# Patient Record
Sex: FEMALE | Race: WHITE | Employment: UNEMPLOYED | URBAN - NONMETROPOLITAN AREA
[De-identification: names, ages, dates, MRNs, and addresses within clinical notes are randomized per-mention and may not be internally consistent; named-entity substitution may affect disease eponyms.]

---

## 2017-02-02 ENCOUNTER — TELEPHONE (OUTPATIENT)
Dept: FAMILY MEDICINE CLINIC | Age: 72
End: 2017-02-02

## 2017-03-07 ENCOUNTER — OFFICE VISIT (OUTPATIENT)
Dept: OPHTHALMOLOGY | Age: 72
End: 2017-03-07

## 2017-03-07 DIAGNOSIS — H40.10X3 COAG (CHRONIC OPEN-ANGLE GLAUCOMA), SEVERE STAGE: Primary | ICD-10-CM

## 2017-03-07 PROCEDURE — 92083 EXTENDED VISUAL FIELD XM: CPT | Performed by: OPHTHALMOLOGY

## 2017-03-07 PROCEDURE — 92133 CPTRZD OPH DX IMG PST SGM ON: CPT | Performed by: OPHTHALMOLOGY

## 2017-03-07 PROCEDURE — 1036F TOBACCO NON-USER: CPT | Performed by: OPHTHALMOLOGY

## 2017-03-07 RX ORDER — PHENYLEPHRINE HCL 2.5 %
1 DROPS OPHTHALMIC (EYE) ONCE
Status: COMPLETED | OUTPATIENT
Start: 2017-03-07 | End: 2017-03-07

## 2017-03-07 RX ORDER — TROPICAMIDE 10 MG/ML
1 SOLUTION/ DROPS OPHTHALMIC ONCE
Status: COMPLETED | OUTPATIENT
Start: 2017-03-07 | End: 2017-03-07

## 2017-03-07 RX ADMIN — Medication 1 DROP: at 14:15

## 2017-03-07 RX ADMIN — TROPICAMIDE 1 DROP: 10 SOLUTION/ DROPS OPHTHALMIC at 14:15

## 2017-03-09 ENCOUNTER — OFFICE VISIT (OUTPATIENT)
Dept: OPHTHALMOLOGY | Age: 72
End: 2017-03-09

## 2017-03-09 ENCOUNTER — OFFICE VISIT (OUTPATIENT)
Dept: FAMILY MEDICINE CLINIC | Age: 72
End: 2017-03-09

## 2017-03-09 VITALS
SYSTOLIC BLOOD PRESSURE: 100 MMHG | WEIGHT: 153.8 LBS | DIASTOLIC BLOOD PRESSURE: 68 MMHG | HEART RATE: 60 BPM | HEIGHT: 65 IN | RESPIRATION RATE: 16 BRPM | BODY MASS INDEX: 25.62 KG/M2

## 2017-03-09 DIAGNOSIS — H40.10X3 COAG (CHRONIC OPEN-ANGLE GLAUCOMA), SEVERE STAGE: Primary | ICD-10-CM

## 2017-03-09 DIAGNOSIS — H02.403 PTOSIS OF EYELID, BILATERAL: ICD-10-CM

## 2017-03-09 DIAGNOSIS — Z96.1 PSEUDOPHAKIA OF BOTH EYES: ICD-10-CM

## 2017-03-09 DIAGNOSIS — E11.9 TYPE 2 DIABETES MELLITUS WITHOUT COMPLICATION, WITHOUT LONG-TERM CURRENT USE OF INSULIN (HCC): ICD-10-CM

## 2017-03-09 DIAGNOSIS — J01.41 ACUTE RECURRENT PANSINUSITIS: Primary | ICD-10-CM

## 2017-03-09 PROCEDURE — 4040F PNEUMOC VAC/ADMIN/RCVD: CPT | Performed by: FAMILY MEDICINE

## 2017-03-09 PROCEDURE — 3044F HG A1C LEVEL LT 7.0%: CPT | Performed by: OPHTHALMOLOGY

## 2017-03-09 PROCEDURE — 1036F TOBACCO NON-USER: CPT | Performed by: FAMILY MEDICINE

## 2017-03-09 PROCEDURE — G8483 FLU IMM NO ADMIN DOC REA: HCPCS | Performed by: FAMILY MEDICINE

## 2017-03-09 PROCEDURE — 1123F ACP DISCUSS/DSCN MKR DOCD: CPT | Performed by: FAMILY MEDICINE

## 2017-03-09 PROCEDURE — 1090F PRES/ABSN URINE INCON ASSESS: CPT | Performed by: OPHTHALMOLOGY

## 2017-03-09 PROCEDURE — G8483 FLU IMM NO ADMIN DOC REA: HCPCS | Performed by: OPHTHALMOLOGY

## 2017-03-09 PROCEDURE — 1123F ACP DISCUSS/DSCN MKR DOCD: CPT | Performed by: OPHTHALMOLOGY

## 2017-03-09 PROCEDURE — 1036F TOBACCO NON-USER: CPT | Performed by: OPHTHALMOLOGY

## 2017-03-09 PROCEDURE — G8427 DOCREV CUR MEDS BY ELIG CLIN: HCPCS | Performed by: FAMILY MEDICINE

## 2017-03-09 PROCEDURE — 99214 OFFICE O/P EST MOD 30 MIN: CPT | Performed by: OPHTHALMOLOGY

## 2017-03-09 PROCEDURE — 1090F PRES/ABSN URINE INCON ASSESS: CPT | Performed by: FAMILY MEDICINE

## 2017-03-09 PROCEDURE — 4040F PNEUMOC VAC/ADMIN/RCVD: CPT | Performed by: OPHTHALMOLOGY

## 2017-03-09 PROCEDURE — G8420 CALC BMI NORM PARAMETERS: HCPCS | Performed by: FAMILY MEDICINE

## 2017-03-09 PROCEDURE — 99213 OFFICE O/P EST LOW 20 MIN: CPT | Performed by: FAMILY MEDICINE

## 2017-03-09 PROCEDURE — 3017F COLORECTAL CA SCREEN DOC REV: CPT | Performed by: FAMILY MEDICINE

## 2017-03-09 PROCEDURE — G8427 DOCREV CUR MEDS BY ELIG CLIN: HCPCS | Performed by: OPHTHALMOLOGY

## 2017-03-09 PROCEDURE — 3014F SCREEN MAMMO DOC REV: CPT | Performed by: FAMILY MEDICINE

## 2017-03-09 PROCEDURE — G8399 PT W/DXA RESULTS DOCUMENT: HCPCS | Performed by: FAMILY MEDICINE

## 2017-03-09 PROCEDURE — G8399 PT W/DXA RESULTS DOCUMENT: HCPCS | Performed by: OPHTHALMOLOGY

## 2017-03-09 PROCEDURE — 3017F COLORECTAL CA SCREEN DOC REV: CPT | Performed by: OPHTHALMOLOGY

## 2017-03-09 PROCEDURE — G8420 CALC BMI NORM PARAMETERS: HCPCS | Performed by: OPHTHALMOLOGY

## 2017-03-09 PROCEDURE — 3014F SCREEN MAMMO DOC REV: CPT | Performed by: OPHTHALMOLOGY

## 2017-03-09 RX ORDER — BENOXINATE HCL/FLUORESCEIN SOD 0.4%-0.25%
1 DROPS OPHTHALMIC (EYE) ONCE
Status: COMPLETED | OUTPATIENT
Start: 2017-03-09 | End: 2017-03-09

## 2017-03-09 RX ORDER — AMOXICILLIN AND CLAVULANATE POTASSIUM 875; 125 MG/1; MG/1
1 TABLET, FILM COATED ORAL 2 TIMES DAILY
Qty: 20 TABLET | Refills: 0 | Status: SHIPPED | OUTPATIENT
Start: 2017-03-09 | End: 2017-03-19

## 2017-03-09 RX ORDER — TROPICAMIDE 10 MG/ML
1 SOLUTION/ DROPS OPHTHALMIC ONCE
Status: COMPLETED | OUTPATIENT
Start: 2017-03-09 | End: 2017-03-09

## 2017-03-09 RX ORDER — PHENYLEPHRINE HCL 2.5 %
1 DROPS OPHTHALMIC (EYE) ONCE
Status: COMPLETED | OUTPATIENT
Start: 2017-03-09 | End: 2017-03-09

## 2017-03-09 RX ORDER — BLOOD PRESSURE TEST KIT
KIT MISCELLANEOUS
Qty: 1 KIT | Refills: 0 | Status: SHIPPED | OUTPATIENT
Start: 2017-03-09 | End: 2017-03-14 | Stop reason: SDUPTHER

## 2017-03-09 RX ADMIN — Medication 1 DROP: at 14:31

## 2017-03-09 RX ADMIN — TROPICAMIDE 1 DROP: 10 SOLUTION/ DROPS OPHTHALMIC at 14:31

## 2017-03-09 ASSESSMENT — ENCOUNTER SYMPTOMS
TROUBLE SWALLOWING: 0
SORE THROAT: 0
EYES NEGATIVE: 1
VOICE CHANGE: 0
COUGH: 1
ALLERGIC/IMMUNOLOGIC NEGATIVE: 1
GASTROINTESTINAL NEGATIVE: 1
SINUS PRESSURE: 1
RHINORRHEA: 1

## 2017-03-09 ASSESSMENT — TONOMETRY
OS_IOP_MMHG: 14
IOP_METHOD: APPLANATION W FLURESS DROP
OD_IOP_MMHG: 15

## 2017-03-09 ASSESSMENT — SLIT LAMP EXAM - LIDS
COMMENTS: 2+ PTOSIS
COMMENTS: 1+ PTOSIS

## 2017-03-09 ASSESSMENT — VISUAL ACUITY
OS_CC: 20/20
CORRECTION_TYPE: GLASSES
METHOD: SNELLEN - LINEAR

## 2017-03-14 RX ORDER — FLUTICASONE PROPIONATE 50 MCG
2 SPRAY, SUSPENSION (ML) NASAL DAILY
Qty: 1 BOTTLE | Refills: 5 | Status: SHIPPED | OUTPATIENT
Start: 2017-03-14 | End: 2018-05-10 | Stop reason: SDUPTHER

## 2017-03-14 RX ORDER — BLOOD PRESSURE TEST KIT
KIT MISCELLANEOUS
Qty: 1 KIT | Refills: 0 | Status: SHIPPED | OUTPATIENT
Start: 2017-03-14 | End: 2017-09-11 | Stop reason: SDUPTHER

## 2017-03-21 ENCOUNTER — TELEPHONE (OUTPATIENT)
Dept: FAMILY MEDICINE CLINIC | Age: 72
End: 2017-03-21

## 2017-03-30 ENCOUNTER — TELEPHONE (OUTPATIENT)
Dept: FAMILY MEDICINE CLINIC | Age: 72
End: 2017-03-30

## 2017-04-07 RX ORDER — BEPOTASTINE BESILATE 15 MG/ML
1 SOLUTION/ DROPS OPHTHALMIC 2 TIMES DAILY
Qty: 1 BOTTLE | Refills: 3 | Status: SHIPPED | OUTPATIENT
Start: 2017-04-07 | End: 2017-09-11 | Stop reason: ALTCHOICE

## 2017-04-11 ENCOUNTER — OFFICE VISIT (OUTPATIENT)
Dept: FAMILY MEDICINE CLINIC | Age: 72
End: 2017-04-11
Payer: MEDICARE

## 2017-04-11 VITALS
DIASTOLIC BLOOD PRESSURE: 68 MMHG | SYSTOLIC BLOOD PRESSURE: 116 MMHG | HEIGHT: 65 IN | BODY MASS INDEX: 25.49 KG/M2 | HEART RATE: 64 BPM | WEIGHT: 153 LBS

## 2017-04-11 DIAGNOSIS — K11.7 XEROSTOMIA: ICD-10-CM

## 2017-04-11 DIAGNOSIS — K30 DELAYED GASTRIC EMPTYING: ICD-10-CM

## 2017-04-11 DIAGNOSIS — E11.9 TYPE 2 DIABETES MELLITUS WITHOUT COMPLICATION, WITHOUT LONG-TERM CURRENT USE OF INSULIN (HCC): ICD-10-CM

## 2017-04-11 DIAGNOSIS — K58.1 IRRITABLE BOWEL SYNDROME WITH CONSTIPATION: ICD-10-CM

## 2017-04-11 DIAGNOSIS — Z91.81 AT HIGH RISK FOR FALLS: Primary | ICD-10-CM

## 2017-04-11 DIAGNOSIS — I10 ESSENTIAL HYPERTENSION: ICD-10-CM

## 2017-04-11 DIAGNOSIS — K11.1 PAROTID GLAND ENLARGEMENT: ICD-10-CM

## 2017-04-11 PROCEDURE — G8427 DOCREV CUR MEDS BY ELIG CLIN: HCPCS | Performed by: FAMILY MEDICINE

## 2017-04-11 PROCEDURE — G8399 PT W/DXA RESULTS DOCUMENT: HCPCS | Performed by: FAMILY MEDICINE

## 2017-04-11 PROCEDURE — 1123F ACP DISCUSS/DSCN MKR DOCD: CPT | Performed by: FAMILY MEDICINE

## 2017-04-11 PROCEDURE — 3014F SCREEN MAMMO DOC REV: CPT | Performed by: FAMILY MEDICINE

## 2017-04-11 PROCEDURE — 1036F TOBACCO NON-USER: CPT | Performed by: FAMILY MEDICINE

## 2017-04-11 PROCEDURE — 1090F PRES/ABSN URINE INCON ASSESS: CPT | Performed by: FAMILY MEDICINE

## 2017-04-11 PROCEDURE — 3044F HG A1C LEVEL LT 7.0%: CPT | Performed by: FAMILY MEDICINE

## 2017-04-11 PROCEDURE — G8420 CALC BMI NORM PARAMETERS: HCPCS | Performed by: FAMILY MEDICINE

## 2017-04-11 PROCEDURE — 3017F COLORECTAL CA SCREEN DOC REV: CPT | Performed by: FAMILY MEDICINE

## 2017-04-11 PROCEDURE — 4040F PNEUMOC VAC/ADMIN/RCVD: CPT | Performed by: FAMILY MEDICINE

## 2017-04-11 PROCEDURE — 99214 OFFICE O/P EST MOD 30 MIN: CPT | Performed by: FAMILY MEDICINE

## 2017-04-11 ASSESSMENT — ENCOUNTER SYMPTOMS
EYES NEGATIVE: 1
ALLERGIC/IMMUNOLOGIC NEGATIVE: 1
GASTROINTESTINAL NEGATIVE: 1
RESPIRATORY NEGATIVE: 1

## 2017-06-09 ENCOUNTER — TELEPHONE (OUTPATIENT)
Dept: FAMILY MEDICINE CLINIC | Age: 72
End: 2017-06-09

## 2017-06-20 ENCOUNTER — TELEPHONE (OUTPATIENT)
Dept: FAMILY MEDICINE CLINIC | Age: 72
End: 2017-06-20
Payer: MEDICARE

## 2017-06-20 DIAGNOSIS — K85.90 ACUTE PANCREATITIS, UNSPECIFIED COMPLICATION STATUS, UNSPECIFIED PANCREATITIS TYPE: ICD-10-CM

## 2017-06-20 DIAGNOSIS — K58.1 IRRITABLE BOWEL SYNDROME WITH CONSTIPATION: ICD-10-CM

## 2017-06-20 DIAGNOSIS — I10 ESSENTIAL HYPERTENSION: ICD-10-CM

## 2017-06-20 DIAGNOSIS — E13.8 DIABETES MELLITUS OF OTHER TYPE WITH COMPLICATION: Primary | ICD-10-CM

## 2017-06-21 PROCEDURE — G0180 MD CERTIFICATION HHA PATIENT: HCPCS | Performed by: FAMILY MEDICINE

## 2017-07-10 DIAGNOSIS — I10 ESSENTIAL HYPERTENSION: ICD-10-CM

## 2017-07-10 RX ORDER — LEVOTHYROXINE SODIUM 88 UG/1
TABLET ORAL
Qty: 30 TABLET | Refills: 6 | Status: SHIPPED | OUTPATIENT
Start: 2017-07-10 | End: 2017-11-15 | Stop reason: SDUPTHER

## 2017-07-10 RX ORDER — LISINOPRIL 5 MG/1
TABLET ORAL
Qty: 30 TABLET | Refills: 10 | Status: SHIPPED | OUTPATIENT
Start: 2017-07-10 | End: 2017-11-15 | Stop reason: SDUPTHER

## 2017-07-21 ENCOUNTER — HOSPITAL ENCOUNTER (EMERGENCY)
Age: 72
Discharge: HOME | End: 2017-07-21
Attending: EMERGENCY MEDICINE
Payer: MEDICARE

## 2017-07-21 ENCOUNTER — APPOINTMENT (OUTPATIENT)
Dept: GENERAL RADIOLOGY | Age: 72
End: 2017-07-21
Payer: MEDICARE

## 2017-07-21 VITALS
SYSTOLIC BLOOD PRESSURE: 122 MMHG | OXYGEN SATURATION: 95 % | WEIGHT: 152 LBS | HEIGHT: 65 IN | RESPIRATION RATE: 16 BRPM | HEART RATE: 71 BPM | DIASTOLIC BLOOD PRESSURE: 64 MMHG | BODY MASS INDEX: 25.33 KG/M2 | TEMPERATURE: 97.7 F

## 2017-07-21 DIAGNOSIS — J01.00 ACUTE NON-RECURRENT MAXILLARY SINUSITIS: Primary | ICD-10-CM

## 2017-07-21 PROCEDURE — 70360 X-RAY EXAM OF NECK: CPT

## 2017-07-21 PROCEDURE — 99284 EMERGENCY DEPT VISIT MOD MDM: CPT

## 2017-07-21 PROCEDURE — 70360 X-RAY EXAM OF NECK: CPT | Performed by: RADIOLOGY

## 2017-07-21 RX ORDER — SULFAMETHOXAZOLE AND TRIMETHOPRIM 800; 160 MG/1; MG/1
1 TABLET ORAL 2 TIMES DAILY
Qty: 20 TABLET | Refills: 0 | Status: SHIPPED | OUTPATIENT
Start: 2017-07-21 | End: 2017-07-31

## 2017-07-21 ASSESSMENT — PAIN DESCRIPTION - LOCATION: LOCATION: HEAD

## 2017-07-21 ASSESSMENT — PAIN DESCRIPTION - ORIENTATION: ORIENTATION: OTHER (COMMENT)

## 2017-07-21 ASSESSMENT — PAIN DESCRIPTION - ONSET: ONSET: ON-GOING

## 2017-07-21 ASSESSMENT — PAIN DESCRIPTION - DESCRIPTORS: DESCRIPTORS: PRESSURE

## 2017-07-21 ASSESSMENT — PAIN DESCRIPTION - PAIN TYPE: TYPE: ACUTE PAIN

## 2017-07-21 ASSESSMENT — PAIN SCALES - GENERAL
PAINLEVEL_OUTOF10: 7
PAINLEVEL_OUTOF10: 6

## 2017-07-21 ASSESSMENT — PAIN DESCRIPTION - FREQUENCY: FREQUENCY: CONTINUOUS

## 2017-07-24 ENCOUNTER — CARE COORDINATION (OUTPATIENT)
Dept: FAMILY MEDICINE CLINIC | Age: 72
End: 2017-07-24

## 2017-08-04 ENCOUNTER — OFFICE VISIT (OUTPATIENT)
Dept: PRIMARY CARE CLINIC | Age: 72
End: 2017-08-04
Payer: MEDICARE

## 2017-08-04 VITALS
HEIGHT: 65 IN | SYSTOLIC BLOOD PRESSURE: 112 MMHG | DIASTOLIC BLOOD PRESSURE: 74 MMHG | TEMPERATURE: 98.3 F | BODY MASS INDEX: 25.56 KG/M2 | OXYGEN SATURATION: 98 % | WEIGHT: 153.4 LBS | HEART RATE: 74 BPM

## 2017-08-04 DIAGNOSIS — R00.2 FLUTTERING SENSATION OF HEART: ICD-10-CM

## 2017-08-04 DIAGNOSIS — R68.2 DRY MOUTH: Primary | ICD-10-CM

## 2017-08-04 PROCEDURE — G8419 CALC BMI OUT NRM PARAM NOF/U: HCPCS | Performed by: FAMILY MEDICINE

## 2017-08-04 PROCEDURE — 1090F PRES/ABSN URINE INCON ASSESS: CPT | Performed by: FAMILY MEDICINE

## 2017-08-04 PROCEDURE — 1036F TOBACCO NON-USER: CPT | Performed by: FAMILY MEDICINE

## 2017-08-04 PROCEDURE — 3017F COLORECTAL CA SCREEN DOC REV: CPT | Performed by: FAMILY MEDICINE

## 2017-08-04 PROCEDURE — 93000 ELECTROCARDIOGRAM COMPLETE: CPT | Performed by: FAMILY MEDICINE

## 2017-08-04 PROCEDURE — G8399 PT W/DXA RESULTS DOCUMENT: HCPCS | Performed by: FAMILY MEDICINE

## 2017-08-04 PROCEDURE — 3014F SCREEN MAMMO DOC REV: CPT | Performed by: FAMILY MEDICINE

## 2017-08-04 PROCEDURE — 4040F PNEUMOC VAC/ADMIN/RCVD: CPT | Performed by: FAMILY MEDICINE

## 2017-08-04 PROCEDURE — 1123F ACP DISCUSS/DSCN MKR DOCD: CPT | Performed by: FAMILY MEDICINE

## 2017-08-04 PROCEDURE — G8427 DOCREV CUR MEDS BY ELIG CLIN: HCPCS | Performed by: FAMILY MEDICINE

## 2017-08-04 PROCEDURE — 99214 OFFICE O/P EST MOD 30 MIN: CPT | Performed by: FAMILY MEDICINE

## 2017-08-04 ASSESSMENT — ENCOUNTER SYMPTOMS
RESPIRATORY NEGATIVE: 1
ALLERGIC/IMMUNOLOGIC NEGATIVE: 1
SORE THROAT: 0
GASTROINTESTINAL NEGATIVE: 1
EYE ITCHING: 1

## 2017-08-07 PROBLEM — E11.9 TYPE 2 DIABETES MELLITUS WITHOUT COMPLICATION, WITHOUT LONG-TERM CURRENT USE OF INSULIN (HCC): Status: ACTIVE | Noted: 2017-08-07

## 2017-08-11 ENCOUNTER — HOSPITAL ENCOUNTER (OUTPATIENT)
Age: 72
Setting detail: SPECIMEN
Discharge: HOME | End: 2017-08-11
Payer: MEDICARE

## 2017-08-11 LAB — RHEUMATOID FACTOR: <10 IU/ML

## 2017-08-14 LAB
ANTI SSA: 6 U/ML
ANTI SSB: 12 U/ML
ANTI-NUCLEAR ANTIBODY (ANA): POSITIVE

## 2017-08-22 ENCOUNTER — TELEPHONE (OUTPATIENT)
Dept: FAMILY MEDICINE CLINIC | Age: 72
End: 2017-08-22
Payer: MEDICARE

## 2017-08-22 DIAGNOSIS — I10 UNSPECIFIED ESSENTIAL HYPERTENSION: ICD-10-CM

## 2017-08-22 DIAGNOSIS — M15.9 POLYOSTEOARTHRITIS: ICD-10-CM

## 2017-08-22 DIAGNOSIS — E11.9 TYPE 2 DIABETES MELLITUS WITHOUT COMPLICATION, WITHOUT LONG-TERM CURRENT USE OF INSULIN (HCC): ICD-10-CM

## 2017-08-22 DIAGNOSIS — K21.9 GASTROESOPHAGEAL REFLUX DISEASE WITHOUT ESOPHAGITIS: ICD-10-CM

## 2017-08-22 DIAGNOSIS — Z51.89 ENCOUNTER FOR OTHER SPECIFIED AFTERCARE: Primary | ICD-10-CM

## 2017-08-22 DIAGNOSIS — M62.81 MUSCLE WEAKNESS (GENERALIZED): ICD-10-CM

## 2017-08-22 PROCEDURE — G0180 MD CERTIFICATION HHA PATIENT: HCPCS | Performed by: FAMILY MEDICINE

## 2017-08-31 ENCOUNTER — TELEPHONE (OUTPATIENT)
Dept: FAMILY MEDICINE CLINIC | Age: 72
End: 2017-08-31

## 2017-08-31 DIAGNOSIS — E11.9 TYPE 2 DIABETES MELLITUS WITHOUT COMPLICATION, WITHOUT LONG-TERM CURRENT USE OF INSULIN (HCC): Primary | ICD-10-CM

## 2017-09-11 ENCOUNTER — OFFICE VISIT (OUTPATIENT)
Dept: FAMILY MEDICINE CLINIC | Age: 72
End: 2017-09-11
Payer: MEDICARE

## 2017-09-11 VITALS
SYSTOLIC BLOOD PRESSURE: 128 MMHG | HEIGHT: 65 IN | HEART RATE: 76 BPM | OXYGEN SATURATION: 97 % | TEMPERATURE: 97.2 F | BODY MASS INDEX: 25.83 KG/M2 | WEIGHT: 155 LBS | DIASTOLIC BLOOD PRESSURE: 72 MMHG

## 2017-09-11 DIAGNOSIS — Z12.31 VISIT FOR SCREENING MAMMOGRAM: Primary | ICD-10-CM

## 2017-09-11 DIAGNOSIS — Z74.2 NEED FOR HOME HEALTH CARE: ICD-10-CM

## 2017-09-11 PROCEDURE — 3017F COLORECTAL CA SCREEN DOC REV: CPT | Performed by: FAMILY MEDICINE

## 2017-09-11 PROCEDURE — 1090F PRES/ABSN URINE INCON ASSESS: CPT | Performed by: FAMILY MEDICINE

## 2017-09-11 PROCEDURE — G8427 DOCREV CUR MEDS BY ELIG CLIN: HCPCS | Performed by: FAMILY MEDICINE

## 2017-09-11 PROCEDURE — 3014F SCREEN MAMMO DOC REV: CPT | Performed by: FAMILY MEDICINE

## 2017-09-11 PROCEDURE — 1123F ACP DISCUSS/DSCN MKR DOCD: CPT | Performed by: FAMILY MEDICINE

## 2017-09-11 PROCEDURE — G8399 PT W/DXA RESULTS DOCUMENT: HCPCS | Performed by: FAMILY MEDICINE

## 2017-09-11 PROCEDURE — 1036F TOBACCO NON-USER: CPT | Performed by: FAMILY MEDICINE

## 2017-09-11 PROCEDURE — 99213 OFFICE O/P EST LOW 20 MIN: CPT | Performed by: FAMILY MEDICINE

## 2017-09-11 PROCEDURE — 4040F PNEUMOC VAC/ADMIN/RCVD: CPT | Performed by: FAMILY MEDICINE

## 2017-09-11 PROCEDURE — G8417 CALC BMI ABV UP PARAM F/U: HCPCS | Performed by: FAMILY MEDICINE

## 2017-09-11 RX ORDER — LORATADINE 10 MG/1
10 TABLET ORAL DAILY
Qty: 30 TABLET | Refills: 11 | Status: SHIPPED | OUTPATIENT
Start: 2017-09-11 | End: 2018-09-13

## 2017-09-11 ASSESSMENT — ENCOUNTER SYMPTOMS
ALLERGIC/IMMUNOLOGIC NEGATIVE: 1
TROUBLE SWALLOWING: 1
RESPIRATORY NEGATIVE: 1
EYE REDNESS: 1
VOICE CHANGE: 0
GASTROINTESTINAL NEGATIVE: 1

## 2017-09-14 ENCOUNTER — OFFICE VISIT (OUTPATIENT)
Dept: FAMILY MEDICINE CLINIC | Age: 72
End: 2017-09-14
Payer: MEDICARE

## 2017-09-14 VITALS
HEIGHT: 65 IN | WEIGHT: 154.98 LBS | BODY MASS INDEX: 25.82 KG/M2 | SYSTOLIC BLOOD PRESSURE: 120 MMHG | HEART RATE: 68 BPM | DIASTOLIC BLOOD PRESSURE: 70 MMHG

## 2017-09-14 DIAGNOSIS — M70.61 TROCHANTERIC BURSITIS OF RIGHT HIP: Primary | ICD-10-CM

## 2017-09-14 DIAGNOSIS — E11.9 TYPE 2 DIABETES MELLITUS WITHOUT COMPLICATION, WITHOUT LONG-TERM CURRENT USE OF INSULIN (HCC): ICD-10-CM

## 2017-09-14 PROCEDURE — 3288F FALL RISK ASSESSMENT DOCD: CPT | Performed by: FAMILY MEDICINE

## 2017-09-14 PROCEDURE — 1123F ACP DISCUSS/DSCN MKR DOCD: CPT | Performed by: FAMILY MEDICINE

## 2017-09-14 PROCEDURE — 3017F COLORECTAL CA SCREEN DOC REV: CPT | Performed by: FAMILY MEDICINE

## 2017-09-14 PROCEDURE — 20610 DRAIN/INJ JOINT/BURSA W/O US: CPT | Performed by: FAMILY MEDICINE

## 2017-09-14 PROCEDURE — G8427 DOCREV CUR MEDS BY ELIG CLIN: HCPCS | Performed by: FAMILY MEDICINE

## 2017-09-14 PROCEDURE — 1036F TOBACCO NON-USER: CPT | Performed by: FAMILY MEDICINE

## 2017-09-14 PROCEDURE — 4040F PNEUMOC VAC/ADMIN/RCVD: CPT | Performed by: FAMILY MEDICINE

## 2017-09-14 PROCEDURE — 1090F PRES/ABSN URINE INCON ASSESS: CPT | Performed by: FAMILY MEDICINE

## 2017-09-14 PROCEDURE — G8417 CALC BMI ABV UP PARAM F/U: HCPCS | Performed by: FAMILY MEDICINE

## 2017-09-14 PROCEDURE — G8399 PT W/DXA RESULTS DOCUMENT: HCPCS | Performed by: FAMILY MEDICINE

## 2017-09-14 PROCEDURE — 3046F HEMOGLOBIN A1C LEVEL >9.0%: CPT | Performed by: FAMILY MEDICINE

## 2017-09-14 PROCEDURE — 3014F SCREEN MAMMO DOC REV: CPT | Performed by: FAMILY MEDICINE

## 2017-09-14 PROCEDURE — 99213 OFFICE O/P EST LOW 20 MIN: CPT | Performed by: FAMILY MEDICINE

## 2017-09-14 ASSESSMENT — ENCOUNTER SYMPTOMS
ALLERGIC/IMMUNOLOGIC NEGATIVE: 1
VOICE CHANGE: 0
RESPIRATORY NEGATIVE: 1
EYES NEGATIVE: 1
GASTROINTESTINAL NEGATIVE: 1

## 2017-09-19 DIAGNOSIS — M16.0 BILATERAL HIP JOINT ARTHRITIS: Primary | ICD-10-CM

## 2017-09-20 ENCOUNTER — HOSPITAL ENCOUNTER (OUTPATIENT)
Dept: MAMMOGRAPHY | Age: 72
Discharge: HOME | End: 2017-09-20
Payer: MEDICARE

## 2017-09-20 DIAGNOSIS — Z12.31 VISIT FOR SCREENING MAMMOGRAM: ICD-10-CM

## 2017-09-20 PROCEDURE — G0202 SCR MAMMO BI INCL CAD: HCPCS

## 2017-10-06 RX ORDER — TIMOLOL MALEATE 5 MG/ML
SOLUTION/ DROPS OPHTHALMIC
Qty: 5 ML | Refills: 10 | Status: SHIPPED | OUTPATIENT
Start: 2017-10-06 | End: 2018-08-06

## 2017-10-10 ENCOUNTER — OFFICE VISIT (OUTPATIENT)
Dept: OPTOMETRY | Age: 72
End: 2017-10-10
Payer: MEDICARE

## 2017-10-10 ENCOUNTER — TELEPHONE (OUTPATIENT)
Dept: FAMILY MEDICINE CLINIC | Age: 72
End: 2017-10-10
Payer: MEDICARE

## 2017-10-10 DIAGNOSIS — M48.061 SPINAL STENOSIS, LUMBAR REGION, WITHOUT NEUROGENIC CLAUDICATION: ICD-10-CM

## 2017-10-10 DIAGNOSIS — E11.9 TYPE 2 DIABETES MELLITUS WITHOUT COMPLICATION, WITHOUT LONG-TERM CURRENT USE OF INSULIN (HCC): Primary | ICD-10-CM

## 2017-10-10 DIAGNOSIS — K58.1 IRRITABLE BOWEL SYNDROME WITH CONSTIPATION: ICD-10-CM

## 2017-10-10 DIAGNOSIS — M54.42 CHRONIC BILATERAL LOW BACK PAIN WITH BILATERAL SCIATICA: ICD-10-CM

## 2017-10-10 DIAGNOSIS — H52.4 ASTIGMATISM OF BOTH EYES WITH PRESBYOPIA: ICD-10-CM

## 2017-10-10 DIAGNOSIS — M54.41 CHRONIC BILATERAL LOW BACK PAIN WITH BILATERAL SCIATICA: ICD-10-CM

## 2017-10-10 DIAGNOSIS — G89.29 CHRONIC BILATERAL LOW BACK PAIN WITH BILATERAL SCIATICA: ICD-10-CM

## 2017-10-10 DIAGNOSIS — H04.123 DRY EYE SYNDROME, BILATERAL: ICD-10-CM

## 2017-10-10 DIAGNOSIS — H53.8 BLURRED VISION, BILATERAL: Primary | ICD-10-CM

## 2017-10-10 DIAGNOSIS — H52.203 ASTIGMATISM OF BOTH EYES WITH PRESBYOPIA: ICD-10-CM

## 2017-10-10 DIAGNOSIS — Z96.1 PSEUDOPHAKIA OF BOTH EYES: ICD-10-CM

## 2017-10-10 PROCEDURE — G8427 DOCREV CUR MEDS BY ELIG CLIN: HCPCS | Performed by: OPTOMETRIST

## 2017-10-10 PROCEDURE — 99214 OFFICE O/P EST MOD 30 MIN: CPT | Performed by: OPTOMETRIST

## 2017-10-10 PROCEDURE — G0179 MD RECERTIFICATION HHA PT: HCPCS | Performed by: FAMILY MEDICINE

## 2017-10-10 PROCEDURE — G8417 CALC BMI ABV UP PARAM F/U: HCPCS | Performed by: OPTOMETRIST

## 2017-10-10 PROCEDURE — G8484 FLU IMMUNIZE NO ADMIN: HCPCS | Performed by: OPTOMETRIST

## 2017-10-10 PROCEDURE — 3014F SCREEN MAMMO DOC REV: CPT | Performed by: OPTOMETRIST

## 2017-10-10 PROCEDURE — G8399 PT W/DXA RESULTS DOCUMENT: HCPCS | Performed by: OPTOMETRIST

## 2017-10-10 PROCEDURE — 1123F ACP DISCUSS/DSCN MKR DOCD: CPT | Performed by: OPTOMETRIST

## 2017-10-10 PROCEDURE — 1090F PRES/ABSN URINE INCON ASSESS: CPT | Performed by: OPTOMETRIST

## 2017-10-10 PROCEDURE — 1036F TOBACCO NON-USER: CPT | Performed by: OPTOMETRIST

## 2017-10-10 PROCEDURE — 3017F COLORECTAL CA SCREEN DOC REV: CPT | Performed by: OPTOMETRIST

## 2017-10-10 PROCEDURE — 4040F PNEUMOC VAC/ADMIN/RCVD: CPT | Performed by: OPTOMETRIST

## 2017-10-10 RX ORDER — PHENYLEPHRINE HCL 2.5 %
1 DROPS OPHTHALMIC (EYE) ONCE
Status: COMPLETED | OUTPATIENT
Start: 2017-10-10 | End: 2017-10-10

## 2017-10-10 RX ORDER — TROPICAMIDE 10 MG/ML
1 SOLUTION/ DROPS OPHTHALMIC ONCE
Status: COMPLETED | OUTPATIENT
Start: 2017-10-10 | End: 2017-10-10

## 2017-10-10 RX ORDER — BENOXINATE HCL/FLUORESCEIN SOD 0.4%-0.25%
1 DROPS OPHTHALMIC (EYE) ONCE
Status: COMPLETED | OUTPATIENT
Start: 2017-10-10 | End: 2017-10-10

## 2017-10-10 RX ADMIN — Medication 1 DROP: at 15:34

## 2017-10-10 RX ADMIN — TROPICAMIDE 1 DROP: 10 SOLUTION/ DROPS OPHTHALMIC at 15:35

## 2017-10-10 RX ADMIN — Medication 1 DROP: at 15:35

## 2017-10-10 ASSESSMENT — KERATOMETRY
OS_K1POWER_DIOPTERS: 42.75
OD_K2POWER_DIOPTERS: 43.75
OS_AXISANGLE_DEGREES: 015
OD_AXISANGLE2_DEGREES: 071
OD_K1POWER_DIOPTERS: 41.75
OS_AXISANGLE2_DEGREES: 105
OS_K2POWER_DIOPTERS: 44.00
OD_AXISANGLE_DEGREES: 161

## 2017-10-10 ASSESSMENT — SLIT LAMP EXAM - LIDS
COMMENTS: NORMAL
COMMENTS: NORMAL

## 2017-10-10 ASSESSMENT — REFRACTION_WEARINGRX
SPECS_TYPE: BIFOCAL
OD_ADD: +2.50
OD_CYLINDER: -2.50
OD_SPHERE: PLANO
OS_AXIS: 107
OS_ADD: +2.50
OS_SPHERE: -0.25
OS_CYLINDER: -0.75
OD_AXIS: 085

## 2017-10-10 ASSESSMENT — REFRACTION_MANIFEST
OD_AXIS: 090
OD_ADD: +2.50
OS_SPHERE: PLANO
OS_CYLINDER: -1.25
OD_CYLINDER: -2.50
OS_AXIS: 100
OS_ADD: +2.50
OD_SPHERE: PLANO

## 2017-10-10 ASSESSMENT — VISUAL ACUITY
METHOD: SNELLEN - LINEAR
CORRECTION_TYPE: GLASSES
OD_CC+: -1
OS_CC: 20/80
OD_CC: 20/400 OU

## 2017-10-10 ASSESSMENT — TONOMETRY
OS_IOP_MMHG: 19
IOP_METHOD: APPLANATION W FLURESS DROP
OD_IOP_MMHG: 18

## 2017-10-10 NOTE — PATIENT INSTRUCTIONS
Keep yearly appointments because of diabetes   New glasses if desired;    Schedule glaucoma pressure check with [facility] or ophth in 4 months;  Fill rx for the timoptic and begin using 2x per day

## 2017-10-10 NOTE — PROGRESS NOTES
Rani Carrasco presents today for   Chief Complaint   Patient presents with    Vision Exam   .    HPI     Last Vision Exam: 11/28/2016 AW  Last Ophthalmology Exam: 3/9/2017 [facility] Dm Exam, cat sx ou 2003 [facility]; timoptic BID last used a long time ago   Has an rx at [facility] to    Last Filled Glasses Rx: 11/28/2016  Insurance: Medicare/Medicaid  Update: Glasses: Update  Vision is all blurry  Both eyes have been itchy, full of \"junk\", and they burn. Has been using soothe eye drops to help.   Diabetic:  Sugars:    HmgA1C: 6.2  3/31/2047                   Main Ophthalmology Exam     External Exam       Right Left    External Normal Normal          Slit Lamp Exam       Right Left    Lids/Lashes Normal Normal    Conjunctiva/Sclera White and quiet White and quiet    Cornea Clear Clear    Anterior Chamber Deep and quiet Deep and quiet    Iris Round and reactive Round and reactive    Lens Posterior chamber intraocular lens Posterior chamber intraocular lens    Vitreous Normal Normal          Fundus Exam       Right Left    Disc Normal Normal    C/D Ratio 0.85 0.75    Macula Normal Normal    Vessels Normal Normal                   Tonometry     Tonometry (Applanation w Fluress drop, 3:37 PM)       Right Left    Pressure 18 19               Visual Acuity (Snellen - Linear)       Right Left    Dist cc 20/70 -1 20/80    Near cc 20/400 OU     Correction:  Glasses        Keratometry     Keratometry       K1 Axis K2 Axis    Right 41.75 071 43.75 161    Left 42.75 105 44.00 015                Ophthalmology Exam     Wearing Rx       Sphere Cylinder Axis Add    Right [place] -2.50 085 +2.50    Left -0.25 -0.75 107 +2.50    Age:  1yr    Type:  Bifocal                Manifest Refraction     Manifest Refraction       Sphere Cylinder Axis Dist VA Add    Right [place] -2.50 090 20/40+ +2.50    Left [place] -1.25 100 20/40- +2.50          Manifest Refraction #2 (Auto)       Sphere Cylinder Axis Dist VA Add    Right +0.00 -2.50 079      Left

## 2017-10-16 ENCOUNTER — TELEPHONE (OUTPATIENT)
Dept: FAMILY MEDICINE CLINIC | Age: 72
End: 2017-10-16

## 2017-10-16 DIAGNOSIS — E11.9 TYPE 2 DIABETES MELLITUS WITHOUT COMPLICATION, WITHOUT LONG-TERM CURRENT USE OF INSULIN (HCC): Primary | ICD-10-CM

## 2017-10-16 RX ORDER — BLOOD SUGAR DIAGNOSTIC
STRIP MISCELLANEOUS
Qty: 200 EACH | Refills: 3 | Status: SHIPPED | OUTPATIENT
Start: 2017-10-16 | End: 2017-10-16 | Stop reason: SDUPTHER

## 2017-10-16 NOTE — TELEPHONE ENCOUNTER
Spoke with [facility] [place], script needed an ICD10 code. Code is now added to prescription diagnoses.

## 2017-10-16 NOTE — TELEPHONE ENCOUNTER
Pt calling stating the pharmacy told her that GO's office needs to contact them regarding the script for test strips. Please call [facility].

## 2017-10-18 ENCOUNTER — TELEPHONE (OUTPATIENT)
Dept: PRIMARY CARE CLINIC | Age: 72
End: 2017-10-18

## 2017-10-20 ENCOUNTER — HOSPITAL ENCOUNTER (OUTPATIENT)
Dept: LAB | Age: 72
Setting detail: SPECIMEN
Discharge: HOME | End: 2017-10-20
Payer: MEDICARE

## 2017-10-20 DIAGNOSIS — E11.9 TYPE 2 DIABETES MELLITUS WITHOUT COMPLICATION, WITHOUT LONG-TERM CURRENT USE OF INSULIN (HCC): ICD-10-CM

## 2017-10-20 LAB
ABSOLUTE EOS #: 0.1 K/UL (ref 0–0.4)
ABSOLUTE IMMATURE GRANULOCYTE: ABNORMAL K/UL (ref 0–0.3)
ABSOLUTE LYMPH #: 2.5 K/UL (ref 1–4.8)
ABSOLUTE MONO #: 0.8 K/UL (ref 0.1–1.2)
ANION GAP SERPL CALCULATED.3IONS-SCNC: 12 MMOL/L (ref 9–17)
BASOPHILS # BLD: 1 % (ref 0–1)
BASOPHILS ABSOLUTE: 0.1 K/UL (ref 0–0.2)
BUN BLDV-MCNC: 24 MG/DL (ref 8–23)
BUN/CREAT BLD: 31 (ref 9–20)
CALCIUM SERPL-MCNC: 10 MG/DL (ref 8.6–10.4)
CHLORIDE BLD-SCNC: 100 MMOL/L (ref 98–107)
CHOLESTEROL/HDL RATIO: 2.8
CHOLESTEROL: 236 MG/DL
CO2: 33 MMOL/L (ref 20–31)
CREAT SERPL-MCNC: 0.78 MG/DL (ref 0.5–0.9)
DIFFERENTIAL TYPE: ABNORMAL
EOSINOPHILS RELATIVE PERCENT: 1 % (ref 1–7)
ESTIMATED AVERAGE GLUCOSE: 131 MG/DL
GFR AFRICAN AMERICAN: >60 ML/MIN
GFR NON-AFRICAN AMERICAN: >60 ML/MIN
GFR SERPL CREATININE-BSD FRML MDRD: ABNORMAL ML/MIN/{1.73_M2}
GFR SERPL CREATININE-BSD FRML MDRD: ABNORMAL ML/MIN/{1.73_M2}
GLUCOSE BLD-MCNC: 116 MG/DL (ref 70–99)
HBA1C MFR BLD: 6.2 % (ref 4.8–5.9)
HCT VFR BLD CALC: 39.9 % (ref 36–46)
HDLC SERPL-MCNC: 84 MG/DL
HEMOGLOBIN: 13.2 G/DL (ref 12–16)
IMMATURE GRANULOCYTES: ABNORMAL %
LDL CHOLESTEROL: 120 MG/DL (ref 0–130)
LYMPHOCYTES # BLD: 38 % (ref 16–46)
MCH RBC QN AUTO: 29.5 PG (ref 26–34)
MCHC RBC AUTO-ENTMCNC: 33.1 G/DL (ref 31–37)
MCV RBC AUTO: 89 FL (ref 80–100)
MONOCYTES # BLD: 12 % (ref 4–11)
PDW BLD-RTO: 14.9 % (ref 11–14.5)
PLATELET # BLD: 330 K/UL (ref 140–450)
PLATELET ESTIMATE: ABNORMAL
PMV BLD AUTO: 8.3 FL (ref 6–12)
POTASSIUM SERPL-SCNC: 4.2 MMOL/L (ref 3.7–5.3)
RBC # BLD: 4.49 M/UL (ref 4–5.2)
RBC # BLD: ABNORMAL 10*6/UL
SEG NEUTROPHILS: 48 % (ref 43–77)
SEGMENTED NEUTROPHILS ABSOLUTE COUNT: 3.1 K/UL (ref 1.8–7.7)
SODIUM BLD-SCNC: 145 MMOL/L (ref 135–144)
TRIGL SERPL-MCNC: 160 MG/DL
VLDLC SERPL CALC-MCNC: ABNORMAL MG/DL (ref 1–30)
WBC # BLD: 6.5 K/UL (ref 3.5–11)
WBC # BLD: ABNORMAL 10*3/UL

## 2017-10-20 PROCEDURE — 80048 BASIC METABOLIC PNL TOTAL CA: CPT

## 2017-10-20 PROCEDURE — 85025 COMPLETE CBC W/AUTO DIFF WBC: CPT

## 2017-10-20 PROCEDURE — 80061 LIPID PANEL: CPT

## 2017-10-20 PROCEDURE — 83036 HEMOGLOBIN GLYCOSYLATED A1C: CPT

## 2017-10-20 PROCEDURE — 36415 COLL VENOUS BLD VENIPUNCTURE: CPT

## 2017-10-23 ENCOUNTER — TELEPHONE (OUTPATIENT)
Dept: FAMILY MEDICINE CLINIC | Age: 72
End: 2017-10-23

## 2017-10-23 DIAGNOSIS — E11.9 TYPE 2 DIABETES MELLITUS WITHOUT COMPLICATION, WITHOUT LONG-TERM CURRENT USE OF INSULIN (HCC): ICD-10-CM

## 2017-10-23 RX ORDER — BLOOD SUGAR DIAGNOSTIC
STRIP MISCELLANEOUS
Qty: 100 STRIP | Refills: 3 | Status: SHIPPED | OUTPATIENT
Start: 2017-10-23 | End: 2017-12-27 | Stop reason: SDUPTHER

## 2017-10-26 ENCOUNTER — HOSPITAL ENCOUNTER (OUTPATIENT)
Age: 72
Setting detail: SPECIMEN
Discharge: HOME | End: 2017-10-26
Payer: MEDICARE

## 2017-10-26 ENCOUNTER — TELEPHONE (OUTPATIENT)
Dept: FAMILY MEDICINE CLINIC | Age: 72
End: 2017-10-26

## 2017-10-26 DIAGNOSIS — R82.90 FOUL SMELLING URINE: ICD-10-CM

## 2017-10-26 DIAGNOSIS — R30.9 PAIN WITH URINATION: Primary | ICD-10-CM

## 2017-10-26 DIAGNOSIS — R30.9 PAIN WITH URINATION: ICD-10-CM

## 2017-10-26 LAB
-: NORMAL
AMORPHOUS: NORMAL
BACTERIA: NORMAL
BILIRUBIN URINE: NEGATIVE
CASTS UA: NORMAL /LPF (ref 0–2)
COLOR: NORMAL
COMMENT UA: NORMAL
CRYSTALS, UA: NORMAL /HPF
EPITHELIAL CELLS UA: NORMAL /HPF (ref 0–5)
GLUCOSE URINE: NEGATIVE
KETONES, URINE: NEGATIVE
LEUKOCYTE ESTERASE, URINE: NEGATIVE
MUCUS: NORMAL
NITRITE, URINE: NEGATIVE
OTHER OBSERVATIONS UA: NORMAL
PH UA: 6 (ref 5–6)
PROTEIN UA: NEGATIVE
RBC UA: NORMAL /HPF (ref 0–4)
RENAL EPITHELIAL, UA: NORMAL /HPF
SPECIFIC GRAVITY UA: 1.01 (ref 1.01–1.02)
TRICHOMONAS: NORMAL
TURBIDITY: NORMAL
URINE HGB: NEGATIVE
UROBILINOGEN, URINE: NORMAL
WBC UA: NORMAL /HPF (ref 0–4)
YEAST: NORMAL

## 2017-10-26 PROCEDURE — 81001 URINALYSIS AUTO W/SCOPE: CPT

## 2017-10-26 NOTE — TELEPHONE ENCOUNTER
Spoke with Rojelio Cruz does not think she can get a ride into the clinic today.   Do you want to order UA and home health can bring it in?

## 2017-11-07 ENCOUNTER — TELEPHONE (OUTPATIENT)
Dept: FAMILY MEDICINE CLINIC | Age: 72
End: 2017-11-07

## 2017-11-07 NOTE — TELEPHONE ENCOUNTER
Message left on voice mail regarding Dr DON  [facility] recommendations regarding medications and snack

## 2017-11-07 NOTE — TELEPHONE ENCOUNTER
Pt calling stating lately her BS has been dropping, states in the AM it's sometimes been in the 80's and it also drops before she goes to bed, please advise pt at above number, pt did not have any readings to give at this time.

## 2017-11-15 DIAGNOSIS — I10 ESSENTIAL HYPERTENSION: ICD-10-CM

## 2017-11-15 RX ORDER — LISINOPRIL 5 MG/1
TABLET ORAL
Qty: 90 TABLET | Refills: 1 | Status: SHIPPED | OUTPATIENT
Start: 2017-11-15 | End: 2018-05-09 | Stop reason: SDUPTHER

## 2017-11-15 RX ORDER — LEVOTHYROXINE SODIUM 88 UG/1
TABLET ORAL
Qty: 90 TABLET | Refills: 1 | Status: SHIPPED | OUTPATIENT
Start: 2017-11-15 | End: 2018-05-09 | Stop reason: SDUPTHER

## 2017-11-21 ENCOUNTER — TELEPHONE (OUTPATIENT)
Dept: FAMILY MEDICINE CLINIC | Age: 72
End: 2017-11-21

## 2017-12-11 ENCOUNTER — HOSPITAL ENCOUNTER (EMERGENCY)
Age: 72
Discharge: HOME | End: 2017-12-11
Attending: SPECIALIST
Payer: MEDICARE

## 2017-12-11 ENCOUNTER — TELEPHONE (OUTPATIENT)
Dept: FAMILY MEDICINE CLINIC | Age: 72
End: 2017-12-11

## 2017-12-11 VITALS
RESPIRATION RATE: 18 BRPM | DIASTOLIC BLOOD PRESSURE: 61 MMHG | OXYGEN SATURATION: 97 % | TEMPERATURE: 98.6 F | HEART RATE: 80 BPM | SYSTOLIC BLOOD PRESSURE: 114 MMHG | BODY MASS INDEX: 26.66 KG/M2 | WEIGHT: 160 LBS | HEIGHT: 65 IN

## 2017-12-11 DIAGNOSIS — R42 DIZZINESS: Primary | ICD-10-CM

## 2017-12-11 DIAGNOSIS — N30.00 ACUTE CYSTITIS WITHOUT HEMATURIA: ICD-10-CM

## 2017-12-11 LAB
-: ABNORMAL
ABSOLUTE EOS #: 0.2 K/UL (ref 0–0.4)
ABSOLUTE IMMATURE GRANULOCYTE: ABNORMAL K/UL (ref 0–0.3)
ABSOLUTE LYMPH #: 2.1 K/UL (ref 1–4.8)
ABSOLUTE MONO #: 1.5 K/UL (ref 0.1–1.2)
AMORPHOUS: ABNORMAL
ANION GAP SERPL CALCULATED.3IONS-SCNC: 14 MMOL/L (ref 9–17)
BACTERIA: ABNORMAL
BASOPHILS # BLD: 1 % (ref 0–1)
BASOPHILS ABSOLUTE: 0.1 K/UL (ref 0–0.2)
BILIRUBIN URINE: NEGATIVE
BUN BLDV-MCNC: 18 MG/DL (ref 8–23)
BUN/CREAT BLD: 19 (ref 9–20)
CALCIUM SERPL-MCNC: 9.7 MG/DL (ref 8.6–10.4)
CASTS UA: ABNORMAL /LPF (ref 0–2)
CHLORIDE BLD-SCNC: 97 MMOL/L (ref 98–107)
CO2: 28 MMOL/L (ref 20–31)
COLOR: ABNORMAL
COMMENT UA: ABNORMAL
CREAT SERPL-MCNC: 0.95 MG/DL (ref 0.5–0.9)
CRYSTALS, UA: ABNORMAL /HPF
DIFFERENTIAL TYPE: ABNORMAL
EOSINOPHILS RELATIVE PERCENT: 2 % (ref 1–7)
EPITHELIAL CELLS UA: ABNORMAL /HPF (ref 0–5)
GFR AFRICAN AMERICAN: >60 ML/MIN
GFR NON-AFRICAN AMERICAN: 58 ML/MIN
GFR SERPL CREATININE-BSD FRML MDRD: ABNORMAL ML/MIN/{1.73_M2}
GFR SERPL CREATININE-BSD FRML MDRD: ABNORMAL ML/MIN/{1.73_M2}
GLUCOSE BLD-MCNC: 199 MG/DL (ref 70–99)
GLUCOSE URINE: NEGATIVE
HCT VFR BLD CALC: 38.8 % (ref 36–46)
HEMOGLOBIN: 12.8 G/DL (ref 12–16)
IMMATURE GRANULOCYTES: ABNORMAL %
KETONES, URINE: NEGATIVE
LEUKOCYTE ESTERASE, URINE: ABNORMAL
LYMPHOCYTES # BLD: 21 % (ref 16–46)
MAGNESIUM: 1.5 MG/DL (ref 1.6–2.6)
MCH RBC QN AUTO: 29.7 PG (ref 26–34)
MCHC RBC AUTO-ENTMCNC: 33.1 G/DL (ref 31–37)
MCV RBC AUTO: 89.9 FL (ref 80–100)
MONOCYTES # BLD: 15 % (ref 4–11)
MUCUS: ABNORMAL
NITRITE, URINE: NEGATIVE
OTHER OBSERVATIONS UA: ABNORMAL
PDW BLD-RTO: 14.9 % (ref 11–14.5)
PH UA: 5.5 (ref 5–6)
PLATELET # BLD: 318 K/UL (ref 140–450)
PLATELET ESTIMATE: ABNORMAL
PMV BLD AUTO: 7.9 FL (ref 6–12)
POTASSIUM SERPL-SCNC: 4.3 MMOL/L (ref 3.7–5.3)
PROTEIN UA: NEGATIVE
RBC # BLD: 4.32 M/UL (ref 4–5.2)
RBC # BLD: ABNORMAL 10*6/UL
RBC UA: ABNORMAL /HPF (ref 0–4)
RENAL EPITHELIAL, UA: ABNORMAL /HPF
SEG NEUTROPHILS: 61 % (ref 43–77)
SEGMENTED NEUTROPHILS ABSOLUTE COUNT: 6.4 K/UL (ref 1.8–7.7)
SODIUM BLD-SCNC: 139 MMOL/L (ref 135–144)
SPECIFIC GRAVITY UA: 1.01 (ref 1.01–1.02)
TRICHOMONAS: ABNORMAL
TURBIDITY: ABNORMAL
URINE HGB: NEGATIVE
UROBILINOGEN, URINE: NORMAL
WBC # BLD: 10.3 K/UL (ref 3.5–11)
WBC # BLD: ABNORMAL 10*3/UL
WBC UA: ABNORMAL /HPF (ref 0–4)
YEAST: ABNORMAL

## 2017-12-11 PROCEDURE — 99284 EMERGENCY DEPT VISIT MOD MDM: CPT

## 2017-12-11 PROCEDURE — 83735 ASSAY OF MAGNESIUM: CPT

## 2017-12-11 PROCEDURE — 96365 THER/PROPH/DIAG IV INF INIT: CPT

## 2017-12-11 PROCEDURE — 6360000002 HC RX W HCPCS: Performed by: SPECIALIST

## 2017-12-11 PROCEDURE — 93005 ELECTROCARDIOGRAM TRACING: CPT

## 2017-12-11 PROCEDURE — 6370000000 HC RX 637 (ALT 250 FOR IP): Performed by: SPECIALIST

## 2017-12-11 PROCEDURE — 81001 URINALYSIS AUTO W/SCOPE: CPT

## 2017-12-11 PROCEDURE — 6370000000 HC RX 637 (ALT 250 FOR IP): Performed by: EMERGENCY MEDICINE

## 2017-12-11 PROCEDURE — 36415 COLL VENOUS BLD VENIPUNCTURE: CPT

## 2017-12-11 PROCEDURE — 80048 BASIC METABOLIC PNL TOTAL CA: CPT

## 2017-12-11 PROCEDURE — 85025 COMPLETE CBC W/AUTO DIFF WBC: CPT

## 2017-12-11 PROCEDURE — 87086 URINE CULTURE/COLONY COUNT: CPT

## 2017-12-11 RX ORDER — SULFAMETHOXAZOLE AND TRIMETHOPRIM 800; 160 MG/1; MG/1
1 TABLET ORAL 2 TIMES DAILY
Qty: 14 TABLET | Refills: 0 | Status: SHIPPED | OUTPATIENT
Start: 2017-12-11 | End: 2017-12-14 | Stop reason: ALTCHOICE

## 2017-12-11 RX ORDER — DIPHENHYDRAMINE HCL 25 MG
25 TABLET ORAL ONCE
Status: COMPLETED | OUTPATIENT
Start: 2017-12-11 | End: 2017-12-11

## 2017-12-11 RX ORDER — MECLIZINE HCL 12.5 MG/1
12.5 TABLET ORAL ONCE
Status: COMPLETED | OUTPATIENT
Start: 2017-12-11 | End: 2017-12-11

## 2017-12-11 RX ORDER — MAGNESIUM SULFATE 1 G/100ML
1 INJECTION INTRAVENOUS ONCE
Status: COMPLETED | OUTPATIENT
Start: 2017-12-11 | End: 2017-12-11

## 2017-12-11 RX ORDER — SULFAMETHOXAZOLE AND TRIMETHOPRIM 800; 160 MG/1; MG/1
1 TABLET ORAL ONCE
Status: COMPLETED | OUTPATIENT
Start: 2017-12-11 | End: 2017-12-11

## 2017-12-11 RX ADMIN — MECLIZINE 12.5 MG: 12.5 TABLET ORAL at 18:54

## 2017-12-11 RX ADMIN — DIPHENHYDRAMINE HCL 25 MG: 25 TABLET ORAL at 18:54

## 2017-12-11 RX ADMIN — MAGNESIUM SULFATE HEPTAHYDRATE 1 G: 1 INJECTION, SOLUTION INTRAVENOUS at 19:57

## 2017-12-11 RX ADMIN — SULFAMETHOXAZOLE AND TRIMETHOPRIM 1 TABLET: 800; 160 TABLET ORAL at 20:55

## 2017-12-11 ASSESSMENT — PAIN SCALES - GENERAL: PAINLEVEL_OUTOF10: 8

## 2017-12-11 ASSESSMENT — PAIN DESCRIPTION - LOCATION: LOCATION: GENERALIZED

## 2017-12-11 ASSESSMENT — PAIN DESCRIPTION - DESCRIPTORS: DESCRIPTORS: ITCHING

## 2017-12-11 ASSESSMENT — PAIN SCALES - WONG BAKER: WONGBAKER_NUMERICALRESPONSE: 8

## 2017-12-11 NOTE — TELEPHONE ENCOUNTER
Pt calling stating she was in [place] and got into cockroaches, pt states she had 1 treatment at the hospital in [facility] and pt states she's still itching, do you want to work in tomorrow, pt cannot find what she was treated with in [facility], please advise at above number.

## 2017-12-11 NOTE — ED TRIAGE NOTES
Patient with dizziness for one hour chi prior to arrival. Patient has been treated for reaction from cockroaches that caused hives and itching. Patient treated with benadryl and steroids.  Patient with a light pink rash noted on skin scattered through out

## 2017-12-11 NOTE — ED PROVIDER NOTES
[facility]  eMERGENCY dEPARTMENT eNCOUnter      Pt Name: Trinity Van  MRN: [id number]  [place] 1945  Date of evaluation: 12/11/2017      CHIEF COMPLAINT       Chief Complaint   Patient presents with    Dizziness     one hour         HISTORY OF PRESENT ILLNESS    Trinity Van is a 74 y.o. female who presents To the emergency department brought in by family after patient started having dizziness and vertigo about one hour prior to arrival.  She states she has come down with head cold which causes dizziness and vertigo as she has had these episodes in the past.  Dizziness is worse with the movements and better with rest.  She admits to having headache. She denies any chest pain or palpitations or diaphoresis. She admits to having slight shortness of breath. She denies any abdominal pain, vomiting or diarrhea and denies any urinary symptoms. She denies any tingling, numbness or weakness in any of the extremities. She has been taking Claritin for head cold without any relief. She denies any fever or chills. Patient states she was in the [facility] recently and the sustained infection with a cockroaches and the received several medicines for the infection but she continues having itching. REVIEW OF SYSTEMS       Review of Systems   Constitutional: Negative for chills and fever. HENT: Positive for congestion. Cardiovascular: Negative for chest pain and palpitations. Neurological: Positive for dizziness and headaches. Negative for syncope. All other systems reviewed and are negative. PAST MEDICAL HISTORY    has a past medical history of Allergic conjunctivitis; Chronic sinusitis; Constipation; Dizzy spells; Dry eye syndrome; Edentulous; Essential hypertension; Fracture of lateral malleolus; Hearing loss; Hypothyroidism; Interstitial cystitis; Irritable bowel syndrome; Keratitis; Parotid gland enlargement; Pseudophakos;  Seasonal rhinitis; Stress hypoglycemia, hyperglycemia, electrolyte imbalance, cardiac arrhythmia    DIAGNOSTIC RESULTS     EKG: All EKG's are interpreted by the Emergency Department Physician who either signs or Co-signs this chart in the absence of a cardiologist.    EKG Interpretation    Interpreted by emergency department physician    Rhythm: normal sinus   Rate: normal  Axis: normal  Conduction: normal  ST Segments: no acute change  T Waves: no acute change  Q Waves: no acute change    Clinical Impression: no acute change, but this is a nonspecific EKG. RADIOLOGY:   I directly visualized the following  images and reviewed the radiologist interpretations:          ED BEDSIDE ULTRASOUND:       LABS:  Labs Reviewed   CBC WITH AUTO DIFFERENTIAL   BASIC METABOLIC PANEL   MAGNESIUM   UA W/REFLEX CULTURE       Her CBC and basic metabolic panel are unremarkable. Urinalysis is pending at the end of my shift. EMERGENCY DEPARTMENT COURSE:   Vitals:    Vitals:    12/11/17 1821   BP: (!) 142/69   Pulse: 89   Resp: 20   Temp: 98.6 °F (37 °C)   TempSrc: Tympanic   SpO2: 96%   Weight: 160 lb (72.6 kg)   Height: 5' 5\" (1.651 m)     -------------------------  BP: (!) 142/69, Temp: 98.6 °F (37 °C), Pulse: 89, Resp: 20    Orders Placed This Encounter   Medications    meclizine (ANTIVERT) tablet 12.5 mg    diphenhydrAMINE (BENADRYL) tablet 25 mg       During emergency department course, patient was given Antivert 12.5 mg and Benadryl 25 mg orally. PLAN/ TASKS OUTSTANDING     Care of this patient has been endorsed to Dr. Nadeem Edwards at 7:30 PM.  We have discussed in detail the patient's history of present illness, physical exam, completed studies as well as current emergency department course. He/She is kind enough to assume care of this patient. I have reviewed the disposition diagnosis with the patient and or their family/guardian. I have answered their questions and given discharge instructions.  They voiced understanding of these

## 2017-12-12 ENCOUNTER — CARE COORDINATION (OUTPATIENT)
Dept: CARE COORDINATION | Age: 72
End: 2017-12-12

## 2017-12-12 LAB
EKG ATRIAL RATE: 86 BPM
EKG P AXIS: 32 DEGREES
EKG P-R INTERVAL: 186 MS
EKG Q-T INTERVAL: 362 MS
EKG QRS DURATION: 76 MS
EKG QTC CALCULATION (BAZETT): 433 MS
EKG R AXIS: 5 DEGREES
EKG T AXIS: 6 DEGREES
EKG VENTRICULAR RATE: 86 BPM

## 2017-12-12 NOTE — ED PROVIDER NOTES
ADDENDUM:      Care of this patient was assumed from Dr. Rene Hodge. The patient was seen for Dizziness (one hour)  . The patient's initial evaluation and plan have been discussed with the prior provider who initially evaluated the patient. Nursing Notes, Past Medical Hx, Past Surgical Hx, Social Hx, Allergies, and Family Hx were all reviewed.       Diagnostic Results     EKG     RADIOLOGY:   Images are read by the radiologist.     LABS:   Results for orders placed or performed during the hospital encounter of 12/11/17   CBC Auto Differential   Result Value Ref Range    WBC 10.3 3.5 - 11.0 k/uL    RBC 4.32 4.0 - 5.2 m/uL    Hemoglobin 12.8 12.0 - 16.0 g/dL    Hematocrit 38.8 36 - 46 %    MCV 89.9 80 - 100 fL    MCH 29.7 26 - 34 pg    MCHC 33.1 31 - 37 g/dL    RDW 14.9 (H) 11.0 - 14.5 %    Platelets 035 613 - 220 k/uL    MPV 7.9 6.0 - 12.0 fL    Differential Type NOT REPORTED     Immature Granulocytes NOT REPORTED 0 %    Absolute Immature Granulocyte NOT REPORTED 0.00 - 0.30 k/uL    WBC Morphology NOT REPORTED     RBC Morphology NOT REPORTED     Platelet Estimate NOT REPORTED     Seg Neutrophils 61 43 - 77 %    Lymphocytes 21 16 - 46 %    Monocytes 15 (H) 4 - 11 %    Eosinophils % 2 1 - 7 %    Basophils 1 0 - 1 %    Segs Absolute 6.40 1.8 - 7.7 k/uL    Absolute Lymph # 2.10 1.0 - 4.8 k/uL    Absolute Mono # 1.50 (H) 0.1 - 1.2 k/uL    Absolute Eos # 0.20 0.0 - 0.4 k/uL    Basophils # 0.10 0.0 - 0.2 k/uL   Basic Metabolic Panel   Result Value Ref Range    Glucose 199 (H) 70 - 99 mg/dL    BUN 18 8 - 23 mg/dL    CREATININE 0.95 (H) 0.50 - 0.90 mg/dL    Bun/Cre Ratio 19 9 - 20    Calcium 9.7 8.6 - 10.4 mg/dL    Sodium 139 135 - 144 mmol/L    Potassium 4.3 3.7 - 5.3 mmol/L    Chloride 97 (L) 98 - 107 mmol/L    CO2 28 20 - 31 mmol/L    Anion Gap 14 9 - 17 mmol/L    GFR Non-African American 58 (L) >60 mL/min    GFR African American >60 >60 mL/min    GFR Comment          GFR Staging NOT REPORTED    Magnesium   Result Value

## 2017-12-12 NOTE — CARE COORDINATION
Ambulatory Care Coordination ED Follow up Call       Reason for ED Visit:  Dizziness and UTI  Care Management Risk Score: CMRS 6  How are you feeling? :     not changed  Patient Reports the following:  Pt states she is still itching. Writer advised her to use hydrocortisone cream. She voiced understanding. She states she will get bactrim today. She was reminded of appt listed below and will make a sooner appt if needed. Contact [facility] regarding any worsening symptoms from above. Did you call your PCP prior to going to the ED? No          Post Discharge Status:  What health concerns since you left the Emergency Room? itching    Do you have wounds that you are caring for at home? No    Do you have a follow up appt scheduled? yes    Review of Instructions:                                 Do you have any questions regarding your discharge instructions?:  No  Medications:    What questions do you have about your medications? No  Are you taking your medications as directed? If not - why? Yes   Can you afford your medications? yes  ADLS:  Do you need assistance of any kind at home? N/A   What assistance is needed?  none      FU appts/Provider:    Future Appointments  Date Time Provider Edin Pandey   1/3/2018 2:00 PM [facility]       Health Maintenance Due   Topic Date Due    Diabetic retinal exam  08/05/1955    DTaP/Tdap/Td vaccine (1 - Tdap) 08/09/2000     Patient advised to contact PCP office to have HM items/records faxed to PCP Office directly? N/A      As a reminder, writer explained the importance of first calling their PCP to get an appointment instead going the ER especially Monday- Friday during office hours for non-emergency problems. Writer advised the patient to speak to a nurse or MOA to assist them with the issue and maybe get them in as a same day/sick call before they decide to go to the ER.  Writer also stressed that they can call me for any help regarding appointments,medications and questions/concerns regarding ER visits, patient understood well.

## 2017-12-13 LAB
CULTURE: NORMAL
CULTURE: NORMAL
Lab: NORMAL
SPECIMEN DESCRIPTION: NORMAL
SPECIMEN DESCRIPTION: NORMAL
STATUS: NORMAL

## 2017-12-14 ENCOUNTER — TELEPHONE (OUTPATIENT)
Dept: FAMILY MEDICINE CLINIC | Age: 72
End: 2017-12-14
Payer: MEDICARE

## 2017-12-14 DIAGNOSIS — I10 ESSENTIAL HYPERTENSION: ICD-10-CM

## 2017-12-14 DIAGNOSIS — E11.9 TYPE 2 DIABETES MELLITUS WITHOUT COMPLICATION, WITHOUT LONG-TERM CURRENT USE OF INSULIN (HCC): Primary | ICD-10-CM

## 2017-12-14 DIAGNOSIS — K85.90 ACUTE PANCREATITIS, UNSPECIFIED COMPLICATION STATUS, UNSPECIFIED PANCREATITIS TYPE: ICD-10-CM

## 2017-12-14 DIAGNOSIS — M48.061 SPINAL STENOSIS, LUMBAR REGION, WITHOUT NEUROGENIC CLAUDICATION: ICD-10-CM

## 2017-12-14 DIAGNOSIS — K58.1 IRRITABLE BOWEL SYNDROME WITH CONSTIPATION: ICD-10-CM

## 2017-12-14 PROCEDURE — G0180 MD CERTIFICATION HHA PATIENT: HCPCS | Performed by: FAMILY MEDICINE

## 2017-12-27 ENCOUNTER — TELEPHONE (OUTPATIENT)
Dept: FAMILY MEDICINE CLINIC | Age: 72
End: 2017-12-27

## 2017-12-27 NOTE — TELEPHONE ENCOUNTER
Meals from [facility] will be discussed at office visit on 1/3/2018 with Dr. Ginny Bonner. Refill will need to be sent in.   GO out office.

## 2017-12-27 NOTE — TELEPHONE ENCOUNTER
Pt calling stating she wants to switch her meals from [employer] to [employer], she needs GO to take her off her \"special low sodium diabetic diet\" so she can apply for the [employer] meals. States if you have any questions call Nunu Mora with [employer] at [phone] ext [phone]. Also states the pharmacy wont give her test strips she needs a new refill sent in to [facility] please.

## 2018-01-03 ENCOUNTER — OFFICE VISIT (OUTPATIENT)
Dept: FAMILY MEDICINE CLINIC | Age: 73
End: 2018-01-03
Payer: MEDICARE

## 2018-01-03 VITALS
HEIGHT: 65 IN | SYSTOLIC BLOOD PRESSURE: 118 MMHG | BODY MASS INDEX: 27.16 KG/M2 | HEART RATE: 72 BPM | DIASTOLIC BLOOD PRESSURE: 68 MMHG | WEIGHT: 163 LBS

## 2018-01-03 DIAGNOSIS — K21.9 GASTROESOPHAGEAL REFLUX DISEASE WITHOUT ESOPHAGITIS: ICD-10-CM

## 2018-01-03 DIAGNOSIS — K11.7 XEROSTOMIA: ICD-10-CM

## 2018-01-03 DIAGNOSIS — M15.9 POLYOSTEOARTHRITIS: ICD-10-CM

## 2018-01-03 DIAGNOSIS — I10 ESSENTIAL HYPERTENSION: ICD-10-CM

## 2018-01-03 DIAGNOSIS — K30 DELAYED GASTRIC EMPTYING: ICD-10-CM

## 2018-01-03 DIAGNOSIS — M70.62 TROCHANTERIC BURSITIS OF BOTH HIPS: ICD-10-CM

## 2018-01-03 DIAGNOSIS — K58.1 IRRITABLE BOWEL SYNDROME WITH CONSTIPATION: ICD-10-CM

## 2018-01-03 DIAGNOSIS — M70.61 TROCHANTERIC BURSITIS OF BOTH HIPS: ICD-10-CM

## 2018-01-03 DIAGNOSIS — E11.8 TYPE 2 DIABETES MELLITUS WITH COMPLICATION, WITHOUT LONG-TERM CURRENT USE OF INSULIN (HCC): Primary | ICD-10-CM

## 2018-01-03 PROCEDURE — 3017F COLORECTAL CA SCREEN DOC REV: CPT | Performed by: FAMILY MEDICINE

## 2018-01-03 PROCEDURE — 4040F PNEUMOC VAC/ADMIN/RCVD: CPT | Performed by: FAMILY MEDICINE

## 2018-01-03 PROCEDURE — 3014F SCREEN MAMMO DOC REV: CPT | Performed by: FAMILY MEDICINE

## 2018-01-03 PROCEDURE — G8399 PT W/DXA RESULTS DOCUMENT: HCPCS | Performed by: FAMILY MEDICINE

## 2018-01-03 PROCEDURE — G8417 CALC BMI ABV UP PARAM F/U: HCPCS | Performed by: FAMILY MEDICINE

## 2018-01-03 PROCEDURE — G8484 FLU IMMUNIZE NO ADMIN: HCPCS | Performed by: FAMILY MEDICINE

## 2018-01-03 PROCEDURE — 1090F PRES/ABSN URINE INCON ASSESS: CPT | Performed by: FAMILY MEDICINE

## 2018-01-03 PROCEDURE — 1123F ACP DISCUSS/DSCN MKR DOCD: CPT | Performed by: FAMILY MEDICINE

## 2018-01-03 PROCEDURE — 3046F HEMOGLOBIN A1C LEVEL >9.0%: CPT | Performed by: FAMILY MEDICINE

## 2018-01-03 PROCEDURE — 99214 OFFICE O/P EST MOD 30 MIN: CPT | Performed by: FAMILY MEDICINE

## 2018-01-03 PROCEDURE — G8427 DOCREV CUR MEDS BY ELIG CLIN: HCPCS | Performed by: FAMILY MEDICINE

## 2018-01-03 PROCEDURE — 1036F TOBACCO NON-USER: CPT | Performed by: FAMILY MEDICINE

## 2018-01-03 RX ORDER — PANTOPRAZOLE SODIUM 40 MG/1
TABLET, DELAYED RELEASE ORAL
Qty: 30 TABLET | Refills: 5 | Status: SHIPPED | OUTPATIENT
Start: 2018-01-03 | End: 2018-07-05 | Stop reason: CLARIF

## 2018-01-03 ASSESSMENT — ENCOUNTER SYMPTOMS
ALLERGIC/IMMUNOLOGIC NEGATIVE: 1
BACK PAIN: 1
RESPIRATORY NEGATIVE: 1
GASTROINTESTINAL NEGATIVE: 1
EYES NEGATIVE: 1

## 2018-01-03 ASSESSMENT — PATIENT HEALTH QUESTIONNAIRE - PHQ9
SUM OF ALL RESPONSES TO PHQ9 QUESTIONS 1 & 2: 0
2. FEELING DOWN, DEPRESSED OR HOPELESS: 0
1. LITTLE INTEREST OR PLEASURE IN DOING THINGS: 0
SUM OF ALL RESPONSES TO PHQ QUESTIONS 1-9: 0
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
## 2018-01-11 ENCOUNTER — OFFICE VISIT (OUTPATIENT)
Dept: OPTOMETRY | Age: 73
End: 2018-01-11
Payer: MEDICARE

## 2018-01-11 DIAGNOSIS — H40.1131 PRIMARY OPEN ANGLE GLAUCOMA OF BOTH EYES, MILD STAGE: ICD-10-CM

## 2018-01-11 DIAGNOSIS — E11.9 NON-INSULIN DEPENDENT TYPE 2 DIABETES MELLITUS (HCC): Primary | ICD-10-CM

## 2018-01-11 PROCEDURE — G8399 PT W/DXA RESULTS DOCUMENT: HCPCS | Performed by: OPTOMETRIST

## 2018-01-11 PROCEDURE — 1036F TOBACCO NON-USER: CPT | Performed by: OPTOMETRIST

## 2018-01-11 PROCEDURE — 3014F SCREEN MAMMO DOC REV: CPT | Performed by: OPTOMETRIST

## 2018-01-11 PROCEDURE — 99212 OFFICE O/P EST SF 10 MIN: CPT | Performed by: OPTOMETRIST

## 2018-01-11 PROCEDURE — 3017F COLORECTAL CA SCREEN DOC REV: CPT | Performed by: OPTOMETRIST

## 2018-01-11 PROCEDURE — 1090F PRES/ABSN URINE INCON ASSESS: CPT | Performed by: OPTOMETRIST

## 2018-01-11 PROCEDURE — G8427 DOCREV CUR MEDS BY ELIG CLIN: HCPCS | Performed by: OPTOMETRIST

## 2018-01-11 PROCEDURE — 4040F PNEUMOC VAC/ADMIN/RCVD: CPT | Performed by: OPTOMETRIST

## 2018-01-11 PROCEDURE — 1123F ACP DISCUSS/DSCN MKR DOCD: CPT | Performed by: OPTOMETRIST

## 2018-01-11 PROCEDURE — 3046F HEMOGLOBIN A1C LEVEL >9.0%: CPT | Performed by: OPTOMETRIST

## 2018-01-11 PROCEDURE — G8417 CALC BMI ABV UP PARAM F/U: HCPCS | Performed by: OPTOMETRIST

## 2018-01-11 PROCEDURE — G8484 FLU IMMUNIZE NO ADMIN: HCPCS | Performed by: OPTOMETRIST

## 2018-01-11 RX ORDER — BENOXINATE HCL/FLUORESCEIN SOD 0.4%-0.25%
1 DROPS OPHTHALMIC (EYE) ONCE
Status: COMPLETED | OUTPATIENT
Start: 2018-01-11 | End: 2018-01-11

## 2018-01-11 RX ADMIN — Medication 1 DROP: at 14:05

## 2018-01-11 ASSESSMENT — REFRACTION_WEARINGRX
OS_SPHERE: PLANO
OS_CYLINDER: -1.25
OS_AXIS: 100
OD_AXIS: 090
OS_ADD: +2.50
OD_CYLINDER: -2.50
SPECS_TYPE: BIFOCAL
OD_ADD: +2.50
OD_SPHERE: PLANO

## 2018-01-11 ASSESSMENT — VISUAL ACUITY
CORRECTION_TYPE: GLASSES
OD_CC+: -1
OS_CC: 20/70
METHOD: SNELLEN - LINEAR

## 2018-01-11 ASSESSMENT — TONOMETRY
IOP_METHOD: APPLANATION W FLURESS DROP
OS_IOP_MMHG: 14
OD_IOP_MMHG: 15

## 2018-01-26 ENCOUNTER — HOSPITAL ENCOUNTER (EMERGENCY)
Age: 73
Discharge: HOME | End: 2018-01-26
Attending: EMERGENCY MEDICINE
Payer: MEDICARE

## 2018-01-26 VITALS
RESPIRATION RATE: 16 BRPM | HEART RATE: 68 BPM | BODY MASS INDEX: 26.66 KG/M2 | DIASTOLIC BLOOD PRESSURE: 73 MMHG | WEIGHT: 160 LBS | HEIGHT: 65 IN | OXYGEN SATURATION: 96 % | TEMPERATURE: 97.9 F | SYSTOLIC BLOOD PRESSURE: 161 MMHG

## 2018-01-26 DIAGNOSIS — R73.9 HYPERGLYCEMIA: Primary | ICD-10-CM

## 2018-01-26 LAB
-: NORMAL
ABSOLUTE EOS #: 0.1 K/UL (ref 0–0.4)
ABSOLUTE IMMATURE GRANULOCYTE: ABNORMAL K/UL (ref 0–0.3)
ABSOLUTE LYMPH #: 1.9 K/UL (ref 1–4.8)
ABSOLUTE MONO #: 0.6 K/UL (ref 0.1–1.2)
AMORPHOUS: NORMAL
ANION GAP SERPL CALCULATED.3IONS-SCNC: 13 MMOL/L (ref 9–17)
BACTERIA: NORMAL
BASOPHILS # BLD: 1 % (ref 0–1)
BASOPHILS ABSOLUTE: 0 K/UL (ref 0–0.2)
BILIRUBIN URINE: NEGATIVE
BUN BLDV-MCNC: 17 MG/DL (ref 8–23)
BUN/CREAT BLD: 20 (ref 9–20)
CALCIUM SERPL-MCNC: 9.7 MG/DL (ref 8.6–10.4)
CASTS UA: NORMAL /LPF (ref 0–2)
CHLORIDE BLD-SCNC: 98 MMOL/L (ref 98–107)
CO2: 28 MMOL/L (ref 20–31)
COLOR: NORMAL
COMMENT UA: NORMAL
CREAT SERPL-MCNC: 0.85 MG/DL (ref 0.5–0.9)
CRYSTALS, UA: NORMAL /HPF
DIFFERENTIAL TYPE: ABNORMAL
EOSINOPHILS RELATIVE PERCENT: 1 % (ref 1–7)
EPITHELIAL CELLS UA: NORMAL /HPF (ref 0–5)
GFR AFRICAN AMERICAN: >60 ML/MIN
GFR NON-AFRICAN AMERICAN: >60 ML/MIN
GFR SERPL CREATININE-BSD FRML MDRD: ABNORMAL ML/MIN/{1.73_M2}
GFR SERPL CREATININE-BSD FRML MDRD: ABNORMAL ML/MIN/{1.73_M2}
GLUCOSE BLD-MCNC: 202 MG/DL (ref 65–105)
GLUCOSE BLD-MCNC: 231 MG/DL (ref 70–99)
GLUCOSE URINE: NEGATIVE
HCT VFR BLD CALC: 38.7 % (ref 36–46)
HEMOGLOBIN: 12.6 G/DL (ref 12–16)
IMMATURE GRANULOCYTES: ABNORMAL %
KETONES, URINE: NEGATIVE
LEUKOCYTE ESTERASE, URINE: NEGATIVE
LYMPHOCYTES # BLD: 30 % (ref 16–46)
MCH RBC QN AUTO: 29.2 PG (ref 26–34)
MCHC RBC AUTO-ENTMCNC: 32.5 G/DL (ref 31–37)
MCV RBC AUTO: 89.7 FL (ref 80–100)
MONOCYTES # BLD: 10 % (ref 4–11)
MUCUS: NORMAL
NITRITE, URINE: NEGATIVE
NRBC AUTOMATED: ABNORMAL PER 100 WBC
OTHER OBSERVATIONS UA: NORMAL
PDW BLD-RTO: 15 % (ref 11–14.5)
PH UA: 5.5 (ref 5–6)
PLATELET # BLD: 278 K/UL (ref 140–450)
PLATELET ESTIMATE: ABNORMAL
PMV BLD AUTO: 8.5 FL (ref 6–12)
POTASSIUM SERPL-SCNC: 3.9 MMOL/L (ref 3.7–5.3)
PROTEIN UA: NEGATIVE
RBC # BLD: 4.32 M/UL (ref 4–5.2)
RBC # BLD: ABNORMAL 10*6/UL
RBC UA: NORMAL /HPF (ref 0–4)
RENAL EPITHELIAL, UA: NORMAL /HPF
SEG NEUTROPHILS: 58 % (ref 43–77)
SEGMENTED NEUTROPHILS ABSOLUTE COUNT: 3.6 K/UL (ref 1.8–7.7)
SODIUM BLD-SCNC: 139 MMOL/L (ref 135–144)
SPECIFIC GRAVITY UA: 1.01 (ref 1.01–1.02)
TRICHOMONAS: NORMAL
TURBIDITY: NORMAL
URINE HGB: NEGATIVE
UROBILINOGEN, URINE: NORMAL
WBC # BLD: 6.2 K/UL (ref 3.5–11)
WBC # BLD: ABNORMAL 10*3/UL
WBC UA: NORMAL /HPF (ref 0–4)
YEAST: NORMAL

## 2018-01-26 PROCEDURE — 80048 BASIC METABOLIC PNL TOTAL CA: CPT

## 2018-01-26 PROCEDURE — 81001 URINALYSIS AUTO W/SCOPE: CPT

## 2018-01-26 PROCEDURE — 82947 ASSAY GLUCOSE BLOOD QUANT: CPT

## 2018-01-26 PROCEDURE — 99285 EMERGENCY DEPT VISIT HI MDM: CPT

## 2018-01-26 PROCEDURE — 36415 COLL VENOUS BLD VENIPUNCTURE: CPT

## 2018-01-26 PROCEDURE — 85025 COMPLETE CBC W/AUTO DIFF WBC: CPT

## 2018-01-27 NOTE — ED TRIAGE NOTES
States ate dinner of pancakes and an apple turnover and blood sugar reading came back as high. Per EMS blood sugar 211. Does complain only of a headache.

## 2018-01-27 NOTE — ED PROVIDER NOTES
eMERGENCY dEPARTMENT eNCOUnter      Pt Name: Giovanna Burton  MRN: [id number]  [place] 1945  Date of evaluation: 1/26/2018      CHIEF COMPLAINT       Chief Complaint   Patient presents with    Hyperglycemia         HISTORY OF PRESENT ILLNESS    Giovanna Burton is a 74 y.o. female who presents With elevated blood sugar. Patient states she no she cheated on her diet today, ate pancakes and an apple crisp had taken her blood sugar, found it to be over 400 . She was concerned there was so high, such comes in for evaluation. She denies any chest pain, shortness of breath, nausea, vomiting, fever, chills        REVIEW OF SYSTEMS       Review of systems are all reviewed and negative except stated above in HPI     PAST MEDICAL HISTORY    has a past medical history of Allergic conjunctivitis; Chronic sinusitis; Constipation; Dizzy spells; Dry eye syndrome; Edentulous; Essential hypertension; Fracture of lateral malleolus; Hearing loss; Hypothyroidism; Interstitial cystitis; Irritable bowel syndrome; Keratitis; Parotid gland enlargement; Pseudophakos; Seasonal rhinitis; Stress incontinence; Type II or unspecified type diabetes mellitus without mention of complication, not stated as uncontrolled; Urethral stenosis; and Xerostomia. SURGICAL HISTORY      has a past surgical history that includes Cataract removal with implant (Right, 9/2/2003); Cataract removal with implant (Left, 8/12/2003); Cholecystectomy (07/24/1986); Tubal ligation (6/2/1977); Cystourethroscopy/Urethral Dilation (2/8/12); Appendectomy (90-); Colonoscopy (9/8/14); and Upper gastrointestinal endoscopy (6/4/15). CURRENT MEDICATIONS       Previous Medications    BLOOD GLUCOSE MONITORING SUPPL YANY    Monitor blood glucose levels once daily and as needed for hyper/hypoglycemic symptoms.   Dx. Diabetes (E11.9)    FLUTICASONE (FLONASE) 50 MCG/ACT NASAL SPRAY    2 sprays by Nasal route daily    GLUCOSE BLOOD VI TEST STRIPS (FREESTYLE TEST STRIPS) STRIP    USE ONE STRIP TO CHECK GLUCOSE TWICE DAILY    LEVOTHYROXINE (SYNTHROID) 88 MCG TABLET    take 1 tablet by mouth once daily    LISINOPRIL (PRINIVIL;ZESTRIL) 5 MG TABLET    take 1 tablet by mouth once daily    LORATADINE (CLARITIN) 10 MG TABLET    Take 1 tablet by mouth daily    METFORMIN (GLUCOPHAGE) 500 MG TABLET    take 1 tablet by mouth twice a day with meals    PANTOPRAZOLE (PROTONIX) 40 MG TABLET    take 1 tablet by mouth once daily    PROPYLENE GLYCOL (SYSTANE BALANCE OP)    Apply 1 drop to eye 4 times daily    SOFT TOUCH LANCETS MISC    1 Device by Does not apply route 2 times daily    TIMOLOL (TIMOPTIC) 0.5 % OPHTHALMIC SOLUTION    instill 1 drop into both eyes twice a day       ALLERGIES     is allergic to latex; aleve [naproxen]; aspirin; dye [iodides]; influenza vaccines; and reglan [metoclopramide]. FAMILY HISTORY     indicated that her mother is alive. She indicated that her father is . She indicated that the status of her brother is unknown. She indicated that her maternal grandmother is . She indicated that her maternal grandfather is . She indicated that her paternal grandmother is . She indicated that her paternal grandfather is . She indicated that both of her daughters are alive. She indicated that the status of her neg hx is unknown.      family history includes Allergies in her daughter and daughter; Arthritis in her mother; Heart Attack in her mother; Heart Disease in her mother; Other in her brother and maternal grandmother. SOCIAL HISTORY      reports that she has never smoked. She has never used smokeless tobacco. She reports that she does not drink alcohol or use drugs. PHYSICAL EXAM     INITIAL VITALS:  height is 5' 5\" (1.651 m) and weight is 160 lb (72.6 kg). Her tympanic temperature is 97.9 °F (36.6 °C). Her blood pressure is 161/73 (abnormal) and her pulse is 68. Her respiration is 16 and oxygen saturation is 96%. Gen.: Patient is a well-hydrated, nontoxic-appearing elderly female in no apparent distress. HEENT: Head is atraumatic. Mouth shows moist mucous membranes. Respiratory: Lung sounds are clear bilateral without wheezes or rhonchi. Cardiac: Heart is regular rate and rhythm. GI: Abdomen soft, nontender    DIFFERENTIAL DIAGNOSIS/ MDM:     Hyperglycemia, electrolyte abnormalities    DIAGNOSTIC RESULTS       LABS:  Labs Reviewed   CBC WITH AUTO DIFFERENTIAL - Abnormal; Notable for the following:        Result Value    RDW 15.0 (*)     All other components within normal limits   BASIC METABOLIC PANEL - Abnormal; Notable for the following:     Glucose 231 (*)     All other components within normal limits   POC GLUCOSE FINGERSTICK - Abnormal; Notable for the following:     POC Glucose 202 (*)     All other components within normal limits   URINALYSIS   MICROSCOPIC URINALYSIS         EMERGENCY DEPARTMENT COURSE:   Vitals:    Vitals:    01/26/18 1916   BP: (!) 161/73   Pulse: 68   Resp: 16   Temp: 97.9 °F (36.6 °C)   TempSrc: Tympanic   SpO2: 96%   Weight: 160 lb (72.6 kg)   Height: 5' 5\" (1.651 m)     -------------------------  BP: (!) 161/73, Temp: 97.9 °F (36.6 °C), Pulse: 68, Resp: 16    No orders of the defined types were placed in this encounter. Re-evaluation Notes    Repeat labs showed sugar of 230 here. Patient admits to cheating on her diet. She had a large carbohydrate intake today. Rest.  Her labs are unremarkable. I do feel she is stable be discharged home with no further treatment watch her diet. Follow-up as directed. Return if worse        FINAL IMPRESSION      1.  Hyperglycemia          DISPOSITION/PLAN   DISPOSITION Decision To Discharge 01/26/2018 08:34:39 PM      Condition on Disposition    Stable    [facility] [address] [phone]    In 1 day        DISCHARGE MEDICATIONS:  New Prescriptions    No medications on file

## 2018-01-29 ENCOUNTER — CARE COORDINATION (OUTPATIENT)
Dept: CARE COORDINATION | Age: 73
End: 2018-01-29

## 2018-02-13 ENCOUNTER — TELEPHONE (OUTPATIENT)
Dept: FAMILY MEDICINE CLINIC | Age: 73
End: 2018-02-13
Payer: MEDICARE

## 2018-02-13 DIAGNOSIS — I10 ESSENTIAL HYPERTENSION: ICD-10-CM

## 2018-02-13 DIAGNOSIS — E11.9 TYPE 2 DIABETES MELLITUS WITHOUT COMPLICATION, WITHOUT LONG-TERM CURRENT USE OF INSULIN (HCC): ICD-10-CM

## 2018-02-13 DIAGNOSIS — K58.1 IRRITABLE BOWEL SYNDROME WITH CONSTIPATION: ICD-10-CM

## 2018-02-13 DIAGNOSIS — K85.90 ACUTE PANCREATITIS, UNSPECIFIED COMPLICATION STATUS, UNSPECIFIED PANCREATITIS TYPE: Primary | ICD-10-CM

## 2018-02-13 DIAGNOSIS — M48.061 SPINAL STENOSIS, LUMBAR REGION, WITHOUT NEUROGENIC CLAUDICATION: ICD-10-CM

## 2018-02-13 PROCEDURE — G0179 MD RECERTIFICATION HHA PT: HCPCS | Performed by: FAMILY MEDICINE

## 2018-03-01 RX ORDER — GLUCOSAMINE HCL/CHONDROITIN SU 500-400 MG
CAPSULE ORAL
Qty: 100 STRIP | Refills: 1 | Status: SHIPPED | OUTPATIENT
Start: 2018-03-01 | End: 2018-08-03 | Stop reason: SDUPTHER

## 2018-04-11 ENCOUNTER — TELEPHONE (OUTPATIENT)
Dept: FAMILY MEDICINE CLINIC | Age: 73
End: 2018-04-11
Payer: MEDICARE

## 2018-04-11 DIAGNOSIS — K58.1 IRRITABLE BOWEL SYNDROME WITH CONSTIPATION: ICD-10-CM

## 2018-04-11 DIAGNOSIS — M48.061 SPINAL STENOSIS, LUMBAR REGION, WITHOUT NEUROGENIC CLAUDICATION: ICD-10-CM

## 2018-04-11 DIAGNOSIS — I10 ESSENTIAL HYPERTENSION: ICD-10-CM

## 2018-04-11 DIAGNOSIS — E11.9 TYPE 2 DIABETES MELLITUS WITHOUT COMPLICATION, WITHOUT LONG-TERM CURRENT USE OF INSULIN (HCC): Primary | ICD-10-CM

## 2018-04-11 DIAGNOSIS — K85.90 ACUTE PANCREATITIS, UNSPECIFIED COMPLICATION STATUS, UNSPECIFIED PANCREATITIS TYPE: ICD-10-CM

## 2018-04-11 DIAGNOSIS — K11.1 PAROTID GLAND ENLARGEMENT: ICD-10-CM

## 2018-04-11 PROCEDURE — G0179 MD RECERTIFICATION HHA PT: HCPCS | Performed by: FAMILY MEDICINE

## 2018-05-09 DIAGNOSIS — I10 ESSENTIAL HYPERTENSION: ICD-10-CM

## 2018-05-09 RX ORDER — LEVOTHYROXINE SODIUM 88 UG/1
TABLET ORAL
Qty: 90 TABLET | Refills: 1 | Status: SHIPPED | OUTPATIENT
Start: 2018-05-09 | End: 2018-09-13

## 2018-05-09 RX ORDER — LISINOPRIL 5 MG/1
TABLET ORAL
Qty: 90 TABLET | Refills: 1 | Status: SHIPPED | OUTPATIENT
Start: 2018-05-09 | End: 2018-09-13 | Stop reason: ALTCHOICE

## 2018-05-10 RX ORDER — LEVOTHYROXINE SODIUM 88 UG/1
TABLET ORAL
Qty: 90 TABLET | Refills: 1 | Status: SHIPPED | OUTPATIENT
Start: 2018-05-10 | End: 2018-07-05 | Stop reason: SDUPTHER

## 2018-05-10 RX ORDER — FLUTICASONE PROPIONATE 50 MCG
SPRAY, SUSPENSION (ML) NASAL
Qty: 16 G | Refills: 5 | Status: SHIPPED | OUTPATIENT
Start: 2018-05-10 | End: 2019-07-05 | Stop reason: SDUPTHER

## 2018-05-16 RX ORDER — FLUTICASONE PROPIONATE 50 MCG
SPRAY, SUSPENSION (ML) NASAL
Qty: 16 G | Refills: 5 | Status: SHIPPED | OUTPATIENT
Start: 2018-05-16 | End: 2018-07-05 | Stop reason: SDUPTHER

## 2018-05-17 ENCOUNTER — OFFICE VISIT (OUTPATIENT)
Dept: OPTOMETRY | Age: 73
End: 2018-05-17
Payer: MEDICARE

## 2018-05-17 DIAGNOSIS — H40.10X0 OPEN-ANGLE GLAUCOMA, UNSPECIFIED GLAUCOMA STAGE, UNSPECIFIED LATERALITY, UNSPECIFIED OPEN-ANGLE GLAUCOMA TYPE: Primary | ICD-10-CM

## 2018-05-17 DIAGNOSIS — H40.10X1 ADVANCED OPEN-ANGLE GLAUCOMA, MILD STAGE: Primary | ICD-10-CM

## 2018-05-17 PROCEDURE — 4040F PNEUMOC VAC/ADMIN/RCVD: CPT | Performed by: OPTOMETRIST

## 2018-05-17 PROCEDURE — G8417 CALC BMI ABV UP PARAM F/U: HCPCS | Performed by: OPTOMETRIST

## 2018-05-17 PROCEDURE — 1036F TOBACCO NON-USER: CPT | Performed by: OPTOMETRIST

## 2018-05-17 PROCEDURE — G8399 PT W/DXA RESULTS DOCUMENT: HCPCS | Performed by: OPTOMETRIST

## 2018-05-17 PROCEDURE — 3017F COLORECTAL CA SCREEN DOC REV: CPT | Performed by: OPTOMETRIST

## 2018-05-17 PROCEDURE — 1123F ACP DISCUSS/DSCN MKR DOCD: CPT | Performed by: OPTOMETRIST

## 2018-05-17 PROCEDURE — 92083 EXTENDED VISUAL FIELD XM: CPT | Performed by: OPTOMETRIST

## 2018-05-17 PROCEDURE — G8427 DOCREV CUR MEDS BY ELIG CLIN: HCPCS | Performed by: OPTOMETRIST

## 2018-05-17 PROCEDURE — 1090F PRES/ABSN URINE INCON ASSESS: CPT | Performed by: OPTOMETRIST

## 2018-05-17 PROCEDURE — 92133 CPTRZD OPH DX IMG PST SGM ON: CPT | Performed by: OPTOMETRIST

## 2018-05-17 PROCEDURE — 99213 OFFICE O/P EST LOW 20 MIN: CPT | Performed by: OPTOMETRIST

## 2018-05-17 RX ORDER — TROPICAMIDE 10 MG/ML
1 SOLUTION/ DROPS OPHTHALMIC ONCE
Status: COMPLETED | OUTPATIENT
Start: 2018-05-17 | End: 2018-05-17

## 2018-05-17 RX ORDER — BENOXINATE HCL/FLUORESCEIN SOD 0.4%-0.25%
1 DROPS OPHTHALMIC (EYE) ONCE
Status: COMPLETED | OUTPATIENT
Start: 2018-05-17 | End: 2018-05-17

## 2018-05-17 RX ORDER — PHENYLEPHRINE HCL 2.5 %
1 DROPS OPHTHALMIC (EYE) ONCE
Status: COMPLETED | OUTPATIENT
Start: 2018-05-17 | End: 2018-05-17

## 2018-05-17 RX ADMIN — TROPICAMIDE 1 DROP: 10 SOLUTION/ DROPS OPHTHALMIC at 11:00

## 2018-05-17 RX ADMIN — TROPICAMIDE 1 DROP: 10 SOLUTION/ DROPS OPHTHALMIC at 10:28

## 2018-05-17 RX ADMIN — Medication 1 DROP: at 11:00

## 2018-05-17 RX ADMIN — Medication 1 DROP: at 10:28

## 2018-05-17 ASSESSMENT — VISUAL ACUITY
METHOD: SNELLEN - LINEAR
OS_CC: 20/30
CORRECTION_TYPE: GLASSES

## 2018-05-17 ASSESSMENT — TONOMETRY
OD_IOP_MMHG: 17
IOP_METHOD: APPLANATION W FLURESS DROP
OS_IOP_MMHG: 19

## 2018-05-17 ASSESSMENT — SLIT LAMP EXAM - LIDS
COMMENTS: NORMAL
COMMENTS: NORMAL

## 2018-06-13 ENCOUNTER — TELEPHONE (OUTPATIENT)
Dept: FAMILY MEDICINE CLINIC | Age: 73
End: 2018-06-13
Payer: MEDICARE

## 2018-06-13 DIAGNOSIS — K85.90 ACUTE PANCREATITIS, UNSPECIFIED COMPLICATION STATUS, UNSPECIFIED PANCREATITIS TYPE: ICD-10-CM

## 2018-06-13 DIAGNOSIS — E11.9 TYPE 2 DIABETES MELLITUS WITHOUT COMPLICATION, WITHOUT LONG-TERM CURRENT USE OF INSULIN (HCC): ICD-10-CM

## 2018-06-13 DIAGNOSIS — I10 ESSENTIAL HYPERTENSION: ICD-10-CM

## 2018-06-13 DIAGNOSIS — K58.1 IRRITABLE BOWEL SYNDROME WITH CONSTIPATION: ICD-10-CM

## 2018-06-13 DIAGNOSIS — M48.061 SPINAL STENOSIS, LUMBAR REGION, WITHOUT NEUROGENIC CLAUDICATION: Primary | ICD-10-CM

## 2018-06-13 PROCEDURE — G0179 MD RECERTIFICATION HHA PT: HCPCS | Performed by: FAMILY MEDICINE

## 2018-06-28 ENCOUNTER — HOSPITAL ENCOUNTER (OUTPATIENT)
Dept: LAB | Age: 73
Setting detail: SPECIMEN
Discharge: HOME | End: 2018-06-28
Payer: MEDICARE

## 2018-06-28 DIAGNOSIS — E11.8 TYPE 2 DIABETES MELLITUS WITH COMPLICATION, WITHOUT LONG-TERM CURRENT USE OF INSULIN (HCC): ICD-10-CM

## 2018-06-28 DIAGNOSIS — I10 ESSENTIAL HYPERTENSION: ICD-10-CM

## 2018-06-28 LAB
ABSOLUTE EOS #: 0.1 K/UL (ref 0–0.4)
ABSOLUTE IMMATURE GRANULOCYTE: ABNORMAL K/UL (ref 0–0.3)
ABSOLUTE LYMPH #: 2.2 K/UL (ref 1–4.8)
ABSOLUTE MONO #: 0.7 K/UL (ref 0.1–1.2)
ANION GAP SERPL CALCULATED.3IONS-SCNC: 9 MMOL/L (ref 9–17)
BASOPHILS # BLD: 1 % (ref 0–1)
BASOPHILS ABSOLUTE: 0.1 K/UL (ref 0–0.2)
BUN BLDV-MCNC: 15 MG/DL (ref 8–23)
BUN/CREAT BLD: 19 (ref 9–20)
CALCIUM SERPL-MCNC: 10.1 MG/DL (ref 8.6–10.4)
CHLORIDE BLD-SCNC: 100 MMOL/L (ref 98–107)
CHOLESTEROL/HDL RATIO: 4.7
CHOLESTEROL: 253 MG/DL
CO2: 32 MMOL/L (ref 20–31)
CREAT SERPL-MCNC: 0.8 MG/DL (ref 0.5–0.9)
DIFFERENTIAL TYPE: ABNORMAL
EOSINOPHILS RELATIVE PERCENT: 2 % (ref 1–7)
ESTIMATED AVERAGE GLUCOSE: 154 MG/DL
GFR AFRICAN AMERICAN: >60 ML/MIN
GFR NON-AFRICAN AMERICAN: >60 ML/MIN
GFR SERPL CREATININE-BSD FRML MDRD: ABNORMAL ML/MIN/{1.73_M2}
GFR SERPL CREATININE-BSD FRML MDRD: ABNORMAL ML/MIN/{1.73_M2}
GLUCOSE BLD-MCNC: 135 MG/DL (ref 70–99)
HBA1C MFR BLD: 7 % (ref 4.8–5.9)
HCT VFR BLD CALC: 38.9 % (ref 36–46)
HDLC SERPL-MCNC: 54 MG/DL
HEMOGLOBIN: 12.7 G/DL (ref 12–16)
IMMATURE GRANULOCYTES: ABNORMAL %
LDL CHOLESTEROL: 134 MG/DL (ref 0–130)
LYMPHOCYTES # BLD: 37 % (ref 16–46)
MCH RBC QN AUTO: 30.3 PG (ref 26–34)
MCHC RBC AUTO-ENTMCNC: 32.7 G/DL (ref 31–37)
MCV RBC AUTO: 92.8 FL (ref 80–100)
MONOCYTES # BLD: 12 % (ref 4–11)
NRBC AUTOMATED: ABNORMAL PER 100 WBC
PDW BLD-RTO: 15.3 % (ref 11–14.5)
PLATELET # BLD: 325 K/UL (ref 140–450)
PLATELET ESTIMATE: ABNORMAL
PMV BLD AUTO: 8.9 FL (ref 6–12)
POTASSIUM SERPL-SCNC: 4.1 MMOL/L (ref 3.7–5.3)
RBC # BLD: 4.19 M/UL (ref 4–5.2)
RBC # BLD: ABNORMAL 10*6/UL
SEG NEUTROPHILS: 48 % (ref 43–77)
SEGMENTED NEUTROPHILS ABSOLUTE COUNT: 2.9 K/UL (ref 1.8–7.7)
SODIUM BLD-SCNC: 141 MMOL/L (ref 135–144)
TRIGL SERPL-MCNC: 327 MG/DL
VLDLC SERPL CALC-MCNC: ABNORMAL MG/DL (ref 1–30)
WBC # BLD: 5.9 K/UL (ref 3.5–11)
WBC # BLD: ABNORMAL 10*3/UL

## 2018-06-28 PROCEDURE — 36415 COLL VENOUS BLD VENIPUNCTURE: CPT

## 2018-06-28 PROCEDURE — 83036 HEMOGLOBIN GLYCOSYLATED A1C: CPT

## 2018-06-28 PROCEDURE — 85025 COMPLETE CBC W/AUTO DIFF WBC: CPT

## 2018-06-28 PROCEDURE — 80048 BASIC METABOLIC PNL TOTAL CA: CPT

## 2018-06-28 PROCEDURE — 80061 LIPID PANEL: CPT

## 2018-07-05 ENCOUNTER — OFFICE VISIT (OUTPATIENT)
Dept: FAMILY MEDICINE CLINIC | Age: 73
End: 2018-07-05
Payer: MEDICARE

## 2018-07-05 VITALS
SYSTOLIC BLOOD PRESSURE: 122 MMHG | DIASTOLIC BLOOD PRESSURE: 66 MMHG | HEIGHT: 65 IN | HEART RATE: 64 BPM | RESPIRATION RATE: 16 BRPM | BODY MASS INDEX: 28.32 KG/M2 | WEIGHT: 170 LBS

## 2018-07-05 DIAGNOSIS — M15.9 POLYOSTEOARTHRITIS: ICD-10-CM

## 2018-07-05 DIAGNOSIS — G89.29 CHRONIC BILATERAL LOW BACK PAIN WITH BILATERAL SCIATICA: ICD-10-CM

## 2018-07-05 DIAGNOSIS — M54.42 CHRONIC BILATERAL LOW BACK PAIN WITH BILATERAL SCIATICA: ICD-10-CM

## 2018-07-05 DIAGNOSIS — M48.062 SPINAL STENOSIS OF LUMBAR REGION WITH NEUROGENIC CLAUDICATION: ICD-10-CM

## 2018-07-05 DIAGNOSIS — K30 DELAYED GASTRIC EMPTYING: ICD-10-CM

## 2018-07-05 DIAGNOSIS — K21.9 GASTROESOPHAGEAL REFLUX DISEASE WITHOUT ESOPHAGITIS: ICD-10-CM

## 2018-07-05 DIAGNOSIS — K11.7 XEROSTOMIA: ICD-10-CM

## 2018-07-05 DIAGNOSIS — I10 ESSENTIAL HYPERTENSION: ICD-10-CM

## 2018-07-05 DIAGNOSIS — E11.9 TYPE 2 DIABETES MELLITUS WITHOUT COMPLICATION, WITHOUT LONG-TERM CURRENT USE OF INSULIN (HCC): Primary | ICD-10-CM

## 2018-07-05 DIAGNOSIS — M54.41 CHRONIC BILATERAL LOW BACK PAIN WITH BILATERAL SCIATICA: ICD-10-CM

## 2018-07-05 DIAGNOSIS — K58.1 IRRITABLE BOWEL SYNDROME WITH CONSTIPATION: ICD-10-CM

## 2018-07-05 PROCEDURE — G8417 CALC BMI ABV UP PARAM F/U: HCPCS | Performed by: FAMILY MEDICINE

## 2018-07-05 PROCEDURE — 1090F PRES/ABSN URINE INCON ASSESS: CPT | Performed by: FAMILY MEDICINE

## 2018-07-05 PROCEDURE — 3045F PR MOST RECENT HEMOGLOBIN A1C LEVEL 7.0-9.0%: CPT | Performed by: FAMILY MEDICINE

## 2018-07-05 PROCEDURE — 1123F ACP DISCUSS/DSCN MKR DOCD: CPT | Performed by: FAMILY MEDICINE

## 2018-07-05 PROCEDURE — 3017F COLORECTAL CA SCREEN DOC REV: CPT | Performed by: FAMILY MEDICINE

## 2018-07-05 PROCEDURE — 4040F PNEUMOC VAC/ADMIN/RCVD: CPT | Performed by: FAMILY MEDICINE

## 2018-07-05 PROCEDURE — 1036F TOBACCO NON-USER: CPT | Performed by: FAMILY MEDICINE

## 2018-07-05 PROCEDURE — 99214 OFFICE O/P EST MOD 30 MIN: CPT | Performed by: FAMILY MEDICINE

## 2018-07-05 PROCEDURE — 2022F DILAT RTA XM EVC RTNOPTHY: CPT | Performed by: FAMILY MEDICINE

## 2018-07-05 PROCEDURE — G8427 DOCREV CUR MEDS BY ELIG CLIN: HCPCS | Performed by: FAMILY MEDICINE

## 2018-07-05 PROCEDURE — G8399 PT W/DXA RESULTS DOCUMENT: HCPCS | Performed by: FAMILY MEDICINE

## 2018-07-05 RX ORDER — OMEPRAZOLE 20 MG/1
20 CAPSULE, DELAYED RELEASE ORAL DAILY
COMMUNITY
Start: 2018-07-04 | End: 2019-01-02 | Stop reason: ALTCHOICE

## 2018-07-05 RX ORDER — GABAPENTIN 100 MG/1
100 CAPSULE ORAL 3 TIMES DAILY
Qty: 90 CAPSULE | Refills: 11 | Status: SHIPPED | OUTPATIENT
Start: 2018-07-05 | End: 2018-07-24 | Stop reason: SDUPTHER

## 2018-07-05 ASSESSMENT — PATIENT HEALTH QUESTIONNAIRE - PHQ9
SUM OF ALL RESPONSES TO PHQ9 QUESTIONS 1 & 2: 1
1. LITTLE INTEREST OR PLEASURE IN DOING THINGS: 0
2. FEELING DOWN, DEPRESSED OR HOPELESS: 1
SUM OF ALL RESPONSES TO PHQ QUESTIONS 1-9: 1

## 2018-07-05 ASSESSMENT — ENCOUNTER SYMPTOMS
EYES NEGATIVE: 1
RESPIRATORY NEGATIVE: 1
BACK PAIN: 1
ALLERGIC/IMMUNOLOGIC NEGATIVE: 1
GASTROINTESTINAL NEGATIVE: 1

## 2018-07-05 NOTE — PROGRESS NOTES
Subjective:      Patient ID: Yary Jiang is a 74 y.o. female. Routine follow up on chronic medical conditions, refills, and review of updated labs. I have reviewed the patient's medical history in detail and updated the computerized patient record. She currently has some back and foot pain complaints. Back pain became worse/more problematic and painful about 3 months ago. Using tylenol and topical rub that helps with pain. Pain into both gluteal areas and across the lower back. She describes radicular pain down both legs in a posterior/lateral distribution. Distribution variable. Sometimes left, sometimes right, sometimes bilateral at the same time. She reports numbness and tingling in the gluteal areas and posterior thighs. No bladder or bowel incontinence issues of concern. She reports burning/sharp pains in the feet. Haven sensitive , barely tolerates shoes. Feet feel cold /numb. Past Medical History:   Diagnosis Date    Allergic conjunctivitis     (stable)    Chronic sinusitis     Constipation     Dizzy spells     Dry eye syndrome     Edentulous     (does not wear dentures)    Essential hypertension 7/6/2016    Fracture of lateral malleolus     (avulsion fracture at the tip of the lateral malleolus - right ankle).     Hearing loss     Hypothyroidism     Interstitial cystitis     Irritable bowel syndrome     Keratitis     (secondary to dry eye syndrome)    Parotid gland enlargement     stasis, consider sjorgens    Pseudophakos     (bilateral)    Seasonal rhinitis     Stress incontinence     Type II or unspecified type diabetes mellitus without mention of complication, not stated as uncontrolled     Urethral stenosis     Xerostomia      Past Surgical History:   Procedure Laterality Date    APPENDECTOMY  07-    CATARACT REMOVAL WITH IMPLANT Right 9/2/2003    (Dr. Josie Cordero)    CATARACT REMOVAL WITH IMPLANT Left 8/12/2003    (Dr. Josie Cordero)   [address] [place] [address] CHOLECYSTECTOMY  07/24/1986    COLONOSCOPY  9/8/14    normal    CYSTOURETHROSCOPY/URETHRAL DILATION  2/8/12    multiple    TUBAL LIGATION  6/2/1977    (Dr. Richelle Arce)    UPPER GASTROINTESTINAL ENDOSCOPY  6/4/15    gastritis, biopsy x 2     Current Outpatient Prescriptions   Medication Sig Dispense Refill    omeprazole (PRILOSEC) 20 MG delayed release capsule Take 20 mg by mouth daily      fluticasone (FLONASE) 50 MCG/ACT nasal spray instill 2 sprays into each nostril once daily 16 g 5    metFORMIN (GLUCOPHAGE) 500 MG tablet take 1 tablet by mouth twice a day with meals 180 tablet 1    lisinopril (PRINIVIL;ZESTRIL) 5 MG tablet take 1 tablet by mouth once daily 90 tablet 1    levothyroxine (SYNTHROID) 88 MCG tablet take 1 tablet by mouth once daily 90 tablet 1    Blood Glucose Monitoring Suppl YANY Monitor blood glucose levels twice daily and as needed for hyper/hypoglycemic symptoms. Dx. Diabetes (E11.9) 1 Device 0    Glucose Blood (BLOOD GLUCOSE TEST STRIPS) STRP Monitor blood glucose levels twice daily and as needed for hyper/hypoglycemic symptoms. Dx. Diabetes (E11.9) 100 strip 1    timolol (TIMOPTIC) 0.5 % ophthalmic solution instill 1 drop into both eyes twice a day 5 mL 10    loratadine (CLARITIN) 10 MG tablet Take 1 tablet by mouth daily 30 tablet 11    Propylene Glycol (SYSTANE BALANCE OP) Apply 1 drop to eye 4 times daily      SOFT TOUCH LANCETS MISC 1 Device by Does not apply route 2 times daily 200 each 3     No current facility-administered medications for this visit. Allergies   Allergen Reactions    Latex Rash    Aleve [Naproxen]      Stomach problems    Aspirin Other (See Comments)     Dizziness and tears her stomach up    Dye [Iodides]      IV DYE    Influenza Vaccines     Reglan [Metoclopramide] Other (See Comments)     Dystonic reaction, involuntary movements     . Rash     Other   Associated symptoms include arthralgias (knees) and a rash.    Diabetes   Hypoglycemia symptoms include dizziness. Review of Systems   Constitutional: Negative. HENT: Negative. Eyes: Negative. Respiratory: Negative. Cardiovascular: Negative. Gastrointestinal: Negative. Endocrine: Negative. Genitourinary: Negative. Musculoskeletal: Positive for arthralgias (knees), back pain and gait problem (she has a walker for longer trips). Skin: Positive for rash. Allergic/Immunologic: Negative. Neurological: Positive for dizziness. Hematological: Negative. Psychiatric/Behavioral: Negative. Objective:   Physical Exam   Constitutional: She is oriented to person, place, and time. She appears well-developed and well-nourished. No distress. HENT:   Head: Normocephalic and atraumatic. Right Ear: External ear normal.   Left Ear: External ear normal.   Mouth/Throat: Oropharynx is clear and moist. No oropharyngeal exudate. Eyes: Conjunctivae and EOM are normal. No scleral icterus. Neck: Neck supple. No thyromegaly present. Cardiovascular: Normal rate, regular rhythm, normal heart sounds and intact distal pulses. No murmur heard. Pulmonary/Chest: Effort normal and breath sounds normal. No respiratory distress. She has no wheezes. Abdominal: Soft. Bowel sounds are normal. She exhibits no distension. There is no tenderness. There is no rebound. Musculoskeletal: Normal range of motion. She exhibits no edema or tenderness. Neurological: She is alert and oriented to person, place, and time. Skin: Skin is warm and dry. No rash noted. No erythema. Psychiatric: She has a normal mood and affect.  Her behavior is normal. Judgment and thought content normal.     /66 (Site: Right Arm, Position: Sitting, Cuff Size: Large Adult)   Pulse 64   Resp 16   Ht 5' 5\" (1.651 m)   Wt 170 lb (77.1 kg)   BMI 28.29 kg/m²    Results for orders placed or performed during the hospital encounter of 06/28/18   Lipid Panel   Result Value Ref Range    Cholesterol 253 (H) <200 Irritable bowel syndrome with constipation     Polyosteoarthritis     Gastroesophageal reflux disease without esophagitis     Chronic bilateral low back pain with bilateral sciatica     Spinal stenosis of lumbar region with neurogenic claudication              Plan:         diabetes: doing well at present. a1c at 7%. Cont. Current metformin bid dosing. Updating eye exam.      Htn: improved at present. Cont. Lisinopril for htn and renal protection. Delayed gastric emptying. Currently, some post prandial bloating and increased bowel sounds by report. Not really painful. No vomiting. EGD 4/2/18: antral ulceration found. Esophagus re-dilated at that time. She has prn gastroenterology consult if symptoms worsen. Parotid enlargement/exrostomia; still with dry mouth concerns. Not using artificial saliva, she tried and did not like it. Ibs; some intermittent constipation. We discussed current bowel regimen and made plans to add more if needed. Oa: seeing more knee pain. Currently her back pain is causing her the most pain and disability. Conservative with nsaids at present due to stomach ulceration in April. Gerd: some recent heartburn recurrent. She was noting some recurrent esophageal pain with swallowing, and some episodes of choking recently. She had prior dilation of esophagus in [place] 8/2016, and again 11/2016. Repeat EGD 4/2/18 with antral gastric ulcer . Repeated dilation at that time. She continues on protonix 40mg/day. Acute on chronic back pain. Pain worse in the last 3 months. She has radicular symptoms down both legs, although variable day to day. Neuropathy symptoms in feet by description, also more problematic acutely. Stiff with poor rom. Trouble transitioning to standing today. Stooped posture. Known moderate spinal canal stenosis at L3-4, L4-5 from 2016 MRI.   Plan to have her follow up with Dr. Angelika Evans on this as it is evident that her symptoms are

## 2018-07-05 NOTE — PATIENT INSTRUCTIONS
[email or web address]            GFR Staging 06/28/2018 NOT REPORTED   Final    WBC 06/28/2018 5.9  3.5 - 11.0 k/uL Final    RBC 06/28/2018 4.19  4.0 - 5.2 m/uL Final    Hemoglobin 06/28/2018 12.7  12.0 - 16.0 g/dL Final    Hematocrit 06/28/2018 38.9  36 - 46 % Final    MCV 06/28/2018 92.8  80 - 100 fL Final    MCH 06/28/2018 30.3  26 - 34 pg Final    MCHC 06/28/2018 32.7  31 - 37 g/dL Final    RDW 06/28/2018 15.3* 11.0 - 14.5 % Final    Platelets 59/71/1398 325  140 - 450 k/uL Final    MPV 06/28/2018 8.9  6.0 - 12.0 fL Final    NRBC Automated 06/28/2018 NOT REPORTED  per 100 WBC Final    Differential Type 06/28/2018 NOT REPORTED   Final    Immature Granulocytes 06/28/2018 NOT REPORTED  0 % Final    Absolute Immature Granulocyte 06/28/2018 NOT REPORTED  0.00 - 0.30 k/uL Final    WBC Morphology 06/28/2018 NOT REPORTED   Final    RBC Morphology 06/28/2018 NOT REPORTED   Final    Platelet Estimate 88/46/8064 NOT REPORTED   Final    Seg Neutrophils 06/28/2018 48  43 - 77 % Final    Lymphocytes 06/28/2018 37  16 - 46 % Final    Monocytes 06/28/2018 12* 4 - 11 % Final    Eosinophils % 06/28/2018 2  1 - 7 % Final    Basophils 06/28/2018 1  0 - 1 % Final    Segs Absolute 06/28/2018 2.90  1.8 - 7.7 k/uL Final    Absolute Lymph # 06/28/2018 2.20  1.0 - 4.8 k/uL Final    Absolute Mono # 06/28/2018 0.70  0.1 - 1.2 k/uL Final    Absolute Eos # 06/28/2018 0.10  0.0 - 0.4 k/uL Final    Basophils # 06/28/2018 0.10  0.0 - 0.2 k/uL Final    Hemoglobin A1C 06/28/2018 7.0* 4.8 - 5.9 % Final    Estimated Avg Glucose 06/28/2018 154  mg/dL Final     Patient Education        Learning About Type 2 Diabetes  What is type 2 diabetes? Insulin is a hormone that helps your body use sugar from your food as energy. Type 2 diabetes happens when your body can't use insulin the right way. Over time, the pancreas can't make enough insulin.  If you don't have enough insulin, too much sugar stays in your blood. If you are overweight, get little or no exercise, or have type 2 diabetes in your family, you are more likely to have problems with the way insulin works in your body.  Americans, Hispanics, Native Americans,  Americans, and Pacific Islanders have a higher risk for type 2 diabetes. Type 2 diabetes can be prevented or delayed with a healthy lifestyle, which includes staying at a healthy weight, making smart food choices, and getting regular exercise. What can you expect with type 2 diabetes? Tanya Sewell keep hearing about how important it is to keep your blood sugar within a target range. That's because over time, high blood sugar can lead to serious problems. It can:  · Harm your eyes, nerves, and kidneys. · Damage your blood vessels, leading to heart disease and stroke. · Reduce blood flow and cause nerve damage to parts of your body, especially your feet. This can cause slow healing and pain when you walk. · Make your immune system weak and less able to fight infections. When people hear the word \"diabetes,\" they often think of problems like these. But daily care and treatment can help prevent or delay these problems. The goal is to keep your blood sugar in a target range. That's the best way to reduce your chance of having more problems from diabetes. What are the symptoms? Some people who have type 2 diabetes may not have any symptoms early on. Many people with the disease don't even know they have it at first. But with time, diabetes starts to cause symptoms. You experience most symptoms of type 2 diabetes when your blood sugar is either too high or too low. The most common symptoms of high blood sugar include:  · Thirst.  · Frequent urination. · Weight loss. · Blurry vision. The symptoms of low blood sugar include:  · Sweating. · Shakiness. · Weakness. · Hunger. · Confusion. How can you prevent type 2 diabetes?   The best way to prevent or delay type 2 diabetes is limit how much sodium you eat to less than 2,300 milligrams (mg) a day. If you limit your sodium to 1,500 mg a day, you can lower your blood pressure even more. ¨ Buy foods that are labeled \"unsalted,\" \"sodium-free,\" or \"low-sodium. \" Foods labeled \"reduced-sodium\" and \"light sodium\" may still have too much sodium. ¨ Flavor your food with garlic, lemon juice, onion, vinegar, herbs, and spices instead of salt. Do not use soy sauce, steak sauce, onion salt, garlic salt, mustard, or ketchup on your food. ¨ Use less salt (or none) when recipes call for it. You can often use half the salt a recipe calls for without losing flavor. · Be physically active. Get at least 30 minutes of exercise on most days of the week. Walking is a good choice. You also may want to do other activities, such as running, swimming, cycling, or playing tennis or team sports. · Limit alcohol to 2 drinks a day for men and 1 drink a day for women. · Eat plenty of fruits, vegetables, and low-fat dairy products. Eat less saturated and total fats. How is high blood pressure treated? · Your doctor will suggest making lifestyle changes. For example, your doctor may ask you to eat healthy foods, quit smoking, lose extra weight, and be more active. · If lifestyle changes don't help enough or your blood pressure is very high, you will have to take medicine every day. Follow-up care is a key part of your treatment and safety. Be sure to make and go to all appointments, and call your doctor if you are having problems. It's also a good idea to know your test results and keep a list of the medicines you take. Where can you learn more? Go to https://[employer]. org and sign in to your [facility] account. Enter P501 in the [employer] box to learn more about \"Learning About High Blood Pressure. \"     If you do not have an account, please click on the \"Sign Up Now\" link. Current as of:  May 10, 2017  Content Version: 11.6  ©

## 2018-07-24 RX ORDER — GABAPENTIN 100 MG/1
100 CAPSULE ORAL 3 TIMES DAILY
Qty: 270 CAPSULE | Refills: 1 | Status: SHIPPED | OUTPATIENT
Start: 2018-07-24 | End: 2018-09-13 | Stop reason: DRUGHIGH

## 2018-07-26 ENCOUNTER — TELEPHONE (OUTPATIENT)
Dept: OPHTHALMOLOGY | Age: 73
End: 2018-07-26

## 2018-07-27 NOTE — TELEPHONE ENCOUNTER
Called patient explained that we had 2 samples here for her. Patient stated she will see if she can find someone to bring her on Monday to pick it up. Explained to patient that we will check back with her on Monday.

## 2018-07-31 DIAGNOSIS — G89.29 CHRONIC BILATERAL LOW BACK PAIN WITH BILATERAL SCIATICA: Primary | ICD-10-CM

## 2018-07-31 DIAGNOSIS — M54.42 CHRONIC BILATERAL LOW BACK PAIN WITH BILATERAL SCIATICA: Primary | ICD-10-CM

## 2018-07-31 DIAGNOSIS — M54.41 CHRONIC BILATERAL LOW BACK PAIN WITH BILATERAL SCIATICA: Primary | ICD-10-CM

## 2018-07-31 DIAGNOSIS — M48.061 SPINAL STENOSIS, LUMBAR REGION, WITHOUT NEUROGENIC CLAUDICATION: ICD-10-CM

## 2018-08-02 ENCOUNTER — HOSPITAL ENCOUNTER (EMERGENCY)
Age: 73
Discharge: HOME | End: 2018-08-02
Attending: EMERGENCY MEDICINE
Payer: MEDICARE

## 2018-08-02 ENCOUNTER — APPOINTMENT (OUTPATIENT)
Dept: CT IMAGING | Age: 73
End: 2018-08-02
Payer: MEDICARE

## 2018-08-02 VITALS
BODY MASS INDEX: 26.99 KG/M2 | HEART RATE: 72 BPM | TEMPERATURE: 98.4 F | DIASTOLIC BLOOD PRESSURE: 65 MMHG | SYSTOLIC BLOOD PRESSURE: 135 MMHG | OXYGEN SATURATION: 99 % | RESPIRATION RATE: 22 BRPM | WEIGHT: 162 LBS | HEIGHT: 65 IN

## 2018-08-02 DIAGNOSIS — N30.00 ACUTE CYSTITIS WITHOUT HEMATURIA: Primary | ICD-10-CM

## 2018-08-02 LAB
-: ABNORMAL
ABSOLUTE EOS #: 0.1 K/UL (ref 0–0.4)
ABSOLUTE IMMATURE GRANULOCYTE: ABNORMAL K/UL (ref 0–0.3)
ABSOLUTE LYMPH #: 2.2 K/UL (ref 1–4.8)
ABSOLUTE MONO #: 0.7 K/UL (ref 0.1–1.2)
AMORPHOUS: ABNORMAL
ANION GAP SERPL CALCULATED.3IONS-SCNC: 12 MMOL/L (ref 9–17)
BACTERIA: ABNORMAL
BASOPHILS # BLD: 1 % (ref 0–1)
BASOPHILS ABSOLUTE: 0 K/UL (ref 0–0.2)
BILIRUBIN URINE: NEGATIVE
BUN BLDV-MCNC: 16 MG/DL (ref 8–23)
BUN/CREAT BLD: 21 (ref 9–20)
CALCIUM SERPL-MCNC: 9.9 MG/DL (ref 8.6–10.4)
CASTS UA: ABNORMAL /LPF (ref 0–2)
CHLORIDE BLD-SCNC: 97 MMOL/L (ref 98–107)
CO2: 30 MMOL/L (ref 20–31)
COLOR: ABNORMAL
COMMENT UA: ABNORMAL
CREAT SERPL-MCNC: 0.75 MG/DL (ref 0.5–0.9)
CRYSTALS, UA: ABNORMAL /HPF
DIFFERENTIAL TYPE: ABNORMAL
EOSINOPHILS RELATIVE PERCENT: 1 % (ref 1–7)
EPITHELIAL CELLS UA: ABNORMAL /HPF (ref 0–5)
GFR AFRICAN AMERICAN: >60 ML/MIN
GFR NON-AFRICAN AMERICAN: >60 ML/MIN
GFR SERPL CREATININE-BSD FRML MDRD: ABNORMAL ML/MIN/{1.73_M2}
GFR SERPL CREATININE-BSD FRML MDRD: ABNORMAL ML/MIN/{1.73_M2}
GLUCOSE BLD-MCNC: 136 MG/DL (ref 70–99)
GLUCOSE URINE: NEGATIVE
HCT VFR BLD CALC: 36.1 % (ref 36–46)
HEMOGLOBIN: 12.6 G/DL (ref 12–16)
IMMATURE GRANULOCYTES: ABNORMAL %
KETONES, URINE: NEGATIVE
LEUKOCYTE ESTERASE, URINE: ABNORMAL
LYMPHOCYTES # BLD: 34 % (ref 16–46)
MCH RBC QN AUTO: 32 PG (ref 26–34)
MCHC RBC AUTO-ENTMCNC: 35 G/DL (ref 31–37)
MCV RBC AUTO: 91.4 FL (ref 80–100)
MONOCYTES # BLD: 11 % (ref 4–11)
MUCUS: ABNORMAL
NITRITE, URINE: NEGATIVE
NRBC AUTOMATED: ABNORMAL PER 100 WBC
OTHER OBSERVATIONS UA: ABNORMAL
PDW BLD-RTO: 14.6 % (ref 11–14.5)
PH UA: 6 (ref 5–6)
PLATELET # BLD: 308 K/UL (ref 140–450)
PLATELET ESTIMATE: ABNORMAL
PMV BLD AUTO: 8.8 FL (ref 6–12)
POTASSIUM SERPL-SCNC: 4.2 MMOL/L (ref 3.7–5.3)
PROTEIN UA: NEGATIVE
RBC # BLD: 3.94 M/UL (ref 4–5.2)
RBC # BLD: ABNORMAL 10*6/UL
RBC UA: ABNORMAL /HPF (ref 0–4)
RENAL EPITHELIAL, UA: ABNORMAL /HPF
SEG NEUTROPHILS: 53 % (ref 43–77)
SEGMENTED NEUTROPHILS ABSOLUTE COUNT: 3.4 K/UL (ref 1.8–7.7)
SODIUM BLD-SCNC: 139 MMOL/L (ref 135–144)
SPECIFIC GRAVITY UA: 1 (ref 1.01–1.02)
TRICHOMONAS: ABNORMAL
TURBIDITY: ABNORMAL
URINE HGB: NEGATIVE
UROBILINOGEN, URINE: NORMAL
WBC # BLD: 6.5 K/UL (ref 3.5–11)
WBC # BLD: ABNORMAL 10*3/UL
WBC UA: ABNORMAL /HPF (ref 0–4)
YEAST: ABNORMAL

## 2018-08-02 PROCEDURE — 81001 URINALYSIS AUTO W/SCOPE: CPT

## 2018-08-02 PROCEDURE — 96374 THER/PROPH/DIAG INJ IV PUSH: CPT

## 2018-08-02 PROCEDURE — 80048 BASIC METABOLIC PNL TOTAL CA: CPT

## 2018-08-02 PROCEDURE — 99284 EMERGENCY DEPT VISIT MOD MDM: CPT

## 2018-08-02 PROCEDURE — 2580000003 HC RX 258: Performed by: EMERGENCY MEDICINE

## 2018-08-02 PROCEDURE — 85025 COMPLETE CBC W/AUTO DIFF WBC: CPT

## 2018-08-02 PROCEDURE — 70450 CT HEAD/BRAIN W/O DYE: CPT

## 2018-08-02 PROCEDURE — 6360000002 HC RX W HCPCS: Performed by: EMERGENCY MEDICINE

## 2018-08-02 RX ORDER — CEPHALEXIN 500 MG/1
500 CAPSULE ORAL 3 TIMES DAILY
Qty: 30 CAPSULE | Refills: 0 | Status: SHIPPED | OUTPATIENT
Start: 2018-08-02 | End: 2018-08-12

## 2018-08-02 RX ORDER — DIPHENHYDRAMINE HYDROCHLORIDE 50 MG/ML
25 INJECTION INTRAMUSCULAR; INTRAVENOUS ONCE
Status: COMPLETED | OUTPATIENT
Start: 2018-08-02 | End: 2018-08-02

## 2018-08-02 RX ORDER — 0.9 % SODIUM CHLORIDE 0.9 %
1000 INTRAVENOUS SOLUTION INTRAVENOUS ONCE
Status: COMPLETED | OUTPATIENT
Start: 2018-08-02 | End: 2018-08-02

## 2018-08-02 RX ADMIN — SODIUM CHLORIDE 1000 ML: 9 INJECTION, SOLUTION INTRAVENOUS at 18:49

## 2018-08-02 RX ADMIN — DIPHENHYDRAMINE HYDROCHLORIDE 25 MG: 50 INJECTION, SOLUTION INTRAMUSCULAR; INTRAVENOUS at 19:23

## 2018-08-02 ASSESSMENT — PAIN SCALES - GENERAL: PAINLEVEL_OUTOF10: 9

## 2018-08-02 ASSESSMENT — PAIN DESCRIPTION - LOCATION: LOCATION: HEAD

## 2018-08-02 ASSESSMENT — PAIN DESCRIPTION - PAIN TYPE: TYPE: ACUTE PAIN

## 2018-08-02 NOTE — ED PROVIDER NOTES
[facility] ED  Counts include 234 beds at the [facility]  Phone: [phone]      Pt Name: J Carlos Muro  MRN: [id number]  [place] 1945  Date of evaluation: 8/2/2018      CHIEF COMPLAINT       Chief Complaint   Patient presents with    Tremors     pt daughter called medics because \"she had a sugar attack on the toilet. \" When asked for her to describe the attack she demonstrates tremors. Pt denies LOC. HISTORY OF PRESENT ILLNESS    78-year-old female presents to the emergency department today after patient and family our thought that she was having a \"sugar attack. \"  What she means by this she keeps having these jerking episodes which are quite violent and lasts seconds at most.  They are global entire body. She had episodes throughout last night and into today. Patient took her diabetes medications this morning and then ate it about 1:00. She thought that she was having sugar because of this she has been eating peanut butter as well as drinking juices and non-diet carbonated beverages. This has not improved her symptoms. She does complain of cephalgia. Pain on skills are tender as a 9. He resides on the superior portion of her scalp. No mitigating precipitating or exacerbating factors. No neck stiffness. No nausea vomiting fevers or chills. No chest pain. She's never had problems like this in the past.  No loss of consciousness during these events and is no incontinence of urine or stool. She does not bite her tongue. Patient states that she is concerned that she is overmedicated with her diabetes medications and is wondering if she can drop one of the pills. She reports that her last hemoglobin A1c was perfectly normal.  She denies any hypoglycemic events recently. REVIEW OF SYSTEMS     Review of Systems   All other systems reviewed and are negative. PAST MEDICAL HISTORY    has a past medical history of Allergic conjunctivitis; Chronic sinusitis;  Constipation; TIMOLOL (TIMOPTIC) 0.5 % OPHTHALMIC SOLUTION    instill 1 drop into both eyes twice a day       ALLERGIES     is allergic to latex; aleve [naproxen]; aspirin; dye [iodides]; influenza vaccines; and reglan [metoclopramide]. FAMILY HISTORY     indicated that her mother is alive. She indicated that her father is . She indicated that the status of her brother is unknown. She indicated that her maternal grandmother is . She indicated that her maternal grandfather is . She indicated that her paternal grandmother is . She indicated that her paternal grandfather is . She indicated that both of her daughters are alive. She indicated that the status of her neg hx is unknown.      family history includes Allergies in her daughter and daughter; Arthritis in her mother; Heart Attack in her mother; Heart Disease in her mother; Other in her brother and maternal grandmother. SOCIAL HISTORY      reports that she has never smoked. She has never used smokeless tobacco. She reports that she does not drink alcohol or use drugs. PHYSICAL EXAM     INITIAL VITALS:  height is 5' 5\" (1.651 m) and weight is 162 lb (73.5 kg). Her tympanic temperature is 98.4 °F (36.9 °C). Her blood pressure is 135/65 and her pulse is 72. Her respiration is 22 and oxygen saturation is 99%. Physical Exam   Constitutional: She is oriented to person, place, and time. She appears well-developed and well-nourished. No distress. HENT:   Head: Normocephalic and atraumatic. Mouth/Throat: Oropharynx is clear and moist.   Eyes: Conjunctivae and EOM are normal. Pupils are equal, round, and reactive to light. Neck: Normal range of motion. Neck supple. No tracheal deviation present. Cardiovascular: Normal rate, regular rhythm and intact distal pulses. Pulmonary/Chest: Breath sounds normal. No respiratory distress. Abdominal: Soft. Bowel sounds are normal. She exhibits no distension. There is no tenderness. Musculoskeletal: Normal range of motion. She exhibits no edema or tenderness. Neurological: She is alert and oriented to person, place, and time. She has normal reflexes. No cranial nerve deficit. She exhibits normal muscle tone. Coordination normal.   Skin: Skin is warm and dry. Psychiatric: She has a normal mood and affect. Her behavior is normal. Judgment and thought content normal.   Vitals reviewed. DIFFERENTIAL DIAGNOSIS/ MDM:   During IV started as well as blood draw, patient does not have any shaking episodes or twitches at all. During my interview she has no twitches as the majority my interview occurred while IV was being initiated. Before the IV is being initiated she did have entire body shaking which is just best described as one being jerked. No postictal period. She does not hurt herself with these events and there was no loss of consciousness. EMS reports that she was doing similar jerking spells when they came to pick her up however he transports here was uneventful until they started to unload her here at this facility. I will look for organic causes and we'll get a CAT scan of her brain. DIAGNOSTIC RESULTS     EKG:  None    RADIOLOGY:   No results found.       LABS:  Results for orders placed or performed during the hospital encounter of 08/02/18   CBC auto differential   Result Value Ref Range    WBC 6.5 3.5 - 11.0 k/uL    RBC 3.94 (L) 4.0 - 5.2 m/uL    Hemoglobin 12.6 12.0 - 16.0 g/dL    Hematocrit 36.1 36 - 46 %    MCV 91.4 80 - 100 fL    MCH 32.0 26 - 34 pg    MCHC 35.0 31 - 37 g/dL    RDW 14.6 (H) 11.0 - 14.5 %    Platelets 324 961 - 451 k/uL    MPV 8.8 6.0 - 12.0 fL    NRBC Automated NOT REPORTED per 100 WBC    Differential Type NOT REPORTED     Immature Granulocytes NOT REPORTED 0 %    Absolute Immature Granulocyte NOT REPORTED 0.00 - 0.30 k/uL    WBC Morphology NOT REPORTED     RBC Morphology NOT REPORTED     Platelet Estimate NOT REPORTED     Seg Neutrophils 53 43 - diphenhydrAMINE (BENADRYL) injection 25 mg    cephALEXin (KEFLEX) 500 MG capsule     Sig: Take 1 capsule by mouth 3 times daily for 10 days     Dispense:  30 capsule     Refill:  0        Vitals:    Vitals:    08/02/18 1805 08/02/18 1913   BP: (!) 147/70 135/65   Pulse: 75 72   Resp: 16 22   Temp: 98.4 °F (36.9 °C)    TempSrc: Tympanic    SpO2: 95% 99%   Weight: 162 lb (73.5 kg)    Height: 5' 5\" (1.651 m)      -------------------------  BP: 135/65, Temp: 98.4 °F (36.9 °C), Pulse: 72, Resp: 22      Re-evaluation Notes  Observing the patient him but no sore self I do not appreciate any jerking movement into the room the jerked resumes. It almost does look like in a certain extent a dystonic type reaction therefore tried Benadryl. She does have urinary tract infection. I do not find anything serious or life threatening going on outpatient management is appropriate. CONSULTS:  None    CRITICAL CARE:   None    PROCEDURES:  None    FINAL IMPRESSION      1.  Acute cystitis without hematuria          DISPOSITION/PLAN   DISPOSITION Decision To Discharge 08/02/2018 07:17:24 PM      Condition on Disposition  Good    PATIENT REFERRED TO:  Surinder Heath, [address] [facility] [address] [place]  [phone]    Schedule an appointment as soon as possible for a visit in 1 day        DISCHARGE MEDICATIONS:  New Prescriptions    CEPHALEXIN (KEFLEX) 500 MG CAPSULE    Take 1 capsule by mouth 3 times daily for 10 days       (Please note that portions of this note were completed with a voice recognition program.  Efforts were made to edit the dictations but occasionally words are mis-transcribed.)    Naresh Wagner MD, F.A.C.E.P, F.A.A.E.M  Emergency Physician Attending          Naresh Wagner MD  08/02/18 Biju Bustamante

## 2018-08-03 ENCOUNTER — OFFICE VISIT (OUTPATIENT)
Dept: ORTHOPEDIC SURGERY | Age: 73
End: 2018-08-03
Payer: MEDICARE

## 2018-08-03 ENCOUNTER — HOSPITAL ENCOUNTER (OUTPATIENT)
Dept: GENERAL RADIOLOGY | Age: 73
Discharge: HOME | End: 2018-08-05
Payer: MEDICARE

## 2018-08-03 VITALS
BODY MASS INDEX: 28.32 KG/M2 | WEIGHT: 170 LBS | HEART RATE: 84 BPM | SYSTOLIC BLOOD PRESSURE: 124 MMHG | DIASTOLIC BLOOD PRESSURE: 76 MMHG | HEIGHT: 65 IN

## 2018-08-03 DIAGNOSIS — E11.9 TYPE 2 DIABETES MELLITUS WITHOUT COMPLICATION, WITHOUT LONG-TERM CURRENT USE OF INSULIN (HCC): Primary | ICD-10-CM

## 2018-08-03 DIAGNOSIS — G89.29 CHRONIC BILATERAL LOW BACK PAIN WITH BILATERAL SCIATICA: ICD-10-CM

## 2018-08-03 DIAGNOSIS — M54.42 CHRONIC BILATERAL LOW BACK PAIN WITH BILATERAL SCIATICA: Primary | ICD-10-CM

## 2018-08-03 DIAGNOSIS — M43.10 ACQUIRED SPONDYLOLISTHESIS: ICD-10-CM

## 2018-08-03 DIAGNOSIS — M48.061 SPINAL STENOSIS, LUMBAR REGION, WITHOUT NEUROGENIC CLAUDICATION: ICD-10-CM

## 2018-08-03 DIAGNOSIS — G89.29 CHRONIC BILATERAL LOW BACK PAIN WITH BILATERAL SCIATICA: Primary | ICD-10-CM

## 2018-08-03 DIAGNOSIS — M54.41 CHRONIC BILATERAL LOW BACK PAIN WITH BILATERAL SCIATICA: ICD-10-CM

## 2018-08-03 DIAGNOSIS — M54.41 CHRONIC BILATERAL LOW BACK PAIN WITH BILATERAL SCIATICA: Primary | ICD-10-CM

## 2018-08-03 DIAGNOSIS — M54.42 CHRONIC BILATERAL LOW BACK PAIN WITH BILATERAL SCIATICA: ICD-10-CM

## 2018-08-03 DIAGNOSIS — M48.062 LUMBAR STENOSIS WITH NEUROGENIC CLAUDICATION: ICD-10-CM

## 2018-08-03 PROCEDURE — 99213 OFFICE O/P EST LOW 20 MIN: CPT | Performed by: ORTHOPAEDIC SURGERY

## 2018-08-03 PROCEDURE — 1101F PT FALLS ASSESS-DOCD LE1/YR: CPT | Performed by: ORTHOPAEDIC SURGERY

## 2018-08-03 PROCEDURE — G8427 DOCREV CUR MEDS BY ELIG CLIN: HCPCS | Performed by: ORTHOPAEDIC SURGERY

## 2018-08-03 PROCEDURE — 4040F PNEUMOC VAC/ADMIN/RCVD: CPT | Performed by: ORTHOPAEDIC SURGERY

## 2018-08-03 PROCEDURE — 1036F TOBACCO NON-USER: CPT | Performed by: ORTHOPAEDIC SURGERY

## 2018-08-03 PROCEDURE — 3017F COLORECTAL CA SCREEN DOC REV: CPT | Performed by: ORTHOPAEDIC SURGERY

## 2018-08-03 PROCEDURE — 72100 X-RAY EXAM L-S SPINE 2/3 VWS: CPT

## 2018-08-03 PROCEDURE — G8417 CALC BMI ABV UP PARAM F/U: HCPCS | Performed by: ORTHOPAEDIC SURGERY

## 2018-08-03 PROCEDURE — G8399 PT W/DXA RESULTS DOCUMENT: HCPCS | Performed by: ORTHOPAEDIC SURGERY

## 2018-08-03 PROCEDURE — 1123F ACP DISCUSS/DSCN MKR DOCD: CPT | Performed by: ORTHOPAEDIC SURGERY

## 2018-08-03 PROCEDURE — 1090F PRES/ABSN URINE INCON ASSESS: CPT | Performed by: ORTHOPAEDIC SURGERY

## 2018-08-03 RX ORDER — GLUCOSAMINE HCL/CHONDROITIN SU 500-400 MG
CAPSULE ORAL
Qty: 100 STRIP | Refills: 1 | Status: SHIPPED | OUTPATIENT
Start: 2018-08-03 | End: 2018-10-10 | Stop reason: SDUPTHER

## 2018-08-03 NOTE — PROGRESS NOTES
Subjective:      Patient ID: Jose Medina is a 74 y.o. female. HPI  Tony's refer to my previous clinic notes    Patient is here follow-up low back pain by radicular leg pain with strong qualities of neurogenic claudication    Had seen the patient for for this in 2016 which point she is having a flare of radicular leg pain this is going he hasn't improved for a while but is now back as bad as ever and her low back pain is as bad as ever    Patient is reluctant to consider surgery as a sister or sister-in-law had a bad outcome  Review of Systems    Objective:   Physical Exam   Constitutional: She is oriented to person, place, and time. She appears well-developed and well-nourished. HENT:   Head: Normocephalic and atraumatic. Eyes: Conjunctivae and EOM are normal.   Neck: Normal range of motion. Pulmonary/Chest: Effort normal. No respiratory distress. Neurological: She is alert and oriented to person, place, and time. She has normal strength. No sensory deficit. Normal gait   Skin: Skin is warm and dry. Psychiatric: Her behavior is normal. Thought content normal.   Nursing note and vitals reviewed. Diagnostic x-rays reviewed compared with prior imaging patient with L4 5 disc space collapse a 6 mm spondylolisthesis at L3 4 that appears slightly worse    Prior MRI reveals severe stenosis at L3 4 and L4 5  Assessment:      Encounter Diagnoses   Name Primary?     Chronic bilateral low back pain with bilateral sciatica Yes    Acquired spondylolisthesis     Lumbar stenosis with neurogenic claudication      Patient is a candidate for L3 to 5 PLIF    Patient would like to trial epidural steroid injections      Plan:      Lumbar epidural steroid injections    Follow-up 12 weeks

## 2018-08-06 ENCOUNTER — OFFICE VISIT (OUTPATIENT)
Dept: OPHTHALMOLOGY | Age: 73
End: 2018-08-06
Payer: MEDICARE

## 2018-08-06 DIAGNOSIS — Z96.1 PSEUDOPHAKIA OF BOTH EYES: ICD-10-CM

## 2018-08-06 DIAGNOSIS — E11.9 DIABETES TYPE 2, NO OCULAR INVOLVEMENT (HCC): ICD-10-CM

## 2018-08-06 DIAGNOSIS — H40.1132 PRIMARY OPEN ANGLE GLAUCOMA (POAG) OF BOTH EYES, MODERATE STAGE: Primary | ICD-10-CM

## 2018-08-06 PROCEDURE — 4040F PNEUMOC VAC/ADMIN/RCVD: CPT | Performed by: OPHTHALMOLOGY

## 2018-08-06 PROCEDURE — 92133 CPTRZD OPH DX IMG PST SGM ON: CPT | Performed by: OPHTHALMOLOGY

## 2018-08-06 PROCEDURE — 1036F TOBACCO NON-USER: CPT | Performed by: OPHTHALMOLOGY

## 2018-08-06 PROCEDURE — 1123F ACP DISCUSS/DSCN MKR DOCD: CPT | Performed by: OPHTHALMOLOGY

## 2018-08-06 PROCEDURE — 1090F PRES/ABSN URINE INCON ASSESS: CPT | Performed by: OPHTHALMOLOGY

## 2018-08-06 PROCEDURE — 92145 CORNEAL HYSTERESIS DETER: CPT | Performed by: OPHTHALMOLOGY

## 2018-08-06 PROCEDURE — G8427 DOCREV CUR MEDS BY ELIG CLIN: HCPCS | Performed by: OPHTHALMOLOGY

## 2018-08-06 PROCEDURE — 1101F PT FALLS ASSESS-DOCD LE1/YR: CPT | Performed by: OPHTHALMOLOGY

## 2018-08-06 PROCEDURE — G8417 CALC BMI ABV UP PARAM F/U: HCPCS | Performed by: OPHTHALMOLOGY

## 2018-08-06 PROCEDURE — 99214 OFFICE O/P EST MOD 30 MIN: CPT | Performed by: OPHTHALMOLOGY

## 2018-08-06 PROCEDURE — 3017F COLORECTAL CA SCREEN DOC REV: CPT | Performed by: OPHTHALMOLOGY

## 2018-08-06 PROCEDURE — G8399 PT W/DXA RESULTS DOCUMENT: HCPCS | Performed by: OPHTHALMOLOGY

## 2018-08-06 PROCEDURE — 2022F DILAT RTA XM EVC RTNOPTHY: CPT | Performed by: OPHTHALMOLOGY

## 2018-08-06 PROCEDURE — 3045F PR MOST RECENT HEMOGLOBIN A1C LEVEL 7.0-9.0%: CPT | Performed by: OPHTHALMOLOGY

## 2018-08-06 RX ORDER — TIMOLOL MALEATE 5 MG/ML
1 SOLUTION/ DROPS OPHTHALMIC
Qty: 3 BOTTLE | Refills: 3 | Status: SHIPPED | OUTPATIENT
Start: 2018-08-06 | End: 2018-09-05

## 2018-08-06 RX ORDER — LATANOPROST 50 UG/ML
1 SOLUTION/ DROPS OPHTHALMIC NIGHTLY
Qty: 3 BOTTLE | Refills: 3 | Status: SHIPPED | OUTPATIENT
Start: 2018-08-06 | End: 2018-09-28

## 2018-08-06 RX ORDER — TROPICAMIDE 10 MG/ML
1 SOLUTION/ DROPS OPHTHALMIC ONCE
Status: COMPLETED | OUTPATIENT
Start: 2018-08-06 | End: 2018-08-06

## 2018-08-06 RX ORDER — PHENYLEPHRINE HYDROCHLORIDE 100 MG/ML
1 SOLUTION/ DROPS OPHTHALMIC ONCE
Status: COMPLETED | OUTPATIENT
Start: 2018-08-06 | End: 2018-08-06

## 2018-08-06 RX ADMIN — PHENYLEPHRINE HYDROCHLORIDE 1 DROP: 100 SOLUTION/ DROPS OPHTHALMIC at 14:30

## 2018-08-06 RX ADMIN — TROPICAMIDE 1 DROP: 10 SOLUTION/ DROPS OPHTHALMIC at 14:31

## 2018-08-06 ASSESSMENT — SLIT LAMP EXAM - LIDS
COMMENTS: MILD BLEPHARITIS. MILD MGD
COMMENTS: MILD BLEPHARITIS. MILD MGD

## 2018-08-06 ASSESSMENT — TONOMETRY
IOP_METHOD: NON-CONTACT AIR PUFF
OD_IOP_MMHG: 20
OS_IOP_MMHG: 15

## 2018-08-06 ASSESSMENT — VISUAL ACUITY
OS_CC: 20/40
OD_PH_CC: 20/40
METHOD: SNELLEN - LINEAR
CORRECTION_TYPE: GLASSES
OS_CC+: 2
OS_PH_CC: 20/40

## 2018-08-06 NOTE — PROGRESS NOTES
Interstitial cystitis     Irritable bowel syndrome     Keratitis     (secondary to dry eye syndrome)    Parotid gland enlargement     stasis, consider sjorgens    Pseudophakos     (bilateral)    Seasonal rhinitis     Stress incontinence     Type II or unspecified type diabetes mellitus without mention of complication, not stated as uncontrolled     Urethral stenosis     Xerostomia           Current Outpatient Prescriptions:     blood glucose monitor strips, Monitor blood glucose levels twice daily and as needed for hyper/hypoglycemic symptoms. Dx. Diabetes (E11.9), Disp: 100 strip, Rfl: 1    cephALEXin (KEFLEX) 500 MG capsule, Take 1 capsule by mouth 3 times daily for 10 days, Disp: 30 capsule, Rfl: 0    metFORMIN (GLUCOPHAGE) 500 MG tablet, Take 1 tablet by mouth 2 times daily (with meals), Disp: 180 tablet, Rfl: 1    gabapentin (NEURONTIN) 100 MG capsule, Take 1 capsule by mouth 3 times daily for 90 days. ., Disp: 270 capsule, Rfl: 1    omeprazole (PRILOSEC) 20 MG delayed release capsule, Take 20 mg by mouth daily, Disp: , Rfl:     fluticasone (FLONASE) 50 MCG/ACT nasal spray, instill 2 sprays into each nostril once daily, Disp: 16 g, Rfl: 5    lisinopril (PRINIVIL;ZESTRIL) 5 MG tablet, take 1 tablet by mouth once daily, Disp: 90 tablet, Rfl: 1    levothyroxine (SYNTHROID) 88 MCG tablet, take 1 tablet by mouth once daily, Disp: 90 tablet, Rfl: 1    Blood Glucose Monitoring Suppl YANY, Monitor blood glucose levels twice daily and as needed for hyper/hypoglycemic symptoms.   Dx. Diabetes (E11.9), Disp: 1 Device, Rfl: 0    timolol (TIMOPTIC) 0.5 % ophthalmic solution, instill 1 drop into both eyes twice a day, Disp: 5 mL, Rfl: 10    loratadine (CLARITIN) 10 MG tablet, Take 1 tablet by mouth daily, Disp: 30 tablet, Rfl: 11    Propylene Glycol (SYSTANE BALANCE OP), Apply 1 drop to eye 4 times daily, Disp: , Rfl:     SOFT TOUCH LANCETS MISC, 1 Device by Does not apply route 2 times daily, Disp: 200 normal as possible. 3. Pseudophakia of both eyes    Folllow.     Electronically signed by Christiano Burns MD on 8/6/2018 at 2:04 PM

## 2018-08-09 ENCOUNTER — TELEPHONE (OUTPATIENT)
Dept: FAMILY MEDICINE CLINIC | Age: 73
End: 2018-08-09
Payer: MEDICARE

## 2018-08-09 DIAGNOSIS — K58.1 IRRITABLE BOWEL SYNDROME WITH CONSTIPATION: ICD-10-CM

## 2018-08-09 DIAGNOSIS — I10 ESSENTIAL HYPERTENSION: ICD-10-CM

## 2018-08-09 DIAGNOSIS — M48.062 LUMBAR STENOSIS WITH NEUROGENIC CLAUDICATION: ICD-10-CM

## 2018-08-09 DIAGNOSIS — H40.1132 PRIMARY OPEN ANGLE GLAUCOMA (POAG) OF BOTH EYES, MODERATE STAGE: ICD-10-CM

## 2018-08-09 DIAGNOSIS — E11.9 TYPE 2 DIABETES MELLITUS WITHOUT COMPLICATION, WITHOUT LONG-TERM CURRENT USE OF INSULIN (HCC): Primary | ICD-10-CM

## 2018-08-09 DIAGNOSIS — K85.90 ACUTE PANCREATITIS, UNSPECIFIED COMPLICATION STATUS, UNSPECIFIED PANCREATITIS TYPE: ICD-10-CM

## 2018-08-09 PROCEDURE — G0180 MD CERTIFICATION HHA PATIENT: HCPCS | Performed by: FAMILY MEDICINE

## 2018-08-09 NOTE — TELEPHONE ENCOUNTER
Home Health plan of care reviewed and certification completed on 08/09/18 on patient for service dates 08/04/18-10/2/18. Medications reviewed and verified.   Physician time spent on activities to coordinate services, documenting medical decision making, reviewing of reports, treatment plans and test results is 20 miutes

## 2018-08-22 ENCOUNTER — OFFICE VISIT (OUTPATIENT)
Dept: OPHTHALMOLOGY | Age: 73
End: 2018-08-22
Payer: MEDICARE

## 2018-08-22 DIAGNOSIS — H40.1132 PRIMARY OPEN ANGLE GLAUCOMA (POAG) OF BOTH EYES, MODERATE STAGE: Primary | ICD-10-CM

## 2018-08-22 PROCEDURE — 92083 EXTENDED VISUAL FIELD XM: CPT | Performed by: OPHTHALMOLOGY

## 2018-09-13 ENCOUNTER — OFFICE VISIT (OUTPATIENT)
Dept: FAMILY MEDICINE CLINIC | Age: 73
End: 2018-09-13
Payer: MEDICARE

## 2018-09-13 ENCOUNTER — OFFICE VISIT (OUTPATIENT)
Dept: OPTOMETRY | Age: 73
End: 2018-09-13
Payer: MEDICARE

## 2018-09-13 VITALS
HEIGHT: 65 IN | WEIGHT: 170.2 LBS | OXYGEN SATURATION: 98 % | BODY MASS INDEX: 28.36 KG/M2 | HEART RATE: 72 BPM | DIASTOLIC BLOOD PRESSURE: 80 MMHG | SYSTOLIC BLOOD PRESSURE: 136 MMHG

## 2018-09-13 DIAGNOSIS — E11.49 OTHER DIABETIC NEUROLOGICAL COMPLICATION ASSOCIATED WITH TYPE 2 DIABETES MELLITUS (HCC): Primary | ICD-10-CM

## 2018-09-13 DIAGNOSIS — M79.674 GREAT TOE PAIN, RIGHT: ICD-10-CM

## 2018-09-13 DIAGNOSIS — H10.10 ALLERGIC CONJUNCTIVITIS, UNSPECIFIED LATERALITY: Primary | ICD-10-CM

## 2018-09-13 DIAGNOSIS — E11.9 TYPE 2 DIABETES MELLITUS WITHOUT COMPLICATION, WITHOUT LONG-TERM CURRENT USE OF INSULIN (HCC): ICD-10-CM

## 2018-09-13 PROCEDURE — 1036F TOBACCO NON-USER: CPT | Performed by: NURSE PRACTITIONER

## 2018-09-13 PROCEDURE — G8399 PT W/DXA RESULTS DOCUMENT: HCPCS | Performed by: NURSE PRACTITIONER

## 2018-09-13 PROCEDURE — 4040F PNEUMOC VAC/ADMIN/RCVD: CPT | Performed by: NURSE PRACTITIONER

## 2018-09-13 PROCEDURE — 1123F ACP DISCUSS/DSCN MKR DOCD: CPT | Performed by: NURSE PRACTITIONER

## 2018-09-13 PROCEDURE — G8417 CALC BMI ABV UP PARAM F/U: HCPCS | Performed by: NURSE PRACTITIONER

## 2018-09-13 PROCEDURE — G8417 CALC BMI ABV UP PARAM F/U: HCPCS | Performed by: OPTOMETRIST

## 2018-09-13 PROCEDURE — G8427 DOCREV CUR MEDS BY ELIG CLIN: HCPCS | Performed by: NURSE PRACTITIONER

## 2018-09-13 PROCEDURE — 3017F COLORECTAL CA SCREEN DOC REV: CPT | Performed by: OPTOMETRIST

## 2018-09-13 PROCEDURE — G8399 PT W/DXA RESULTS DOCUMENT: HCPCS | Performed by: OPTOMETRIST

## 2018-09-13 PROCEDURE — 1101F PT FALLS ASSESS-DOCD LE1/YR: CPT | Performed by: NURSE PRACTITIONER

## 2018-09-13 PROCEDURE — 1090F PRES/ABSN URINE INCON ASSESS: CPT | Performed by: OPTOMETRIST

## 2018-09-13 PROCEDURE — G8427 DOCREV CUR MEDS BY ELIG CLIN: HCPCS | Performed by: OPTOMETRIST

## 2018-09-13 PROCEDURE — 1036F TOBACCO NON-USER: CPT | Performed by: OPTOMETRIST

## 2018-09-13 PROCEDURE — 99214 OFFICE O/P EST MOD 30 MIN: CPT | Performed by: NURSE PRACTITIONER

## 2018-09-13 PROCEDURE — 99213 OFFICE O/P EST LOW 20 MIN: CPT | Performed by: OPTOMETRIST

## 2018-09-13 PROCEDURE — 1101F PT FALLS ASSESS-DOCD LE1/YR: CPT | Performed by: OPTOMETRIST

## 2018-09-13 PROCEDURE — 4040F PNEUMOC VAC/ADMIN/RCVD: CPT | Performed by: OPTOMETRIST

## 2018-09-13 PROCEDURE — G8510 SCR DEP NEG, NO PLAN REQD: HCPCS | Performed by: NURSE PRACTITIONER

## 2018-09-13 PROCEDURE — 1090F PRES/ABSN URINE INCON ASSESS: CPT | Performed by: NURSE PRACTITIONER

## 2018-09-13 PROCEDURE — 2022F DILAT RTA XM EVC RTNOPTHY: CPT | Performed by: NURSE PRACTITIONER

## 2018-09-13 PROCEDURE — 1123F ACP DISCUSS/DSCN MKR DOCD: CPT | Performed by: OPTOMETRIST

## 2018-09-13 PROCEDURE — 3017F COLORECTAL CA SCREEN DOC REV: CPT | Performed by: NURSE PRACTITIONER

## 2018-09-13 PROCEDURE — 3045F PR MOST RECENT HEMOGLOBIN A1C LEVEL 7.0-9.0%: CPT | Performed by: NURSE PRACTITIONER

## 2018-09-13 RX ORDER — GABAPENTIN 300 MG/1
300 CAPSULE ORAL NIGHTLY
Qty: 90 CAPSULE | Refills: 3 | Status: SHIPPED | OUTPATIENT
Start: 2018-09-13 | End: 2018-11-15 | Stop reason: SDUPTHER

## 2018-09-13 RX ORDER — GABAPENTIN 100 MG/1
100 CAPSULE ORAL 2 TIMES DAILY
Qty: 270 CAPSULE | Refills: 1 | Status: SHIPPED | OUTPATIENT
Start: 2018-09-13 | End: 2019-02-06 | Stop reason: SDUPTHER

## 2018-09-13 RX ORDER — BENOXINATE HCL/FLUORESCEIN SOD 0.4%-0.25%
1 DROPS OPHTHALMIC (EYE) ONCE
Status: COMPLETED | OUTPATIENT
Start: 2018-09-13 | End: 2018-09-13

## 2018-09-13 RX ADMIN — Medication 1 DROP: at 14:45

## 2018-09-13 ASSESSMENT — ENCOUNTER SYMPTOMS
CHEST TIGHTNESS: 0
ABDOMINAL PAIN: 0
COUGH: 0
CONSTIPATION: 0
TROUBLE SWALLOWING: 0
SINUS PRESSURE: 0
VOMITING: 0
ALLERGIC/IMMUNOLOGIC NEGATIVE: 1
NAUSEA: 0
SHORTNESS OF BREATH: 0
DIARRHEA: 0
EYES NEGATIVE: 1

## 2018-09-13 ASSESSMENT — PATIENT HEALTH QUESTIONNAIRE - PHQ9
2. FEELING DOWN, DEPRESSED OR HOPELESS: 0
SUM OF ALL RESPONSES TO PHQ QUESTIONS 1-9: 0
1. LITTLE INTEREST OR PLEASURE IN DOING THINGS: 0
SUM OF ALL RESPONSES TO PHQ9 QUESTIONS 1 & 2: 0
SUM OF ALL RESPONSES TO PHQ QUESTIONS 1-9: 0

## 2018-09-13 ASSESSMENT — VISUAL ACUITY
METHOD: SNELLEN - LINEAR
OS_CC: 20/70
CORRECTION_TYPE: GLASSES

## 2018-09-13 NOTE — PROGRESS NOTES
Margar[place])    UPPER GASTROINTESTINAL ENDOSCOPY  6/4/15    gastritis, biopsy x 2    UPPER GASTROINTESTINAL ENDOSCOPY  2018    Dr. Jerry Vogel   antral gastric ulcer. esophagus re-dilated at the end of procedure. Family History   Problem Relation Age of Onset    Heart Disease Mother     Heart Attack Mother     Arthritis Mother     Other Maternal Grandmother         (gout)    Allergies Daughter     Allergies Daughter     Other Brother         ( at age 21 secondary to auto accident).  Cataracts Neg Hx     Diabetes Neg Hx     Glaucoma Neg Hx        Allergies   Allergen Reactions    Latex Rash    Aleve [Naproxen]      Stomach problems    Aspirin Other (See Comments)     Dizziness and tears her stomach up    Dye [Iodides]      IV DYE    Influenza Vaccines     Reglan [Metoclopramide] Other (See Comments)     Dystonic reaction, involuntary movements       Current Outpatient Prescriptions   Medication Sig Dispense Refill    gabapentin (NEURONTIN) 100 MG capsule Take 1 capsule by mouth 2 times daily for 90 days. . 270 capsule 1    gabapentin (NEURONTIN) 300 MG capsule Take 1 capsule by mouth nightly for 30 days. . 90 capsule 3    latanoprost (XALATAN) 0.005 % ophthalmic solution Place 1 drop into both eyes nightly 3 Bottle 3    blood glucose monitor strips Monitor blood glucose levels twice daily and as needed for hyper/hypoglycemic symptoms. Dx. Diabetes (E11.9) 100 strip 1    metFORMIN (GLUCOPHAGE) 500 MG tablet Take 1 tablet by mouth 2 times daily (with meals) 180 tablet 1    omeprazole (PRILOSEC) 20 MG delayed release capsule Take 20 mg by mouth daily      fluticasone (FLONASE) 50 MCG/ACT nasal spray instill 2 sprays into each nostril once daily 16 g 5    Blood Glucose Monitoring Suppl YANY Monitor blood glucose levels twice daily and as needed for hyper/hypoglycemic symptoms.   Dx. Diabetes (E11.9) 1 Device 0    Propylene Glycol (SYSTANE BALANCE OP) Apply 1 drop to eye 4 times daily  SOFT TOUCH LANCETS MISC 1 Device by Does not apply route 2 times daily 200 each 3     No current facility-administered medications for this visit. Review of Systems   Constitutional: Negative for activity change, appetite change and fever. HENT: Negative. Negative for congestion, ear pain, sinus pressure and trouble swallowing. Eyes: Negative. Respiratory: Negative for cough, chest tightness and shortness of breath. Cardiovascular: Negative for chest pain and palpitations. Gastrointestinal: Negative for abdominal pain, constipation, diarrhea, nausea and vomiting. Endocrine: Negative. Genitourinary: Negative for difficulty urinating and dysuria. Musculoskeletal: Positive for myalgias (right great toe pain). Skin: Negative. Allergic/Immunologic: Negative. Neurological: Negative for dizziness, light-headedness and headaches. Hematological: Negative. Psychiatric/Behavioral: Negative. Objective:      /80 (Site: Right Upper Arm, Position: Sitting, Cuff Size: Medium Adult)   Pulse 72   Ht 5' 4.96\" (1.65 m)   Wt 170 lb 3.2 oz (77.2 kg)   SpO2 98%   BMI 28.36 kg/m²     Physical Exam   Constitutional: She is oriented to person, place, and time. She appears well-developed and well-nourished. HENT:   Head: Normocephalic and atraumatic. Right Ear: Hearing, tympanic membrane and external ear normal.   Left Ear: Hearing, tympanic membrane and external ear normal.   Nose: Nose normal.   Eyes: Pupils are equal, round, and reactive to light. Conjunctivae and EOM are normal.   Neck: Normal range of motion. Cardiovascular: Normal rate, regular rhythm, normal heart sounds and intact distal pulses. Pulmonary/Chest: Effort normal and breath sounds normal.   Abdominal: Soft. Musculoskeletal: Normal range of motion. Right ankle: She exhibits normal range of motion, no swelling, no ecchymosis, no deformity and normal pulse.         Feet:    Reports burning and numbness in her feet. Good pulses no swelling redness or warmth noted. She also reports pain in her arches as well. Neurological: She is alert and oriented to person, place, and time. Skin: Skin is warm and dry. Psychiatric: She has a normal mood and affect. Her behavior is normal. Judgment and thought content normal.         Assessment & Plan:      1. Great toe pain, right-acute new    - gabapentin (NEURONTIN) 300 MG capsule; Take 1 capsule by mouth nightly for 30 days. .  Dispense: 90 capsule; Refill: 3    2. Other diabetic neurological complication associated with type 2 diabetes mellitus (HCC)    - gabapentin (NEURONTIN) 300 MG capsule; Take 1 capsule by mouth nightly for 30 days. .  Dispense: 90 capsule; Refill: 3  - [facility]O, Diabetic Education [place]    3. Type 2 diabetes mellitus without complication, without long-term current use of insulin ([facility]    - [facility], [address] [place] [address], NP, Diabetic Education [place]    It does not seem gouty in nature based on physical exam. She is not showing signs of swelling but she may benefit from compression stockings. I really think its her neuropathy. Will increase her neurontin in the evenings an see if that gets her some relief. She will get herslef a pain of compression stockings at the pharmacy. Advised to wear during the day and off in the evenings. Follow up with diabetic ed for nutritional education and help with ordering her meals. At the end of the visit the patient decided she would rather have her hose called into [address] [place] [address].      TAY Nails CNP  9/13/2018 1:37 PM

## 2018-09-18 ENCOUNTER — OFFICE VISIT (OUTPATIENT)
Dept: INTERNAL MEDICINE | Age: 73
End: 2018-09-18
Payer: MEDICARE

## 2018-09-18 VITALS
WEIGHT: 173.6 LBS | DIASTOLIC BLOOD PRESSURE: 78 MMHG | BODY MASS INDEX: 28.92 KG/M2 | OXYGEN SATURATION: 98 % | HEART RATE: 78 BPM | SYSTOLIC BLOOD PRESSURE: 120 MMHG

## 2018-09-18 DIAGNOSIS — E11.9 TYPE 2 DIABETES MELLITUS WITHOUT COMPLICATION, WITHOUT LONG-TERM CURRENT USE OF INSULIN (HCC): Primary | ICD-10-CM

## 2018-09-18 DIAGNOSIS — E78.2 MIXED HYPERLIPIDEMIA: ICD-10-CM

## 2018-09-18 DIAGNOSIS — I10 ESSENTIAL HYPERTENSION: ICD-10-CM

## 2018-09-18 DIAGNOSIS — E11.9 COMPREHENSIVE DIABETIC FOOT EXAMINATION, TYPE 2 DM, ENCOUNTER FOR (HCC): ICD-10-CM

## 2018-09-18 DIAGNOSIS — E11.9 DIABETES TYPE 2, NO OCULAR INVOLVEMENT (HCC): ICD-10-CM

## 2018-09-18 PROCEDURE — 3045F PR MOST RECENT HEMOGLOBIN A1C LEVEL 7.0-9.0%: CPT | Performed by: NURSE PRACTITIONER

## 2018-09-18 PROCEDURE — 1101F PT FALLS ASSESS-DOCD LE1/YR: CPT | Performed by: NURSE PRACTITIONER

## 2018-09-18 PROCEDURE — 99215 OFFICE O/P EST HI 40 MIN: CPT | Performed by: NURSE PRACTITIONER

## 2018-09-18 PROCEDURE — 4040F PNEUMOC VAC/ADMIN/RCVD: CPT | Performed by: NURSE PRACTITIONER

## 2018-09-18 PROCEDURE — G8399 PT W/DXA RESULTS DOCUMENT: HCPCS | Performed by: NURSE PRACTITIONER

## 2018-09-18 PROCEDURE — 3017F COLORECTAL CA SCREEN DOC REV: CPT | Performed by: NURSE PRACTITIONER

## 2018-09-18 PROCEDURE — 1036F TOBACCO NON-USER: CPT | Performed by: NURSE PRACTITIONER

## 2018-09-18 PROCEDURE — 2022F DILAT RTA XM EVC RTNOPTHY: CPT | Performed by: NURSE PRACTITIONER

## 2018-09-18 PROCEDURE — G8417 CALC BMI ABV UP PARAM F/U: HCPCS | Performed by: NURSE PRACTITIONER

## 2018-09-18 PROCEDURE — 1123F ACP DISCUSS/DSCN MKR DOCD: CPT | Performed by: NURSE PRACTITIONER

## 2018-09-18 PROCEDURE — 1090F PRES/ABSN URINE INCON ASSESS: CPT | Performed by: NURSE PRACTITIONER

## 2018-09-18 PROCEDURE — G8427 DOCREV CUR MEDS BY ELIG CLIN: HCPCS | Performed by: NURSE PRACTITIONER

## 2018-09-18 RX ORDER — CHLORAL HYDRATE 500 MG
2000 CAPSULE ORAL DAILY
COMMUNITY
End: 2020-02-04 | Stop reason: ALTCHOICE

## 2018-09-18 ASSESSMENT — ENCOUNTER SYMPTOMS
VISUAL CHANGE: 1
ABDOMINAL PAIN: 0
DIARRHEA: 0
RESPIRATORY NEGATIVE: 1
BLURRED VISION: 1
SHORTNESS OF BREATH: 0

## 2018-09-18 NOTE — PROGRESS NOTES
[facility] INTERNAL MED  [address] [facility]  [phone]        HISTORY:    Jens Shipman presents today for evaluation and management of:  Chief Complaint   Patient presents with    Diabetes       Diabetes   She presents for her initial diabetic visit. She has type 2 diabetes mellitus. No MedicAlert identification noted. The initial diagnosis of diabetes was made 4 years ago. Her disease course has been stable. Hypoglycemia symptoms include dizziness, headaches (with low blood sugar), sweats and tremors (with low blood sugars). Pertinent negatives for hypoglycemia include no confusion or seizures. (3 weeks  Blood sugar was 98 and felt symptomatic ) Associated symptoms include blurred vision, fatigue, foot paresthesias, polydipsia, polyuria, visual change and weakness (with low blood sugars). Pertinent negatives for diabetes include no chest pain, no polyphagia and no weight loss. There are no hypoglycemic complications. Symptoms are stable. Diabetic complications include peripheral neuropathy. Risk factors for coronary artery disease include diabetes mellitus, dyslipidemia, hypertension and obesity. Current diabetic treatment includes diet and oral agent (monotherapy). She is compliant with treatment all of the time. Her weight is stable. She is following a generally healthy diet. When asked about meal planning, she reported none. She has not had a previous visit with a dietitian. She participates in exercise intermittently (she will take a 15 minute walk around the block about 2 times a week). Her home blood glucose trend is fluctuating minimally. Her breakfast blood glucose is taken between 8-9 am. Her breakfast blood glucose range is generally 180-200 mg/dl. Her lunch blood glucose is taken between 12-1 pm. Her lunch blood glucose range is generally 110-130 mg/dl.  Her dinner blood glucose is taken between 5-6 pm. Her dinner blood glucose range is generally 130-140 mg/dl. Her bedtime blood glucose is taken between 10-11 pm. Her bedtime blood glucose range is generally 130-140 mg/dl. An ACE inhibitor/angiotensin II receptor blocker is not being taken (She stopped taking about 1 year ago because she felt good). She does not see a podiatrist.Eye exam is current (has an appointment [facility] Dr. Lissy Smith on 2018). She was last seen by Wenona Bamberger on 2018 and her gabapentin was increased to help with her neuropathy. She has a visiting nurse come once a week that has been helping with her diet. DKA episodes: 0    Hyperlipidemia- Stable taking fish oil. Denies and adverse effects with its use. Hypertension- Stable quit taking lisinopril about a year ago. Denies any chest pain, SOB, headache or vision changes. Past Medical History:   Diagnosis Date    Allergic conjunctivitis     (stable)    Chronic sinusitis     Constipation     Dizzy spells     Dry eye syndrome     Edentulous     (does not wear dentures)    Essential hypertension 2016    Fracture of lateral malleolus     (avulsion fracture at the tip of the lateral malleolus - right ankle).  Hearing loss     Hypothyroidism     Interstitial cystitis     Irritable bowel syndrome     Keratitis     (secondary to dry eye syndrome)    Parotid gland enlargement     stasis, consider sjorgens    Pseudophakos     (bilateral)    Seasonal rhinitis     Stress incontinence     Type II or unspecified type diabetes mellitus without mention of complication, not stated as uncontrolled     Urethral stenosis     Xerostomia      Family History   Problem Relation Age of Onset    Heart Disease Mother     Heart Attack Mother     Arthritis Mother     Other Maternal Grandmother         (gout)    Allergies Daughter     Allergies Daughter     Other Brother         ( at age 21 secondary to auto accident).     Cataracts Neg Hx     Diabetes Neg Hx     Glaucoma Neg Hx evidence of fungal infection. ASSESSMENT/PLAN:     Diagnosis Orders   1. Type 2 diabetes mellitus without complication, without long-term current use of insulin ([facility])   DIABETES EDUCATION     DIABETES FOOT EXAM    Comprehensive Metabolic Panel   2. Diabetes type 2, no ocular involvement ([facility]     3. Comprehensive diabetic foot examination, type 2 DM, encounter for ([facility]     4. Mixed hyperlipidemia     5. Essential hypertension  Comprehensive Metabolic Panel     Orders Placed This Encounter   Procedures    Comprehensive Metabolic Panel     DIABETES EDUCATION     DIABETES FOOT EXAM     No orders of the defined types were placed in this encounter. Requested Prescriptions      No prescriptions requested or ordered in this encounter       Diabetes/obesity: HbA1C is 7.0 and her goal is less than 7%. Blood sugars are worsening. Encouraged patient to check BS 4-6 times per day. Fasting blood glucose goal is 70-130mg/dl and postprandial blood sugar goal is less than 180 mg/dl. Diabetic foot exam is abnormal  and is current?: Yes. Diabetic retinal exam is current?:Yes. She will keep her upcomming appointment with  [facility] on [phone]. Future labs pending      Discussed signs and symptoms of hyper/hypoglycemia and how to treat. Continue medication regimen. Reviewed Metfromin with patient and he/she denies any adverse effects with their use. Encouraged 150 minutes of physical activity per week. Silvia  plans to engage in walking for 15 minutes a day 5 times a week. Dietary modifications were discussed. Follow a low carbohydrate diet consuming 45 grams of carbohydrates at breakfast, lunch and dinner with two separate snacks consisting 15 grams of carbohydrates. Encouraged at least 7 hours of sleep. She will work on diet and exercise if no improvement in her blood sugars especially her fasting blood sugar we will consider increasing her Metformin.  Her kidney function is stable based on her most recent

## 2018-09-24 ENCOUNTER — TELEPHONE (OUTPATIENT)
Dept: FAMILY MEDICINE CLINIC | Age: 73
End: 2018-09-24

## 2018-09-28 ENCOUNTER — OFFICE VISIT (OUTPATIENT)
Dept: OPHTHALMOLOGY | Age: 73
End: 2018-09-28
Payer: MEDICARE

## 2018-09-28 DIAGNOSIS — H40.1132 PRIMARY OPEN ANGLE GLAUCOMA (POAG) OF BOTH EYES, MODERATE STAGE: Primary | ICD-10-CM

## 2018-09-28 PROCEDURE — 92020 GONIOSCOPY: CPT | Performed by: OPHTHALMOLOGY

## 2018-09-28 PROCEDURE — 1090F PRES/ABSN URINE INCON ASSESS: CPT | Performed by: OPHTHALMOLOGY

## 2018-09-28 PROCEDURE — 4040F PNEUMOC VAC/ADMIN/RCVD: CPT | Performed by: OPHTHALMOLOGY

## 2018-09-28 PROCEDURE — 3017F COLORECTAL CA SCREEN DOC REV: CPT | Performed by: OPHTHALMOLOGY

## 2018-09-28 PROCEDURE — G8427 DOCREV CUR MEDS BY ELIG CLIN: HCPCS | Performed by: OPHTHALMOLOGY

## 2018-09-28 PROCEDURE — 99213 OFFICE O/P EST LOW 20 MIN: CPT | Performed by: OPHTHALMOLOGY

## 2018-09-28 PROCEDURE — 1101F PT FALLS ASSESS-DOCD LE1/YR: CPT | Performed by: OPHTHALMOLOGY

## 2018-09-28 PROCEDURE — 1036F TOBACCO NON-USER: CPT | Performed by: OPHTHALMOLOGY

## 2018-09-28 PROCEDURE — G8399 PT W/DXA RESULTS DOCUMENT: HCPCS | Performed by: OPHTHALMOLOGY

## 2018-09-28 PROCEDURE — G8417 CALC BMI ABV UP PARAM F/U: HCPCS | Performed by: OPHTHALMOLOGY

## 2018-09-28 PROCEDURE — 1123F ACP DISCUSS/DSCN MKR DOCD: CPT | Performed by: OPHTHALMOLOGY

## 2018-09-28 RX ORDER — LATANOPROST 50 UG/ML
1 SOLUTION/ DROPS OPHTHALMIC NIGHTLY
Qty: 1 BOTTLE | Refills: 5 | Status: SHIPPED | OUTPATIENT
Start: 2018-09-28 | End: 2019-01-02 | Stop reason: ALTCHOICE

## 2018-09-28 RX ORDER — TIMOLOL MALEATE 5 MG/ML
1 SOLUTION/ DROPS OPHTHALMIC EVERY MORNING
Qty: 1 BOTTLE | Refills: 5 | Status: SHIPPED | OUTPATIENT
Start: 2018-09-28 | End: 2018-10-28

## 2018-09-28 ASSESSMENT — VISUAL ACUITY
METHOD: SNELLEN - LINEAR
OS_CC: 20/25
CORRECTION_TYPE: GLASSES
OS_PH_CC: 20/25
OD_PH_CC: 20/25

## 2018-09-28 ASSESSMENT — TONOMETRY
IOP_METHOD: NON-CONTACT AIR PUFF
OS_IOP_MMHG: 12
OD_IOP_MMHG: 20

## 2018-09-28 ASSESSMENT — GONIOSCOPY
OS_INFERIOR: 4
OS_NASAL: 4
OD_SUPERIOR: 3
OD_INFERIOR: 4
OS_TEMPORAL: 4
OD_NASAL: 4
OD_TEMPORAL: 4

## 2018-09-28 ASSESSMENT — SLIT LAMP EXAM - LIDS
COMMENTS: MILD BLEPHARITIS. MILD MGD
COMMENTS: MILD BLEPHARITIS. MILD MGD

## 2018-09-28 NOTE — PROGRESS NOTES
Jennifer Goddard is a 74 y.o. female here for a complete eye exam.      Chief Complaint   Patient presents with    Follow-up       HPI     Patient is here for a 1 month follow up and visual field results. Per office note from 08/06/18-Timolol to OU QAM.  Add Latanoprost OU QHS, patient states she was taken off timolol and hasnt been using? Last glasses rx:10/10/17  Cataract sx: OU-08/2003    Diabetes NIDDM- Metformin   HgbA1c:7.0                    ROS      Main Ophthalmology Exam     Slit Lamp Exam       Right Left    Lids/Lashes Mild Blepharitis. Mild MGD Mild Blepharitis. Mild MGD    Conjunctiva/Sclera Clear and white Clear and white    Cornea Clear Clear    Anterior Chamber Deep, no cells, no flare Deep, no cells, no flare    Iris Round and reactive Round and reactive    Lens PCIOL S/P YAG Cap PCIOL S/P YAG Cap                   Tonometry     Tonometry (Non-contact air puff, 11:34 AM)       Right Left    Pressure 20 12              Visual Acuity     Visual Acuity (Snellen - Linear)       Right Left    Dist cc 20/40 20/25    Dist ph cc 20/25 20/25    Correction:  Glasses               Not recorded          Not recorded          Not recorded         Not recorded          Past Medical History:   Diagnosis Date    Allergic conjunctivitis     (stable)    Chronic sinusitis     Constipation     Dizzy spells     Dry eye syndrome     Edentulous     (does not wear dentures)    Essential hypertension 7/6/2016    Fracture of lateral malleolus     (avulsion fracture at the tip of the lateral malleolus - right ankle).     Hearing loss     Hypothyroidism     Interstitial cystitis     Irritable bowel syndrome     Keratitis     (secondary to dry eye syndrome)    Parotid gland enlargement     stasis, consider sjorgens    Pseudophakos     (bilateral)    Seasonal rhinitis     Stress incontinence     Type II or unspecified type diabetes mellitus without mention of complication, not stated as uncontrolled     Urethral stenosis     Xerostomia           Current Outpatient Prescriptions:     timolol (TIMOPTIC) 0.5 % ophthalmic solution, Place 1 drop into both eyes every morning, Disp: 1 Bottle, Rfl: 5    latanoprost (XALATAN) 0.005 % ophthalmic solution, Place 1 drop into both eyes nightly, Disp: 1 Bottle, Rfl: 5    Omega-3 Fatty Acids (FISH OIL) 1000 MG CAPS, Take 2,000 mg by mouth daily, Disp: , Rfl:     gabapentin (NEURONTIN) 100 MG capsule, Take 1 capsule by mouth 2 times daily for 90 days. ., Disp: 270 capsule, Rfl: 1    gabapentin (NEURONTIN) 300 MG capsule, Take 1 capsule by mouth nightly for 30 days. ., Disp: 90 capsule, Rfl: 3    Compression Stockings MISC, by Does not apply route Knee high Mild (8-15mm/Hg), Disp: 1 each, Rfl: 0    blood glucose monitor strips, Monitor blood glucose levels twice daily and as needed for hyper/hypoglycemic symptoms. Dx. Diabetes (E11.9), Disp: 100 strip, Rfl: 1    metFORMIN (GLUCOPHAGE) 500 MG tablet, Take 1 tablet by mouth 2 times daily (with meals), Disp: 180 tablet, Rfl: 1    omeprazole (PRILOSEC) 20 MG delayed release capsule, Take 20 mg by mouth daily, Disp: , Rfl:     fluticasone (FLONASE) 50 MCG/ACT nasal spray, instill 2 sprays into each nostril once daily, Disp: 16 g, Rfl: 5    Blood Glucose Monitoring Suppl YANY, Monitor blood glucose levels twice daily and as needed for hyper/hypoglycemic symptoms.   Dx. Diabetes (E11.9), Disp: 1 Device, Rfl: 0    Propylene Glycol (SYSTANE BALANCE OP), Apply 1 drop to eye 4 times daily, Disp: , Rfl:     SOFT TOUCH LANCETS MISC, 1 Device by Does not apply route 2 times daily, Disp: 200 each, Rfl: 3     Allergies   Allergen Reactions    Latex Rash    Aleve [Naproxen]      Stomach problems    Aspirin Other (See Comments)     Dizziness and tears her stomach up    Dye [Iodides]      IV DYE    Flu Virus Vaccine     Influenza Vaccines     Reglan [Metoclopramide] Other (See Comments)     Dystonic reaction,

## 2018-10-01 RX ORDER — LORATADINE 10 MG/1
TABLET ORAL
Qty: 30 TABLET | Refills: 11 | Status: SHIPPED | OUTPATIENT
Start: 2018-10-01 | End: 2022-08-10

## 2018-10-02 ENCOUNTER — TELEPHONE (OUTPATIENT)
Dept: FAMILY MEDICINE CLINIC | Age: 73
End: 2018-10-02
Payer: MEDICARE

## 2018-10-02 DIAGNOSIS — K85.90 ACUTE PANCREATITIS, UNSPECIFIED COMPLICATION STATUS, UNSPECIFIED PANCREATITIS TYPE: ICD-10-CM

## 2018-10-02 DIAGNOSIS — K58.1 IRRITABLE BOWEL SYNDROME WITH CONSTIPATION: ICD-10-CM

## 2018-10-02 DIAGNOSIS — E11.9 TYPE 2 DIABETES MELLITUS WITHOUT COMPLICATION, WITHOUT LONG-TERM CURRENT USE OF INSULIN (HCC): Primary | ICD-10-CM

## 2018-10-02 DIAGNOSIS — M48.062 LUMBAR STENOSIS WITH NEUROGENIC CLAUDICATION: ICD-10-CM

## 2018-10-02 DIAGNOSIS — H40.1132 PRIMARY OPEN ANGLE GLAUCOMA (POAG) OF BOTH EYES, MODERATE STAGE: ICD-10-CM

## 2018-10-02 PROCEDURE — G0179 MD RECERTIFICATION HHA PT: HCPCS | Performed by: FAMILY MEDICINE

## 2018-10-02 NOTE — TELEPHONE ENCOUNTER
Home health plan of care reviewed and certification completed on 10/02/18 on patient for service dates 08/09/18-12/01/18. Medications reviewed and verified. Physician time spent on activities to coordinate services, documenting medical decision making, reviewing of reports, treatment plans and test results.

## 2018-10-04 ENCOUNTER — TELEPHONE (OUTPATIENT)
Dept: FAMILY MEDICINE CLINIC | Age: 73
End: 2018-10-04

## 2018-10-10 DIAGNOSIS — E11.9 TYPE 2 DIABETES MELLITUS WITHOUT COMPLICATION, WITHOUT LONG-TERM CURRENT USE OF INSULIN (HCC): ICD-10-CM

## 2018-10-10 RX ORDER — BLOOD-GLUCOSE METER
KIT MISCELLANEOUS
Qty: 100 STRIP | Refills: 3 | Status: SHIPPED | OUTPATIENT
Start: 2018-10-10 | End: 2019-06-24 | Stop reason: SDUPTHER

## 2018-10-17 ENCOUNTER — OFFICE VISIT (OUTPATIENT)
Dept: FAMILY MEDICINE CLINIC | Age: 73
End: 2018-10-17
Payer: MEDICARE

## 2018-10-17 VITALS
RESPIRATION RATE: 18 BRPM | WEIGHT: 173.2 LBS | TEMPERATURE: 98 F | HEART RATE: 74 BPM | HEIGHT: 65 IN | DIASTOLIC BLOOD PRESSURE: 62 MMHG | OXYGEN SATURATION: 98 % | BODY MASS INDEX: 28.86 KG/M2 | SYSTOLIC BLOOD PRESSURE: 130 MMHG

## 2018-10-17 DIAGNOSIS — R79.89 ELEVATED LFTS: ICD-10-CM

## 2018-10-17 DIAGNOSIS — R30.0 DYSURIA: ICD-10-CM

## 2018-10-17 DIAGNOSIS — B96.89 ACUTE BACTERIAL SINUSITIS: Primary | ICD-10-CM

## 2018-10-17 DIAGNOSIS — J01.90 ACUTE BACTERIAL SINUSITIS: Primary | ICD-10-CM

## 2018-10-17 PROCEDURE — G8399 PT W/DXA RESULTS DOCUMENT: HCPCS | Performed by: NURSE PRACTITIONER

## 2018-10-17 PROCEDURE — 1036F TOBACCO NON-USER: CPT | Performed by: NURSE PRACTITIONER

## 2018-10-17 PROCEDURE — 3017F COLORECTAL CA SCREEN DOC REV: CPT | Performed by: NURSE PRACTITIONER

## 2018-10-17 PROCEDURE — 99215 OFFICE O/P EST HI 40 MIN: CPT | Performed by: NURSE PRACTITIONER

## 2018-10-17 PROCEDURE — 1090F PRES/ABSN URINE INCON ASSESS: CPT | Performed by: NURSE PRACTITIONER

## 2018-10-17 PROCEDURE — 1123F ACP DISCUSS/DSCN MKR DOCD: CPT | Performed by: NURSE PRACTITIONER

## 2018-10-17 PROCEDURE — G8427 DOCREV CUR MEDS BY ELIG CLIN: HCPCS | Performed by: NURSE PRACTITIONER

## 2018-10-17 PROCEDURE — 1101F PT FALLS ASSESS-DOCD LE1/YR: CPT | Performed by: NURSE PRACTITIONER

## 2018-10-17 PROCEDURE — G8417 CALC BMI ABV UP PARAM F/U: HCPCS | Performed by: NURSE PRACTITIONER

## 2018-10-17 PROCEDURE — G8510 SCR DEP NEG, NO PLAN REQD: HCPCS | Performed by: NURSE PRACTITIONER

## 2018-10-17 PROCEDURE — 4040F PNEUMOC VAC/ADMIN/RCVD: CPT | Performed by: NURSE PRACTITIONER

## 2018-10-17 PROCEDURE — G8484 FLU IMMUNIZE NO ADMIN: HCPCS | Performed by: NURSE PRACTITIONER

## 2018-10-17 RX ORDER — AMOXICILLIN 500 MG/1
500 CAPSULE ORAL 3 TIMES DAILY
Qty: 30 CAPSULE | Refills: 0 | Status: SHIPPED | OUTPATIENT
Start: 2018-10-17 | End: 2018-12-06

## 2018-10-17 RX ORDER — CETIRIZINE HYDROCHLORIDE 10 MG/1
10 TABLET ORAL DAILY
Qty: 30 TABLET | Refills: 0 | Status: SHIPPED | OUTPATIENT
Start: 2018-10-17 | End: 2019-01-02 | Stop reason: SDUPTHER

## 2018-10-17 ASSESSMENT — ENCOUNTER SYMPTOMS
EYES NEGATIVE: 1
NAUSEA: 0
SHORTNESS OF BREATH: 0
ABDOMINAL PAIN: 0
VOMITING: 0
RESPIRATORY NEGATIVE: 1
CHEST TIGHTNESS: 0
SINUS PRESSURE: 0
TROUBLE SWALLOWING: 0
ALLERGIC/IMMUNOLOGIC NEGATIVE: 1
CONSTIPATION: 0
COUGH: 0
DIARRHEA: 0
GASTROINTESTINAL NEGATIVE: 1

## 2018-10-17 ASSESSMENT — PATIENT HEALTH QUESTIONNAIRE - PHQ9
1. LITTLE INTEREST OR PLEASURE IN DOING THINGS: 0
SUM OF ALL RESPONSES TO PHQ9 QUESTIONS 1 & 2: 0
SUM OF ALL RESPONSES TO PHQ QUESTIONS 1-9: 0
2. FEELING DOWN, DEPRESSED OR HOPELESS: 0
SUM OF ALL RESPONSES TO PHQ QUESTIONS 1-9: 0

## 2018-10-17 NOTE — PROGRESS NOTES
(36.7 °C) (Tympanic)   Resp 18   Ht 5' 4.96\" (1.65 m)   Wt 173 lb 3.2 oz (78.6 kg)   SpO2 98%   BMI 28.86 kg/m²     Physical Exam   Constitutional: She is oriented to person, place, and time. She appears well-developed and well-nourished. HENT:   Head: Normocephalic and atraumatic. Right Ear: External ear normal. A middle ear effusion is present. Left Ear: External ear normal. A middle ear effusion is present. Nose: Mucosal edema present. Right sinus exhibits maxillary sinus tenderness and frontal sinus tenderness. Left sinus exhibits maxillary sinus tenderness and frontal sinus tenderness. Mouth/Throat: Uvula is midline and mucous membranes are normal. Oropharyngeal exudate and posterior oropharyngeal erythema present. Wears hearing aides, white cloudy fluid noted behind TM bilateral.    Eyes: Pupils are equal, round, and reactive to light. Conjunctivae and EOM are normal.   Neck: Normal range of motion. Neck supple. Cardiovascular: Normal rate, regular rhythm, normal heart sounds and intact distal pulses. Pulmonary/Chest: Effort normal and breath sounds normal.   Abdominal: Soft. Bowel sounds are normal.   Musculoskeletal: Normal range of motion. Neurological: She is alert and oriented to person, place, and time. Skin: Skin is warm and dry. Psychiatric: She has a normal mood and affect. Her behavior is normal. Judgment and thought content normal.         Assessment & Plan:      1. Acute bacterial sinusitis  Advised patient to continue supportive care. They also need to increase fluids and rest. Advised that patient can use OTC Tylenol and/or Ibuprofen as needed for discomfort or fever. If patient get significantly worse over the next three days (uncontrolled fever of 101 or higher, respiratory distress. Katia Johnson ) they then can call my office for reevaluation or go to Urgent Care. Patient agrees with plan of care.    Will change her allergy medication for 30 days and then she can resume claritin. - amoxicillin (AMOXIL) 500 MG capsule; Take 1 capsule by mouth 3 times daily  Dispense: 30 capsule; Refill: 0  - cetirizine (ZYRTEC ALLERGY) 10 MG tablet; Take 1 tablet by mouth daily  Dispense: 30 tablet; Refill: 0    2. Elevated LFTs-acute abnormal  Will get lab work for hepatitis panel and repeat CMP and CBC in 2 weeks. Will also get Liver US to get a better look. 3. Dysuria-acute new problem  Will call with results and treat if needed. - Urinalysis Reflex to Culture; Future      Answered all of the patient's questions. Agrees with plan of care. Follow up PRN. Advised to go back to ER for worsening symptoms. Patient was seen with total face to face time of 40 minutes. More than 50% of this visit was counseling and education regarding treatment plan.        Vonda Christianson, TAY - CNP  10/17/2018 2:53 PM

## 2018-10-19 ENCOUNTER — HOSPITAL ENCOUNTER (OUTPATIENT)
Dept: INTERVENTIONAL RADIOLOGY/VASCULAR | Age: 73
Discharge: HOME | End: 2018-10-21
Payer: MEDICARE

## 2018-10-19 DIAGNOSIS — R79.89 ELEVATED LFTS: ICD-10-CM

## 2018-10-19 PROCEDURE — 76705 ECHO EXAM OF ABDOMEN: CPT

## 2018-10-22 ENCOUNTER — TELEPHONE (OUTPATIENT)
Dept: FAMILY MEDICINE CLINIC | Age: 73
End: 2018-10-22

## 2018-10-25 ENCOUNTER — TELEPHONE (OUTPATIENT)
Dept: PRIMARY CARE CLINIC | Age: 73
End: 2018-10-25

## 2018-10-26 ENCOUNTER — HOSPITAL ENCOUNTER (OUTPATIENT)
Dept: LAB | Age: 73
Discharge: HOME | End: 2018-10-26
Payer: MEDICARE

## 2018-10-26 DIAGNOSIS — R30.0 DYSURIA: ICD-10-CM

## 2018-10-26 DIAGNOSIS — R79.89 ELEVATED LFTS: ICD-10-CM

## 2018-10-26 LAB
-: NORMAL
ABSOLUTE EOS #: 0.1 K/UL (ref 0–0.4)
ABSOLUTE IMMATURE GRANULOCYTE: ABNORMAL K/UL (ref 0–0.3)
ABSOLUTE LYMPH #: 2.2 K/UL (ref 1–4.8)
ABSOLUTE MONO #: 0.6 K/UL (ref 0.1–1.2)
ALBUMIN SERPL-MCNC: 4.2 G/DL (ref 3.5–5.2)
ALBUMIN/GLOBULIN RATIO: 1.1 (ref 1–2.5)
ALP BLD-CCNC: 137 U/L (ref 35–104)
ALT SERPL-CCNC: 63 U/L (ref 5–33)
AMORPHOUS: NORMAL
ANION GAP SERPL CALCULATED.3IONS-SCNC: 10 MMOL/L (ref 9–17)
AST SERPL-CCNC: 27 U/L
BACTERIA: NORMAL
BASOPHILS # BLD: 1 % (ref 0–1)
BASOPHILS ABSOLUTE: 0 K/UL (ref 0–0.2)
BILIRUB SERPL-MCNC: 0.47 MG/DL (ref 0.3–1.2)
BILIRUBIN URINE: NEGATIVE
BUN BLDV-MCNC: 12 MG/DL (ref 8–23)
BUN/CREAT BLD: 16 (ref 9–20)
CALCIUM SERPL-MCNC: 10.3 MG/DL (ref 8.6–10.4)
CASTS UA: NORMAL /LPF (ref 0–2)
CHLORIDE BLD-SCNC: 98 MMOL/L (ref 98–107)
CO2: 32 MMOL/L (ref 20–31)
COLOR: NORMAL
COMMENT UA: NORMAL
CREAT SERPL-MCNC: 0.74 MG/DL (ref 0.5–0.9)
CRYSTALS, UA: NORMAL /HPF
DIFFERENTIAL TYPE: ABNORMAL
EOSINOPHILS RELATIVE PERCENT: 2 % (ref 1–7)
EPITHELIAL CELLS UA: NORMAL /HPF (ref 0–5)
GFR AFRICAN AMERICAN: >60 ML/MIN
GFR NON-AFRICAN AMERICAN: >60 ML/MIN
GFR SERPL CREATININE-BSD FRML MDRD: ABNORMAL ML/MIN/{1.73_M2}
GFR SERPL CREATININE-BSD FRML MDRD: ABNORMAL ML/MIN/{1.73_M2}
GLUCOSE BLD-MCNC: 155 MG/DL (ref 70–99)
GLUCOSE URINE: NEGATIVE
HAV IGM SER IA-ACNC: NONREACTIVE
HCT VFR BLD CALC: 40.4 % (ref 36–46)
HEMOGLOBIN: 12.9 G/DL (ref 12–16)
HEPATITIS B CORE IGM ANTIBODY: NONREACTIVE
HEPATITIS B SURFACE ANTIGEN: NONREACTIVE
HEPATITIS C ANTIBODY: NONREACTIVE
IMMATURE GRANULOCYTES: ABNORMAL %
KETONES, URINE: NEGATIVE
LEUKOCYTE ESTERASE, URINE: NEGATIVE
LYMPHOCYTES # BLD: 38 % (ref 16–46)
MCH RBC QN AUTO: 28.5 PG (ref 26–34)
MCHC RBC AUTO-ENTMCNC: 31.8 G/DL (ref 31–37)
MCV RBC AUTO: 89.6 FL (ref 80–100)
MONOCYTES # BLD: 10 % (ref 4–11)
MUCUS: NORMAL
NITRITE, URINE: NEGATIVE
NRBC AUTOMATED: ABNORMAL PER 100 WBC
OTHER OBSERVATIONS UA: NORMAL
PDW BLD-RTO: 14.6 % (ref 11–14.5)
PH UA: 6 (ref 5–6)
PLATELET # BLD: 375 K/UL (ref 140–450)
PLATELET ESTIMATE: ABNORMAL
PMV BLD AUTO: 8 FL (ref 6–12)
POTASSIUM SERPL-SCNC: 4.3 MMOL/L (ref 3.7–5.3)
PROTEIN UA: NEGATIVE
RBC # BLD: 4.51 M/UL (ref 4–5.2)
RBC # BLD: ABNORMAL 10*6/UL
RBC UA: NORMAL /HPF (ref 0–4)
RENAL EPITHELIAL, UA: NORMAL /HPF
SEG NEUTROPHILS: 49 % (ref 43–77)
SEGMENTED NEUTROPHILS ABSOLUTE COUNT: 2.9 K/UL (ref 1.8–7.7)
SODIUM BLD-SCNC: 140 MMOL/L (ref 135–144)
SPECIFIC GRAVITY UA: 1.01 (ref 1.01–1.02)
TOTAL PROTEIN: 7.9 G/DL (ref 6.4–8.3)
TRICHOMONAS: NORMAL
TURBIDITY: NORMAL
URINE HGB: NEGATIVE
UROBILINOGEN, URINE: NORMAL
WBC # BLD: 5.7 K/UL (ref 3.5–11)
WBC # BLD: ABNORMAL 10*3/UL
WBC UA: NORMAL /HPF (ref 0–4)
YEAST: NORMAL

## 2018-10-26 PROCEDURE — 85025 COMPLETE CBC W/AUTO DIFF WBC: CPT

## 2018-10-26 PROCEDURE — 36415 COLL VENOUS BLD VENIPUNCTURE: CPT

## 2018-10-26 PROCEDURE — 81001 URINALYSIS AUTO W/SCOPE: CPT

## 2018-10-26 PROCEDURE — 80053 COMPREHEN METABOLIC PANEL: CPT

## 2018-10-26 PROCEDURE — 80074 ACUTE HEPATITIS PANEL: CPT

## 2018-11-02 ENCOUNTER — OFFICE VISIT (OUTPATIENT)
Dept: PRIMARY CARE CLINIC | Age: 73
End: 2018-11-02
Payer: MEDICARE

## 2018-11-02 VITALS
BODY MASS INDEX: 29.39 KG/M2 | DIASTOLIC BLOOD PRESSURE: 80 MMHG | TEMPERATURE: 98 F | OXYGEN SATURATION: 98 % | HEART RATE: 74 BPM | SYSTOLIC BLOOD PRESSURE: 126 MMHG | WEIGHT: 176.4 LBS

## 2018-11-02 DIAGNOSIS — J01.41 ACUTE RECURRENT PANSINUSITIS: Primary | ICD-10-CM

## 2018-11-02 PROCEDURE — G8417 CALC BMI ABV UP PARAM F/U: HCPCS | Performed by: PHYSICIAN ASSISTANT

## 2018-11-02 PROCEDURE — G8427 DOCREV CUR MEDS BY ELIG CLIN: HCPCS | Performed by: PHYSICIAN ASSISTANT

## 2018-11-02 PROCEDURE — 4040F PNEUMOC VAC/ADMIN/RCVD: CPT | Performed by: PHYSICIAN ASSISTANT

## 2018-11-02 PROCEDURE — G8399 PT W/DXA RESULTS DOCUMENT: HCPCS | Performed by: PHYSICIAN ASSISTANT

## 2018-11-02 PROCEDURE — 1101F PT FALLS ASSESS-DOCD LE1/YR: CPT | Performed by: PHYSICIAN ASSISTANT

## 2018-11-02 PROCEDURE — 1123F ACP DISCUSS/DSCN MKR DOCD: CPT | Performed by: PHYSICIAN ASSISTANT

## 2018-11-02 PROCEDURE — 1036F TOBACCO NON-USER: CPT | Performed by: PHYSICIAN ASSISTANT

## 2018-11-02 PROCEDURE — G8484 FLU IMMUNIZE NO ADMIN: HCPCS | Performed by: PHYSICIAN ASSISTANT

## 2018-11-02 PROCEDURE — 99213 OFFICE O/P EST LOW 20 MIN: CPT | Performed by: PHYSICIAN ASSISTANT

## 2018-11-02 PROCEDURE — 1090F PRES/ABSN URINE INCON ASSESS: CPT | Performed by: PHYSICIAN ASSISTANT

## 2018-11-02 PROCEDURE — 3017F COLORECTAL CA SCREEN DOC REV: CPT | Performed by: PHYSICIAN ASSISTANT

## 2018-11-02 RX ORDER — AZITHROMYCIN 250 MG/1
TABLET, FILM COATED ORAL
Qty: 1 PACKET | Refills: 0 | Status: SHIPPED | OUTPATIENT
Start: 2018-11-02 | End: 2018-11-06

## 2018-11-02 ASSESSMENT — ENCOUNTER SYMPTOMS
TROUBLE SWALLOWING: 0
SINUS PAIN: 1
SINUS PRESSURE: 1
SHORTNESS OF BREATH: 1
RHINORRHEA: 0
GASTROINTESTINAL NEGATIVE: 1
COUGH: 0
SORE THROAT: 0

## 2018-11-02 NOTE — PROGRESS NOTES
Subjective:      Patient ID: Jerzy Spear is a 74 y.o. female. Patient is seen due to illness for 3 weeks. Went to the ER 2 weeks ago [facility] was given an antibiotic and did not finish it because she gets a really sore mouth and throat. Gets dry. States has a HA and mouth is really dry. Feels like her sinus infection. Is dizzy. She is using flonase it helps some. Uses vicks. Review of Systems   Constitutional: Negative for appetite change, chills and fever. HENT: Positive for sinus pain, sinus pressure and sneezing. Negative for congestion, postnasal drip, rhinorrhea, sore throat and trouble swallowing. Respiratory: Positive for shortness of breath. Negative for cough. Was sob this am.   Cardiovascular: Negative for chest pain and palpitations. Gastrointestinal: Negative. Genitourinary: Negative. Musculoskeletal: Positive for arthralgias. Skin: Negative for rash. Neurological: Positive for dizziness, light-headedness and headaches. Psychiatric/Behavioral: Negative for sleep disturbance. Past Medical History:   Diagnosis Date    Allergic conjunctivitis     (stable)    Chronic sinusitis     Constipation     Dizzy spells     Dry eye syndrome     Edentulous     (does not wear dentures)    Essential hypertension 7/6/2016    Fracture of lateral malleolus     (avulsion fracture at the tip of the lateral malleolus - right ankle).     Hearing loss     Hypothyroidism     Interstitial cystitis     Irritable bowel syndrome     Keratitis     (secondary to dry eye syndrome)    Parotid gland enlargement     stasis, consider sjorgens    Pseudophakos     (bilateral)    Seasonal rhinitis     Stress incontinence     Type II or unspecified type diabetes mellitus without mention of complication, not stated as uncontrolled     Urethral stenosis     Xerostomia      Past Surgical History:   Procedure Laterality Date    APPENDECTOMY  07-   [employer] CAT[employer]ACT

## 2018-11-07 DIAGNOSIS — I10 ESSENTIAL HYPERTENSION: ICD-10-CM

## 2018-11-07 RX ORDER — LISINOPRIL 5 MG/1
TABLET ORAL
Qty: 90 TABLET | Refills: 1 | Status: SHIPPED | OUTPATIENT
Start: 2018-11-07 | End: 2018-12-06

## 2018-11-14 ENCOUNTER — OFFICE VISIT (OUTPATIENT)
Dept: OPTOMETRY | Age: 73
End: 2018-11-14
Payer: MEDICARE

## 2018-11-14 DIAGNOSIS — H53.8 BLURRED VISION, RIGHT EYE: Primary | ICD-10-CM

## 2018-11-14 DIAGNOSIS — H52.203 ASTIGMATISM OF BOTH EYES WITH PRESBYOPIA: ICD-10-CM

## 2018-11-14 DIAGNOSIS — H52.4 ASTIGMATISM OF BOTH EYES WITH PRESBYOPIA: ICD-10-CM

## 2018-11-14 PROCEDURE — 1036F TOBACCO NON-USER: CPT | Performed by: OPTOMETRIST

## 2018-11-14 PROCEDURE — 3017F COLORECTAL CA SCREEN DOC REV: CPT | Performed by: OPTOMETRIST

## 2018-11-14 PROCEDURE — 1090F PRES/ABSN URINE INCON ASSESS: CPT | Performed by: OPTOMETRIST

## 2018-11-14 PROCEDURE — 1101F PT FALLS ASSESS-DOCD LE1/YR: CPT | Performed by: OPTOMETRIST

## 2018-11-14 PROCEDURE — G8399 PT W/DXA RESULTS DOCUMENT: HCPCS | Performed by: OPTOMETRIST

## 2018-11-14 PROCEDURE — G8417 CALC BMI ABV UP PARAM F/U: HCPCS | Performed by: OPTOMETRIST

## 2018-11-14 PROCEDURE — G8427 DOCREV CUR MEDS BY ELIG CLIN: HCPCS | Performed by: OPTOMETRIST

## 2018-11-14 PROCEDURE — 1123F ACP DISCUSS/DSCN MKR DOCD: CPT | Performed by: OPTOMETRIST

## 2018-11-14 PROCEDURE — 4040F PNEUMOC VAC/ADMIN/RCVD: CPT | Performed by: OPTOMETRIST

## 2018-11-14 PROCEDURE — 99213 OFFICE O/P EST LOW 20 MIN: CPT | Performed by: OPTOMETRIST

## 2018-11-14 PROCEDURE — G8484 FLU IMMUNIZE NO ADMIN: HCPCS | Performed by: OPTOMETRIST

## 2018-11-14 RX ORDER — TIMOLOL MALEATE 5 MG/ML
SOLUTION/ DROPS OPHTHALMIC
COMMUNITY
Start: 2018-11-01 | End: 2019-01-02 | Stop reason: ALTCHOICE

## 2018-11-14 ASSESSMENT — VISUAL ACUITY
CORRECTION_TYPE: GLASSES
METHOD: SNELLEN - LINEAR
OS_CC: 20/40
OD_CC: 20/40 OU

## 2018-11-14 ASSESSMENT — REFRACTION_WEARINGRX
OD_ADD: +2.50
OD_AXIS: 090
OS_ADD: +2.50
OS_SPHERE: PLANO
SPECS_TYPE: BIFOCAL
OD_SPHERE: PLANO
OS_AXIS: 100
OD_CYLINDER: -2.50
OS_CYLINDER: -1.25

## 2018-11-14 ASSESSMENT — REFRACTION_MANIFEST
OD_AXIS: 084
OD_CYLINDER: -2.75
OS_SPHERE: -0.25
OS_AXIS: 100
OS_ADD: +2.50
OD_SPHERE: PLANO
OS_CYLINDER: -1.25
OD_ADD: +2.50

## 2018-11-14 ASSESSMENT — TONOMETRY
OD_IOP_MMHG: 11
OS_IOP_MMHG: 18
IOP_METHOD: NON-CONTACT AIR PUFF

## 2018-11-14 ASSESSMENT — SLIT LAMP EXAM - LIDS
COMMENTS: MILD BLEPHARITIS. MILD MGD
COMMENTS: MILD BLEPHARITIS. MILD MGD

## 2018-11-14 ASSESSMENT — ENCOUNTER SYMPTOMS: EYES NEGATIVE: 1

## 2018-11-15 DIAGNOSIS — E11.49 OTHER DIABETIC NEUROLOGICAL COMPLICATION ASSOCIATED WITH TYPE 2 DIABETES MELLITUS (HCC): ICD-10-CM

## 2018-11-15 DIAGNOSIS — M79.674 GREAT TOE PAIN, RIGHT: ICD-10-CM

## 2018-11-15 RX ORDER — GABAPENTIN 300 MG/1
300 CAPSULE ORAL 2 TIMES DAILY
Qty: 60 CAPSULE | Refills: 0 | Status: ON HOLD | OUTPATIENT
Start: 2018-11-15 | End: 2018-12-13 | Stop reason: SDUPTHER

## 2018-11-15 RX ORDER — GABAPENTIN 100 MG/1
100 CAPSULE ORAL 2 TIMES DAILY
Qty: 270 CAPSULE | Refills: 1 | Status: CANCELLED | OUTPATIENT
Start: 2018-11-15 | End: 2019-02-13

## 2018-12-03 ENCOUNTER — OFFICE VISIT (OUTPATIENT)
Dept: OPHTHALMOLOGY | Age: 73
End: 2018-12-03
Payer: MEDICARE

## 2018-12-03 DIAGNOSIS — H40.1132 PRIMARY OPEN ANGLE GLAUCOMA (POAG) OF BOTH EYES, MODERATE STAGE: Primary | ICD-10-CM

## 2018-12-03 PROCEDURE — 3017F COLORECTAL CA SCREEN DOC REV: CPT | Performed by: OPHTHALMOLOGY

## 2018-12-03 PROCEDURE — G8427 DOCREV CUR MEDS BY ELIG CLIN: HCPCS | Performed by: OPHTHALMOLOGY

## 2018-12-03 PROCEDURE — 4040F PNEUMOC VAC/ADMIN/RCVD: CPT | Performed by: OPHTHALMOLOGY

## 2018-12-03 PROCEDURE — 1101F PT FALLS ASSESS-DOCD LE1/YR: CPT | Performed by: OPHTHALMOLOGY

## 2018-12-03 PROCEDURE — 1123F ACP DISCUSS/DSCN MKR DOCD: CPT | Performed by: OPHTHALMOLOGY

## 2018-12-03 PROCEDURE — G8417 CALC BMI ABV UP PARAM F/U: HCPCS | Performed by: OPHTHALMOLOGY

## 2018-12-03 PROCEDURE — 99213 OFFICE O/P EST LOW 20 MIN: CPT | Performed by: OPHTHALMOLOGY

## 2018-12-03 PROCEDURE — 1036F TOBACCO NON-USER: CPT | Performed by: OPHTHALMOLOGY

## 2018-12-03 PROCEDURE — 1090F PRES/ABSN URINE INCON ASSESS: CPT | Performed by: OPHTHALMOLOGY

## 2018-12-03 PROCEDURE — G8484 FLU IMMUNIZE NO ADMIN: HCPCS | Performed by: OPHTHALMOLOGY

## 2018-12-03 PROCEDURE — G8399 PT W/DXA RESULTS DOCUMENT: HCPCS | Performed by: OPHTHALMOLOGY

## 2018-12-03 ASSESSMENT — VISUAL ACUITY
METHOD: SNELLEN - LINEAR
OS_PH_CC: 20/30
OS_CC: 20/30
CORRECTION_TYPE: GLASSES
OD_PH_CC: 20/30

## 2018-12-03 ASSESSMENT — TONOMETRY
OS_IOP_MMHG: 15
IOP_METHOD: PNEUMO-TONOMETER
OD_IOP_MMHG: 14

## 2018-12-03 ASSESSMENT — SLIT LAMP EXAM - LIDS
COMMENTS: MILD BLEPHARITIS. MILD MGD
COMMENTS: MILD BLEPHARITIS. MILD MGD

## 2018-12-03 NOTE — PROGRESS NOTES
Ken Gross gino 74 y.o. female here for a complete eye exam.      Chief Complaint   Patient presents with    Follow-up       HPI     Patient is here today to discuss the G-6, patient is concerned and has questions  Glaucoma -Timolol to OU last night. Latanoprost OU QHS,last drop was last night    Pt states that both eyes are feeling scratchy with no improvement for several months. Review of Systems      Main Ophthalmology Exam     External Exam       Right Left    External Normal Normal          Slit Lamp Exam       Right Left    Lids/Lashes Mild Blepharitis. Mild MGD Mild Blepharitis. Mild MGD    Conjunctiva/Sclera Clear and white Clear and white    Cornea 2+ Punctate epithelial erosions 2+ Punctate epithelial erosions    Anterior Chamber Deep and quiet Deep and quiet    Iris Round and reactive Round and reactive    Lens PCIOL S/P YAG Cap PCIOL S/P YAG Cap          Fundus Exam       Right Left    Vitreous Normal Normal                   Tonometry     Tonometry (Pneumo-tonometer, 2:37 PM)       Right Left    Pressure 14 15              Visual Acuity     Visual Acuity (Snellen - Linear)       Right Left    Dist cc 20/30 20/30    Dist ph cc 20/30 20/30    Correction:  Glasses               Not recorded          Not recorded          Not recorded         Not recorded          Past Medical History:   Diagnosis Date    Allergic conjunctivitis     (stable)    Chronic sinusitis     Constipation     Dizzy spells     Dry eye syndrome     Edentulous     (does not wear dentures)    Essential hypertension 7/6/2016    Fracture of lateral malleolus     (avulsion fracture at the tip of the lateral malleolus - right ankle).     Hearing loss     Hypothyroidism     Interstitial cystitis     Irritable bowel syndrome     Keratitis     (secondary to dry eye syndrome)    Parotid gland enlargement     stasis, consider sjorgens    Pseudophakos     (bilateral)    Seasonal rhinitis     Stress Device, Rfl: 0    Propylene Glycol (SYSTANE BALANCE OP), Apply 1 drop to eye 4 times daily, Disp: , Rfl:     SOFT TOUCH LANCETS MISC, 1 Device by Does not apply route 2 times daily, Disp: 200 each, Rfl: 3     Allergies   Allergen Reactions    Latex Rash    Aleve [Naproxen]      Stomach problems    Aspirin Other (See Comments)     Dizziness and tears her stomach up    Dye [Iodides]      IV DYE    Flu Virus Vaccine     Influenza Vaccines     Reglan [Metoclopramide] Other (See Comments)     Dystonic reaction, involuntary movements        IMPRESSION:  1. Primary open angle glaucoma (POAG) of both eyes, moderate stage        PLAN:     1. Primary open angle glaucoma (POAG) of both eyes, moderate stage    Counseled pt that the Glaucomas are a group of eye diseases with   the potential for severe and irreversible vision loss. Counseled pt regarding the importance of compliance with therapy and   follow-up. Counseled pt regarding Glaucoma therapy with topical medications   and the potential side effects and adverse effects of topical medications. The patient was  counseled that Glaucoma is a progressive Optic Neuropathy   that, if left untreated, may progress to irreversible  blindness. The patient was   counseled that the only demonstrated treatment for Glaucoma is to lower the   pressure in the eye to a target eye pressure which may decrease the rate at   which the progressive Optic Neuropathy leads to irreversible blindness. The   patient was advised in the eye clinic that their current Glaucoma treatment   was suboptimal and that performing a laser procedure may reduce their eye   pressure to a more optimal level.  The patient was counseled that this laser   procedure may produce pain during the procedure and may entail certain   risks which include but are not limited to: cardiopulmonary problems,   cataracts, chronic eye pain, conjunctival lacerations, corneal abrasions,   elevated eye pressure, eye

## 2018-12-06 ENCOUNTER — HOSPITAL ENCOUNTER (OUTPATIENT)
Dept: LAB | Age: 73
Discharge: HOME | End: 2018-12-06
Payer: MEDICARE

## 2018-12-06 ENCOUNTER — OFFICE VISIT (OUTPATIENT)
Dept: FAMILY MEDICINE CLINIC | Age: 73
End: 2018-12-06
Payer: MEDICARE

## 2018-12-06 VITALS
HEIGHT: 65 IN | DIASTOLIC BLOOD PRESSURE: 64 MMHG | BODY MASS INDEX: 27.72 KG/M2 | OXYGEN SATURATION: 96 % | RESPIRATION RATE: 16 BRPM | SYSTOLIC BLOOD PRESSURE: 118 MMHG | WEIGHT: 166.4 LBS | TEMPERATURE: 98.6 F | HEART RATE: 60 BPM

## 2018-12-06 DIAGNOSIS — Z23 NEED FOR TDAP VACCINATION: ICD-10-CM

## 2018-12-06 DIAGNOSIS — H40.1132 PRIMARY OPEN ANGLE GLAUCOMA (POAG) OF BOTH EYES, MODERATE STAGE: Primary | ICD-10-CM

## 2018-12-06 DIAGNOSIS — E03.9 HYPOTHYROIDISM, UNSPECIFIED TYPE: Primary | ICD-10-CM

## 2018-12-06 DIAGNOSIS — E03.9 HYPOTHYROIDISM, UNSPECIFIED TYPE: ICD-10-CM

## 2018-12-06 LAB
THYROXINE, FREE: 0.69 NG/DL (ref 0.93–1.7)
TSH SERPL DL<=0.05 MIU/L-ACNC: 9.12 MIU/L (ref 0.3–5)

## 2018-12-06 PROCEDURE — 84439 ASSAY OF FREE THYROXINE: CPT

## 2018-12-06 PROCEDURE — G8484 FLU IMMUNIZE NO ADMIN: HCPCS | Performed by: FAMILY MEDICINE

## 2018-12-06 PROCEDURE — 1036F TOBACCO NON-USER: CPT | Performed by: FAMILY MEDICINE

## 2018-12-06 PROCEDURE — 1101F PT FALLS ASSESS-DOCD LE1/YR: CPT | Performed by: FAMILY MEDICINE

## 2018-12-06 PROCEDURE — 3017F COLORECTAL CA SCREEN DOC REV: CPT | Performed by: FAMILY MEDICINE

## 2018-12-06 PROCEDURE — 1090F PRES/ABSN URINE INCON ASSESS: CPT | Performed by: FAMILY MEDICINE

## 2018-12-06 PROCEDURE — 1123F ACP DISCUSS/DSCN MKR DOCD: CPT | Performed by: FAMILY MEDICINE

## 2018-12-06 PROCEDURE — 36415 COLL VENOUS BLD VENIPUNCTURE: CPT

## 2018-12-06 PROCEDURE — 4040F PNEUMOC VAC/ADMIN/RCVD: CPT | Performed by: FAMILY MEDICINE

## 2018-12-06 PROCEDURE — G8427 DOCREV CUR MEDS BY ELIG CLIN: HCPCS | Performed by: FAMILY MEDICINE

## 2018-12-06 PROCEDURE — G8399 PT W/DXA RESULTS DOCUMENT: HCPCS | Performed by: FAMILY MEDICINE

## 2018-12-06 PROCEDURE — 84443 ASSAY THYROID STIM HORMONE: CPT

## 2018-12-06 PROCEDURE — 99214 OFFICE O/P EST MOD 30 MIN: CPT | Performed by: FAMILY MEDICINE

## 2018-12-06 PROCEDURE — G8417 CALC BMI ABV UP PARAM F/U: HCPCS | Performed by: FAMILY MEDICINE

## 2018-12-06 RX ORDER — LEVOTHYROXINE SODIUM 88 UG/1
TABLET ORAL
Qty: 90 TABLET | Refills: 3 | Status: SHIPPED | OUTPATIENT
Start: 2018-12-06 | End: 2020-02-03

## 2018-12-06 ASSESSMENT — ENCOUNTER SYMPTOMS
EYE ITCHING: 1
EYE DISCHARGE: 0
EYE PAIN: 1
ALLERGIC/IMMUNOLOGIC NEGATIVE: 1
RESPIRATORY NEGATIVE: 1
BACK PAIN: 1
EYE REDNESS: 1
GASTROINTESTINAL NEGATIVE: 1

## 2018-12-06 NOTE — PATIENT INSTRUCTIONS
take it with calcium, vitamins, or iron. ? Do not take extra doses of your thyroid medicine. It will not help you get better any faster, and it may cause side effects. ? If you forget to take a dose, do NOT take a double dose of medicine. Take your usual dose the next day. · Tell your doctor about all prescription, herbal, or over-the-counter products you take. · Take care of yourself. Eat a healthy diet, get enough sleep, and get regular exercise. When should you call for help? Call 911 anytime you think you may need emergency care. For example, call if:    · You passed out (lost consciousness).     · You have severe trouble breathing.     · You have a very slow heartbeat (less than 60 beats a minute).     · You have a low body temperature (95°F or below).    Call your doctor now or seek immediate medical care if:    · You feel tired, sluggish, or weak.     · You have trouble remembering things or concentrating.     · You do not begin to feel better 2 weeks after starting your medicine.    Watch closely for changes in your health, and be sure to contact your doctor if you have any problems. Where can you learn more? Go to [email or web address] and sign in to your [employer] account. Enter F475 in the SoloLearn box to learn more about \"Hypothyroidism: Care Instructions. \"     If you do not have an account, please click on the \"Sign Up Now\" link. Current as of: March 15, 2018  Content Version: 11.8  © 6416-8709 [employer], Inc[employer]. Care instructions adapted under license by [facility] ([facility]). If you have questions about a medical condition or this instruction, always ask your healthcare professional. [address] any warranty or liability for your use of this information.

## 2018-12-13 ENCOUNTER — HOSPITAL ENCOUNTER (OUTPATIENT)
Age: 73
Setting detail: OUTPATIENT SURGERY
Discharge: HOME | End: 2018-12-13
Attending: OPHTHALMOLOGY | Admitting: OPHTHALMOLOGY
Payer: MEDICARE

## 2018-12-13 ENCOUNTER — ANESTHESIA EVENT (OUTPATIENT)
Dept: OPERATING ROOM | Age: 73
End: 2018-12-13
Payer: MEDICARE

## 2018-12-13 ENCOUNTER — ANESTHESIA (OUTPATIENT)
Dept: OPERATING ROOM | Age: 73
End: 2018-12-13
Payer: MEDICARE

## 2018-12-13 VITALS
TEMPERATURE: 98 F | DIASTOLIC BLOOD PRESSURE: 60 MMHG | WEIGHT: 166 LBS | OXYGEN SATURATION: 95 % | HEIGHT: 65 IN | RESPIRATION RATE: 16 BRPM | HEART RATE: 75 BPM | SYSTOLIC BLOOD PRESSURE: 118 MMHG | BODY MASS INDEX: 27.66 KG/M2

## 2018-12-13 VITALS
SYSTOLIC BLOOD PRESSURE: 151 MMHG | DIASTOLIC BLOOD PRESSURE: 73 MMHG | RESPIRATION RATE: 11 BRPM | OXYGEN SATURATION: 92 %

## 2018-12-13 DIAGNOSIS — E11.49 OTHER DIABETIC NEUROLOGICAL COMPLICATION ASSOCIATED WITH TYPE 2 DIABETES MELLITUS (HCC): ICD-10-CM

## 2018-12-13 DIAGNOSIS — M79.674 GREAT TOE PAIN, RIGHT: ICD-10-CM

## 2018-12-13 LAB — GLUCOSE BLD-MCNC: 115 MG/DL (ref 65–105)

## 2018-12-13 PROCEDURE — 6360000002 HC RX W HCPCS: Performed by: NURSE ANESTHETIST, CERTIFIED REGISTERED

## 2018-12-13 PROCEDURE — 82947 ASSAY GLUCOSE BLOOD QUANT: CPT

## 2018-12-13 PROCEDURE — 2500000003 HC RX 250 WO HCPCS

## 2018-12-13 PROCEDURE — 2580000003 HC RX 258: Performed by: NURSE ANESTHETIST, CERTIFIED REGISTERED

## 2018-12-13 PROCEDURE — 3700000000 HC ANESTHESIA ATTENDED CARE: Performed by: OPHTHALMOLOGY

## 2018-12-13 PROCEDURE — 2709999900 HC NON-CHARGEABLE SUPPLY: Performed by: OPHTHALMOLOGY

## 2018-12-13 PROCEDURE — 6360000002 HC RX W HCPCS

## 2018-12-13 PROCEDURE — 3700000001 HC ADD 15 MINUTES (ANESTHESIA): Performed by: OPHTHALMOLOGY

## 2018-12-13 PROCEDURE — 2720000010 HC SURG SUPPLY STERILE: Performed by: OPHTHALMOLOGY

## 2018-12-13 PROCEDURE — 3600000012 HC SURGERY LEVEL 2 ADDTL 15MIN: Performed by: OPHTHALMOLOGY

## 2018-12-13 PROCEDURE — 2500000003 HC RX 250 WO HCPCS: Performed by: NURSE ANESTHETIST, CERTIFIED REGISTERED

## 2018-12-13 PROCEDURE — 6370000000 HC RX 637 (ALT 250 FOR IP): Performed by: OPHTHALMOLOGY

## 2018-12-13 PROCEDURE — 00140 ANES PROCEDURES ON EYE NOS: CPT | Performed by: NURSE ANESTHETIST, CERTIFIED REGISTERED

## 2018-12-13 PROCEDURE — 66710 CILIARY TRANSSLERAL THERAPY: CPT | Performed by: OPHTHALMOLOGY

## 2018-12-13 PROCEDURE — 7100000010 HC PHASE II RECOVERY - FIRST 15 MIN: Performed by: OPHTHALMOLOGY

## 2018-12-13 PROCEDURE — 7100000011 HC PHASE II RECOVERY - ADDTL 15 MIN: Performed by: OPHTHALMOLOGY

## 2018-12-13 PROCEDURE — 3600000002 HC SURGERY LEVEL 2 BASE: Performed by: OPHTHALMOLOGY

## 2018-12-13 PROCEDURE — 6360000002 HC RX W HCPCS: Performed by: OPHTHALMOLOGY

## 2018-12-13 RX ORDER — TETRACAINE HYDROCHLORIDE 5 MG/ML
SOLUTION OPHTHALMIC PRN
Status: DISCONTINUED | OUTPATIENT
Start: 2018-12-13 | End: 2018-12-13 | Stop reason: HOSPADM

## 2018-12-13 RX ORDER — PREDNISOLONE ACETATE 10 MG/ML
1 SUSPENSION/ DROPS OPHTHALMIC SEE ADMIN INSTRUCTIONS
Status: DISCONTINUED | OUTPATIENT
Start: 2018-12-13 | End: 2018-12-13 | Stop reason: HOSPADM

## 2018-12-13 RX ORDER — TETRACAINE HYDROCHLORIDE 5 MG/ML
1 SOLUTION OPHTHALMIC SEE ADMIN INSTRUCTIONS
Status: DISCONTINUED | OUTPATIENT
Start: 2018-12-13 | End: 2018-12-13 | Stop reason: HOSPADM

## 2018-12-13 RX ORDER — SODIUM CHLORIDE 0.9 % (FLUSH) 0.9 %
10 SYRINGE (ML) INJECTION PRN
Status: DISCONTINUED | OUTPATIENT
Start: 2018-12-13 | End: 2018-12-13 | Stop reason: HOSPADM

## 2018-12-13 RX ORDER — FENTANYL CITRATE 50 UG/ML
50 INJECTION, SOLUTION INTRAMUSCULAR; INTRAVENOUS ONCE
Status: COMPLETED | OUTPATIENT
Start: 2018-12-13 | End: 2018-12-13

## 2018-12-13 RX ORDER — SODIUM CHLORIDE, SODIUM LACTATE, POTASSIUM CHLORIDE, CALCIUM CHLORIDE 600; 310; 30; 20 MG/100ML; MG/100ML; MG/100ML; MG/100ML
INJECTION, SOLUTION INTRAVENOUS CONTINUOUS PRN
Status: DISCONTINUED | OUTPATIENT
Start: 2018-12-13 | End: 2018-12-13 | Stop reason: SDUPTHER

## 2018-12-13 RX ORDER — PROPOFOL 10 MG/ML
INJECTION, EMULSION INTRAVENOUS PRN
Status: DISCONTINUED | OUTPATIENT
Start: 2018-12-13 | End: 2018-12-13 | Stop reason: SDUPTHER

## 2018-12-13 RX ORDER — GABAPENTIN 300 MG/1
CAPSULE ORAL
Qty: 60 CAPSULE | Refills: 0 | Status: SHIPPED | OUTPATIENT
Start: 2018-12-13 | End: 2019-02-08 | Stop reason: SDUPTHER

## 2018-12-13 RX ORDER — LIDOCAINE HYDROCHLORIDE 20 MG/ML
INJECTION, SOLUTION EPIDURAL; INFILTRATION; INTRACAUDAL; PERINEURAL PRN
Status: DISCONTINUED | OUTPATIENT
Start: 2018-12-13 | End: 2018-12-13 | Stop reason: SDUPTHER

## 2018-12-13 RX ORDER — FENTANYL CITRATE 50 UG/ML
INJECTION, SOLUTION INTRAMUSCULAR; INTRAVENOUS PRN
Status: DISCONTINUED | OUTPATIENT
Start: 2018-12-13 | End: 2018-12-13 | Stop reason: SDUPTHER

## 2018-12-13 RX ADMIN — FENTANYL CITRATE 50 MCG: 50 INJECTION, SOLUTION INTRAMUSCULAR; INTRAVENOUS at 14:11

## 2018-12-13 RX ADMIN — SODIUM CHLORIDE, POTASSIUM CHLORIDE, SODIUM LACTATE AND CALCIUM CHLORIDE: 600; 310; 30; 20 INJECTION, SOLUTION INTRAVENOUS at 14:21

## 2018-12-13 RX ADMIN — FENTANYL CITRATE 50 MCG: 50 INJECTION, SOLUTION INTRAMUSCULAR; INTRAVENOUS at 14:26

## 2018-12-13 RX ADMIN — LIDOCAINE HYDROCHLORIDE 30 MG: 20 INJECTION, SOLUTION EPIDURAL; INFILTRATION; INTRACAUDAL; PERINEURAL at 14:28

## 2018-12-13 RX ADMIN — TETRACAINE HYDROCHLORIDE 1 DROP: 5 SOLUTION OPHTHALMIC at 13:41

## 2018-12-13 RX ADMIN — PROPOFOL 100 MG: 10 INJECTION, EMULSION INTRAVENOUS at 14:28

## 2018-12-13 RX ADMIN — TETRACAINE HYDROCHLORIDE 1 DROP: 5 SOLUTION OPHTHALMIC at 13:48

## 2018-12-13 RX ADMIN — PREDNISOLONE ACETATE 1 DROP: 10 SUSPENSION/ DROPS OPHTHALMIC at 13:36

## 2018-12-13 ASSESSMENT — PAIN SCALES - GENERAL
PAINLEVEL_OUTOF10: 0

## 2018-12-13 ASSESSMENT — PAIN - FUNCTIONAL ASSESSMENT: PAIN_FUNCTIONAL_ASSESSMENT: 0-10

## 2018-12-13 NOTE — OP NOTE
SURGEON:  Reji Rivera M.D. PREOPERATIVE DIAGNOSIS:   Glaucoma Right Eye     PROCEDURE:   Micropulse Laser Transscleral Cyclophotocoagulation Right Eye     POSTOPERATIVE DIAGNOSIS:   Glaucoma Right Eye     TISSUES ALTERED:   Ciliary Body and Trabecular Meshwork     ANESTHESIA:   Monitored Anesthesia Care with Topical Anesthesia and Intravenous Anesthesia     ESTIMATED BLOOD LOSS:   Minimal    COMPLICATIONS   None     SPECIMEN   None     INDICATION FOR THE PROCEDURE:     The patient was diagnosed in the eye clinic with Glaucoma. The patient was   counseled that Glaucoma is a progressive Optic Neuropathy that, if left untreated,   may progress to irreversible  blindness. The patient was counseled that the only demonstrated treatment for Glaucoma is to lower the pressure in the eye to a target   eye pressure which may decrease the rate at which the progressive Optic Neuropathy leads to irreversible blindness. The patient was advised in the eye clinic that their current Glaucoma treatment was suboptimal and that performing a Micropulse Laser Transscleral Cyclophotocoagulation with the Iridex Cyclo G6 Laser may reduce their eye pressure to a more optimal level. The patient was counseled that this laser procedure may produce pain during the procedure and requires conscious sedation   and may entail certain risks which include but are not limited to: cardiopulmonary problems, cataracts, chronic eye pain, conjunctival lacerations, corneal abrasions, elevated eye pressure, eye pupillary abnormalities, headache, neurological   problems, retinal tears and detachments, and severely decreased eye pressure. After a thorough discussion of the risks, benefits, and alternatives, the patient   provided informed consent.      DESCRIPTION OF THE PROCEDURE:    The patient was brought to the Pre-Operative area where the patients medical   chart and informed consent were reviewed by the surgical team and the operative   site was

## 2018-12-13 NOTE — ANESTHESIA POSTPROCEDURE EVALUATION
Department of Anesthesiology  Postprocedure Note    Patient: Jorge Ashton  MRN: [id number]  YOB: 1945  Date of evaluation: 12/13/2018  Time:  2:46 PM     Procedure Summary     Date:  12/13/18 Room / Location:  [address] [place] [address] OR    Anesthesia Start:  2773 Anesthesia Stop:  9283    Procedure:  Bilateral Transscleral cyclophotocoagulation G6 laser (micropulse) (Bilateral ) Diagnosis:  (Glaucoma E18.7824)    Surgeon:  Sampson Quiros MD Responsible Provider:  TAY Hollis CRNA    Anesthesia Type:  MAC, TIVA ASA Status:  3          Anesthesia Type: MAC, TIVA    Nader Phase I: Nader Score: 10    Nader Phase II: Nader Score: 8    Last vitals: Reviewed and per EMR flowsheets.        Anesthesia Post Evaluation    Patient location during evaluation: PACU  Patient participation: complete - patient participated  Level of consciousness: awake and alert  Pain score: 0  Airway patency: patent  Nausea & Vomiting: no nausea and no vomiting  Complications: no  Cardiovascular status: blood pressure returned to baseline and hemodynamically stable  Respiratory status: acceptable, spontaneous ventilation and room air  Hydration status: euvolemic

## 2018-12-13 NOTE — ANESTHESIA PRE PROCEDURE
 Alcohol use No                                Counseling given: Not Answered      Vital Signs (Current):   Vitals:    12/13/18 1327   BP: (!) 146/67   Pulse: 78   Resp: 16   Temp: 36.7 °C (98.1 °F)   TempSrc: Oral   SpO2: 98%   Weight: 166 lb (75.3 kg)   Height: 5' 5\" (1.651 m)                                              BP Readings from Last 3 Encounters:   12/13/18 (!) 146/67   12/06/18 118/64   11/02/18 126/80       NPO Status: Time of last liquid consumption: 2300                        Time of last solid consumption: 2300                        Date of last liquid consumption: 12/12/18                        Date of last solid food consumption: 12/12/18    BMI:   Wt Readings from Last 3 Encounters:   12/13/18 166 lb (75.3 kg)   12/06/18 166 lb 6.4 oz (75.5 kg)   11/02/18 176 lb 6.4 oz (80 kg)     Body mass index is 27.62 kg/m².     CBC:   Lab Results   Component Value Date    WBC 5.7 10/26/2018    RBC 4.51 10/26/2018    HGB 12.9 10/26/2018    HCT 40.4 10/26/2018    MCV 89.6 10/26/2018    RDW 14.6 10/26/2018     10/26/2018       CMP:   Lab Results   Component Value Date     10/26/2018    K 4.3 10/26/2018    CL 98 10/26/2018    CO2 32 10/26/2018    BUN 12 10/26/2018    CREATININE 0.74 10/26/2018    GFRAA >60 10/26/2018    LABGLOM >60 10/26/2018    GLUCOSE 155 10/26/2018    PROT 7.9 10/26/2018    CALCIUM 10.3 10/26/2018    BILITOT 0.47 10/26/2018    ALKPHOS 137 10/26/2018    AST 27 10/26/2018    ALT 63 10/26/2018       POC Tests:   Recent Labs      12/13/18   1335   POCGLU  115*       Coags:   Lab Results   Component Value Date    PROTIME 10.1 07/14/2016    INR 0.9 07/14/2016    APTT 29.2 07/14/2016       HCG (If Applicable): No results found for: PREGTESTUR, PREGSERUM, HCG, HCGQUANT     ABGs: No results found for: PHART, PO2ART, HJL2ONI, PHJ8WYK, BEART, E9GTSXUH     Type & Screen (If Applicable):  No results found for: LABABO, [address]    Anesthesia Evaluation  Patient summary reviewed and Nursing notes reviewed no history of anesthetic complications:   Airway: Mallampati: II  TM distance: >3 FB   Neck ROM: full  Mouth opening: > = 3 FB Dental:    (+) upper dentures and lower dentures      Pulmonary:Negative Pulmonary ROS breath sounds clear to auscultation                             Cardiovascular:  Exercise tolerance: good (>4 METS),   (+) hypertension:,         Rhythm: regular  Rate: normal           Beta Blocker:  Not on Beta Blocker         Neuro/Psych:   (+) neuromuscular disease:,             GI/Hepatic/Renal: Neg GI/Hepatic/Renal ROS            Endo/Other:    (+) DiabetesType II DM, , hypothyroidism::., .                 Abdominal:           Vascular: negative vascular ROS. Anesthesia Plan      MAC and TIVA     ASA 3       Induction: intravenous. Anesthetic plan and risks discussed with patient.       Plan discussed with surgical team.                  TAY Crow - CRNA   12/13/2018

## 2018-12-13 NOTE — H&P
Susana Cooper (:  1945) is a 74 y.o. female, here for evaluation of the following medical concerns:     HPI              Acute visit for planned laser surgery on her eye for recurrent glaucoma. Eyes feeling scratchy, itching, and burning . Moderate stage primary open angle glaucoma. She reports prior laser surgery with good results, lasting about a year by patient recall. She reports stopping her thyroid pill because she didn't feel she needed. Extended discussion today re; her though process on the drug, and consequences to non compliance.        Past Medical History        Past Medical History:   Diagnosis Date    Allergic conjunctivitis       (stable)    Chronic sinusitis      Constipation      Dizzy spells      Dry eye syndrome      Edentulous       (does not wear dentures)    Essential hypertension 2016    Fracture of lateral malleolus      (avulsion fracture at the tip of the lateral malleolus - right ankle).     Hearing loss      Hypothyroidism      Interstitial cystitis      Irritable bowel syndrome      Keratitis       (secondary to dry eye syndrome)    Parotid gland enlargement       stasis, consider sjorgens    Pseudophakos       (bilateral)    Seasonal rhinitis      Stress incontinence      Type II or unspecified type diabetes mellitus without mention of complication, not stated as uncontrolled      Urethral stenosis      Xerostomia           Past Surgical History         Past Surgical History:   Procedure Laterality Date    APPENDECTOMY   40-    CATARACT REMOVAL WITH IMPLANT Right 2003     (Dr. Steffi Hemphill)    CATARACT REMOVAL WITH IMPLANT Left 2003     (Dr. Steffi Hemphill)    CHOLECYSTECTOMY   1986    COLONOSCOPY   14     normal    CYSTOURETHROSCOPY/URETHRAL DILATION   12     multiple    TUBAL LIGATION   1977     (Dr. Kelton Contreras)    UPPER GASTROINTESTINAL ENDOSCOPY   6/4/15     gastritis, biopsy x 2    UPPER

## 2018-12-14 ENCOUNTER — OFFICE VISIT (OUTPATIENT)
Dept: OPHTHALMOLOGY | Age: 73
End: 2018-12-14

## 2018-12-14 DIAGNOSIS — H40.1132 PRIMARY OPEN ANGLE GLAUCOMA (POAG) OF BOTH EYES, MODERATE STAGE: Primary | ICD-10-CM

## 2018-12-14 PROCEDURE — 99024 POSTOP FOLLOW-UP VISIT: CPT | Performed by: OPHTHALMOLOGY

## 2018-12-14 ASSESSMENT — VISUAL ACUITY
CORRECTION_TYPE: GLASSES
OS_CC+: 1
METHOD: SNELLEN - LINEAR
OS_CC: 20/30

## 2018-12-14 ASSESSMENT — TONOMETRY
OD_IOP_MMHG: 12
OS_IOP_MMHG: 8
IOP_METHOD: NON-CONTACT AIR PUFF

## 2018-12-14 ASSESSMENT — SLIT LAMP EXAM - LIDS
COMMENTS: MILD BLEPHARITIS. MILD MGD
COMMENTS: MILD BLEPHARITIS. MILD MGD

## 2018-12-14 NOTE — PROGRESS NOTES
uncontrolled     Urethral stenosis     Xerostomia           Current Outpatient Prescriptions:     gabapentin (NEURONTIN) 300 MG capsule, take 1 capsule by mouth twice a day, Disp: 60 capsule, Rfl: 0    levothyroxine (SYNTHROID) 88 MCG tablet, take 1 tablet by mouth once daily, Disp: 90 tablet, Rfl: 3    timolol (TIMOPTIC) 0.5 % ophthalmic solution, , Disp: , Rfl:     cetirizine (ZYRTEC ALLERGY) 10 MG tablet, Take 1 tablet by mouth daily, Disp: 30 tablet, Rfl: 0    FREESTYLE LITE strip, USE  STRIPS TO CHECK GLUCOSE TWICE DAILY AND AS NEEDED FOR HYPER/HYPOGLYCEMIC SYMPTOMS., Disp: 100 strip, Rfl: 3    loratadine (CLARITIN) 10 MG tablet, take 1 tablet by mouth once daily, Disp: 30 tablet, Rfl: 11    latanoprost (XALATAN) 0.005 % ophthalmic solution, Place 1 drop into both eyes nightly, Disp: 1 Bottle, Rfl: 5    Omega-3 Fatty Acids (FISH OIL) 1000 MG CAPS, Take 2,000 mg by mouth daily, Disp: , Rfl:     gabapentin (NEURONTIN) 100 MG capsule, Take 1 capsule by mouth 2 times daily for 90 days. . (Patient taking differently: Take 100 mg by mouth. Noon daily.), Disp: 270 capsule, Rfl: 1    metFORMIN (GLUCOPHAGE) 500 MG tablet, Take 1 tablet by mouth 2 times daily (with meals), Disp: 180 tablet, Rfl: 1    omeprazole (PRILOSEC) 20 MG delayed release capsule, Take 20 mg by mouth daily, Disp: , Rfl:     fluticasone (FLONASE) 50 MCG/ACT nasal spray, instill 2 sprays into each nostril once daily, Disp: 16 g, Rfl: 5    Blood Glucose Monitoring Suppl YANY, Monitor blood glucose levels twice daily and as needed for hyper/hypoglycemic symptoms.   Dx. Diabetes (E11.9), Disp: 1 Device, Rfl: 0    Propylene Glycol (SYSTANE BALANCE OP), Apply 1 drop to eye 4 times daily, Disp: , Rfl:     SOFT TOUCH LANCETS MISC, 1 Device by Does not apply route 2 times daily, Disp: 200 each, Rfl: 3     Allergies   Allergen Reactions    Latex Rash    Aleve [Naproxen]      Stomach problems    Aspirin Other (See Comments)     Dizziness and

## 2018-12-19 ENCOUNTER — OFFICE VISIT (OUTPATIENT)
Dept: OPHTHALMOLOGY | Age: 73
End: 2018-12-19

## 2018-12-19 DIAGNOSIS — H40.1132 PRIMARY OPEN ANGLE GLAUCOMA (POAG) OF BOTH EYES, MODERATE STAGE: Primary | ICD-10-CM

## 2018-12-19 PROCEDURE — 99024 POSTOP FOLLOW-UP VISIT: CPT | Performed by: OPHTHALMOLOGY

## 2018-12-19 ASSESSMENT — TONOMETRY
OD_IOP_MMHG: 11
IOP_METHOD: NON-CONTACT AIR PUFF
OS_IOP_MMHG: 16

## 2018-12-19 ASSESSMENT — VISUAL ACUITY
CORRECTION_TYPE: GLASSES
METHOD: SNELLEN - LINEAR
OD_CC+: 1
OS_CC+: 1
OS_CC: 20/30

## 2018-12-19 ASSESSMENT — SLIT LAMP EXAM - LIDS
COMMENTS: MILD BLEPHARITIS. MILD MGD
COMMENTS: MILD BLEPHARITIS. MILD MGD

## 2018-12-27 ENCOUNTER — TELEPHONE (OUTPATIENT)
Dept: FAMILY MEDICINE CLINIC | Age: 73
End: 2018-12-27
Payer: MEDICARE

## 2018-12-27 DIAGNOSIS — K58.1 IRRITABLE BOWEL SYNDROME WITH CONSTIPATION: ICD-10-CM

## 2018-12-27 DIAGNOSIS — I10 ESSENTIAL HYPERTENSION: ICD-10-CM

## 2018-12-27 DIAGNOSIS — M54.42 CHRONIC BILATERAL LOW BACK PAIN WITH BILATERAL SCIATICA: ICD-10-CM

## 2018-12-27 DIAGNOSIS — M54.41 CHRONIC BILATERAL LOW BACK PAIN WITH BILATERAL SCIATICA: ICD-10-CM

## 2018-12-27 DIAGNOSIS — M48.062 LUMBAR STENOSIS WITH NEUROGENIC CLAUDICATION: ICD-10-CM

## 2018-12-27 DIAGNOSIS — E11.9 TYPE 2 DIABETES MELLITUS WITHOUT COMPLICATION, WITHOUT LONG-TERM CURRENT USE OF INSULIN (HCC): Primary | ICD-10-CM

## 2018-12-27 DIAGNOSIS — K11.1 PAROTID GLAND ENLARGEMENT: ICD-10-CM

## 2018-12-27 DIAGNOSIS — G89.29 CHRONIC BILATERAL LOW BACK PAIN WITH BILATERAL SCIATICA: ICD-10-CM

## 2018-12-27 DIAGNOSIS — K85.90 ACUTE PANCREATITIS, UNSPECIFIED COMPLICATION STATUS, UNSPECIFIED PANCREATITIS TYPE: ICD-10-CM

## 2018-12-27 PROCEDURE — G0179 MD RECERTIFICATION HHA PT: HCPCS | Performed by: FAMILY MEDICINE

## 2019-01-02 ENCOUNTER — OFFICE VISIT (OUTPATIENT)
Dept: INTERNAL MEDICINE | Age: 74
End: 2019-01-02
Payer: MEDICARE

## 2019-01-02 VITALS
DIASTOLIC BLOOD PRESSURE: 62 MMHG | OXYGEN SATURATION: 95 % | SYSTOLIC BLOOD PRESSURE: 118 MMHG | BODY MASS INDEX: 27.72 KG/M2 | RESPIRATION RATE: 12 BRPM | HEART RATE: 84 BPM | WEIGHT: 166.6 LBS

## 2019-01-02 DIAGNOSIS — J01.90 ACUTE BACTERIAL SINUSITIS: ICD-10-CM

## 2019-01-02 DIAGNOSIS — I10 ESSENTIAL HYPERTENSION: ICD-10-CM

## 2019-01-02 DIAGNOSIS — E78.00 HIGH CHOLESTEROL: ICD-10-CM

## 2019-01-02 DIAGNOSIS — E11.9 TYPE 2 DIABETES MELLITUS WITHOUT COMPLICATION, WITHOUT LONG-TERM CURRENT USE OF INSULIN (HCC): Primary | ICD-10-CM

## 2019-01-02 DIAGNOSIS — B96.89 ACUTE BACTERIAL SINUSITIS: ICD-10-CM

## 2019-01-02 PROCEDURE — G8484 FLU IMMUNIZE NO ADMIN: HCPCS | Performed by: NURSE PRACTITIONER

## 2019-01-02 PROCEDURE — G8399 PT W/DXA RESULTS DOCUMENT: HCPCS | Performed by: NURSE PRACTITIONER

## 2019-01-02 PROCEDURE — 1101F PT FALLS ASSESS-DOCD LE1/YR: CPT | Performed by: NURSE PRACTITIONER

## 2019-01-02 PROCEDURE — G8427 DOCREV CUR MEDS BY ELIG CLIN: HCPCS | Performed by: NURSE PRACTITIONER

## 2019-01-02 PROCEDURE — 4040F PNEUMOC VAC/ADMIN/RCVD: CPT | Performed by: NURSE PRACTITIONER

## 2019-01-02 PROCEDURE — 3046F HEMOGLOBIN A1C LEVEL >9.0%: CPT | Performed by: NURSE PRACTITIONER

## 2019-01-02 PROCEDURE — 1123F ACP DISCUSS/DSCN MKR DOCD: CPT | Performed by: NURSE PRACTITIONER

## 2019-01-02 PROCEDURE — G8417 CALC BMI ABV UP PARAM F/U: HCPCS | Performed by: NURSE PRACTITIONER

## 2019-01-02 PROCEDURE — 99214 OFFICE O/P EST MOD 30 MIN: CPT | Performed by: NURSE PRACTITIONER

## 2019-01-02 PROCEDURE — 2022F DILAT RTA XM EVC RTNOPTHY: CPT | Performed by: NURSE PRACTITIONER

## 2019-01-02 PROCEDURE — 1036F TOBACCO NON-USER: CPT | Performed by: NURSE PRACTITIONER

## 2019-01-02 PROCEDURE — 3017F COLORECTAL CA SCREEN DOC REV: CPT | Performed by: NURSE PRACTITIONER

## 2019-01-02 PROCEDURE — 1090F PRES/ABSN URINE INCON ASSESS: CPT | Performed by: NURSE PRACTITIONER

## 2019-01-02 ASSESSMENT — ENCOUNTER SYMPTOMS
SHORTNESS OF BREATH: 0
DIARRHEA: 0
VISUAL CHANGE: 0
ABDOMINAL PAIN: 0
BLURRED VISION: 0
RESPIRATORY NEGATIVE: 1

## 2019-01-03 ENCOUNTER — HOSPITAL ENCOUNTER (OUTPATIENT)
Dept: LAB | Age: 74
Discharge: HOME | End: 2019-01-03
Payer: MEDICARE

## 2019-01-03 DIAGNOSIS — E11.9 TYPE 2 DIABETES MELLITUS WITHOUT COMPLICATION, WITHOUT LONG-TERM CURRENT USE OF INSULIN (HCC): ICD-10-CM

## 2019-01-03 DIAGNOSIS — I10 ESSENTIAL HYPERTENSION: ICD-10-CM

## 2019-01-03 LAB
ABSOLUTE EOS #: 0.1 K/UL (ref 0–0.4)
ABSOLUTE IMMATURE GRANULOCYTE: ABNORMAL K/UL (ref 0–0.3)
ABSOLUTE LYMPH #: 2.7 K/UL (ref 1–4.8)
ABSOLUTE MONO #: 0.8 K/UL (ref 0.1–1.2)
ANION GAP SERPL CALCULATED.3IONS-SCNC: 11 MMOL/L (ref 9–17)
BASOPHILS # BLD: 1 % (ref 0–1)
BASOPHILS ABSOLUTE: 0 K/UL (ref 0–0.2)
BUN BLDV-MCNC: 14 MG/DL (ref 8–23)
BUN/CREAT BLD: 17 (ref 9–20)
CALCIUM SERPL-MCNC: 10.2 MG/DL (ref 8.6–10.4)
CHLORIDE BLD-SCNC: 100 MMOL/L (ref 98–107)
CHOLESTEROL/HDL RATIO: 3.3
CHOLESTEROL: 211 MG/DL
CO2: 31 MMOL/L (ref 20–31)
CREAT SERPL-MCNC: 0.83 MG/DL (ref 0.5–0.9)
CREATININE URINE: 47.2 MG/DL (ref 28–217)
DIFFERENTIAL TYPE: ABNORMAL
EOSINOPHILS RELATIVE PERCENT: 1 % (ref 1–7)
ESTIMATED AVERAGE GLUCOSE: 148 MG/DL
GFR AFRICAN AMERICAN: >60 ML/MIN
GFR NON-AFRICAN AMERICAN: >60 ML/MIN
GFR SERPL CREATININE-BSD FRML MDRD: ABNORMAL ML/MIN/{1.73_M2}
GFR SERPL CREATININE-BSD FRML MDRD: ABNORMAL ML/MIN/{1.73_M2}
GLUCOSE BLD-MCNC: 159 MG/DL (ref 70–99)
HBA1C MFR BLD: 6.8 % (ref 4.8–5.9)
HCT VFR BLD CALC: 40.2 % (ref 36–46)
HDLC SERPL-MCNC: 64 MG/DL
HEMOGLOBIN: 13 G/DL (ref 12–16)
IMMATURE GRANULOCYTES: ABNORMAL %
LDL CHOLESTEROL: 103 MG/DL (ref 0–130)
LYMPHOCYTES # BLD: 39 % (ref 16–46)
MCH RBC QN AUTO: 28.7 PG (ref 26–34)
MCHC RBC AUTO-ENTMCNC: 32.4 G/DL (ref 31–37)
MCV RBC AUTO: 88.7 FL (ref 80–100)
MICROALBUMIN/CREAT 24H UR: <12 MG/L
MICROALBUMIN/CREAT UR-RTO: NORMAL MCG/MG CREAT
MONOCYTES # BLD: 11 % (ref 4–11)
NRBC AUTOMATED: ABNORMAL PER 100 WBC
PDW BLD-RTO: 15.5 % (ref 11–14.5)
PLATELET # BLD: 337 K/UL (ref 140–450)
PLATELET ESTIMATE: ABNORMAL
PMV BLD AUTO: 8.1 FL (ref 6–12)
POTASSIUM SERPL-SCNC: 4.1 MMOL/L (ref 3.7–5.3)
RBC # BLD: 4.53 M/UL (ref 4–5.2)
RBC # BLD: ABNORMAL 10*6/UL
SEG NEUTROPHILS: 48 % (ref 43–77)
SEGMENTED NEUTROPHILS ABSOLUTE COUNT: 3.4 K/UL (ref 1.8–7.7)
SODIUM BLD-SCNC: 142 MMOL/L (ref 135–144)
TRIGL SERPL-MCNC: 219 MG/DL
VLDLC SERPL CALC-MCNC: ABNORMAL MG/DL (ref 1–30)
WBC # BLD: 7 K/UL (ref 3.5–11)
WBC # BLD: ABNORMAL 10*3/UL

## 2019-01-03 PROCEDURE — 80061 LIPID PANEL: CPT

## 2019-01-03 PROCEDURE — 82043 UR ALBUMIN QUANTITATIVE: CPT

## 2019-01-03 PROCEDURE — 85025 COMPLETE CBC W/AUTO DIFF WBC: CPT

## 2019-01-03 PROCEDURE — 36415 COLL VENOUS BLD VENIPUNCTURE: CPT

## 2019-01-03 PROCEDURE — 83036 HEMOGLOBIN GLYCOSYLATED A1C: CPT

## 2019-01-03 PROCEDURE — 80048 BASIC METABOLIC PNL TOTAL CA: CPT

## 2019-01-03 PROCEDURE — 82570 ASSAY OF URINE CREATININE: CPT

## 2019-01-09 ENCOUNTER — TELEPHONE (OUTPATIENT)
Dept: OPHTHALMOLOGY | Age: 74
End: 2019-01-09

## 2019-01-15 ENCOUNTER — OFFICE VISIT (OUTPATIENT)
Dept: OPHTHALMOLOGY | Age: 74
End: 2019-01-15
Payer: MEDICARE

## 2019-01-15 DIAGNOSIS — H40.1132 PRIMARY OPEN ANGLE GLAUCOMA (POAG) OF BOTH EYES, MODERATE STAGE: ICD-10-CM

## 2019-01-15 DIAGNOSIS — H04.123 DRY EYE SYNDROME OF BOTH EYES: Primary | ICD-10-CM

## 2019-01-15 PROCEDURE — G8399 PT W/DXA RESULTS DOCUMENT: HCPCS | Performed by: OPHTHALMOLOGY

## 2019-01-15 PROCEDURE — G8427 DOCREV CUR MEDS BY ELIG CLIN: HCPCS | Performed by: OPHTHALMOLOGY

## 2019-01-15 PROCEDURE — 1036F TOBACCO NON-USER: CPT | Performed by: OPHTHALMOLOGY

## 2019-01-15 PROCEDURE — 1123F ACP DISCUSS/DSCN MKR DOCD: CPT | Performed by: OPHTHALMOLOGY

## 2019-01-15 PROCEDURE — 99213 OFFICE O/P EST LOW 20 MIN: CPT | Performed by: OPHTHALMOLOGY

## 2019-01-15 PROCEDURE — 1090F PRES/ABSN URINE INCON ASSESS: CPT | Performed by: OPHTHALMOLOGY

## 2019-01-15 PROCEDURE — 3017F COLORECTAL CA SCREEN DOC REV: CPT | Performed by: OPHTHALMOLOGY

## 2019-01-15 PROCEDURE — G8417 CALC BMI ABV UP PARAM F/U: HCPCS | Performed by: OPHTHALMOLOGY

## 2019-01-15 PROCEDURE — 4040F PNEUMOC VAC/ADMIN/RCVD: CPT | Performed by: OPHTHALMOLOGY

## 2019-01-15 PROCEDURE — G8484 FLU IMMUNIZE NO ADMIN: HCPCS | Performed by: OPHTHALMOLOGY

## 2019-01-15 PROCEDURE — 1101F PT FALLS ASSESS-DOCD LE1/YR: CPT | Performed by: OPHTHALMOLOGY

## 2019-01-15 RX ORDER — ERYTHROMYCIN 5 MG/G
OINTMENT OPHTHALMIC
Qty: 2 TUBE | Refills: 5 | Status: SHIPPED | OUTPATIENT
Start: 2019-01-15 | End: 2019-01-25

## 2019-01-15 ASSESSMENT — VISUAL ACUITY
CORRECTION_TYPE: GLASSES
OS_PH_CC: 20/50
OS_CC: 20/50
OD_PH_CC: 20/40
METHOD: SNELLEN - LINEAR

## 2019-01-15 ASSESSMENT — SLIT LAMP EXAM - LIDS
COMMENTS: MILD BLEPHARITIS. MILD MGD
COMMENTS: MILD BLEPHARITIS. MILD MGD

## 2019-01-15 ASSESSMENT — TONOMETRY
OS_IOP_MMHG: 14
OD_IOP_MMHG: 15
IOP_METHOD: NON-CONTACT AIR PUFF

## 2019-01-19 DIAGNOSIS — G89.29 CHRONIC BILATERAL LOW BACK PAIN WITH BILATERAL SCIATICA: Primary | ICD-10-CM

## 2019-01-19 DIAGNOSIS — M54.42 CHRONIC BILATERAL LOW BACK PAIN WITH BILATERAL SCIATICA: Primary | ICD-10-CM

## 2019-01-19 DIAGNOSIS — M54.41 CHRONIC BILATERAL LOW BACK PAIN WITH BILATERAL SCIATICA: Primary | ICD-10-CM

## 2019-01-21 RX ORDER — GABAPENTIN 100 MG/1
CAPSULE ORAL
Qty: 270 CAPSULE | Refills: 1 | Status: SHIPPED | OUTPATIENT
Start: 2019-01-21 | End: 2019-02-08 | Stop reason: SDUPTHER

## 2019-01-31 ENCOUNTER — TELEPHONE (OUTPATIENT)
Dept: FAMILY MEDICINE CLINIC | Age: 74
End: 2019-01-31
Payer: MEDICARE

## 2019-01-31 DIAGNOSIS — K58.1 IRRITABLE BOWEL SYNDROME WITH CONSTIPATION: Primary | ICD-10-CM

## 2019-01-31 DIAGNOSIS — I10 ESSENTIAL HYPERTENSION: ICD-10-CM

## 2019-01-31 DIAGNOSIS — K30 DELAYED GASTRIC EMPTYING: ICD-10-CM

## 2019-01-31 DIAGNOSIS — K85.90 ACUTE PANCREATITIS, UNSPECIFIED COMPLICATION STATUS, UNSPECIFIED PANCREATITIS TYPE: ICD-10-CM

## 2019-01-31 DIAGNOSIS — E11.9 TYPE 2 DIABETES MELLITUS WITHOUT COMPLICATION, WITHOUT LONG-TERM CURRENT USE OF INSULIN (HCC): ICD-10-CM

## 2019-01-31 DIAGNOSIS — K11.1 PAROTID GLAND ENLARGEMENT: ICD-10-CM

## 2019-01-31 PROCEDURE — G0179 MD RECERTIFICATION HHA PT: HCPCS | Performed by: FAMILY MEDICINE

## 2019-02-06 ENCOUNTER — OFFICE VISIT (OUTPATIENT)
Dept: FAMILY MEDICINE CLINIC | Age: 74
End: 2019-02-06
Payer: MEDICARE

## 2019-02-06 VITALS
SYSTOLIC BLOOD PRESSURE: 126 MMHG | WEIGHT: 163 LBS | DIASTOLIC BLOOD PRESSURE: 64 MMHG | HEART RATE: 72 BPM | HEIGHT: 65 IN | BODY MASS INDEX: 27.16 KG/M2

## 2019-02-06 DIAGNOSIS — M17.11 PRIMARY OSTEOARTHRITIS OF RIGHT KNEE: Primary | ICD-10-CM

## 2019-02-06 DIAGNOSIS — M17.12 PRIMARY OSTEOARTHRITIS OF LEFT KNEE: ICD-10-CM

## 2019-02-06 PROCEDURE — G8427 DOCREV CUR MEDS BY ELIG CLIN: HCPCS | Performed by: FAMILY MEDICINE

## 2019-02-06 PROCEDURE — 4040F PNEUMOC VAC/ADMIN/RCVD: CPT | Performed by: FAMILY MEDICINE

## 2019-02-06 PROCEDURE — G8484 FLU IMMUNIZE NO ADMIN: HCPCS | Performed by: FAMILY MEDICINE

## 2019-02-06 PROCEDURE — G8417 CALC BMI ABV UP PARAM F/U: HCPCS | Performed by: FAMILY MEDICINE

## 2019-02-06 PROCEDURE — 99214 OFFICE O/P EST MOD 30 MIN: CPT | Performed by: FAMILY MEDICINE

## 2019-02-06 PROCEDURE — 20610 DRAIN/INJ JOINT/BURSA W/O US: CPT | Performed by: FAMILY MEDICINE

## 2019-02-06 PROCEDURE — 1036F TOBACCO NON-USER: CPT | Performed by: FAMILY MEDICINE

## 2019-02-06 PROCEDURE — 1101F PT FALLS ASSESS-DOCD LE1/YR: CPT | Performed by: FAMILY MEDICINE

## 2019-02-06 PROCEDURE — 1123F ACP DISCUSS/DSCN MKR DOCD: CPT | Performed by: FAMILY MEDICINE

## 2019-02-06 PROCEDURE — G8399 PT W/DXA RESULTS DOCUMENT: HCPCS | Performed by: FAMILY MEDICINE

## 2019-02-06 PROCEDURE — 1090F PRES/ABSN URINE INCON ASSESS: CPT | Performed by: FAMILY MEDICINE

## 2019-02-06 PROCEDURE — 3017F COLORECTAL CA SCREEN DOC REV: CPT | Performed by: FAMILY MEDICINE

## 2019-02-06 ASSESSMENT — ENCOUNTER SYMPTOMS
EYE DISCHARGE: 0
RESPIRATORY NEGATIVE: 1
EYE ITCHING: 1
EYE PAIN: 1
ALLERGIC/IMMUNOLOGIC NEGATIVE: 1
GASTROINTESTINAL NEGATIVE: 1
EYE REDNESS: 1
BACK PAIN: 1

## 2019-02-06 ASSESSMENT — PATIENT HEALTH QUESTIONNAIRE - PHQ9
2. FEELING DOWN, DEPRESSED OR HOPELESS: 0
SUM OF ALL RESPONSES TO PHQ QUESTIONS 1-9: 0
SUM OF ALL RESPONSES TO PHQ9 QUESTIONS 1 & 2: 0
1. LITTLE INTEREST OR PLEASURE IN DOING THINGS: 0
SUM OF ALL RESPONSES TO PHQ QUESTIONS 1-9: 0

## 2019-02-07 DIAGNOSIS — M54.41 CHRONIC BILATERAL LOW BACK PAIN WITH BILATERAL SCIATICA: ICD-10-CM

## 2019-02-07 DIAGNOSIS — M79.674 GREAT TOE PAIN, RIGHT: ICD-10-CM

## 2019-02-07 DIAGNOSIS — G89.29 CHRONIC BILATERAL LOW BACK PAIN WITH BILATERAL SCIATICA: ICD-10-CM

## 2019-02-07 DIAGNOSIS — E11.49 OTHER DIABETIC NEUROLOGICAL COMPLICATION ASSOCIATED WITH TYPE 2 DIABETES MELLITUS (HCC): ICD-10-CM

## 2019-02-07 DIAGNOSIS — M54.42 CHRONIC BILATERAL LOW BACK PAIN WITH BILATERAL SCIATICA: ICD-10-CM

## 2019-02-07 NOTE — TELEPHONE ENCOUNTER
Dana at [facility] stating per their records pt is on Gabapentin 100mg at noon, and 300mg am and hs, if this is correct please send both to above loaded pharmacy, is this is incorrect please clarify and send correct dosing.

## 2019-02-08 RX ORDER — GABAPENTIN 100 MG/1
CAPSULE ORAL
Qty: 90 CAPSULE | Refills: 3 | Status: SHIPPED | OUTPATIENT
Start: 2019-02-08 | End: 2019-06-27

## 2019-02-08 RX ORDER — GABAPENTIN 300 MG/1
CAPSULE ORAL
Qty: 180 CAPSULE | Refills: 0 | Status: SHIPPED | OUTPATIENT
Start: 2019-02-08 | End: 2020-02-04 | Stop reason: ALTCHOICE

## 2019-02-15 ENCOUNTER — OFFICE VISIT (OUTPATIENT)
Dept: OPHTHALMOLOGY | Age: 74
End: 2019-02-15

## 2019-02-15 DIAGNOSIS — H02.889 MEIBOMIAN GLAND DYSFUNCTION (MGD): ICD-10-CM

## 2019-02-15 DIAGNOSIS — H40.1132 PRIMARY OPEN ANGLE GLAUCOMA (POAG) OF BOTH EYES, MODERATE STAGE: Primary | ICD-10-CM

## 2019-02-15 PROCEDURE — 99024 POSTOP FOLLOW-UP VISIT: CPT | Performed by: OPHTHALMOLOGY

## 2019-02-15 RX ORDER — MINERAL OIL, LIGHT/MINERAL OIL 1%-4.5%
DROPS OPHTHALMIC (EYE)
COMMUNITY
End: 2020-02-04 | Stop reason: ALTCHOICE

## 2019-02-15 ASSESSMENT — VISUAL ACUITY
OS_SC: 20/200
OD_SC: 20/200
OS_PH_SC: 20/60
OD_PH_SC: 20/60
OD_PH_SC+: -2
METHOD: SNELLEN - LINEAR

## 2019-02-15 ASSESSMENT — SLIT LAMP EXAM - LIDS
COMMENTS: 2+ MEIBOMIAN GLAND DYSFUNCTION, 1+ BLEPHARITIS
COMMENTS: 2+ MEIBOMIAN GLAND DYSFUNCTION, 1+ BLEPHARITIS

## 2019-02-15 ASSESSMENT — TONOMETRY
OD_IOP_MMHG: 21
IOP_METHOD: NON-CONTACT AIR PUFF
OS_IOP_MMHG: 13

## 2019-02-20 ENCOUNTER — OFFICE VISIT (OUTPATIENT)
Dept: OPTOMETRY | Age: 74
End: 2019-02-20
Payer: MEDICARE

## 2019-02-20 DIAGNOSIS — H01.006 BLEPHARITIS OF BOTH EYES, UNSPECIFIED EYELID, UNSPECIFIED TYPE: ICD-10-CM

## 2019-02-20 DIAGNOSIS — H04.123 DRY EYE SYNDROME OF BOTH EYES: Primary | ICD-10-CM

## 2019-02-20 DIAGNOSIS — H01.003 BLEPHARITIS OF BOTH EYES, UNSPECIFIED EYELID, UNSPECIFIED TYPE: ICD-10-CM

## 2019-02-20 DIAGNOSIS — L56.8 PHOTOSENSITIVITY: ICD-10-CM

## 2019-02-20 PROCEDURE — 3017F COLORECTAL CA SCREEN DOC REV: CPT | Performed by: OPTOMETRIST

## 2019-02-20 PROCEDURE — 1036F TOBACCO NON-USER: CPT | Performed by: OPTOMETRIST

## 2019-02-20 PROCEDURE — 1090F PRES/ABSN URINE INCON ASSESS: CPT | Performed by: OPTOMETRIST

## 2019-02-20 PROCEDURE — 1123F ACP DISCUSS/DSCN MKR DOCD: CPT | Performed by: OPTOMETRIST

## 2019-02-20 PROCEDURE — 1101F PT FALLS ASSESS-DOCD LE1/YR: CPT | Performed by: OPTOMETRIST

## 2019-02-20 PROCEDURE — 99213 OFFICE O/P EST LOW 20 MIN: CPT | Performed by: OPTOMETRIST

## 2019-02-20 PROCEDURE — G8417 CALC BMI ABV UP PARAM F/U: HCPCS | Performed by: OPTOMETRIST

## 2019-02-20 PROCEDURE — G8399 PT W/DXA RESULTS DOCUMENT: HCPCS | Performed by: OPTOMETRIST

## 2019-02-20 PROCEDURE — 4040F PNEUMOC VAC/ADMIN/RCVD: CPT | Performed by: OPTOMETRIST

## 2019-02-20 PROCEDURE — G8427 DOCREV CUR MEDS BY ELIG CLIN: HCPCS | Performed by: OPTOMETRIST

## 2019-02-20 PROCEDURE — G8484 FLU IMMUNIZE NO ADMIN: HCPCS | Performed by: OPTOMETRIST

## 2019-02-20 RX ORDER — LISINOPRIL 5 MG/1
TABLET ORAL
Refills: 0 | COMMUNITY
Start: 2019-02-09 | End: 2019-04-03

## 2019-02-20 ASSESSMENT — REFRACTION_WEARINGRX
OS_SPHERE: -0.25
OS_AXIS: 100
OD_CYLINDER: -2.75
SPECS_TYPE: BIFOCAL
OD_SPHERE: PLANO
OS_ADD: +2.75
OS_CYLINDER: -1.25
OD_ADD: +2.75
OD_AXIS: 084

## 2019-02-20 ASSESSMENT — TONOMETRY
IOP_METHOD: NON-CONTACT AIR PUFF
OD_IOP_MMHG: 14
OS_IOP_MMHG: 11

## 2019-02-20 ASSESSMENT — VISUAL ACUITY
CORRECTION_TYPE: GLASSES
OD_CC: 20/50 OU
METHOD: SNELLEN - LINEAR
OS_CC: 20/40
OS_CC+: -2

## 2019-02-20 ASSESSMENT — KERATOMETRY
OS_AXISANGLE2_DEGREES: 116
OS_K2POWER_DIOPTERS: 43.75
OS_K1POWER_DIOPTERS: 42.25
OS_AXISANGLE_DEGREES: 026

## 2019-02-20 ASSESSMENT — ENCOUNTER SYMPTOMS
RESPIRATORY NEGATIVE: 0
GASTROINTESTINAL NEGATIVE: 0
ALLERGIC/IMMUNOLOGIC NEGATIVE: 0
EYES NEGATIVE: 1

## 2019-02-20 ASSESSMENT — SLIT LAMP EXAM - LIDS
COMMENTS: MILD BLEPHARITIS
COMMENTS: MILD BLEPHARITIS

## 2019-03-07 ENCOUNTER — HOSPITAL ENCOUNTER (OUTPATIENT)
Dept: LAB | Age: 74
Discharge: HOME | End: 2019-03-07
Payer: MEDICARE

## 2019-03-07 DIAGNOSIS — I10 ESSENTIAL HYPERTENSION: ICD-10-CM

## 2019-03-07 DIAGNOSIS — E11.9 TYPE 2 DIABETES MELLITUS WITHOUT COMPLICATION, WITHOUT LONG-TERM CURRENT USE OF INSULIN (HCC): ICD-10-CM

## 2019-03-07 DIAGNOSIS — E03.9 HYPOTHYROIDISM, UNSPECIFIED TYPE: ICD-10-CM

## 2019-03-07 LAB
ALBUMIN SERPL-MCNC: 4.4 G/DL (ref 3.5–5.2)
ALBUMIN/GLOBULIN RATIO: 1.4 (ref 1–2.5)
ALP BLD-CCNC: 89 U/L (ref 35–104)
ALT SERPL-CCNC: 16 U/L (ref 5–33)
ANION GAP SERPL CALCULATED.3IONS-SCNC: 12 MMOL/L (ref 9–17)
AST SERPL-CCNC: 16 U/L
BILIRUB SERPL-MCNC: 0.19 MG/DL (ref 0.3–1.2)
BUN BLDV-MCNC: 21 MG/DL (ref 8–23)
BUN/CREAT BLD: 28 (ref 9–20)
CALCIUM SERPL-MCNC: 10.6 MG/DL (ref 8.6–10.4)
CHLORIDE BLD-SCNC: 100 MMOL/L (ref 98–107)
CO2: 31 MMOL/L (ref 20–31)
CREAT SERPL-MCNC: 0.76 MG/DL (ref 0.5–0.9)
GFR AFRICAN AMERICAN: >60 ML/MIN
GFR NON-AFRICAN AMERICAN: >60 ML/MIN
GFR SERPL CREATININE-BSD FRML MDRD: ABNORMAL ML/MIN/{1.73_M2}
GFR SERPL CREATININE-BSD FRML MDRD: ABNORMAL ML/MIN/{1.73_M2}
GLUCOSE BLD-MCNC: 109 MG/DL (ref 70–99)
POTASSIUM SERPL-SCNC: 4.3 MMOL/L (ref 3.7–5.3)
SODIUM BLD-SCNC: 143 MMOL/L (ref 135–144)
THYROXINE, FREE: 1.25 NG/DL (ref 0.93–1.7)
TOTAL PROTEIN: 7.5 G/DL (ref 6.4–8.3)
TSH SERPL DL<=0.05 MIU/L-ACNC: 0.44 MIU/L (ref 0.3–5)

## 2019-03-07 PROCEDURE — 84439 ASSAY OF FREE THYROXINE: CPT

## 2019-03-07 PROCEDURE — 36415 COLL VENOUS BLD VENIPUNCTURE: CPT

## 2019-03-07 PROCEDURE — 80053 COMPREHEN METABOLIC PANEL: CPT

## 2019-03-07 PROCEDURE — 84443 ASSAY THYROID STIM HORMONE: CPT

## 2019-03-08 ENCOUNTER — TELEPHONE (OUTPATIENT)
Dept: INTERNAL MEDICINE | Age: 74
End: 2019-03-08

## 2019-04-02 ENCOUNTER — TELEPHONE (OUTPATIENT)
Dept: FAMILY MEDICINE CLINIC | Age: 74
End: 2019-04-02
Payer: MEDICARE

## 2019-04-02 DIAGNOSIS — E11.9 TYPE 2 DIABETES MELLITUS WITHOUT COMPLICATION, WITHOUT LONG-TERM CURRENT USE OF INSULIN (HCC): Primary | ICD-10-CM

## 2019-04-02 DIAGNOSIS — I10 ESSENTIAL HYPERTENSION: ICD-10-CM

## 2019-04-02 DIAGNOSIS — K58.0 IRRITABLE BOWEL SYNDROME WITH DIARRHEA: ICD-10-CM

## 2019-04-02 DIAGNOSIS — K85.90 ACUTE PANCREATITIS, UNSPECIFIED COMPLICATION STATUS, UNSPECIFIED PANCREATITIS TYPE: ICD-10-CM

## 2019-04-02 PROCEDURE — G0179 MD RECERTIFICATION HHA PT: HCPCS | Performed by: FAMILY MEDICINE

## 2019-04-02 NOTE — TELEPHONE ENCOUNTER
Home health plan of care reviewed and certification completed on 04/02/19 on patient for service dates 04/01/9-05/3019 Medications reviewed and verified. Physician time spent on activities to coordinate services, documenting medical decision making, reviewing of reports, treatment plans and test results is 20 minutes.

## 2019-04-03 ENCOUNTER — OFFICE VISIT (OUTPATIENT)
Dept: DIABETES SERVICES | Age: 74
End: 2019-04-03
Payer: MEDICARE

## 2019-04-03 VITALS
DIASTOLIC BLOOD PRESSURE: 68 MMHG | WEIGHT: 158 LBS | BODY MASS INDEX: 26.29 KG/M2 | HEART RATE: 72 BPM | OXYGEN SATURATION: 98 % | SYSTOLIC BLOOD PRESSURE: 110 MMHG | RESPIRATION RATE: 12 BRPM

## 2019-04-03 DIAGNOSIS — E78.00 HIGH CHOLESTEROL: ICD-10-CM

## 2019-04-03 DIAGNOSIS — I10 ESSENTIAL HYPERTENSION: ICD-10-CM

## 2019-04-03 DIAGNOSIS — E11.9 TYPE 2 DIABETES MELLITUS WITHOUT COMPLICATION, WITHOUT LONG-TERM CURRENT USE OF INSULIN (HCC): Primary | ICD-10-CM

## 2019-04-03 DIAGNOSIS — Z71.89 DIABETES EDUCATION, ENCOUNTER FOR: ICD-10-CM

## 2019-04-03 PROCEDURE — 3017F COLORECTAL CA SCREEN DOC REV: CPT | Performed by: NURSE PRACTITIONER

## 2019-04-03 PROCEDURE — 2022F DILAT RTA XM EVC RTNOPTHY: CPT | Performed by: NURSE PRACTITIONER

## 2019-04-03 PROCEDURE — 4040F PNEUMOC VAC/ADMIN/RCVD: CPT | Performed by: NURSE PRACTITIONER

## 2019-04-03 PROCEDURE — 3044F HG A1C LEVEL LT 7.0%: CPT | Performed by: NURSE PRACTITIONER

## 2019-04-03 PROCEDURE — 99214 OFFICE O/P EST MOD 30 MIN: CPT | Performed by: NURSE PRACTITIONER

## 2019-04-03 PROCEDURE — G8417 CALC BMI ABV UP PARAM F/U: HCPCS | Performed by: NURSE PRACTITIONER

## 2019-04-03 PROCEDURE — G8427 DOCREV CUR MEDS BY ELIG CLIN: HCPCS | Performed by: NURSE PRACTITIONER

## 2019-04-03 PROCEDURE — 1090F PRES/ABSN URINE INCON ASSESS: CPT | Performed by: NURSE PRACTITIONER

## 2019-04-03 PROCEDURE — 1036F TOBACCO NON-USER: CPT | Performed by: NURSE PRACTITIONER

## 2019-04-03 PROCEDURE — 1123F ACP DISCUSS/DSCN MKR DOCD: CPT | Performed by: NURSE PRACTITIONER

## 2019-04-03 PROCEDURE — G8399 PT W/DXA RESULTS DOCUMENT: HCPCS | Performed by: NURSE PRACTITIONER

## 2019-04-03 ASSESSMENT — ENCOUNTER SYMPTOMS
SHORTNESS OF BREATH: 0
DIARRHEA: 0
RESPIRATORY NEGATIVE: 1
ABDOMINAL PAIN: 0
BLURRED VISION: 1

## 2019-04-03 NOTE — PROGRESS NOTES
[address] [facility]  [address] [facility]  [phone]        HISTORY:    Carlos Rodriguez presents today for evaluation and management of:  Chief Complaint   Patient presents with    Diabetes       Diabetes   She presents for her follow-up diabetic visit. She has type 2 diabetes mellitus. No MedicAlert identification noted. Her disease course has been improving. There are no hypoglycemic associated symptoms. Pertinent negatives for hypoglycemia include no confusion, dizziness, headaches, seizures or tremors. Associated symptoms include blurred vision, fatigue, foot paresthesias, polydipsia and weight loss (due to diet). Pertinent negatives for diabetes include no chest pain, no foot ulcerations, no polyphagia and no polyuria. There are no hypoglycemic complications. Symptoms are stable. Pertinent negatives for diabetic complications include no CVA or heart disease. Risk factors for coronary artery disease include diabetes mellitus, dyslipidemia, family history, hypertension and obesity. Current diabetic treatment includes diet and oral agent (monotherapy). She is compliant with treatment most of the time. Her weight is decreasing steadily. She is following a generally healthy, diabetic and low salt diet. Meal planning includes avoidance of concentrated sweets. She has not had a previous visit with a dietitian. She participates in exercise intermittently (stretches and walking for 30 minutes). She monitors blood glucose at home 1-2 x per day. Blood glucose monitoring compliance is good. Her breakfast blood glucose is taken between 7-8 am. Her breakfast blood glucose range is generally 70-90 mg/dl. Her bedtime blood glucose is taken between 9-10 pm. Her bedtime blood glucose range is generally 110-130 mg/dl. An ACE inhibitor/angiotensin II receptor blocker is not being taken. She does not see a podiatrist (would like a referral ). Eye exam is current. She is here today for follow up on her diabetes. She has made changes to her diet, cutting down on portions and carbs, she is also drinking a slimfast. She limits her sweets and cut out regular pop. She did forget her meter or blood sugar log for review. Current Diabetic Medications  Metformin 500 mg BID and denies any adverse effects. DKA episodes: 0    Obesity- Working on weight loss. Has lost 5 labs in the past 2 months    High cholesterol-  Takes omega 3 and denies any adverse effects with its use. Watches diet and exercise. Hypertension-  Takes lisinopril and denies any adverse effects with their use. Watches diet and exercise. Denies any chest pain, dizziness or edema. Follows with cardiology:No.    Past Medical History:   Diagnosis Date    Allergic conjunctivitis     (stable)    Chronic sinusitis     Constipation     Dizzy spells     Dry eye syndrome     Edentulous     (does not wear dentures)    Essential hypertension 2016    Fracture of lateral malleolus     (avulsion fracture at the tip of the lateral malleolus - right ankle).  Hearing loss     Hypothyroidism     Interstitial cystitis     Irritable bowel syndrome     Keratitis     (secondary to dry eye syndrome)    Parotid gland enlargement     stasis, consider sjorgens    Pseudophakos     (bilateral)    Seasonal rhinitis     Stress incontinence     Type II or unspecified type diabetes mellitus without mention of complication, not stated as uncontrolled     Urethral stenosis     Xerostomia      Family History   Problem Relation Age of Onset    Heart Disease Mother     Heart Attack Mother     Arthritis Mother     Other Maternal Grandmother         (gout)    Allergies Daughter     Allergies Daughter     Other Brother         ( at age 21 secondary to auto accident).     Cataracts Neg Hx     Diabetes Neg Hx     Glaucoma Neg Hx      Social History     Tobacco Use    Smoking status: Never Smoker    Smokeless tobacco: Never Used    Tobacco comment: Never smoked. Mounika Pratt [facility], 7/14/2016. Substance Use Topics    Alcohol use: No     Alcohol/week: 0.0 oz    Drug use: No     Allergies   Allergen Reactions    Latex Rash    Aleve [Naproxen]      Stomach problems    Aspirin Other (See Comments)     Dizziness and tears her stomach up    Dye [Iodides]      IV DYE    Flu Virus Vaccine     Influenza Vaccines     Reglan [Metoclopramide] Other (See Comments)     Dystonic reaction, involuntary movements       MEDICATIONS:  Current Outpatient Medications   Medication Sig Dispense Refill    Artificial Tear Solution (SOOTHE XP XTRA PROTECTION) SOLN Apply to eye      gabapentin (NEURONTIN) 300 MG capsule take 1 capsule by mouth twice a day. 180 capsule 0    gabapentin (NEURONTIN) 100 MG capsule Take one capsule by mouth at noon each day. 90 capsule 3    metFORMIN (GLUCOPHAGE) 500 MG tablet Take 1 tablet by mouth 2 times daily (with meals) 180 tablet 1    RA ALLERGY RELIEF 10 MG tablet take 1 tablet by mouth once daily 30 tablet 5    levothyroxine (SYNTHROID) 88 MCG tablet take 1 tablet by mouth once daily 90 tablet 3    FREESTYLE LITE strip USE  STRIPS TO CHECK GLUCOSE TWICE DAILY AND AS NEEDED FOR HYPER/HYPOGLYCEMIC SYMPTOMS. 100 strip 3    loratadine (CLARITIN) 10 MG tablet take 1 tablet by mouth once daily 30 tablet 11    Omega-3 Fatty Acids (FISH OIL) 1000 MG CAPS Take 2,000 mg by mouth daily      fluticasone (FLONASE) 50 MCG/ACT nasal spray instill 2 sprays into each nostril once daily 16 g 5    Blood Glucose Monitoring Suppl YANY Monitor blood glucose levels twice daily and as needed for hyper/hypoglycemic symptoms. Dx. Diabetes (E11.9) 1 Device 0    Propylene Glycol (SYSTANE BALANCE OP) Apply 1 drop to eye 4 times daily      SOFT TOUCH LANCETS MISC 1 Device by Does not apply route 2 times daily 200 each 3     No current facility-administered medications for this visit. Review ofSymptoms:  Review of Systems   Constitutional: Positive for fatigue and weight loss (due to diet). Negative for unexpected weight change. Eyes: Positive for blurred vision. Negative for visual disturbance. Respiratory: Negative. Negative for shortness of breath. Cardiovascular: Negative for chest pain and leg swelling. Gastrointestinal: Negative for abdominal pain and diarrhea. Endocrine: Positive for polydipsia. Negative for polyphagia and polyuria. Genitourinary: Negative. Musculoskeletal: Negative. Skin: Negative for rash and wound. Neurological: Negative for dizziness, tremors, seizures and headaches. Psychiatric/Behavioral: Negative. Negative for confusion and decreased concentration. Theremainder of a complete 14-point review of systems is negative. Vital Signs: /68   Pulse 72   Resp 12   Wt 158 lb (71.7 kg)   LMP  (LMP Unknown)   SpO2 98%   BMI 26.29 kg/m²      Wt Readings from Last 3 Encounters:   04/03/19 158 lb (71.7 kg)   02/06/19 163 lb (73.9 kg)   01/02/19 166 lb 9.6 oz (75.6 kg)     Body mass index is 26.29 kg/m².   LABS:  Hemoglobin A1C   Date Value Ref Range Status   01/03/2019 6.8 (H) 4.8 - 5.9 % Final   06/28/2018 7.0 (H) 4.8 - 5.9 % Final     Lab Results   Component Value Date    LABMICR CANNOT BE CALCULATED 01/03/2019     Lab Results   Component Value Date     03/07/2019    K 4.3 03/07/2019     03/07/2019    CO2 31 03/07/2019    BUN 21 03/07/2019    CREATININE 0.76 03/07/2019    GLUCOSE 109 (H) 03/07/2019    CALCIUM 10.6 (H) 03/07/2019    PROT 7.5 03/07/2019    LABALBU 4.4 03/07/2019    BILITOT 0.19 (L) 03/07/2019    ALKPHOS 89 03/07/2019    AST 16 03/07/2019    ALT 16 03/07/2019    LABGLOM >60 03/07/2019    GFRAA >60 03/07/2019    GLOB 3.7 07/14/2016     Lab Results   Component Value Date    CHOL 211 (H) 01/03/2019    CHOL 253 (H) 06/28/2018    CHOL 236 (H) 10/20/2017     Lab Results   Component Value Date    TRIG 219 (H) 01/03/2019    TRIG 327 (H) 06/28/2018    TRIG 160 (H) 10/20/2017     Lab Results   Component Value Date    HDL 64 01/03/2019    HDL 54 06/28/2018    HDL 84 10/20/2017     Lab Results   Component Value Date    LDLCHOLESTEROL 103 01/03/2019    LDLCHOLESTEROL 134 (H) 06/28/2018    LDLCHOLESTEROL 120 10/20/2017     Lab Results   Component Value Date    VLDL NOT REPORTED 01/03/2019    VLDL NOT REPORTED 06/28/2018    VLDL NOT REPORTED 10/20/2017     Lab Results   Component Value Date    CHOLHDLRATIO 3.3 01/03/2019    CHOLHDLRATIO 4.7 06/28/2018    CHOLHDLRATIO 2.8 10/20/2017           Physical Exam   Constitutional: She is oriented to person, place, and time. She appears well-developed. Eyes: Pupils are equal, round, and reactive to light. Cardiovascular: Normal rate, regular rhythm, normal heart sounds and intact distal pulses. Pulmonary/Chest: Effort normal and breath sounds normal.   Abdominal: Soft. Bowel sounds are normal.   Musculoskeletal: She exhibits no edema (to lower extremites ). Neurological: She is alert and oriented to person, place, and time. Skin: Skin is warm and dry. Negative for open/nonhealing wounds. Negative for lipohypertrophy. Psychiatric: She has a normal mood and affect. ASSESSMENT/PLAN:     Diagnosis Orders   1. Type 2 diabetes mellitus without complication, without long-term current use of insulin ([address]  Ambulatory referral to Podiatry    Hemoglobin A1C   2. Diabetes education, encounter for     3. High cholesterol     4. Essential hypertension       Orders Placed This Encounter   Procedures    Hemoglobin A1C    Ambulatory referral to Podiatry     No orders of the defined types were placed in this encounter. Requested Prescriptions      No prescriptions requested or ordered in this encounter       1. Type 2 diabetes mellitus without complication, without long-term current use of insulin ([address]  2.  Diabetes education, encounter for  - Stable  HbA1C goal is less than 7% and blood sugars are improving.  - Encouraged patient to check BS 1 times per day. Fasting blood glucose goal is 70-130mg/dl and postprandial blood sugar goal is less than 180 mg/dl. -Diabetic foot exam reviewed and is normal and is current?: Yes.   -Diabetic retinal exam reviewed and is current? :Yes showing No diabetic retinopathy.  - Labs reviewed includes: Most recent A1c of 6.8%. She is due for repeat labs this month. Repeat labs ordered Yes due in now.   -We discussed in great detail dietary modifications they can make to better improve their blood sugars.  -She will continue to work on diet and exercise. She will look in 3 months with blood sugar log  -Referral for podiatry for nail care and shoes. Discussed signs and symptoms of hyper/hypoglycemia and how to treat. Encouraged 150 minutes of physical activity per week. Chloe Mccray plans to engage in walking for 30 minutes a day 5 times a week. Follow a low carbohydrate diet consuming 45 grams of carbohydrates at breakfast, lunch and dinner with two separate snacks [id number] grams of carbohydrates. Encouraged at least 7 hours of sleep. The patient was informed of the goals of diabetes management. This can only be accomplished by watching their diet and exercise levels. We certainly use medicines to help attain these goals. The consequences of not controlling blood sugars were discussed. These include blindness, heart disease, stroke, kidney disease, and possibly need for dialysis. They were told to be careful with their foot care as diabetics often have nerve damage, infections and risk for limb amutations . They also need a dilated eye exam yearly. We discussed the issues of diet, exercise, medication, complication avoidance, reviewed the signs and symptoms of diabetes, hypoglycemic episodes, significance of HbA1C.       3. High cholesterol  stable, lipid panel reviewed, continue current medications. Diet and exercise      4.  Essential hypertension   stable, continue current medications. Diet and exercise Seek emergent care if chest pain develops. Answered all patient questions. Agrees to follow plan of care and to follow up in 3 months, sooner if needed. Call office if unexplained blood sugars less than 70 occur or above 400. Call office or access [facility] with any further questions or concerns. Be sure to bring glucometer/food log at next appointment. Patient was seen with total face to face time of 40 minutes. More than 50%  of this visit was counseling and education regarding her diabetes.     Electronically signed by TAY Jorge CNP on 4/3/2019 at 2:00 PM      (Please note that portions of this note were completed with a voice-recognition program. Efforts were made to edit the dictation but occasionally words are mis-transcribed.)

## 2019-04-03 NOTE — PATIENT INSTRUCTIONS
Check blood sugars 2 times per day Fasting and before meals or at least 2 hours after eating. Keep a log and bring them with you to the next appointment. Low carb diet 45 grams at each meal with two 15 gram snacks. Keep a food log and bring with you to the next appointment. Exercise: walk 30 minutes for 5 days continue to work to weight loss 1-2 lbs per month     Medication: continue current medications     Labs due this month     Make appointment with foot doctor for nail care and shoes.

## 2019-04-04 ENCOUNTER — OFFICE VISIT (OUTPATIENT)
Dept: PRIMARY CARE CLINIC | Age: 74
End: 2019-04-04
Payer: MEDICARE

## 2019-04-04 VITALS
HEART RATE: 72 BPM | DIASTOLIC BLOOD PRESSURE: 78 MMHG | OXYGEN SATURATION: 98 % | TEMPERATURE: 98.2 F | SYSTOLIC BLOOD PRESSURE: 128 MMHG | WEIGHT: 158 LBS | BODY MASS INDEX: 26.29 KG/M2

## 2019-04-04 DIAGNOSIS — J01.41 ACUTE RECURRENT PANSINUSITIS: Primary | ICD-10-CM

## 2019-04-04 PROCEDURE — 99213 OFFICE O/P EST LOW 20 MIN: CPT | Performed by: FAMILY MEDICINE

## 2019-04-04 PROCEDURE — G8427 DOCREV CUR MEDS BY ELIG CLIN: HCPCS | Performed by: FAMILY MEDICINE

## 2019-04-04 PROCEDURE — G8399 PT W/DXA RESULTS DOCUMENT: HCPCS | Performed by: FAMILY MEDICINE

## 2019-04-04 PROCEDURE — 1123F ACP DISCUSS/DSCN MKR DOCD: CPT | Performed by: FAMILY MEDICINE

## 2019-04-04 PROCEDURE — 1090F PRES/ABSN URINE INCON ASSESS: CPT | Performed by: FAMILY MEDICINE

## 2019-04-04 PROCEDURE — 3017F COLORECTAL CA SCREEN DOC REV: CPT | Performed by: FAMILY MEDICINE

## 2019-04-04 PROCEDURE — G8417 CALC BMI ABV UP PARAM F/U: HCPCS | Performed by: FAMILY MEDICINE

## 2019-04-04 PROCEDURE — 1036F TOBACCO NON-USER: CPT | Performed by: FAMILY MEDICINE

## 2019-04-04 PROCEDURE — 4040F PNEUMOC VAC/ADMIN/RCVD: CPT | Performed by: FAMILY MEDICINE

## 2019-04-04 RX ORDER — AMOXICILLIN AND CLAVULANATE POTASSIUM 875; 125 MG/1; MG/1
1 TABLET, FILM COATED ORAL 2 TIMES DAILY
Qty: 28 TABLET | Refills: 0 | Status: SHIPPED | OUTPATIENT
Start: 2019-04-04 | End: 2019-04-18

## 2019-04-04 ASSESSMENT — ENCOUNTER SYMPTOMS
TROUBLE SWALLOWING: 1
SINUS PRESSURE: 1
GASTROINTESTINAL NEGATIVE: 1
SORE THROAT: 1
ALLERGIC/IMMUNOLOGIC NEGATIVE: 1
RESPIRATORY NEGATIVE: 1
EYES NEGATIVE: 1
VOICE CHANGE: 0
COUGH: 0
RHINORRHEA: 0

## 2019-04-04 NOTE — PROGRESS NOTES
2019     Octavio Kearns (:  1945) is a 74 y.o. female, here for evaluation of the following medical concerns:    HPI   Acute urgent care visit for sinus concerns. Home health nurse asking her to be seen for prolonged symptoms of concern. She reports about a month of nasal congestion and facial pressure. Some plugged up ears by report. Not a lot of rhinorrhea, but reporting pnd active. No sore throat or cough. She reports seasonal allergies, spring and fall. She reports recurrent dizziness over the same period. Seems to be getting worse. Mostly with walking and being on her feet. No dizziness seated or in bed. She feels off balance with the dizziness. Usually getting up. She doesn't endorse spinning sensation. No recent medication changes. Using antihistamine cetirizine and  flonase ongoing. Past Medical History:   Diagnosis Date    Allergic conjunctivitis     (stable)    Chronic sinusitis     Constipation     Dizzy spells     Dry eye syndrome     Edentulous     (does not wear dentures)    Essential hypertension 2016    Fracture of lateral malleolus     (avulsion fracture at the tip of the lateral malleolus - right ankle).     Hearing loss     Hypothyroidism     Interstitial cystitis     Irritable bowel syndrome     Keratitis     (secondary to dry eye syndrome)    Parotid gland enlargement     stasis, consider sjorgens    Pseudophakos     (bilateral)    Seasonal rhinitis     Stress incontinence     Type II or unspecified type diabetes mellitus without mention of complication, not stated as uncontrolled     Urethral stenosis     Xerostomia      Past Surgical History:   Procedure Laterality Date    APPENDECTOMY  1986    CATARACT REMOVAL WITH IMPLANT Right 2003    (Dr. Herlinda Vincent)    CATARACT REMOVAL WITH IMPLANT Left 2003    (Dr. Herlinda Vincent)    CHOLECYSTECTOMY  1986    COLONOSCOPY  14    normal    CYSTOURETHROSCOPY/URETHRAL DILATION  2/8/12    multiple    EYE SURGERY Bilateral 12/13/2018    Bilateral Transscleral cyclophotocoagulation G6 laser (micropulse) performed by Leatha Stewart MD at [address]  6/2/1977    (Dr. Gus Ramirez)    UPPER GASTROINTESTINAL ENDOSCOPY  6/4/15    gastritis, biopsy x 2    UPPER GASTROINTESTINAL ENDOSCOPY  04/02/2018    Dr. Meredith Spear   antral gastric ulcer. esophagus re-dilated at the end of procedure. Review of Systems   Constitutional: Negative. Negative for fever. HENT: Positive for congestion, postnasal drip, sinus pressure, sneezing (on and off, mild), sore throat and trouble swallowing. Negative for rhinorrhea and voice change. Eyes: Negative. Respiratory: Negative. Negative for cough. Cardiovascular: Negative. Gastrointestinal: Negative. Endocrine: Negative. Genitourinary: Negative. Musculoskeletal: Negative. Skin: Negative. Allergic/Immunologic: Negative. Neurological: Positive for dizziness. Hematological: Negative. Psychiatric/Behavioral: Negative. Prior to Visit Medications    Medication Sig Taking? Authorizing Provider   Artificial Tear Solution (SOOTHE XP XTRA PROTECTION) SOLN Apply to eye  Historical Provider, MD   gabapentin (NEURONTIN) 300 MG capsule take 1 capsule by mouth twice a day. Arlene Ward MD   gabapentin (NEURONTIN) 100 MG capsule Take one capsule by mouth at noon each day. Arlene Ward MD   metFORMIN (GLUCOPHAGE) 500 MG tablet Take 1 tablet by mouth 2 times daily (with meals)  Arlene Ward MD   RA ALLERGY RELIEF 10 MG tablet take 1 tablet by mouth once daily  Rebecca Bruno, APRN - CNP   levothyroxine (SYNTHROID) 88 MCG tablet take 1 tablet by mouth once daily  Arlene Ward MD   FREESTYLE LITE strip USE  STRIPS TO CHECK GLUCOSE TWICE DAILY AND AS NEEDED FOR HYPER/HYPOGLYCEMIC SYMPTOMS.   Arlene Ward MD   loratadine (CLARITIN) 10 MG tablet take 1 tablet by mouth once daily Arlene Ward MD   Omega-3 Fatty Acids (FISH OIL) 1000 MG CAPS Take 2,000 mg by mouth daily  Historical Provider, MD   fluticasone (FLONASE) 50 MCG/ACT nasal spray instill 2 sprays into each nostril once daily  Arlene Ward MD   Blood Glucose Monitoring Suppl YANY Monitor blood glucose levels twice daily and as needed for hyper/hypoglycemic symptoms. Dx. Diabetes (E11.9)  Arlene Ward MD   Propylene Glycol (SYSTANE BALANCE OP) Apply 1 drop to eye 4 times daily  Historical Provider, MD   SOFT TOUCH LANCETS MISC 1 Device by Does not apply route 2 times daily  Arlene Ward MD        Social History     Tobacco Use    Smoking status: Never Smoker    Smokeless tobacco: Never Used    Tobacco comment: Never smoked. Eual Angelucci p, 7/14/2016. Substance Use Topics    Alcohol use: No     Alcohol/week: 0.0 oz        Vitals:    04/04/19 1353   BP: 128/78   Pulse: 72   Temp: 98.2 °F (36.8 °C)   SpO2: 98%   Weight: 158 lb (71.7 kg)     Estimated body mass index is 26.29 kg/m² as calculated from the following:    Height as of 2/6/19: 5' 5\" (1.651 m). Weight as of this encounter: 158 lb (71.7 kg). Physical Exam   Constitutional: She is oriented to person, place, and time. She appears well-developed and well-nourished. No distress. HENT:   Head: Normocephalic and atraumatic. Right Ear: Tympanic membrane and external ear normal.   Left Ear: Tympanic membrane and external ear normal.   Nose: Mucosal edema (right side looks dry but totally occluded, chronic deformity suspected), nasal deformity and septal deviation present. Right sinus exhibits maxillary sinus tenderness and frontal sinus tenderness. Left sinus exhibits maxillary sinus tenderness. Left sinus exhibits no frontal sinus tenderness. Mouth/Throat: Uvula is midline and oropharynx is clear and moist. No oropharyngeal exudate, posterior oropharyngeal edema or posterior oropharyngeal erythema.    Eyes: Conjunctivae and EOM are normal. No scleral icterus. Neck: Neck supple. No thyromegaly present. Cardiovascular: Normal rate, regular rhythm, normal heart sounds and intact distal pulses. No murmur heard. Pulmonary/Chest: Effort normal and breath sounds normal. No respiratory distress. She has no wheezes. Abdominal: Soft. Bowel sounds are normal. She exhibits no distension. There is no tenderness. There is no rebound. Musculoskeletal: Normal range of motion. She exhibits no edema or tenderness. Neurological: She is alert and oriented to person, place, and time. Skin: Skin is warm and dry. No rash noted. No erythema. Psychiatric: She has a normal mood and affect. Her behavior is normal. Judgment and thought content normal.   /78   Pulse 72   Temp 98.2 °F (36.8 °C)   Wt 158 lb (71.7 kg)   LMP  (LMP Unknown)   SpO2 98%   BMI 26.29 kg/m²       ASSESSMENT/PLAN:  Encounter Diagnosis   Name Primary?  Acute recurrent pansinusitis Yes     Chronic nasal deformities along with concerns for allergies. Can cont. flonase and zyrtec  augmentin bid x 14 days. Recheck prn persistent or recurrent symptoms. Dizziness: suspicion for more middle ear source/vertigo. Symptoms usually with movement. No syncope. bp looks ok today. Recheck if persistent or progressing. No focal weakness. Dizziness episodic and usually with head movement at present. An electronic signature was used to authenticate this note.     --Reji Sosa MD on 4/4/2019 at 2:29 PM

## 2019-04-11 ENCOUNTER — OFFICE VISIT (OUTPATIENT)
Dept: PODIATRY | Age: 74
End: 2019-04-11
Payer: MEDICARE

## 2019-04-11 VITALS
SYSTOLIC BLOOD PRESSURE: 130 MMHG | DIASTOLIC BLOOD PRESSURE: 74 MMHG | HEART RATE: 76 BPM | WEIGHT: 158 LBS | HEIGHT: 65 IN | RESPIRATION RATE: 20 BRPM | BODY MASS INDEX: 26.33 KG/M2

## 2019-04-11 DIAGNOSIS — M79.2 NEUROPATHIC PAIN: ICD-10-CM

## 2019-04-11 DIAGNOSIS — B35.1 DERMATOPHYTOSIS OF NAIL: ICD-10-CM

## 2019-04-11 DIAGNOSIS — E11.49 DM (DIABETES MELLITUS), TYPE 2 WITH NEUROLOGICAL COMPLICATIONS (HCC): Primary | ICD-10-CM

## 2019-04-11 PROCEDURE — 1036F TOBACCO NON-USER: CPT | Performed by: PODIATRIST

## 2019-04-11 PROCEDURE — 3017F COLORECTAL CA SCREEN DOC REV: CPT | Performed by: PODIATRIST

## 2019-04-11 PROCEDURE — 3044F HG A1C LEVEL LT 7.0%: CPT | Performed by: PODIATRIST

## 2019-04-11 PROCEDURE — G8427 DOCREV CUR MEDS BY ELIG CLIN: HCPCS | Performed by: PODIATRIST

## 2019-04-11 PROCEDURE — G8399 PT W/DXA RESULTS DOCUMENT: HCPCS | Performed by: PODIATRIST

## 2019-04-11 PROCEDURE — 1123F ACP DISCUSS/DSCN MKR DOCD: CPT | Performed by: PODIATRIST

## 2019-04-11 PROCEDURE — 99203 OFFICE O/P NEW LOW 30 MIN: CPT | Performed by: PODIATRIST

## 2019-04-11 PROCEDURE — 11720 DEBRIDE NAIL 1-5: CPT | Performed by: PODIATRIST

## 2019-04-11 PROCEDURE — 2022F DILAT RTA XM EVC RTNOPTHY: CPT | Performed by: PODIATRIST

## 2019-04-11 PROCEDURE — 1090F PRES/ABSN URINE INCON ASSESS: CPT | Performed by: PODIATRIST

## 2019-04-11 PROCEDURE — 4040F PNEUMOC VAC/ADMIN/RCVD: CPT | Performed by: PODIATRIST

## 2019-04-11 PROCEDURE — G8417 CALC BMI ABV UP PARAM F/U: HCPCS | Performed by: PODIATRIST

## 2019-04-11 RX ORDER — SUCRALFATE 1 G/1
1 TABLET ORAL
COMMUNITY
Start: 2019-04-08 | End: 2020-08-04 | Stop reason: SDUPTHER

## 2019-04-11 NOTE — PROGRESS NOTES
SOLN Apply to eye   Yes Historical Provider, MD   gabapentin (NEURONTIN) 100 MG capsule Take one capsule by mouth at noon each day. 2/8/19 2/8/20 Yes Raynold Romberg, MD   metFORMIN (GLUCOPHAGE) 500 MG tablet Take 1 tablet by mouth 2 times daily (with meals) 2/6/19  Yes Raynold Romberg, MD   RA ALLERGY RELIEF 10 MG tablet take 1 tablet by mouth once daily 1/3/19  Yes TAY Patel - CNP   levothyroxine (SYNTHROID) 88 MCG tablet take 1 tablet by mouth once daily 12/6/18  Yes Raynold Romberg, MD   FREESTYLE LITE strip USE  STRIPS TO CHECK GLUCOSE TWICE DAILY AND AS NEEDED FOR HYPER/HYPOGLYCEMIC SYMPTOMS. 10/10/18  Yes Raynold Romberg, MD   loratadine (CLARITIN) 10 MG tablet take 1 tablet by mouth once daily 10/1/18  Yes Raynold Romberg, MD   Omega-3 Fatty Acids (FISH OIL) 1000 MG CAPS Take 2,000 mg by mouth daily   Yes Historical Provider, MD   fluticasone (FLONASE) 50 MCG/ACT nasal spray instill 2 sprays into each nostril once daily 5/10/18  Yes Raynold Romberg, MD   Blood Glucose Monitoring Suppl YANY Monitor blood glucose levels twice daily and as needed for hyper/hypoglycemic symptoms. Dx. Diabetes (E11.9) 3/1/18  Yes Raynold Romberg, MD   Propylene Glycol (SYSTANE BALANCE OP) Apply 1 drop to eye 4 times daily   Yes Historical Provider, MD   SOFT TOUCH LANCETS MISC 1 Device by Does not apply route 2 times daily 8/16/16  Yes Raynold Romberg, MD   gabapentin (NEURONTIN) 300 MG capsule take 1 capsule by mouth twice a day.  2/8/19 4/3/19  Raynold Romberg, MD       Past Surgical History:   Procedure Laterality Date    APPENDECTOMY  14-    CATARACT REMOVAL WITH IMPLANT Right 9/2/2003    (Dr. Monnie Gaucher)    CATARACT REMOVAL WITH IMPLANT Left 8/12/2003    (Dr. Monnie Gaucher)   [address] [place]  07/24/1986    COLONOSCOPY  9/8/14    normal    CYSTOURETHROSCOPY/URETHRAL DILATION  2/8/12    multiple    EYE SURGERY Bilateral 12/13/2018    Bilateral Transscleral cyclophotocoagulation G6 laser (micropulse) performed by Competitorlawanda Lula Rodriguez MD at [address]  1977    (Dr. Maria Eugenia Mixon)    UPPER GASTROINTESTINAL ENDOSCOPY  6/4/15    gastritis, biopsy x 2    UPPER GASTROINTESTINAL ENDOSCOPY  2018    Dr. Jose Rivas   antral gastric ulcer. esophagus re-dilated at the end of procedure. Family History   Problem Relation Age of Onset    Heart Disease Mother     Heart Attack Mother     Arthritis Mother     Other Maternal Grandmother         (gout)    Allergies Daughter     Allergies Daughter     Other Brother         ( at age 21 secondary to auto accident).  Cataracts Neg Hx     Diabetes Neg Hx     Glaucoma Neg Hx        Social History     Tobacco Use    Smoking status: Never Smoker    Smokeless tobacco: Never Used    Tobacco comment: Never smoked. Micah Aase Rcp, 2016. Substance Use Topics    Alcohol use: No     Alcohol/week: 0.0 oz       Review of Systems: All 12 systems reviewed and pertinent positives noted above. Lower Extremity Physical Examination:     Vitals:   Vitals:    19 1409   BP: 130/74   Pulse: 76   Resp: 20     General: AAO x 3 in NAD. Vascular: DP and PT pulses palpable 2/4, bilateral.  CFT <3 seconds, bilateral.  Hair growth present to the level of the digits, bilateral.  Edema present, bilateral.  Varicosities present, bilateral. Erythema absent, bilateral. Distal Rubor absent bilateral.  Temperature within normal limits bilateral. Hyperpigmentation present bilateral. Atrophic skin yes. Neurological: Sensation Impaired to light touch to level of digits, bilateral.  Protective sensation intact  10/10 sites via 5.07/10g [place] Monofilament, bilateral.  negative Tinel's, bilateral.  negative Valleix sign, bilateral.  Vibratory abnormal  bilateral.  Reflexes Decreased bilateral.  Paresthesias positive. Dysthesias positive.   Sharp/dull abnormal  bilateral.  Musculoskeletal: Muscle strength 5/5, bilateral.  Pain absent upon palpation bilateral. Normal medial longitudinal arch, bilateral.  Ankle ROM decresed,bilateral.  1st MPJ ROM within normal limits, bilateral.  Dorsally contracted digits absent. No other foot deformities. Integument: Warm, dry, supple, bilateral.  Open lesion absent, bilateral.  Interdigital maceration absent to web spaces bilateral.   Nails left 5 and right 1 thickened, dystrophic and crumbly, discolored with subungual debris. .  Fissures absent, bilateral. Hyperkeratotic tissue is absent. Asessment: Patient is a 74 y.o. female with:    Diagnosis Orders   1. DM (diabetes mellitus), type 2 with neurological complications (HCC)   DIABETES FOOT EXAM    MS DEBRIDEMENT OF NAIL(S), 1-5   2. Dermatophytosis of nail   DIABETES FOOT EXAM    MS DEBRIDEMENT OF NAIL(S), 1-5   3. Neuropathic pain   DIABETES FOOT EXAM       Plan: Patient examined and evaluated. Current condition and treatment options discussed in detail. DM foot ed and exam  Orders Placed This Encounter   Procedures     DIABETES FOOT EXAM    MS DEBRIDEMENT OF NAIL(S), 1-5   different tx options for nerve pain discussed. Pt did not wish to try any form of tx at this time. All nails as mentioned above debrided in length and thickness. Patient advised OTC methods for treatment of the mycotic nails. Patient will follow up in 10 weeks. Contact office with any questions/problems/concerns. RTC in 10 week(s).

## 2019-04-11 NOTE — PROGRESS NOTES
Foot Care Worksheet  PCP: Hung Fall MD  Last visit: 4/4/2019    Nail description:  Thick , Yellow , Crumbly , Marked limitation of ambulation     Pain resulting from thickened and dystrophy of nail plate No    Nails involved  Right   1  (T5-T9)  Left     5  (TA-T4)    Q7 1 Class A Finding - Non traumatic amputation of foot No    Q8 2 Class B Findings - Absent DP pulse No, Absent PT pulse No, Advanced tropic changes (3 required) Yes    Decrease hair growth Yes, Nail changes/thickening Yes, Pigmented changes/discoloration Yes, Skin texture (thin, shiny) No, Skin color (rubor/redness) No    Q9 1 Class B and 2 Class C Findings  Claudication No, Temperature change Yes, Paresthesia Yes, Burning No, Edema Yes

## 2019-05-07 DIAGNOSIS — I10 ESSENTIAL HYPERTENSION: ICD-10-CM

## 2019-05-07 RX ORDER — LISINOPRIL 5 MG/1
TABLET ORAL
Qty: 90 TABLET | Refills: 1 | Status: SHIPPED | OUTPATIENT
Start: 2019-05-07 | End: 2019-10-30 | Stop reason: SDUPTHER

## 2019-05-09 ENCOUNTER — TELEPHONE (OUTPATIENT)
Dept: PRIMARY CARE CLINIC | Age: 74
End: 2019-05-09

## 2019-06-20 ENCOUNTER — OFFICE VISIT (OUTPATIENT)
Dept: PODIATRY | Age: 74
End: 2019-06-20
Payer: MEDICARE

## 2019-06-20 VITALS
HEART RATE: 78 BPM | BODY MASS INDEX: 25.83 KG/M2 | HEIGHT: 65 IN | WEIGHT: 155 LBS | SYSTOLIC BLOOD PRESSURE: 132 MMHG | DIASTOLIC BLOOD PRESSURE: 80 MMHG

## 2019-06-20 DIAGNOSIS — E11.49 DM (DIABETES MELLITUS), TYPE 2 WITH NEUROLOGICAL COMPLICATIONS (HCC): Primary | ICD-10-CM

## 2019-06-20 DIAGNOSIS — B35.1 DERMATOPHYTOSIS OF NAIL: ICD-10-CM

## 2019-06-20 PROCEDURE — 11720 DEBRIDE NAIL 1-5: CPT | Performed by: PODIATRIST

## 2019-06-20 PROCEDURE — 99999 PR OFFICE/OUTPT VISIT,PROCEDURE ONLY: CPT | Performed by: PODIATRIST

## 2019-06-20 NOTE — PROGRESS NOTES
Foot Care Worksheet  PCP: Samy Cates MD  Last visit: 4/4/2019    Nail description:  Thick , Yellow , Crumbly , Marked limitation of ambulation     Pain resulting from thickened and dystrophy of nail plate No    Nails involved  Right   1  (T5-T9)  Left     5  (TA-T4)    Q7 1 Class A Finding - Non traumatic amputation of foot No    Q8 2 Class B Findings - Absent DP pulse No, Absent PT pulse No, Advanced tropic changes (3 required) Yes    Decrease hair growth Yes, Nail changes/thickening Yes, Pigmented changes/discoloration Yes, Skin texture (thin, shiny) No, Skin color (rubor/redness) No    Q9 1 Class B and 2 Class C Findings  Claudication No, Temperature change Yes, Paresthesia Yes, Burning No, Edema Yes

## 2019-06-20 NOTE — PROGRESS NOTES
levothyroxine (SYNTHROID) 88 MCG tablet take 1 tablet by mouth once daily 12/6/18  Yes Shala Hoffman MD   FREESTYLE LITE strip USE  STRIPS TO CHECK GLUCOSE TWICE DAILY AND AS NEEDED FOR HYPER/HYPOGLYCEMIC SYMPTOMS. 10/10/18  Yes Shala Hoffman MD   loratadine (CLARITIN) 10 MG tablet take 1 tablet by mouth once daily 10/1/18  Yes Shala Hoffman MD   Omega-3 Fatty Acids (FISH OIL) 1000 MG CAPS Take 2,000 mg by mouth daily   Yes Historical Provider, MD   fluticasone (FLONASE) 50 MCG/ACT nasal spray instill 2 sprays into each nostril once daily 5/10/18  Yes Shala Hoffman MD   Blood Glucose Monitoring Suppl YANY Monitor blood glucose levels twice daily and as needed for hyper/hypoglycemic symptoms. Dx. Diabetes (E11.9) 3/1/18  Yes Shala Hoffman MD   Propylene Glycol (SYSTANE BALANCE OP) Apply 1 drop to eye 4 times daily   Yes Historical Provider, MD   SOFT TOUCH LANCETS MISC 1 Device by Does not apply route 2 times daily 8/16/16  Yes Shala Hoffman MD   sucralfate (CARAFATE) 1 GM tablet Take 1 g by mouth 4/8/19 4/13/19  Historical Provider, MD   gabapentin (NEURONTIN) 300 MG capsule take 1 capsule by mouth twice a day. 2/8/19 4/3/19  Shala Hoffman MD       Past Surgical History:   Procedure Laterality Date    APPENDECTOMY  09-    CATARACT REMOVAL WITH IMPLANT Right 9/2/2003    (Dr. Han Polanco)    CATARACT REMOVAL WITH IMPLANT Left 8/12/2003    (Dr. Han Polanco)    CHOLECYSTECTOMY  07/24/1986    COLONOSCOPY  9/8/14    normal    CYSTOURETHROSCOPY/URETHRAL DILATION  2/8/12    multiple    EYE SURGERY Bilateral 12/13/2018    Bilateral Transscleral cyclophotocoagulation G6 laser (micropulse) performed by Dennis Hassan MD at [address]  6/2/1977    (Dr. Maria Eugenia Mixon)   [facility] ENDOSCOPY  6/4/15    gastritis, biopsy x 2    UPPER GASTROINTESTINAL ENDOSCOPY  04/02/2018    Dr. Jose Rivas   antral gastric ulcer. esophagus re-dilated at the end of procedure.          Family History   Problem Relation Age of Onset    Heart Disease Mother     Heart Attack Mother     Arthritis Mother     Other Maternal Grandmother         (gout)    Allergies Daughter     Allergies Daughter     Other Brother         ( at age 21 secondary to auto accident).  Cataracts Neg Hx     Diabetes Neg Hx     Glaucoma Neg Hx        Social History     Tobacco Use    Smoking status: Never Smoker    Smokeless tobacco: Never Used    Tobacco comment: Never smoked. Herve Montgomery Rcp, 2016. Substance Use Topics    Alcohol use: No     Alcohol/week: 0.0 oz       Review of Systems: All 12 systems reviewed and pertinent positives noted above. Lower Extremity Physical Examination:     Vitals:   Vitals:    19 1445   BP: 132/80   Pulse: 78     General: AAO x 3 in NAD. Vascular: DP and PT pulses palpable 2/4, bilateral.  CFT <3 seconds, bilateral.  Hair growth present to the level of the digits, bilateral.  Edema present, bilateral.  Varicosities present, bilateral. Erythema absent, bilateral. Distal Rubor absent bilateral.  Temperature within normal limits bilateral. Hyperpigmentation present bilateral. Atrophic skin yes. Neurological: Sensation Impaired to light touch to level of digits, bilateral.  Protective sensation intact  10/10 sites via 5.07/10g [place] Monofilament, bilateral.  negative Tinel's, bilateral.  negative Valleix sign, bilateral.  Vibratory abnormal  bilateral.  Reflexes Decreased bilateral.  Paresthesias positive. Dysthesias positive. Sharp/dull abnormal  bilateral.  Musculoskeletal: Muscle strength 5/5, bilateral.  Pain absent upon palpation bilateral. Normal medial longitudinal arch, bilateral.  Ankle ROM decresed,bilateral.  1st MPJ ROM within normal limits, bilateral.  Dorsally contracted digits absent. No other foot deformities.   Integument: Warm, dry, supple, bilateral.  Open lesion absent, bilateral.  Interdigital maceration absent to web spaces bilateral.   Nails left 5 and right 1 thickened, dystrophic and crumbly, discolored with subungual debris. .  Fissures absent, bilateral. Hyperkeratotic tissue is absent. Asessment: Patient is a 74 y.o. female with:    Diagnosis Orders   1. DM (diabetes mellitus), type 2 with neurological complications ([address]     2. Dermatophytosis of nail         Plan: Patient examined and evaluated. Current condition and treatment options discussed in detail. DM foot ed and exam  All nails as mentioned above debrided in length and thickness. Patient advised OTC methods for treatment of the mycotic nails. Patient will follow up in 10 weeks. Contact office with any questions/problems/concerns.

## 2019-06-24 DIAGNOSIS — E11.9 TYPE 2 DIABETES MELLITUS WITHOUT COMPLICATION, WITHOUT LONG-TERM CURRENT USE OF INSULIN (HCC): ICD-10-CM

## 2019-06-27 ENCOUNTER — OFFICE VISIT (OUTPATIENT)
Dept: FAMILY MEDICINE CLINIC | Age: 74
End: 2019-06-27
Payer: MEDICARE

## 2019-06-27 VITALS
HEIGHT: 65 IN | HEART RATE: 78 BPM | SYSTOLIC BLOOD PRESSURE: 132 MMHG | DIASTOLIC BLOOD PRESSURE: 78 MMHG | BODY MASS INDEX: 25.49 KG/M2 | WEIGHT: 153 LBS

## 2019-06-27 DIAGNOSIS — E11.49 OTHER DIABETIC NEUROLOGICAL COMPLICATION ASSOCIATED WITH TYPE 2 DIABETES MELLITUS (HCC): ICD-10-CM

## 2019-06-27 DIAGNOSIS — E11.9 TYPE 2 DIABETES MELLITUS WITHOUT COMPLICATION, WITHOUT LONG-TERM CURRENT USE OF INSULIN (HCC): Primary | ICD-10-CM

## 2019-06-27 PROCEDURE — 4040F PNEUMOC VAC/ADMIN/RCVD: CPT | Performed by: FAMILY MEDICINE

## 2019-06-27 PROCEDURE — 99214 OFFICE O/P EST MOD 30 MIN: CPT | Performed by: FAMILY MEDICINE

## 2019-06-27 PROCEDURE — G8417 CALC BMI ABV UP PARAM F/U: HCPCS | Performed by: FAMILY MEDICINE

## 2019-06-27 PROCEDURE — G8399 PT W/DXA RESULTS DOCUMENT: HCPCS | Performed by: FAMILY MEDICINE

## 2019-06-27 PROCEDURE — 1036F TOBACCO NON-USER: CPT | Performed by: FAMILY MEDICINE

## 2019-06-27 PROCEDURE — 2022F DILAT RTA XM EVC RTNOPTHY: CPT | Performed by: FAMILY MEDICINE

## 2019-06-27 PROCEDURE — G8427 DOCREV CUR MEDS BY ELIG CLIN: HCPCS | Performed by: FAMILY MEDICINE

## 2019-06-27 PROCEDURE — 3044F HG A1C LEVEL LT 7.0%: CPT | Performed by: FAMILY MEDICINE

## 2019-06-27 PROCEDURE — 1090F PRES/ABSN URINE INCON ASSESS: CPT | Performed by: FAMILY MEDICINE

## 2019-06-27 PROCEDURE — 3017F COLORECTAL CA SCREEN DOC REV: CPT | Performed by: FAMILY MEDICINE

## 2019-06-27 PROCEDURE — 1123F ACP DISCUSS/DSCN MKR DOCD: CPT | Performed by: FAMILY MEDICINE

## 2019-06-27 RX ORDER — GABAPENTIN 300 MG/1
CAPSULE ORAL
Refills: 0 | COMMUNITY
Start: 2019-05-13 | End: 2019-09-16 | Stop reason: SDUPTHER

## 2019-06-27 RX ORDER — OMEPRAZOLE 20 MG/1
CAPSULE, DELAYED RELEASE ORAL
Refills: 0 | COMMUNITY
Start: 2019-06-26 | End: 2019-08-26 | Stop reason: SDUPTHER

## 2019-06-27 ASSESSMENT — ENCOUNTER SYMPTOMS
RESPIRATORY NEGATIVE: 1
SORE THROAT: 0
EYES NEGATIVE: 1
ALLERGIC/IMMUNOLOGIC NEGATIVE: 1
VOICE CHANGE: 0
RHINORRHEA: 0
GASTROINTESTINAL NEGATIVE: 1
SINUS PRESSURE: 0
TROUBLE SWALLOWING: 0
COUGH: 0

## 2019-06-27 NOTE — PROGRESS NOTES
2019     Brandon Westbrook (:  1945) is a 74 y.o. female, here for evaluation of the following medical concerns:    HPI   Acute visit to discuss diabetes and some left axillary pain. She does well on diabetes other than she is noticing a pattern that the last week of the month, her sugars tend to run lower. The suspicion is that she runs out of financial resources for meals towards the end of the month and she is eating more conservatively. She is eating eggs for breakfast, tv dinners for lunch, small steak and mashed potatoes for supper during the first 3 weeks. She has some concerns that meats can spike her sugar, we discussed limiting carbs and allowing more meat and protein. She reports she has pressure/discomfort in her legs chronically. Formerly she had more paresthesias but this seems better on gabapentin. Recently she stopped her noon 100mg dose and is just using the 300mg bid. She feels satisfied with the current dosing, helps the legs. She is using the 300mg more prn at present and is trying to taper herself down at present. She has a little discomfort under her left armpit in the last 3 weeks, without recalled injury or initiating event. This resolved. No concerns for rash or lymph nodes. Quiescent at present. Past Medical History:   Diagnosis Date    Allergic conjunctivitis     (stable)    Chronic sinusitis     Constipation     Dizzy spells     Dry eye syndrome     Edentulous     (does not wear dentures)    Essential hypertension 2016    Fracture of lateral malleolus     (avulsion fracture at the tip of the lateral malleolus - right ankle).     Hearing loss     Hypothyroidism     Interstitial cystitis     Irritable bowel syndrome     Keratitis     (secondary to dry eye syndrome)    Parotid gland enlargement     stasis, consider sjorgens    Pseudophakos     (bilateral)    Seasonal rhinitis     Stress incontinence     Type II or unspecified type diabetes mellitus without mention of complication, not stated as uncontrolled     Urethral stenosis     Xerostomia      Past Surgical History:   Procedure Laterality Date    APPENDECTOMY  07-    CATARACT REMOVAL WITH IMPLANT Right 9/2/2003    (Dr. Monnie Gaucher)    CATARACT REMOVAL WITH IMPLANT Left 8/12/2003    (Dr. Monnie Gaucher)    CHOLECYSTECTOMY  07/24/1986    COLONOSCOPY  9/8/14    normal    CYSTOURETHROSCOPY/URETHRAL DILATION  2/8/12    multiple    EYE SURGERY Bilateral 12/13/2018    Bilateral Transscleral cyclophotocoagulation G6 laser (micropulse) performed by Lavell Hoffman MD at [address]  6/2/1977    (Dr. Reed Ch)   [facility] ENDOSCOPY  6/4/15    gastritis, biopsy x 2    UPPER GASTROINTESTINAL ENDOSCOPY  04/02/2018    Dr. Clayton Pa   antral gastric ulcer. esophagus re-dilated at the end of procedure. Review of Systems   Constitutional: Negative. Negative for fever. HENT: Negative for congestion, postnasal drip, rhinorrhea, sinus pressure, sneezing, sore throat, trouble swallowing and voice change. Eyes: Negative. Respiratory: Negative. Negative for cough. Cardiovascular: Negative. Gastrointestinal: Negative. Endocrine: Negative. Genitourinary: Negative. Musculoskeletal: Negative. Skin: Negative. Allergic/Immunologic: Negative. Neurological: Positive for dizziness. Hematological: Negative. Psychiatric/Behavioral: Negative. Prior to Visit Medications    Medication Sig Taking? Authorizing Provider   blood glucose test strips (FREESTYLE LITE) strip USE  STRIPS TO CHECK GLUCOSE TWICE DAILY AND AS NEEDED FOR HYPER/HYPOGLYCEMIC SYMPTOMS. Yes Raynold Romberg, MD   gabapentin (NEURONTIN) 100 MG capsule Take one capsule by mouth at noon each day.  Yes Raynold Romberg, MD   metFORMIN (GLUCOPHAGE) 500 MG tablet Take 1 tablet by mouth 2 times daily (with meals) Yes Raynold Romberg, MD   levothyroxine (SYNTHROID) 88 MCG tablet take 1 tablet by mouth once daily Yes Tuan Coley MD   loratadine (CLARITIN) 10 MG tablet take 1 tablet by mouth once daily Yes Tuan Coley MD   Omega-3 Fatty Acids (FISH OIL) 1000 MG CAPS Take 2,000 mg by mouth daily Indications: PRN  Yes Historical Provider, MD   fluticasone (FLONASE) 50 MCG/ACT nasal spray instill 2 sprays into each nostril once daily Yes Tuan Coley MD   Blood Glucose Monitoring Suppl AYNY Monitor blood glucose levels twice daily and as needed for hyper/hypoglycemic symptoms. Dx. Diabetes (E11.9) Yes Tuan Coley MD   Propylene Glycol (SYSTANE BALANCE OP) Apply 1 drop to eye 4 times daily Indications: prn  Yes Historical Provider, MD   SOFT TOUCH LANCETS MISC 1 Device by Does not apply route 2 times daily Yes Tuan Coley MD   gabapentin (NEURONTIN) 300 MG capsule take 1 capsule by mouth twice a day  Historical Provider, MD   omeprazole (PRILOSEC) 20 MG delayed release capsule take 1 capsule by mouth twice a day  Historical Provider, MD   lisinopril (PRINIVIL;ZESTRIL) 5 MG tablet take 1 tablet by mouth once daily  Tuantrinity Coley MD   sucralfate (CARAFATE) 1 GM tablet Take 1 g by mouth  Historical Provider, MD   Artificial Tear Solution (SOOTHE XP XTRA PROTECTION) SOLN Apply to eye  Historical Provider, MD   gabapentin (NEURONTIN) 300 MG capsule take 1 capsule by mouth twice a day. Tuan Coley MD   RA ALLERGY RELIEF 10 MG tablet take 1 tablet by mouth once daily  Rebecca Bruno, APRN - CNP        Social History     Tobacco Use    Smoking status: Never Smoker    Smokeless tobacco: Never Used    Tobacco comment: Never smoked. Shannan Neil [facility], 7/14/2016.    Substance Use Topics    Alcohol use: No     Alcohol/week: 0.0 oz        Vitals:    06/27/19 1456 06/27/19 1502   BP: (!) 140/80 132/78   Site: Left Upper Arm Left Upper Arm   Position: Sitting Sitting   Cuff Size: Large Adult Large Adult   Pulse: 78    Weight: 153 lb (69.4 kg)    Height: 5' 5\" (1.651 m) Estimated body mass index is 25.46 kg/m² as calculated from the following:    Height as of this encounter: 5' 5\" (1.651 m). Weight as of this encounter: 153 lb (69.4 kg). Physical Exam   Constitutional: She is oriented to person, place, and time. She appears well-nourished. No distress. HENT:   Head: Normocephalic. Right Ear: External ear normal.   Left Ear: External ear normal.   Nose: Mucosal edema (right nasal passage, chronic /deformity) present. Eyes: Pupils are equal, round, and reactive to light. Neck: Normal range of motion. No thyromegaly present. Cardiovascular: Normal rate and regular rhythm. Pulmonary/Chest: Effort normal and breath sounds normal.   Abdominal: Soft. Bowel sounds are normal. She exhibits no distension. There is no tenderness. Musculoskeletal: Normal range of motion. She exhibits no edema or tenderness (no left axillary rash or lymphnodes). Neurological: She is alert and oriented to person, place, and time. Skin: Skin is warm. No rash noted. Psychiatric: She has a normal mood and affect. Her behavior is normal.   /78 (Site: Left Upper Arm, Position: Sitting, Cuff Size: Large Adult)   Pulse 78   Ht 5' 5\" (1.651 m)   Wt 153 lb (69.4 kg)   LMP  (LMP Unknown)   BMI 25.46 kg/m²       ASSESSMENT/PLAN:  Encounter Diagnoses   Name Primary?  Type 2 diabetes mellitus without complication, without long-term current use of insulin ([address] Yes    Other diabetic neurological complication associated with type 2 diabetes mellitus ([address]      Diabetes: fair control. a1c at 6.8% in January. Recent lows, concentrated in the last week of the month when her financial resources waning and she cant get the meals she is used to. I would not expect any significant or problematic hypoglycemia, on metformin alone. I discussed food giuliana available and will try and get her a pamphlet today to go to the food kitchen monthly to help supplement her food availability. Suspected diabetic neuropathy. This has improved on gabapentin. She has more of a ache/pain in the legs she continues to treat. She has been tapering down on the gabapentin, which is ok. We are eliminating the 100 mg noon dose, and she is using the 300mg bid dosing more prn at present. ,trying to find the least effective dose. An electronic signature was used to authenticate this note.     --Nano Vásquez MD on 6/27/2019 at 3:07 PM

## 2019-06-28 ENCOUNTER — TELEPHONE (OUTPATIENT)
Dept: FAMILY MEDICINE CLINIC | Age: 74
End: 2019-06-28

## 2019-07-01 DIAGNOSIS — E11.9 TYPE 2 DIABETES MELLITUS WITHOUT COMPLICATION, WITHOUT LONG-TERM CURRENT USE OF INSULIN (HCC): Primary | ICD-10-CM

## 2019-07-03 ENCOUNTER — OFFICE VISIT (OUTPATIENT)
Dept: PRIMARY CARE CLINIC | Age: 74
End: 2019-07-03
Payer: MEDICARE

## 2019-07-03 ENCOUNTER — HOSPITAL ENCOUNTER (OUTPATIENT)
Dept: CT IMAGING | Age: 74
Discharge: HOME | End: 2019-07-05
Payer: MEDICARE

## 2019-07-03 ENCOUNTER — HOSPITAL ENCOUNTER (OUTPATIENT)
Age: 74
Discharge: HOME | End: 2019-07-05
Payer: MEDICARE

## 2019-07-03 VITALS
HEART RATE: 82 BPM | OXYGEN SATURATION: 98 % | HEIGHT: 65 IN | SYSTOLIC BLOOD PRESSURE: 124 MMHG | RESPIRATION RATE: 16 BRPM | BODY MASS INDEX: 25.49 KG/M2 | TEMPERATURE: 98.4 F | DIASTOLIC BLOOD PRESSURE: 78 MMHG | WEIGHT: 153 LBS

## 2019-07-03 DIAGNOSIS — H53.8 BLURRY VISION: ICD-10-CM

## 2019-07-03 DIAGNOSIS — J30.2 SEASONAL ALLERGIES: ICD-10-CM

## 2019-07-03 DIAGNOSIS — H53.2 DOUBLE VISION: ICD-10-CM

## 2019-07-03 DIAGNOSIS — H10.503 BLEPHAROCONJUNCTIVITIS OF BOTH EYES, UNSPECIFIED BLEPHAROCONJUNCTIVITIS TYPE: Primary | ICD-10-CM

## 2019-07-03 LAB
CHP ED QC CHECK: ABNORMAL
GLUCOSE BLD-MCNC: 185 MG/DL

## 2019-07-03 PROCEDURE — 3017F COLORECTAL CA SCREEN DOC REV: CPT | Performed by: FAMILY MEDICINE

## 2019-07-03 PROCEDURE — 4040F PNEUMOC VAC/ADMIN/RCVD: CPT | Performed by: FAMILY MEDICINE

## 2019-07-03 PROCEDURE — 1090F PRES/ABSN URINE INCON ASSESS: CPT | Performed by: FAMILY MEDICINE

## 2019-07-03 PROCEDURE — 1036F TOBACCO NON-USER: CPT | Performed by: FAMILY MEDICINE

## 2019-07-03 PROCEDURE — G8427 DOCREV CUR MEDS BY ELIG CLIN: HCPCS | Performed by: FAMILY MEDICINE

## 2019-07-03 PROCEDURE — 99214 OFFICE O/P EST MOD 30 MIN: CPT | Performed by: FAMILY MEDICINE

## 2019-07-03 PROCEDURE — 70450 CT HEAD/BRAIN W/O DYE: CPT

## 2019-07-03 PROCEDURE — G8417 CALC BMI ABV UP PARAM F/U: HCPCS | Performed by: FAMILY MEDICINE

## 2019-07-03 PROCEDURE — 1123F ACP DISCUSS/DSCN MKR DOCD: CPT | Performed by: FAMILY MEDICINE

## 2019-07-03 PROCEDURE — G8399 PT W/DXA RESULTS DOCUMENT: HCPCS | Performed by: FAMILY MEDICINE

## 2019-07-03 PROCEDURE — 82962 GLUCOSE BLOOD TEST: CPT | Performed by: FAMILY MEDICINE

## 2019-07-03 RX ORDER — LEVOCETIRIZINE DIHYDROCHLORIDE 5 MG/1
2.5 TABLET, FILM COATED ORAL NIGHTLY
Qty: 30 TABLET | Refills: 0 | Status: SHIPPED | OUTPATIENT
Start: 2019-07-03 | End: 2019-08-26 | Stop reason: SDUPTHER

## 2019-07-03 RX ORDER — SULFACETAMIDE SODIUM 100 MG/ML
2 SOLUTION/ DROPS OPHTHALMIC 4 TIMES DAILY
Qty: 1 BOTTLE | Refills: 0 | Status: SHIPPED | OUTPATIENT
Start: 2019-07-03 | End: 2019-07-10

## 2019-07-03 NOTE — PROGRESS NOTES
[facility]             [address] [place] [address], [place], [address]                        Telephone [phone]             Fax [phone]       Katie Mackay  1945  MRN:  [id number]  Date of visit:  7/3/2019     Subjective:    Katie Mackay is a 74 y.o.  female who presents to [facility] today (7/3/2019) for evaluation of:  Eye Pain (eye pain for 10 days, blurred or double vision. headache, )      She states that she has had pain in both eyes for about 10 days. She reports some drainage from her eyes. She also reports headache, and pain in her forehead. She reports blurry vision and double vision. She states that her blood sugar readings have been fluctuating at home. She states that her glucose was 135 earlier today. She states that when she reads, she does not have blurry vision or double vision. Current medications are:  Current Outpatient Medications   Medication Sig Dispense Refill    blood glucose monitor kit and supplies Test 2 times a day & as needed for symptoms of irregular blood glucose. 1 kit 0    gabapentin (NEURONTIN) 300 MG capsule take 1 capsule by mouth twice a day  0    omeprazole (PRILOSEC) 20 MG delayed release capsule take 1 capsule by mouth twice a day  0    blood glucose test strips (FREESTYLE LITE) strip USE  STRIPS TO CHECK GLUCOSE TWICE DAILY AND AS NEEDED FOR HYPER/HYPOGLYCEMIC SYMPTOMS.  100 strip 3    lisinopril (PRINIVIL;ZESTRIL) 5 MG tablet take 1 tablet by mouth once daily 90 tablet 1    Artificial Tear Solution (SOOTHE XP XTRA PROTECTION) SOLN Apply to eye      metFORMIN (GLUCOPHAGE) 500 MG tablet Take 1 tablet by mouth 2 times daily (with meals) 180 tablet 1    RA ALLERGY RELIEF 10 MG tablet take 1 tablet by mouth once daily 30 tablet 5    levothyroxine (SYNTHROID) 88 MCG tablet take 1 tablet by mouth once daily 90 tablet 3    loratadine (CLARITIN) 10 MG Height: 5' 5\" (1.651 m)      SpO2: 98 %       Body mass index is 25.46 kg/m². Well-nourished, well-developed elderly  female alert and cooperative. There is mild erythema of the conjunctivae bilaterally. There is a small amount of drainage at the medial canthi bilaterally. The upper an lower lids have mild erythema bilaterally. Cranial nerves II-XII are intact. Neck supple. No adenopathy. Chest:  Normal expansion. Clear to auscultation. No rales, rhonchi, or wheezing. Heart sounds are normal.  Regular rate and rhythm without murmur, gallop or rub. Fingerstick glucose was 185. Results of the CT of the head done tonight were reviewed with the patient:  Ct Head Wo Contrast    Result Date: 7/3/2019  EXAMINATION: CT OF THE HEAD WITHOUT CONTRAST  7/3/2019 7:05 pm TECHNIQUE: CT of the head was performed without the administration of intravenous contrast. Dose modulation, iterative reconstruction, and/or weight based adjustment of the mA/kV was utilized to reduce the radiation dose to as low as reasonably achievable. COMPARISON: 08/02/2018 HISTORY: ORDERING SYSTEM PROVIDED HISTORY: Double vision TECHNOLOGIST PROVIDED HISTORY: double vision Reason for Exam: Double and blurred vision, sinus headache, off balance FINDINGS: BRAIN/VENTRICLES: There is mild periventricular and subcortical white matter low attenuation that is nonspecific but most consistent with chronic small vessel ischemia. No change from the prior study. There is no evidence of acute hemorrhage, mass or extra-axial fluid collection. ORBITS: The visualized portion of the orbits demonstrate no acute abnormality. SINUSES: The visualized paranasal sinuses and mastoid air cells demonstrate no acute abnormality. SOFT TISSUES/SKULL:  No acute abnormality of the visualized skull or soft tissues. Hyperostosis interna. No acute intracranial abnormality. Assessment & Plan:    1.  Blepharoconjunctivitis of both eyes, unspecified

## 2019-07-08 RX ORDER — FLUTICASONE PROPIONATE 50 MCG
SPRAY, SUSPENSION (ML) NASAL
Qty: 16 G | Refills: 5 | Status: SHIPPED | OUTPATIENT
Start: 2019-07-08 | End: 2020-06-29 | Stop reason: SDUPTHER

## 2019-07-09 ENCOUNTER — TELEPHONE (OUTPATIENT)
Dept: FAMILY MEDICINE CLINIC | Age: 74
End: 2019-07-09
Payer: MEDICARE

## 2019-07-09 DIAGNOSIS — M15.9 GENERALIZED OSTEOARTHROSIS, INVOLVING MULTIPLE SITES: ICD-10-CM

## 2019-07-09 DIAGNOSIS — K21.9 GASTROESOPHAGEAL REFLUX DISEASE WITHOUT ESOPHAGITIS: ICD-10-CM

## 2019-07-09 DIAGNOSIS — L89.151 STAGE I PRESSURE ULCER OF SACRAL REGION: ICD-10-CM

## 2019-07-09 DIAGNOSIS — E11.9 DIABETES TYPE 2, NO OCULAR INVOLVEMENT (HCC): Primary | ICD-10-CM

## 2019-07-09 DIAGNOSIS — I10 ESSENTIAL HYPERTENSION: ICD-10-CM

## 2019-07-09 DIAGNOSIS — E11.9 TYPE 2 DIABETES MELLITUS WITHOUT COMPLICATION, WITHOUT LONG-TERM CURRENT USE OF INSULIN (HCC): ICD-10-CM

## 2019-07-09 PROCEDURE — G0179 MD RECERTIFICATION HHA PT: HCPCS | Performed by: FAMILY MEDICINE

## 2019-07-11 ENCOUNTER — HOSPITAL ENCOUNTER (OUTPATIENT)
Dept: LAB | Age: 74
Discharge: HOME | End: 2019-07-11
Payer: MEDICARE

## 2019-07-11 ENCOUNTER — OFFICE VISIT (OUTPATIENT)
Dept: DIABETES SERVICES | Age: 74
End: 2019-07-11
Payer: MEDICARE

## 2019-07-11 VITALS
OXYGEN SATURATION: 98 % | BODY MASS INDEX: 25.96 KG/M2 | DIASTOLIC BLOOD PRESSURE: 76 MMHG | HEART RATE: 71 BPM | SYSTOLIC BLOOD PRESSURE: 122 MMHG | WEIGHT: 156 LBS | RESPIRATION RATE: 16 BRPM

## 2019-07-11 DIAGNOSIS — R30.0 DYSURIA: ICD-10-CM

## 2019-07-11 DIAGNOSIS — E78.00 HIGH CHOLESTEROL: ICD-10-CM

## 2019-07-11 DIAGNOSIS — E11.9 TYPE 2 DIABETES MELLITUS WITHOUT COMPLICATION, WITHOUT LONG-TERM CURRENT USE OF INSULIN (HCC): ICD-10-CM

## 2019-07-11 DIAGNOSIS — E11.9 TYPE 2 DIABETES MELLITUS WITHOUT COMPLICATION, WITHOUT LONG-TERM CURRENT USE OF INSULIN (HCC): Primary | ICD-10-CM

## 2019-07-11 DIAGNOSIS — I10 ESSENTIAL HYPERTENSION: ICD-10-CM

## 2019-07-11 DIAGNOSIS — Z71.89 DIABETES EDUCATION, ENCOUNTER FOR: ICD-10-CM

## 2019-07-11 LAB
-: NORMAL
AMORPHOUS: NORMAL
BACTERIA: NORMAL
BILIRUBIN URINE: NEGATIVE
CASTS UA: NORMAL /LPF (ref 0–2)
COLOR: NORMAL
COMMENT UA: NORMAL
CREATININE URINE: 55.5 MG/DL (ref 28–217)
CRYSTALS, UA: NORMAL /HPF
EPITHELIAL CELLS UA: NORMAL /HPF (ref 0–5)
ESTIMATED AVERAGE GLUCOSE: 140 MG/DL
GLUCOSE URINE: NEGATIVE
HBA1C MFR BLD: 6.5 % (ref 4.8–5.9)
KETONES, URINE: NEGATIVE
LEUKOCYTE ESTERASE, URINE: NEGATIVE
MICROALBUMIN/CREAT 24H UR: <12 MG/L
MICROALBUMIN/CREAT UR-RTO: NORMAL MCG/MG CREAT
MUCUS: NORMAL
NITRITE, URINE: NEGATIVE
OTHER OBSERVATIONS UA: NORMAL
PH UA: 5 (ref 5–6)
PROTEIN UA: NEGATIVE
RBC UA: NORMAL /HPF (ref 0–4)
RENAL EPITHELIAL, UA: NORMAL /HPF
SPECIFIC GRAVITY UA: 1.02 (ref 1.01–1.02)
TRICHOMONAS: NORMAL
TURBIDITY: NORMAL
URINE HGB: NEGATIVE
UROBILINOGEN, URINE: NORMAL
WBC UA: NORMAL /HPF (ref 0–4)
YEAST: NORMAL

## 2019-07-11 PROCEDURE — 83036 HEMOGLOBIN GLYCOSYLATED A1C: CPT

## 2019-07-11 PROCEDURE — 81001 URINALYSIS AUTO W/SCOPE: CPT

## 2019-07-11 PROCEDURE — 1090F PRES/ABSN URINE INCON ASSESS: CPT | Performed by: NURSE PRACTITIONER

## 2019-07-11 PROCEDURE — 99215 OFFICE O/P EST HI 40 MIN: CPT | Performed by: NURSE PRACTITIONER

## 2019-07-11 PROCEDURE — 3017F COLORECTAL CA SCREEN DOC REV: CPT | Performed by: NURSE PRACTITIONER

## 2019-07-11 PROCEDURE — G8417 CALC BMI ABV UP PARAM F/U: HCPCS | Performed by: NURSE PRACTITIONER

## 2019-07-11 PROCEDURE — 82570 ASSAY OF URINE CREATININE: CPT

## 2019-07-11 PROCEDURE — 36415 COLL VENOUS BLD VENIPUNCTURE: CPT

## 2019-07-11 PROCEDURE — G8399 PT W/DXA RESULTS DOCUMENT: HCPCS | Performed by: NURSE PRACTITIONER

## 2019-07-11 PROCEDURE — 1036F TOBACCO NON-USER: CPT | Performed by: NURSE PRACTITIONER

## 2019-07-11 PROCEDURE — G8428 CUR MEDS NOT DOCUMENT: HCPCS | Performed by: NURSE PRACTITIONER

## 2019-07-11 PROCEDURE — 3044F HG A1C LEVEL LT 7.0%: CPT | Performed by: NURSE PRACTITIONER

## 2019-07-11 PROCEDURE — 82043 UR ALBUMIN QUANTITATIVE: CPT

## 2019-07-11 PROCEDURE — 1123F ACP DISCUSS/DSCN MKR DOCD: CPT | Performed by: NURSE PRACTITIONER

## 2019-07-11 PROCEDURE — 2022F DILAT RTA XM EVC RTNOPTHY: CPT | Performed by: NURSE PRACTITIONER

## 2019-07-11 PROCEDURE — 4040F PNEUMOC VAC/ADMIN/RCVD: CPT | Performed by: NURSE PRACTITIONER

## 2019-07-11 ASSESSMENT — ENCOUNTER SYMPTOMS
ABDOMINAL PAIN: 0
RESPIRATORY NEGATIVE: 1
BLURRED VISION: 1
DIARRHEA: 0
SHORTNESS OF BREATH: 0

## 2019-07-12 ENCOUNTER — OFFICE VISIT (OUTPATIENT)
Dept: OPHTHALMOLOGY | Age: 74
End: 2019-07-12
Payer: MEDICARE

## 2019-07-12 DIAGNOSIS — H40.1132 PRIMARY OPEN ANGLE GLAUCOMA (POAG) OF BOTH EYES, MODERATE STAGE: Primary | ICD-10-CM

## 2019-07-12 DIAGNOSIS — H43.813 DEGENERATION OF POSTERIOR VITREOUS BODY OF BOTH EYES: ICD-10-CM

## 2019-07-12 DIAGNOSIS — E11.9 DIABETES TYPE 2, NO OCULAR INVOLVEMENT (HCC): ICD-10-CM

## 2019-07-12 PROCEDURE — 1123F ACP DISCUSS/DSCN MKR DOCD: CPT | Performed by: OPHTHALMOLOGY

## 2019-07-12 PROCEDURE — 3044F HG A1C LEVEL LT 7.0%: CPT | Performed by: OPHTHALMOLOGY

## 2019-07-12 PROCEDURE — 1090F PRES/ABSN URINE INCON ASSESS: CPT | Performed by: OPHTHALMOLOGY

## 2019-07-12 PROCEDURE — G8427 DOCREV CUR MEDS BY ELIG CLIN: HCPCS | Performed by: OPHTHALMOLOGY

## 2019-07-12 PROCEDURE — 99214 OFFICE O/P EST MOD 30 MIN: CPT | Performed by: OPHTHALMOLOGY

## 2019-07-12 PROCEDURE — G8417 CALC BMI ABV UP PARAM F/U: HCPCS | Performed by: OPHTHALMOLOGY

## 2019-07-12 PROCEDURE — 1036F TOBACCO NON-USER: CPT | Performed by: OPHTHALMOLOGY

## 2019-07-12 PROCEDURE — 4040F PNEUMOC VAC/ADMIN/RCVD: CPT | Performed by: OPHTHALMOLOGY

## 2019-07-12 PROCEDURE — G8399 PT W/DXA RESULTS DOCUMENT: HCPCS | Performed by: OPHTHALMOLOGY

## 2019-07-12 PROCEDURE — 2022F DILAT RTA XM EVC RTNOPTHY: CPT | Performed by: OPHTHALMOLOGY

## 2019-07-12 PROCEDURE — 3017F COLORECTAL CA SCREEN DOC REV: CPT | Performed by: OPHTHALMOLOGY

## 2019-07-12 ASSESSMENT — VISUAL ACUITY
OS_PH_CC: 20/30
METHOD: SNELLEN - LINEAR
OD_PH_CC: 20/40
OS_CC: 20/30
CORRECTION_TYPE: GLASSES

## 2019-07-12 ASSESSMENT — CONF VISUAL FIELD
OS_NORMAL: 1
OD_NORMAL: 1

## 2019-07-12 ASSESSMENT — ENCOUNTER SYMPTOMS
GASTROINTESTINAL NEGATIVE: 0
RESPIRATORY NEGATIVE: 0
EYES NEGATIVE: 0
ALLERGIC/IMMUNOLOGIC NEGATIVE: 0

## 2019-07-12 ASSESSMENT — SLIT LAMP EXAM - LIDS
COMMENTS: MILD BLEPHARITIS. MILD MGD
COMMENTS: MILD BLEPHARITIS. MILD MGD

## 2019-07-22 DIAGNOSIS — M79.674 GREAT TOE PAIN, RIGHT: ICD-10-CM

## 2019-07-22 DIAGNOSIS — E11.49 OTHER DIABETIC NEUROLOGICAL COMPLICATION ASSOCIATED WITH TYPE 2 DIABETES MELLITUS (HCC): ICD-10-CM

## 2019-07-22 RX ORDER — GABAPENTIN 300 MG/1
CAPSULE ORAL
Qty: 60 CAPSULE | Refills: 0 | Status: SHIPPED | OUTPATIENT
Start: 2019-07-22 | End: 2019-09-16 | Stop reason: SDUPTHER

## 2019-07-29 DIAGNOSIS — E11.49 OTHER DIABETIC NEUROLOGICAL COMPLICATION ASSOCIATED WITH TYPE 2 DIABETES MELLITUS (HCC): ICD-10-CM

## 2019-07-29 DIAGNOSIS — M79.674 GREAT TOE PAIN, RIGHT: ICD-10-CM

## 2019-07-29 RX ORDER — GABAPENTIN 300 MG/1
CAPSULE ORAL
Qty: 60 CAPSULE | Refills: 0 | OUTPATIENT
Start: 2019-07-29 | End: 2019-08-28

## 2019-08-16 NOTE — TELEPHONE ENCOUNTER
Long time time ago Dr Camelia Bermudez  took pt off gabapentin and pt states she is still getting her meds filled and doesn't need them.  Suggested to pt to not  med and will relay a message to

## 2019-08-19 ENCOUNTER — TELEPHONE (OUTPATIENT)
Dept: FAMILY MEDICINE CLINIC | Age: 74
End: 2019-08-19

## 2019-08-19 NOTE — TELEPHONE ENCOUNTER
Reviewed notes-patient is to continue on the 300mg BID but the 100mg was d/c'd in February.   Dana from [facility] will review meds and need for meds with patient and advise her to continue the 300mg BID

## 2019-08-19 NOTE — TELEPHONE ENCOUNTER
Returned call-she is to take her Gabapentin to the pharmacy and have pharmacist review meds.   Confirmed with patient and pharmacist her current dose is 300mg BID

## 2019-08-26 DIAGNOSIS — K21.9 GASTROESOPHAGEAL REFLUX DISEASE WITHOUT ESOPHAGITIS: Primary | ICD-10-CM

## 2019-08-26 DIAGNOSIS — J30.2 SEASONAL ALLERGIES: ICD-10-CM

## 2019-08-26 RX ORDER — OMEPRAZOLE 20 MG/1
CAPSULE, DELAYED RELEASE ORAL
Qty: 30 CAPSULE | Refills: 0 | Status: SHIPPED | OUTPATIENT
Start: 2019-08-26 | End: 2019-09-09 | Stop reason: SDUPTHER

## 2019-08-26 RX ORDER — LEVOCETIRIZINE DIHYDROCHLORIDE 5 MG/1
2.5 TABLET, FILM COATED ORAL NIGHTLY
Qty: 30 TABLET | Refills: 0 | Status: SHIPPED | OUTPATIENT
Start: 2019-08-26 | End: 2019-10-21 | Stop reason: SDUPTHER

## 2019-08-26 RX ORDER — OMEPRAZOLE 20 MG/1
CAPSULE, DELAYED RELEASE ORAL
Qty: 30 CAPSULE | Refills: 0 | OUTPATIENT
Start: 2019-08-26

## 2019-08-26 RX ORDER — LEVOCETIRIZINE DIHYDROCHLORIDE 5 MG/1
2.5 TABLET, FILM COATED ORAL NIGHTLY
Qty: 30 TABLET | OUTPATIENT
Start: 2019-08-26

## 2019-08-27 ENCOUNTER — TELEPHONE (OUTPATIENT)
Dept: FAMILY MEDICINE CLINIC | Age: 74
End: 2019-08-27

## 2019-08-27 DIAGNOSIS — Z51.89 ENCOUNTER FOR VOCATIONAL THERAPY: ICD-10-CM

## 2019-08-27 DIAGNOSIS — I10 ESSENTIAL HYPERTENSION, MALIGNANT: ICD-10-CM

## 2019-08-27 DIAGNOSIS — K21.9 GASTROESOPHAGEAL REFLUX DISEASE WITHOUT ESOPHAGITIS: ICD-10-CM

## 2019-08-27 DIAGNOSIS — M15.9 GENERALIZED OSTEOARTHROSIS, INVOLVING MULTIPLE SITES: ICD-10-CM

## 2019-08-27 DIAGNOSIS — E11.9 TYPE 2 DIABETES MELLITUS WITHOUT COMPLICATION, WITHOUT LONG-TERM CURRENT USE OF INSULIN (HCC): Primary | ICD-10-CM

## 2019-08-27 DIAGNOSIS — H90.5 CENTRAL HEARING LOSS: ICD-10-CM

## 2019-08-27 NOTE — TELEPHONE ENCOUNTER
Home health plan of care reviewed and certification completed on 08/27/19 on patient for service dates 07/30/19-09/27/19. Medications reviewed and verified. Physician time spent on activities to coordinate services, documenting medical decision making, reviewing of reports, treatment plans and test results is 20 minutes.

## 2019-09-09 DIAGNOSIS — K21.9 GASTROESOPHAGEAL REFLUX DISEASE WITHOUT ESOPHAGITIS: ICD-10-CM

## 2019-09-09 DIAGNOSIS — J01.90 ACUTE BACTERIAL SINUSITIS: ICD-10-CM

## 2019-09-09 DIAGNOSIS — B96.89 ACUTE BACTERIAL SINUSITIS: ICD-10-CM

## 2019-09-09 RX ORDER — OMEPRAZOLE 20 MG/1
CAPSULE, DELAYED RELEASE ORAL
Qty: 30 CAPSULE | Refills: 0 | Status: SHIPPED | OUTPATIENT
Start: 2019-09-09 | End: 2019-09-17 | Stop reason: SDUPTHER

## 2019-09-16 ENCOUNTER — OFFICE VISIT (OUTPATIENT)
Dept: DIABETES SERVICES | Age: 74
End: 2019-09-16
Payer: MEDICARE

## 2019-09-16 VITALS
RESPIRATION RATE: 16 BRPM | DIASTOLIC BLOOD PRESSURE: 60 MMHG | OXYGEN SATURATION: 99 % | WEIGHT: 160 LBS | SYSTOLIC BLOOD PRESSURE: 112 MMHG | HEART RATE: 70 BPM | BODY MASS INDEX: 26.63 KG/M2

## 2019-09-16 DIAGNOSIS — E78.00 HIGH CHOLESTEROL: ICD-10-CM

## 2019-09-16 DIAGNOSIS — M79.674 GREAT TOE PAIN, RIGHT: ICD-10-CM

## 2019-09-16 DIAGNOSIS — I10 ESSENTIAL HYPERTENSION: ICD-10-CM

## 2019-09-16 DIAGNOSIS — Z71.89 DIABETES EDUCATION, ENCOUNTER FOR: ICD-10-CM

## 2019-09-16 DIAGNOSIS — E11.9 TYPE 2 DIABETES MELLITUS WITHOUT COMPLICATION, WITHOUT LONG-TERM CURRENT USE OF INSULIN (HCC): Primary | ICD-10-CM

## 2019-09-16 DIAGNOSIS — E11.49 OTHER DIABETIC NEUROLOGICAL COMPLICATION ASSOCIATED WITH TYPE 2 DIABETES MELLITUS (HCC): ICD-10-CM

## 2019-09-16 PROCEDURE — 99214 OFFICE O/P EST MOD 30 MIN: CPT | Performed by: NURSE PRACTITIONER

## 2019-09-16 RX ORDER — GABAPENTIN 300 MG/1
CAPSULE ORAL
Qty: 60 CAPSULE | Refills: 0 | Status: SHIPPED | OUTPATIENT
Start: 2019-09-16 | End: 2020-01-06 | Stop reason: SDUPTHER

## 2019-09-16 ASSESSMENT — ENCOUNTER SYMPTOMS
DIARRHEA: 0
ABDOMINAL PAIN: 0
RESPIRATORY NEGATIVE: 1
SHORTNESS OF BREATH: 0

## 2019-09-16 NOTE — TELEPHONE ENCOUNTER
Cullen Sotelo called requesting a refill of the below medication which has been pended for you: [place] reveiwed. Gabapentin 300 mg last filled 07/20/19 #60/30 days. Report available for your review.      Requested Prescriptions     Pending Prescriptions Disp Refills    gabapentin (NEURONTIN) 300 MG capsule [Pharmacy Med Name: GABAPENTIN 300 MG CAPSULE] 60 capsule 0     Sig: take 1 capsule by mouth twice a day       Last Appointment Date: 6/27/2019  Next Appointment Date: 12/26/2019    Allergies   Allergen Reactions    Latex Rash    Aleve [Naproxen]      Stomach problems    Amoxicillin      Other reaction(s): mucositis    Aspirin Other (See Comments)     Dizziness and tears her stomach up    Dye [Iodides]      IV DYE    Flu Virus Vaccine     Influenza Vaccines     Reglan [Metoclopramide] Other (See Comments)     Dystonic reaction, involuntary movements

## 2019-09-16 NOTE — PROGRESS NOTES
[address] [facility] INTERNAL MED  [address], [address]  [facility] [id number] [phone]        HISTORY:    Randy Goncalves presents today for evaluation and management of:  Chief Complaint   Patient presents with    Diabetes       Diabetes   She presents for her follow-up diabetic visit. She has type 2 diabetes mellitus. Her disease course has been improving. There are no hypoglycemic associated symptoms. Pertinent negatives for hypoglycemia include no confusion, dizziness, headaches, seizures or tremors. There are no diabetic associated symptoms. Pertinent negatives for diabetes include no chest pain, no polydipsia, no polyphagia and no polyuria. There are no hypoglycemic complications. Pertinent negatives for diabetic complications include no heart disease, nephropathy, peripheral neuropathy or retinopathy. Risk factors for coronary artery disease include diabetes mellitus, dyslipidemia, family history and hypertension. Current diabetic treatment includes oral agent (monotherapy). She is compliant with treatment all of the time. Her weight is stable. She monitors blood glucose at home 1-2 x per day. Blood glucose monitoring compliance is adequate. Her overall blood glucose range is 110-130 mg/dl. Current Diabetic Medications  Metformin 500 mg twice a day    DKA episodes: 0      High cholesterol-  Takes fish oil and denies any adverse effects with its use. Watches diet and exercise. Hypertension-  Takes lisinopril and denies any adverse effects with their use. Watches diet and exercise. Denies any chest pain, dizziness or edema.   Follows with cardiology:No.    Past Medical History:   Diagnosis Date    Allergic conjunctivitis     (stable)    Chronic sinusitis     Constipation     Dizzy spells     Dry eye syndrome     Edentulous     (does not wear dentures)    Essential hypertension 7/6/2016    Fracture of lateral malleolus     (avulsion fracture at the tip of 50 MCG/ACT nasal spray instill 2 sprays into each nostril once daily 16 g 5    blood glucose monitor kit and supplies Test 2 times a day & as needed for symptoms of irregular blood glucose. 1 kit 0    gabapentin (NEURONTIN) 300 MG capsule take 1 capsule by mouth twice a day  0    blood glucose test strips (FREESTYLE LITE) strip USE  STRIPS TO CHECK GLUCOSE TWICE DAILY AND AS NEEDED FOR HYPER/HYPOGLYCEMIC SYMPTOMS. 100 strip 3    lisinopril (PRINIVIL;ZESTRIL) 5 MG tablet take 1 tablet by mouth once daily 90 tablet 1    sucralfate (CARAFATE) 1 GM tablet Take 1 g by mouth      Artificial Tear Solution (SOOTHE XP XTRA PROTECTION) SOLN Apply to eye      gabapentin (NEURONTIN) 300 MG capsule take 1 capsule by mouth twice a day. 180 capsule 0    metFORMIN (GLUCOPHAGE) 500 MG tablet Take 1 tablet by mouth 2 times daily (with meals) 180 tablet 1    levothyroxine (SYNTHROID) 88 MCG tablet take 1 tablet by mouth once daily 90 tablet 3    loratadine (CLARITIN) 10 MG tablet take 1 tablet by mouth once daily 30 tablet 11    Omega-3 Fatty Acids (FISH OIL) 1000 MG CAPS Take 2,000 mg by mouth daily Indications: PRN       Blood Glucose Monitoring Suppl YNAY Monitor blood glucose levels twice daily and as needed for hyper/hypoglycemic symptoms. Dx. Diabetes (E11.9) 1 Device 0    SOFT TOUCH LANCETS MISC 1 Device by Does not apply route 2 times daily 200 each 3     No current facility-administered medications for this visit. Review ofSymptoms:  Review of Systems   Constitutional: Negative for unexpected weight change. Eyes: Negative for visual disturbance. Respiratory: Negative. Negative for shortness of breath. Cardiovascular: Negative for chest pain and leg swelling. Gastrointestinal: Negative for abdominal pain and diarrhea. Endocrine: Negative for polydipsia, polyphagia and polyuria. Genitourinary: Negative. Musculoskeletal: Negative. Skin: Negative for rash and wound.    Neurological: Negative for dizziness, tremors, seizures and headaches. Psychiatric/Behavioral: Negative. Negative for confusion and decreased concentration. Theremainder of a complete 14-point review of systems is negative. Vital Signs: Wt 160 lb (72.6 kg)   LMP  (LMP Unknown)   BMI 26.63 kg/m²      Wt Readings from Last 3 Encounters:   09/16/19 160 lb (72.6 kg)   07/11/19 156 lb (70.8 kg)   07/03/19 153 lb (69.4 kg)     Body mass index is 26.63 kg/m².   LABS:  Hemoglobin A1C   Date Value Ref Range Status   07/11/2019 6.5 (H) 4.8 - 5.9 % Final   01/03/2019 6.8 (H) 4.8 - 5.9 % Final     Lab Results   Component Value Date    LABMICR CANNOT BE CALCULATED 07/11/2019     Lab Results   Component Value Date     03/07/2019    K 4.3 03/07/2019     03/07/2019    CO2 31 03/07/2019    BUN 21 03/07/2019    CREATININE 0.76 03/07/2019    GLUCOSE 185 07/03/2019    CALCIUM 10.6 (H) 03/07/2019    PROT 7.5 03/07/2019    LABALBU 4.4 03/07/2019    BILITOT 0.19 (L) 03/07/2019    ALKPHOS 89 03/07/2019    AST 16 03/07/2019    ALT 16 03/07/2019    LABGLOM >60 03/07/2019    GFRAA >60 03/07/2019    GLOB 3.7 07/14/2016     Lab Results   Component Value Date    CHOL 211 (H) 01/03/2019    CHOL 253 (H) 06/28/2018    CHOL 236 (H) 10/20/2017     Lab Results   Component Value Date    TRIG 219 (H) 01/03/2019    TRIG 327 (H) 06/28/2018    TRIG 160 (H) 10/20/2017     Lab Results   Component Value Date    HDL 64 01/03/2019    HDL 54 06/28/2018    HDL 84 10/20/2017     Lab Results   Component Value Date    LDLCHOLESTEROL 103 01/03/2019    LDLCHOLESTEROL 134 (H) 06/28/2018    LDLCHOLESTEROL 120 10/20/2017     Lab Results   Component Value Date    VLDL NOT REPORTED 01/03/2019    VLDL NOT REPORTED 06/28/2018    VLDL NOT REPORTED 10/20/2017     Lab Results   Component Value Date    CHOLHDLRATIO 3.3 01/03/2019    CHOLHDLRATIO 4.7 06/28/2018    CHOLHDLRATIO 2.8 10/20/2017           Physical Exam   Constitutional: She is oriented to person, place, appointment. Patient was seen with total face to face time of 30 minutes. More than 50%  of this visit was counseling and education regarding her diabetes.     Electronically signed by TAY Mckeon CNP on 9/16/2019 at 1:25 PM      (Please note that portions of this note were completed with a voice-recognition program. Efforts were made to edit the dictation but occasionally words are mis-transcribed.)

## 2019-09-17 DIAGNOSIS — K21.9 GASTROESOPHAGEAL REFLUX DISEASE WITHOUT ESOPHAGITIS: ICD-10-CM

## 2019-09-17 RX ORDER — OMEPRAZOLE 20 MG/1
CAPSULE, DELAYED RELEASE ORAL
Qty: 60 CAPSULE | Refills: 3 | Status: SHIPPED | OUTPATIENT
Start: 2019-09-17 | End: 2020-06-29 | Stop reason: SDUPTHER

## 2019-09-19 ENCOUNTER — OFFICE VISIT (OUTPATIENT)
Dept: PODIATRY | Age: 74
End: 2019-09-19
Payer: MEDICARE

## 2019-09-19 VITALS
DIASTOLIC BLOOD PRESSURE: 70 MMHG | BODY MASS INDEX: 26.49 KG/M2 | WEIGHT: 159 LBS | HEIGHT: 65 IN | RESPIRATION RATE: 20 BRPM | SYSTOLIC BLOOD PRESSURE: 122 MMHG | HEART RATE: 76 BPM

## 2019-09-19 DIAGNOSIS — E11.49 DM (DIABETES MELLITUS), TYPE 2 WITH NEUROLOGICAL COMPLICATIONS (HCC): Primary | ICD-10-CM

## 2019-09-19 DIAGNOSIS — B35.1 DERMATOPHYTOSIS OF NAIL: ICD-10-CM

## 2019-09-19 PROCEDURE — 11720 DEBRIDE NAIL 1-5: CPT | Performed by: PODIATRIST

## 2019-09-19 NOTE — PROGRESS NOTES
Subjective:  Elvis Sosa is a 66 y.o. female who presents to the office today for routine DM foot care. .  Currently denies F/C/N/V. Allergies   Allergen Reactions    Latex Rash    Aleve [Naproxen]      Stomach problems    Amoxicillin      Other reaction(s): mucositis    Aspirin Other (See Comments)     Dizziness and tears her stomach up    Dye [Iodides]      IV DYE    Flu Virus Vaccine     Influenza Vaccines     Reglan [Metoclopramide] Other (See Comments)     Dystonic reaction, involuntary movements       Past Medical History:   Diagnosis Date    Allergic conjunctivitis     (stable)    Chronic sinusitis     Constipation     Dizzy spells     Dry eye syndrome     Edentulous     (does not wear dentures)    Essential hypertension 7/6/2016    Fracture of lateral malleolus     (avulsion fracture at the tip of the lateral malleolus - right ankle).  Hearing loss     Hypothyroidism     Interstitial cystitis     Irritable bowel syndrome     Keratitis     (secondary to dry eye syndrome)    Parotid gland enlargement     stasis, consider sjorgens    Pseudophakos     (bilateral)    Seasonal rhinitis     Stress incontinence     Type II or unspecified type diabetes mellitus without mention of complication, not stated as uncontrolled     Urethral stenosis     Xerostomia        Prior to Admission medications    Medication Sig Start Date End Date Taking?  Authorizing Provider   omeprazole (PRILOSEC) 20 MG delayed release capsule take 1 capsule by mouth twice a day 9/17/19  Yes Madina Lovelace MD   gabapentin (NEURONTIN) 300 MG capsule take 1 capsule by mouth twice a day 9/16/19 10/16/19 Yes Madina Lovelace MD   RA ALLERGY RELIEF 10 MG tablet take 1 tablet by mouth once daily 9/10/19  Yes TAY Mcclure - CNP   levocetirizine (XYZAL) 5 MG tablet Take 0.5 tablets by mouth nightly 8/26/19  Yes Madina Lovelace MD   fluticasone (FLONASE) 50 MCG/ACT nasal spray instill 2 sprays strength 5/5, bilateral.  Pain absent upon palpation bilateral. Normal medial longitudinal arch, bilateral.  Ankle ROM decresed,bilateral.  1st MPJ ROM within normal limits, bilateral.  Dorsally contracted digits absent. No other foot deformities. Integument: Warm, dry, supple, bilateral.  Open lesion absent, bilateral.  Interdigital maceration absent to web spaces bilateral.   Nails left 5 and right 1 thickened, dystrophic and crumbly, discolored with subungual debris. .  Fissures absent, bilateral. Hyperkeratotic tissue is absent. Asessment: Patient is a 66 y.o. female with:    Diagnosis Orders   1. DM (diabetes mellitus), type 2 with neurological complications ([facility] [address]     2. Dermatophytosis of nail         Plan: Patient examined and evaluated. Current condition and treatment options discussed in detail. DM foot ed and exam  All nails as mentioned above debrided in length and thickness. Patient advised OTC methods for treatment of the mycotic nails. Patient will follow up in 10 weeks. Contact office with any questions/problems/concerns.

## 2019-09-26 ENCOUNTER — TELEPHONE (OUTPATIENT)
Dept: FAMILY MEDICINE CLINIC | Age: 74
End: 2019-09-26

## 2019-09-26 DIAGNOSIS — E11.9 TYPE 2 DIABETES MELLITUS WITHOUT COMPLICATION, WITHOUT LONG-TERM CURRENT USE OF INSULIN (HCC): Primary | ICD-10-CM

## 2019-09-26 RX ORDER — BLOOD-GLUCOSE METER
1 KIT MISCELLANEOUS DAILY
Qty: 1 KIT | Refills: 0 | Status: SHIPPED | OUTPATIENT
Start: 2019-09-26 | End: 2020-02-04 | Stop reason: ALTCHOICE

## 2019-10-03 ENCOUNTER — TELEPHONE (OUTPATIENT)
Dept: PRIMARY CARE CLINIC | Age: 74
End: 2019-10-03

## 2019-10-03 DIAGNOSIS — I10 ESSENTIAL HYPERTENSION, MALIGNANT: ICD-10-CM

## 2019-10-03 DIAGNOSIS — Z51.89 ENCOUNTER FOR VOCATIONAL THERAPY: ICD-10-CM

## 2019-10-03 DIAGNOSIS — E11.9 TYPE 2 DIABETES MELLITUS WITHOUT COMPLICATION, UNSPECIFIED WHETHER LONG TERM INSULIN USE (HCC): Primary | ICD-10-CM

## 2019-10-03 DIAGNOSIS — H90.5 CENTRAL HEARING LOSS: ICD-10-CM

## 2019-10-03 DIAGNOSIS — K21.9 GASTROESOPHAGEAL REFLUX DISEASE WITHOUT ESOPHAGITIS: ICD-10-CM

## 2019-10-03 DIAGNOSIS — M15.9 GENERALIZED OSTEOARTHROSIS, INVOLVING MULTIPLE SITES: ICD-10-CM

## 2019-10-21 DIAGNOSIS — J30.2 SEASONAL ALLERGIES: ICD-10-CM

## 2019-10-21 RX ORDER — LEVOCETIRIZINE DIHYDROCHLORIDE 5 MG/1
TABLET, FILM COATED ORAL
Qty: 30 TABLET | Refills: 0 | Status: SHIPPED | OUTPATIENT
Start: 2019-10-21 | End: 2019-12-31

## 2019-10-30 DIAGNOSIS — I10 ESSENTIAL HYPERTENSION: ICD-10-CM

## 2019-10-30 RX ORDER — LISINOPRIL 5 MG/1
TABLET ORAL
Qty: 90 TABLET | Refills: 1 | Status: SHIPPED | OUTPATIENT
Start: 2019-10-30 | End: 2020-02-04 | Stop reason: ALTCHOICE

## 2019-11-14 ENCOUNTER — TELEPHONE (OUTPATIENT)
Dept: FAMILY MEDICINE CLINIC | Age: 74
End: 2019-11-14

## 2019-12-02 ENCOUNTER — TELEPHONE (OUTPATIENT)
Dept: FAMILY MEDICINE CLINIC | Age: 74
End: 2019-12-02

## 2019-12-02 DIAGNOSIS — H90.5 CENTRAL HEARING LOSS: ICD-10-CM

## 2019-12-02 DIAGNOSIS — I10 ESSENTIAL HYPERTENSION, MALIGNANT: ICD-10-CM

## 2019-12-02 DIAGNOSIS — Z51.89 ENCOUNTER FOR VOCATIONAL THERAPY: ICD-10-CM

## 2019-12-02 DIAGNOSIS — E11.9 TYPE 2 DIABETES MELLITUS WITHOUT COMPLICATION, UNSPECIFIED WHETHER LONG TERM INSULIN USE (HCC): Primary | ICD-10-CM

## 2019-12-02 DIAGNOSIS — K21.9 GASTROESOPHAGEAL REFLUX DISEASE WITHOUT ESOPHAGITIS: ICD-10-CM

## 2019-12-02 DIAGNOSIS — M15.9 GENERALIZED OSTEOARTHROSIS, INVOLVING MULTIPLE SITES: ICD-10-CM

## 2019-12-09 ENCOUNTER — TELEPHONE (OUTPATIENT)
Dept: FAMILY MEDICINE CLINIC | Age: 74
End: 2019-12-09

## 2019-12-09 DIAGNOSIS — E11.9 TYPE 2 DIABETES MELLITUS WITHOUT COMPLICATION, UNSPECIFIED WHETHER LONG TERM INSULIN USE (HCC): Primary | ICD-10-CM

## 2019-12-30 ENCOUNTER — TELEPHONE (OUTPATIENT)
Dept: FAMILY MEDICINE CLINIC | Age: 74
End: 2019-12-30

## 2019-12-31 DIAGNOSIS — J30.2 SEASONAL ALLERGIES: ICD-10-CM

## 2019-12-31 RX ORDER — LEVOCETIRIZINE DIHYDROCHLORIDE 5 MG/1
TABLET, FILM COATED ORAL
Qty: 30 TABLET | Refills: 0 | Status: SHIPPED | OUTPATIENT
Start: 2019-12-31 | End: 2020-02-04 | Stop reason: SDUPTHER

## 2020-01-06 RX ORDER — GABAPENTIN 300 MG/1
CAPSULE ORAL
Qty: 60 CAPSULE | Refills: 0 | Status: SHIPPED | OUTPATIENT
Start: 2020-01-06 | End: 2020-03-16

## 2020-01-07 RX ORDER — GABAPENTIN 300 MG/1
CAPSULE ORAL
Qty: 60 CAPSULE | Refills: 0 | OUTPATIENT
Start: 2020-01-07 | End: 2020-02-07

## 2020-01-24 ENCOUNTER — OFFICE VISIT (OUTPATIENT)
Dept: PODIATRY | Age: 75
End: 2020-01-24
Payer: MEDICARE

## 2020-01-24 VITALS
SYSTOLIC BLOOD PRESSURE: 130 MMHG | DIASTOLIC BLOOD PRESSURE: 70 MMHG | HEART RATE: 78 BPM | HEIGHT: 63 IN | WEIGHT: 156 LBS | BODY MASS INDEX: 27.64 KG/M2

## 2020-01-24 PROCEDURE — 11720 DEBRIDE NAIL 1-5: CPT | Performed by: PODIATRIST

## 2020-01-24 PROCEDURE — 99999 PR OFFICE/OUTPT VISIT,PROCEDURE ONLY: CPT | Performed by: PODIATRIST

## 2020-01-24 NOTE — PROGRESS NOTES
Subjective:  Conception Jay Jay is a 66 y.o. female who presents to the office today for routine DM foot care. .  Currently denies F/C/N/V. Allergies   Allergen Reactions    Latex Rash    Aleve [Naproxen]      Stomach problems    Amoxicillin      Other reaction(s): mucositis    Aspirin Other (See Comments)     Dizziness and tears her stomach up    Dye [Iodides]      IV DYE    Flu Virus Vaccine     Influenza Vaccines     Reglan [Metoclopramide] Other (See Comments)     Dystonic reaction, involuntary movements       Past Medical History:   Diagnosis Date    Allergic conjunctivitis     (stable)    Chronic sinusitis     Constipation     Dizzy spells     Dry eye syndrome     Edentulous     (does not wear dentures)    Essential hypertension 7/6/2016    Fracture of lateral malleolus     (avulsion fracture at the tip of the lateral malleolus - right ankle).  Hearing loss     Hypothyroidism     Interstitial cystitis     Irritable bowel syndrome     Keratitis     (secondary to dry eye syndrome)    Parotid gland enlargement     stasis, consider sjorgens    Pseudophakos     (bilateral)    Seasonal rhinitis     Stress incontinence     Type II or unspecified type diabetes mellitus without mention of complication, not stated as uncontrolled     Urethral stenosis     Xerostomia        Prior to Admission medications    Medication Sig Start Date End Date Taking?  Authorizing Provider   gabapentin (NEURONTIN) 300 MG capsule take 1 capsule by mouth twice a day 1/6/20 2/6/20 Yes Noelle Mcallister MD   levocetirizine Rexine Dodrill) 5 MG tablet take 1/2 tablet by mouth once daily 12/31/19  Yes Noelle Mcallister MD   Diabetic Shoe MISC by Does not apply route 12/9/19  Yes Noelle Mcallister MD   lisinopril (PRINIVIL;ZESTRIL) 5 MG tablet take 1 tablet by mouth once daily 10/30/19  Yes Noelle Mcallister MD   glucose monitoring kit (FREESTYLE) monitoring kit 1 kit by Does not apply route daily 9/26/19  Yes Noelle Mcallister MD omeprazole (PRILOSEC) 20 MG delayed release capsule take 1 capsule by mouth twice a day 9/17/19  Yes Tangela Ji MD   RA ALLERGY RELIEF 10 MG tablet take 1 tablet by mouth once daily 9/10/19  Yes Ulysess Less, APRN - CNP   fluticasone (FLONASE) 50 MCG/ACT nasal spray instill 2 sprays into each nostril once daily 7/8/19  Yes Tangela Ji MD   blood glucose monitor kit and supplies Test 2 times a day & as needed for symptoms of irregular blood glucose. 7/1/19  Yes Tangela Ji MD   blood glucose test strips (FREESTYLE LITE) strip USE  STRIPS TO CHECK GLUCOSE TWICE DAILY AND AS NEEDED FOR HYPER/HYPOGLYCEMIC SYMPTOMS. 6/24/19  Yes Tangela Ji MD   Artificial Tear Solution (SOOTHE XP XTRA PROTECTION) SOLN Apply to eye   Yes Historical Provider, MD   metFORMIN (GLUCOPHAGE) 500 MG tablet Take 1 tablet by mouth 2 times daily (with meals) 2/6/19  Yes Tangela Ji MD   levothyroxine (SYNTHROID) 88 MCG tablet take 1 tablet by mouth once daily 12/6/18  Yes Tangela Ji MD   loratadine (CLARITIN) 10 MG tablet take 1 tablet by mouth once daily 10/1/18  Yes Tangela Ji MD   Omega-3 Fatty Acids (FISH OIL) 1000 MG CAPS Take 2,000 mg by mouth daily Indications: PRN    Yes Historical Provider, MD   Blood Glucose Monitoring Suppl YANY Monitor blood glucose levels twice daily and as needed for hyper/hypoglycemic symptoms. Dx. Diabetes (E11.9) 3/1/18  Yes Tangela Ji MD   SOFT TOUCH LANCETS MISC 1 Device by Does not apply route 2 times daily 8/16/16  Yes Tangela Ji MD   sucralfate (CARAFATE) 1 GM tablet Take 1 g by mouth 4/8/19 4/13/19  Historical Provider, MD   gabapentin (NEURONTIN) 300 MG capsule take 1 capsule by mouth twice a day.  2/8/19 4/3/19  Tangela Ji MD       Past Surgical History:   Procedure Laterality Date    APPENDECTOMY  45-    CATARACT REMOVAL WITH IMPLANT Right 9/2/2003    (Dr. Minal Daniels)    CATARACT REMOVAL WITH IMPLANT Left 8/12/2003    (Dr. Minal Daniels)   Ray Vibratory abnormal  bilateral.  Reflexes Decreased bilateral.  Paresthesias positive. Dysthesias positive. Sharp/dull abnormal  bilateral.  Musculoskeletal: Muscle strength 5/5, bilateral.  Pain absent upon palpation bilateral. Normal medial longitudinal arch, bilateral.  Ankle ROM decresed,bilateral.  1st MPJ ROM within normal limits, bilateral.  Dorsally contracted digits absent. No other foot deformities. Integument: Warm, dry, supple, bilateral.  Open lesion absent, bilateral.  Interdigital maceration absent to web spaces bilateral.   Nails left 5 and right 1 thickened, dystrophic and crumbly, discolored with subungual debris. .  Fissures absent, bilateral. Hyperkeratotic tissue is absent. Asessment: Patient is a 66 y.o. female with:    Diagnosis Orders   1. DM (diabetes mellitus), type 2 with neurological complications ([facility] [address]     2. Dermatophytosis of nail         Plan: Patient examined and evaluated. Current condition and treatment options discussed in detail. DM foot ed and exam  All nails as mentioned above debrided in length and thickness. Patient advised OTC methods for treatment of the mycotic nails. Patient will follow up in 10 weeks. Contact office with any questions/problems/concerns.

## 2020-01-28 ENCOUNTER — TELEPHONE (OUTPATIENT)
Dept: FAMILY MEDICINE CLINIC | Age: 75
End: 2020-01-28
Payer: MEDICARE

## 2020-01-28 PROCEDURE — G0179 MD RECERTIFICATION HHA PT: HCPCS | Performed by: FAMILY MEDICINE

## 2020-02-03 RX ORDER — LEVOTHYROXINE SODIUM 88 UG/1
TABLET ORAL
Qty: 90 TABLET | Refills: 3 | Status: SHIPPED | OUTPATIENT
Start: 2020-02-03 | End: 2021-01-08

## 2020-02-04 ENCOUNTER — OFFICE VISIT (OUTPATIENT)
Dept: FAMILY MEDICINE CLINIC | Age: 75
End: 2020-02-04
Payer: MEDICARE

## 2020-02-04 VITALS
WEIGHT: 151 LBS | HEIGHT: 63 IN | BODY MASS INDEX: 26.75 KG/M2 | HEART RATE: 72 BPM | DIASTOLIC BLOOD PRESSURE: 60 MMHG | SYSTOLIC BLOOD PRESSURE: 120 MMHG

## 2020-02-04 PROCEDURE — 99214 OFFICE O/P EST MOD 30 MIN: CPT

## 2020-02-04 PROCEDURE — 99214 OFFICE O/P EST MOD 30 MIN: CPT | Performed by: FAMILY MEDICINE

## 2020-02-04 RX ORDER — DOXYCYCLINE HYCLATE 100 MG/1
100 CAPSULE ORAL 2 TIMES DAILY
Qty: 28 CAPSULE | Refills: 0 | Status: SHIPPED | OUTPATIENT
Start: 2020-02-04 | End: 2020-03-23 | Stop reason: SDUPTHER

## 2020-02-04 RX ORDER — CETIRIZINE HYDROCHLORIDE 10 MG/1
10 TABLET ORAL DAILY
COMMUNITY
End: 2020-02-04 | Stop reason: SDUPTHER

## 2020-02-04 ASSESSMENT — ENCOUNTER SYMPTOMS
RESPIRATORY NEGATIVE: 1
GASTROINTESTINAL NEGATIVE: 1
ALLERGIC/IMMUNOLOGIC NEGATIVE: 1
RHINORRHEA: 1
EYES NEGATIVE: 1
BACK PAIN: 0
SINUS PRESSURE: 1
TROUBLE SWALLOWING: 1

## 2020-02-04 NOTE — PROGRESS NOTES
Subjective:      Patient ID: Berta Collet is a 66 y.o. female. Routine follow up on chronic medical conditions, refills, and review of updated labs. I have reviewed the patient's medical history in detail and updated the computerized patient record. Having more sinus issues and postnasal drip at present. Choking sensation at times. Nose seems persistently plugged up. Mild sneezing and coughing without wheezing. She notices she is sensitive to perfumes and household cleaning sprays. Considering air purifier, dust collectors. Using zyrtec and flonase. She has several prescriptions at present: zyrtec, xyzal, flonase, and loratadine. Still having dry mouth and thicker saliva. Getting stuck below tracheal level at times. bs readings consistently below 200 at present. Fasting today for labs. Past Medical History:   Diagnosis Date    Allergic conjunctivitis     (stable)    Chronic sinusitis     Constipation     Dizzy spells     Dry eye syndrome     Edentulous     (does not wear dentures)    Essential hypertension 7/6/2016    Fracture of lateral malleolus     (avulsion fracture at the tip of the lateral malleolus - right ankle).     Hearing loss     Hypothyroidism     Interstitial cystitis     Irritable bowel syndrome     Keratitis     (secondary to dry eye syndrome)    Parotid gland enlargement     stasis, consider sjorgens    Pseudophakos     (bilateral)    Seasonal rhinitis     Stress incontinence     Type II or unspecified type diabetes mellitus without mention of complication, not stated as uncontrolled     Urethral stenosis     Xerostomia      Past Surgical History:   Procedure Laterality Date    APPENDECTOMY  07-    CATARACT REMOVAL WITH IMPLANT Right 9/2/2003    (Dr. Minal Daniels)    CATARACT REMOVAL WITH IMPLANT Left 8/12/2003    (Dr. Minal Daniels)   [address]  07/24/1986    COLONOSCOPY  9/8/14    normal    CYSTOURETHROSCOPY/URETHRAL DILATION  2/8/12 are normal. There is no distension. Palpations: Abdomen is soft. Tenderness: There is no abdominal tenderness. There is no rebound. Musculoskeletal: Normal range of motion. General: No tenderness. Skin:     General: Skin is warm and dry. Findings: No erythema or rash. Neurological:      Mental Status: She is alert and oriented to person, place, and time. Psychiatric:         Behavior: Behavior normal.         Thought Content: Thought content normal.         Judgment: Judgment normal.       /60 (Site: Right Upper Arm, Position: Sitting, Cuff Size: Large Adult)   Pulse 72   Ht 5' 2.99\" (1.6 m)   Wt 151 lb (68.5 kg)   LMP  (LMP Unknown)   BMI 26.76 kg/m²        Assessment:       Encounter Diagnoses   Name Primary?  Type 2 diabetes mellitus without complication, without long-term current use of insulin ([facility]) Yes    Essential hypertension, malignant     Delayed gastric emptying     Parotid gland enlargement     Seasonal allergies     Irritable bowel syndrome with constipation     Primary osteoarthritis of right knee     Gastroesophageal reflux disease without esophagitis     Lumbar stenosis with neurogenic claudication     Hypothyroidism, unspecified type     High cholesterol                Plan:         diabetes: doing well by home checks. last  a1c at 7%. Cont. Current metformin bid dosing. Updating eye exam.  Updating labs at present. Htn: improved at present. She stopped her low dose of lisinopril long term at present. Declining to use at present. Delayed gastric emptying. Currently, some post prandial bloating and increased bowel sounds by report. Not really painful. No vomiting. EGD 4/2/18: antral ulceration found. Esophagus re-dilated at that time. She has prn gastroenterology consult if symptoms worsen. EGD 11/ 18/19. Minimal gastritis without ulceration. Single dilation with dilator. Some evidence for thick secretions and laryngitis. labs.     Hyperlipidemia:  Discussed rec. For treatment given diabetes diagnosis. Discussed side effects and precautions.

## 2020-02-06 ENCOUNTER — PREP FOR PROCEDURE (OUTPATIENT)
Dept: OPHTHALMOLOGY | Age: 75
End: 2020-02-06

## 2020-02-06 RX ORDER — ONDANSETRON 2 MG/ML
4 INJECTION INTRAMUSCULAR; INTRAVENOUS EVERY 6 HOURS PRN
Status: CANCELLED | OUTPATIENT
Start: 2020-02-06

## 2020-02-06 RX ORDER — PHENYLEPHRINE HCL 2.5 %
1 DROPS OPHTHALMIC (EYE) SEE ADMIN INSTRUCTIONS
Status: CANCELLED | OUTPATIENT
Start: 2020-02-06

## 2020-02-06 RX ORDER — TETRACAINE HYDROCHLORIDE 5 MG/ML
1 SOLUTION OPHTHALMIC SEE ADMIN INSTRUCTIONS
Status: CANCELLED | OUTPATIENT
Start: 2020-02-06

## 2020-02-06 RX ORDER — CYCLOPENTOLATE HYDROCHLORIDE 10 MG/ML
1 SOLUTION/ DROPS OPHTHALMIC SEE ADMIN INSTRUCTIONS
Status: CANCELLED | OUTPATIENT
Start: 2020-02-06

## 2020-02-06 RX ORDER — TROPICAMIDE 10 MG/ML
1 SOLUTION/ DROPS OPHTHALMIC SEE ADMIN INSTRUCTIONS
Status: CANCELLED | OUTPATIENT
Start: 2020-02-06

## 2020-02-06 RX ORDER — BLOOD-GLUCOSE METER
1 KIT MISCELLANEOUS DAILY
Qty: 1 KIT | Refills: 0 | Status: SHIPPED | OUTPATIENT
Start: 2020-02-06 | End: 2020-03-23

## 2020-02-11 ENCOUNTER — TELEPHONE (OUTPATIENT)
Dept: INTERNAL MEDICINE | Age: 75
End: 2020-02-11

## 2020-02-11 ENCOUNTER — OFFICE VISIT (OUTPATIENT)
Dept: OTOLARYNGOLOGY | Age: 75
End: 2020-02-11
Payer: MEDICARE

## 2020-02-11 VITALS
HEIGHT: 63 IN | TEMPERATURE: 96.9 F | BODY MASS INDEX: 27.11 KG/M2 | DIASTOLIC BLOOD PRESSURE: 78 MMHG | WEIGHT: 153 LBS | SYSTOLIC BLOOD PRESSURE: 115 MMHG | HEART RATE: 71 BPM

## 2020-02-11 PROCEDURE — 99213 OFFICE O/P EST LOW 20 MIN: CPT | Performed by: OTOLARYNGOLOGY

## 2020-02-11 PROCEDURE — 99203 OFFICE O/P NEW LOW 30 MIN: CPT | Performed by: OTOLARYNGOLOGY

## 2020-02-11 NOTE — TELEPHONE ENCOUNTER
Ok thanks for the update. Not sure why insurance wont cover as its the same billing as when she would see a PCP. I hope she is not thinking it is billed as diabetic education.

## 2020-02-11 NOTE — TELEPHONE ENCOUNTER
Patient stopped in to let you know the reason she has not been seeing you, is her insurance will not cover and until they get it straightened out, she won't be able to see you, unless she pays herself, which she can't do at this time. Wanted you to know, she is doing fine, though.   SPARKLE

## 2020-02-18 ENCOUNTER — OFFICE VISIT (OUTPATIENT)
Dept: OPHTHALMOLOGY | Age: 75
End: 2020-02-18
Payer: MEDICARE

## 2020-02-18 PROCEDURE — 92133 CPTRZD OPH DX IMG PST SGM ON: CPT | Performed by: OPHTHALMOLOGY

## 2020-02-18 PROCEDURE — 99214 OFFICE O/P EST MOD 30 MIN: CPT | Performed by: OPHTHALMOLOGY

## 2020-02-18 ASSESSMENT — VISUAL ACUITY
OS_CC: 20/40
CORRECTION_TYPE: GLASSES
OD_PH_CC: 20/30
METHOD: SNELLEN - LINEAR

## 2020-02-18 ASSESSMENT — TONOMETRY
IOP_METHOD: TONOPEN
OS_IOP_MMHG: 15
OD_IOP_MMHG: 16

## 2020-02-18 ASSESSMENT — CONF VISUAL FIELD
OD_NORMAL: 1
METHOD: COUNTING FINGERS
OS_NORMAL: 1

## 2020-02-18 ASSESSMENT — PACHYMETRY
OD_CT(UM): 510
OS_CT(UM): 518

## 2020-02-18 ASSESSMENT — ENCOUNTER SYMPTOMS
RESPIRATORY NEGATIVE: 0
ALLERGIC/IMMUNOLOGIC NEGATIVE: 1
GASTROINTESTINAL NEGATIVE: 0

## 2020-02-18 ASSESSMENT — SLIT LAMP EXAM - LIDS
COMMENTS: 2+ MEIBOMIAN GLAND DYSFUNCTION
COMMENTS: 2+ MEIBOMIAN GLAND DYSFUNCTION

## 2020-02-18 NOTE — PROGRESS NOTES
Bonilla Meza is a 66 y.o. female here for a complete eye exam.      Chief Complaint   Patient presents with    6 Month Follow-Up       HPI     Patient is here for 6 month dilated check up . Vision stable. Eyes hurt OD>OS. Not using gtts. Reports long-term allergy. Patient cannot afford her allergy medications. Review of Systems  ROS     Positive for: Musculoskeletal, HENT, Allergic/Imm    Negative for: Constitutional, Gastrointestinal, Endocrine, Cardiovascular, Respiratory          Main Ophthalmology Exam     External Exam       Right Left    External Normal Normal          Slit Lamp Exam       Right Left    Lids/Lashes 2+ Meibomian gland dysfunction 2+ Meibomian gland dysfunction    Conjunctiva/Sclera Clear and white Clear and white    Cornea 4+ Punctate epithelial erosions 4+ Punctate epithelial erosions    Anterior Chamber Deep, no cells, no flare Deep, no cells, no flare    Iris Round and reactive Round and reactive    Lens PCIOL , Open posterior capsule PCIOL , Open posterior capsule    Vitreous Vitreous syneresis Vitreous syneresis          Fundus Exam       Right Left    Disc No edema and no pallor No edema and no pallor    C/D Ratio Vertical 0.8 0.75    Macula Normal architecture Normal architecture    Vessels Normal course and caliber Normal course and caliber    Periphery No breaks, tears, or detachments No breaks, tears, or detachments                   Tonometry     Tonometry (Tonopen, 11:06 AM)       Right Left    Pressure 16 15              Visual Acuity     Visual Acuity (Snellen - Linear)       Right Left    Dist cc 20/40 20/40    Dist ph cc 20/30 ni    Correction:  Glasses              Pupils     Pupils       Pupils APD    Right PERRL None    Left PERRL None   Left eye stays constricted .  Very light sensitive                Confrontational Visual Fields     Visual Fields (Counting fingers)       Right Left     Full Full               Extraocular Movement     Extraocular Movement strip, Rfl: 3    loratadine (CLARITIN) 10 MG tablet, take 1 tablet by mouth once daily, Disp: 30 tablet, Rfl: 11    SOFT TOUCH LANCETS MISC, 1 Device by Does not apply route 2 times daily, Disp: 200 each, Rfl: 3    gabapentin (NEURONTIN) 300 MG capsule, take 1 capsule by mouth twice a day, Disp: 60 capsule, Rfl: 0    sucralfate (CARAFATE) 1 GM tablet, Take 1 g by mouth, Disp: , Rfl:      Allergies   Allergen Reactions    Latex Rash    Aleve [Naproxen]      Stomach problems    Amoxicillin      Other reaction(s): mucositis    Aspirin Other (See Comments)     Dizziness and tears her stomach up    Dye [Iodides]      IV DYE    Flu Virus Vaccine     Influenza Vaccines     Reglan [Metoclopramide] Other (See Comments)     Dystonic reaction, involuntary movements        IMPRESSION:  1. Keratoconjunctivitis sicca of both eyes not specified as Sjogren's    2. Chronic open angle glaucoma of both eyes, moderate stage    3. Diabetes type 2, no ocular involvement ([address]        PLAN:    1. Dry eye both eyes. She is extremely dry today and not using any artificial tears or doing any lid therapy. Recommend aggressive lubrication with preservative-free artificial tears as well as gel drops during the day and ointment such as Genteal gel at bedtime. Low threshold for plugs or restasis. 2. POAG both eyes. T-max 25, 23 by records review. Patient was taken off drops in the past.  Will recheck pressure and dry eye in a month. Low threshold for restarting IOP drops. Clinically and OCT optic nerve show enough damage to warrant IOP a bit lower. Thin pachy OU. Obtain HVF 24-2 prior to next visit. 3. No retinopathy. Glycemic control encouraged. Return in about 1 month (around 3/18/2020) for SABRINA f.u; HVF 24-2 in the interim.     Electronically signed by Silvano Rosales MD on 2/18/2020 at 4:21 PM

## 2020-02-25 ENCOUNTER — OFFICE VISIT (OUTPATIENT)
Dept: OPTOMETRY | Age: 75
End: 2020-02-25
Payer: MEDICARE

## 2020-02-25 ENCOUNTER — HOSPITAL ENCOUNTER (OUTPATIENT)
Dept: LAB | Age: 75
Discharge: HOME | End: 2020-02-25
Payer: MEDICARE

## 2020-02-25 ENCOUNTER — TELEPHONE (OUTPATIENT)
Dept: FAMILY MEDICINE CLINIC | Age: 75
End: 2020-02-25

## 2020-02-25 LAB
ABSOLUTE EOS #: 0.05 K/UL (ref 0–0.44)
ABSOLUTE IMMATURE GRANULOCYTE: 0.03 K/UL (ref 0–0.3)
ABSOLUTE LYMPH #: 3.36 K/UL (ref 1.1–3.7)
ABSOLUTE MONO #: 0.85 K/UL (ref 0.1–1.2)
ALBUMIN SERPL-MCNC: 4.7 G/DL (ref 3.5–5.2)
ALBUMIN/GLOBULIN RATIO: 1.5 (ref 1–2.5)
ALP BLD-CCNC: 83 U/L (ref 35–104)
ALT SERPL-CCNC: 16 U/L (ref 5–33)
ANION GAP SERPL CALCULATED.3IONS-SCNC: 15 MMOL/L (ref 9–17)
AST SERPL-CCNC: 21 U/L
BASOPHILS # BLD: 1 % (ref 0–2)
BASOPHILS ABSOLUTE: 0.05 K/UL (ref 0–0.2)
BILIRUB SERPL-MCNC: 0.38 MG/DL (ref 0.3–1.2)
BUN BLDV-MCNC: 22 MG/DL (ref 8–23)
BUN/CREAT BLD: 26 (ref 9–20)
CALCIUM SERPL-MCNC: 10.7 MG/DL (ref 8.6–10.4)
CHLORIDE BLD-SCNC: 97 MMOL/L (ref 98–107)
CHOLESTEROL/HDL RATIO: 3
CHOLESTEROL: 219 MG/DL
CO2: 28 MMOL/L (ref 20–31)
CREAT SERPL-MCNC: 0.86 MG/DL (ref 0.5–0.9)
CREATININE URINE: 96.5 MG/DL (ref 28–217)
DIFFERENTIAL TYPE: ABNORMAL
EOSINOPHILS RELATIVE PERCENT: 1 % (ref 1–4)
ESTIMATED AVERAGE GLUCOSE: 137 MG/DL
GFR AFRICAN AMERICAN: >60 ML/MIN
GFR NON-AFRICAN AMERICAN: >60 ML/MIN
GFR SERPL CREATININE-BSD FRML MDRD: ABNORMAL ML/MIN/{1.73_M2}
GFR SERPL CREATININE-BSD FRML MDRD: ABNORMAL ML/MIN/{1.73_M2}
GLUCOSE BLD-MCNC: 140 MG/DL (ref 70–99)
HBA1C MFR BLD: 6.4 % (ref 4.8–5.9)
HCT VFR BLD CALC: 42.7 % (ref 36.3–47.1)
HDLC SERPL-MCNC: 74 MG/DL
HEMOGLOBIN: 13.4 G/DL (ref 11.9–15.1)
IMMATURE GRANULOCYTES: 0 %
LDL CHOLESTEROL: 103 MG/DL (ref 0–130)
LYMPHOCYTES # BLD: 37 % (ref 24–43)
MCH RBC QN AUTO: 28.3 PG (ref 25.2–33.5)
MCHC RBC AUTO-ENTMCNC: 31.4 G/DL (ref 25.2–33.5)
MCV RBC AUTO: 90.1 FL (ref 82.6–102.9)
MICROALBUMIN/CREAT 24H UR: <12 MG/L
MICROALBUMIN/CREAT UR-RTO: NORMAL MCG/MG CREAT
MONOCYTES # BLD: 9 % (ref 3–12)
NRBC AUTOMATED: 0 PER 100 WBC
PDW BLD-RTO: 14.6 % (ref 11.8–14.4)
PLATELET # BLD: 330 K/UL (ref 138–453)
PLATELET ESTIMATE: ABNORMAL
PMV BLD AUTO: 10.4 FL (ref 8.1–13.5)
POTASSIUM SERPL-SCNC: 5.1 MMOL/L (ref 3.7–5.3)
RBC # BLD: 4.74 M/UL (ref 3.95–5.11)
RBC # BLD: ABNORMAL 10*6/UL
SEG NEUTROPHILS: 52 % (ref 36–65)
SEGMENTED NEUTROPHILS ABSOLUTE COUNT: 4.75 K/UL (ref 1.5–8.1)
SODIUM BLD-SCNC: 140 MMOL/L (ref 135–144)
THYROXINE, FREE: 1.45 NG/DL (ref 0.93–1.7)
TOTAL PROTEIN: 7.8 G/DL (ref 6.4–8.3)
TRIGL SERPL-MCNC: 209 MG/DL
TSH SERPL DL<=0.05 MIU/L-ACNC: 0.23 MIU/L (ref 0.3–5)
VLDLC SERPL CALC-MCNC: ABNORMAL MG/DL (ref 1–30)
WBC # BLD: 9.1 K/UL (ref 3.5–11.3)
WBC # BLD: ABNORMAL 10*3/UL

## 2020-02-25 PROCEDURE — 99213 OFFICE O/P EST LOW 20 MIN: CPT | Performed by: OPTOMETRIST

## 2020-02-25 PROCEDURE — 80061 LIPID PANEL: CPT

## 2020-02-25 PROCEDURE — 84443 ASSAY THYROID STIM HORMONE: CPT

## 2020-02-25 PROCEDURE — 83036 HEMOGLOBIN GLYCOSYLATED A1C: CPT

## 2020-02-25 PROCEDURE — 84439 ASSAY OF FREE THYROXINE: CPT

## 2020-02-25 PROCEDURE — 82570 ASSAY OF URINE CREATININE: CPT

## 2020-02-25 PROCEDURE — 99211 OFF/OP EST MAY X REQ PHY/QHP: CPT

## 2020-02-25 PROCEDURE — 82043 UR ALBUMIN QUANTITATIVE: CPT

## 2020-02-25 PROCEDURE — 85025 COMPLETE CBC W/AUTO DIFF WBC: CPT

## 2020-02-25 PROCEDURE — 36415 COLL VENOUS BLD VENIPUNCTURE: CPT

## 2020-02-25 PROCEDURE — 80053 COMPREHEN METABOLIC PANEL: CPT

## 2020-02-25 ASSESSMENT — SLIT LAMP EXAM - LIDS
COMMENTS: MILD BLEPHARITIS
COMMENTS: MILD BLEPHARITIS

## 2020-02-25 ASSESSMENT — KERATOMETRY
OS_K1POWER_DIOPTERS: 42.25
OS_K2POWER_DIOPTERS: 44.00
OD_K1POWER_DIOPTERS: 41.50
OS_AXISANGLE_DEGREES: 033
OD_K2POWER_DIOPTERS: 43.75
OD_AXISANGLE2_DEGREES: 078
OS_AXISANGLE2_DEGREES: 123
OD_AXISANGLE_DEGREES: 168

## 2020-02-25 ASSESSMENT — REFRACTION_WEARINGRX
OD_ADD: +2.75
SPECS_TYPE: BIFOCAL
OD_AXIS: 084
OD_CYLINDER: -2.75
OS_CYLINDER: -1.25
OD_SPHERE: PLANO
OS_ADD: +2.75
OS_SPHERE: -0.25
OS_AXIS: 100

## 2020-02-25 ASSESSMENT — REFRACTION_MANIFEST
OD_AXIS: 084
OS_SPHERE: -0.25
OD_CYLINDER: -2.75
OD_SPHERE: PLANO
OS_CYLINDER: -1.25
OD_ADD: +2.75
OS_ADD: +2.75
OS_AXIS: 100

## 2020-02-25 ASSESSMENT — TONOMETRY
IOP_METHOD: NON-CONTACT AIR PUFF
OS_IOP_MMHG: 12
OD_IOP_MMHG: 14

## 2020-02-25 ASSESSMENT — ENCOUNTER SYMPTOMS
EYES NEGATIVE: 1
RESPIRATORY NEGATIVE: 0
ALLERGIC/IMMUNOLOGIC NEGATIVE: 1
GASTROINTESTINAL NEGATIVE: 0

## 2020-02-25 ASSESSMENT — PACHYMETRY
OD_CT(UM): 510
OS_CT(UM): 518

## 2020-02-25 ASSESSMENT — VISUAL ACUITY
CORRECTION_TYPE: GLASSES
METHOD: SNELLEN - LINEAR
OS_CC: 20/40

## 2020-02-25 NOTE — PATIENT INSTRUCTIONS
Keep appts. With Dr. Waldo Johnson for glaucoma, etc.  New glasses are needed only if desired today; the prescription is stable     Keep using systane complete 2-4x per day.   New sample bottle given today

## 2020-02-25 NOTE — PROGRESS NOTES
Becky Fulton presents today for   Chief Complaint   Patient presents with    Blurred Vision    Vision Exam   .    HPI     Blurred Vision     In both eyes. Vision is blurred. Context:  distance vision and reading. Comments     Last Vision Exam: 2/20/2019 AW  Last Ophthalmology Exam: 2/18/2020 Dr. Soto Roque ; G6 laser 12/13/2018 Dr. Kobe Hernandez ; cat ou 2003 [facility]  Last Filled Glasses Rx: 2/20/2019  Insurance: Medicare/Medicaid/ [employer]  Update: Glasses  Distance and reading are getting more blurry  States has been using Systance Balance 2 x daily ; morning and evening, states her eyes start to bother her around 6 pm but will wait until bed time to put in second drop. Did use it every 2 hours for the first 2 days as per Dr. Briseida Weaver some improvement using the systane balance; We will give another sample today                  Current Outpatient Medications   Medication Sig Dispense Refill    glucose monitoring kit (FREESTYLE) monitoring kit 1 kit by Does not apply route daily On Touch glucometer and test strips-testing BID, PRN 1 kit 0    levothyroxine (SYNTHROID) 88 MCG tablet take 1 tablet by mouth once daily 90 tablet 3    metFORMIN (GLUCOPHAGE) 500 MG tablet take 1 tablet by mouth twice a day with meals 180 tablet 1    gabapentin (NEURONTIN) 300 MG capsule take 1 capsule by mouth twice a day 60 capsule 0    omeprazole (PRILOSEC) 20 MG delayed release capsule take 1 capsule by mouth twice a day 60 capsule 3    fluticasone (FLONASE) 50 MCG/ACT nasal spray instill 2 sprays into each nostril once daily 16 g 5    blood glucose monitor kit and supplies Test 2 times a day & as needed for symptoms of irregular blood glucose. 1 kit 0    blood glucose test strips (FREESTYLE LITE) strip USE  STRIPS TO CHECK GLUCOSE TWICE DAILY AND AS NEEDED FOR HYPER/HYPOGLYCEMIC SYMPTOMS.  100 strip 3    sucralfate (CARAFATE) 1 GM tablet Take 1 g by mouth      loratadine (CLARITIN) 10 MG tablet take 1 tablet Glasses          Pupils     Pupils       Pupils    Right PERRL    Left PERRL              Neuro/Psych     Neuro/Psych     Oriented x3:  Yes    Mood/Affect:  Normal              Keratometry     Keratometry       K1 Axis K2 Axis    Right 41.50 078 43.75 168    Left 42.25 123 44.00 033                  Ophthalmology Exam     Wearing Rx       Sphere Cylinder Axis Add    Right [employer] -2.75 084 +2.75    Left -0.25 -1.25 100 +2.75    Age:  1yr    Type:  Bifocal              Manifest Refraction     Manifest Refraction       Sphere Cylinder Axis Dist VA Add    Right [employer] -2.75 084 20/40 +2.75    Left -0.25 -1.25 100 20/40 +2.75          Manifest Refraction #2 (Auto)       Sphere Cylinder Axis Dist VA Add    Right +0.00 -2.75 077      Left -0.50 -1.00 111            Manifest Refraction Comments    No change from wearing rx                 Final Rx       Sphere Cylinder Axis Add    Right [employer] -2.75 084 +2.75    Left -0.25 -1.25 100 +2.75    Type:  Bifocal    Expiration Date:  2/25/2022            1. Blurred vision, bilateral    2. Dry eye syndrome of both eyes    3. Astigmatism of both eyes with presbyopia           Patient Instructions   Keep appts. With Dr. Claudean Major for glaucoma, etc.  New glasses are needed only if desired today; the prescription is stable     Keep using systane complete 2-4x per day.   New sample bottle given today      Return in about 2 years (around 2/25/2022) for complete eye exam. Patent

## 2020-03-03 ENCOUNTER — PROCEDURE VISIT (OUTPATIENT)
Dept: OPHTHALMOLOGY | Age: 75
End: 2020-03-03
Payer: MEDICARE

## 2020-03-03 PROCEDURE — 92083 EXTENDED VISUAL FIELD XM: CPT | Performed by: OPHTHALMOLOGY

## 2020-03-11 ENCOUNTER — OFFICE VISIT (OUTPATIENT)
Dept: OTOLARYNGOLOGY | Age: 75
End: 2020-03-11
Payer: MEDICARE

## 2020-03-11 VITALS
SYSTOLIC BLOOD PRESSURE: 130 MMHG | BODY MASS INDEX: 27.11 KG/M2 | TEMPERATURE: 97.7 F | DIASTOLIC BLOOD PRESSURE: 76 MMHG | OXYGEN SATURATION: 96 % | HEART RATE: 85 BPM | HEIGHT: 63 IN | WEIGHT: 153 LBS

## 2020-03-11 PROCEDURE — 99213 OFFICE O/P EST LOW 20 MIN: CPT | Performed by: OTOLARYNGOLOGY

## 2020-03-11 PROCEDURE — 99214 OFFICE O/P EST MOD 30 MIN: CPT

## 2020-03-11 NOTE — PROGRESS NOTES
Becky Futlon  3/11/20  /76   Pulse 85   Temp 97.7 °F (36.5 °C)   Ht 5' 2.99\" (1.6 m)   Wt 153 lb (69.4 kg)   LMP  (LMP Unknown)   SpO2 96%   BMI 27.11 kg/m²   Allergies as of 03/11/2020 - Review Complete 03/11/2020   Allergen Reaction Noted    Latex Rash 06/04/2015    Aleve [naproxen]  12/21/2016    Amoxicillin  04/08/2019    Aspirin Other (See Comments) 06/07/2016    Dye [iodides]  06/20/2013    Flu virus vaccine  09/18/2018    Influenza vaccines  08/01/2013    Reglan [metoclopramide] Other (See Comments) 03/30/2016     Chief Complaint   Patient presents with    Follow-up     1 month nasal septal deformity       HPI: Fu for rhinitis, on flonase in evening, has septal deviation, doing better, also onclaritin    Past Med Hx:     Past Medical History:   Diagnosis Date    Allergic conjunctivitis     (stable)    Chronic sinusitis     Constipation     Dizzy spells     Dry eye syndrome     Edentulous     (does not wear dentures)    Essential hypertension 7/6/2016    Fracture of lateral malleolus     (avulsion fracture at the tip of the lateral malleolus - right ankle).     Hearing loss     Hypothyroidism     Interstitial cystitis     Irritable bowel syndrome     Keratitis     (secondary to dry eye syndrome)    Parotid gland enlargement     stasis, consider sjorgens    Pseudophakos     (bilateral)    Seasonal rhinitis     Stress incontinence     Type II or unspecified type diabetes mellitus without mention of complication, not stated as uncontrolled     Urethral stenosis     Xerostomia         Past Surgical History:   Procedure Laterality Date    APPENDECTOMY  07-    CATARACT REMOVAL WITH IMPLANT Right 9/2/2003    (Dr. Liudmila Gee)    CATARACT REMOVAL WITH IMPLANT Left 8/12/2003    (Dr. Liudmila Gee)    CHOLECYSTECTOMY  07/24/1986    COLONOSCOPY  9/8/14    normal    CYSTOURETHROSCOPY/URETHRAL DILATION  2/8/12    multiple    EYE SURGERY Bilateral 12/13/2018 Bilateral Transscleral cyclophotocoagulation G6 laser (micropulse) performed by Fei Phelps MD at [address]  1977    (Dr. Bony Toscano)    UPPER GASTROINTESTINAL ENDOSCOPY  6/4/15    gastritis, biopsy x 2    UPPER GASTROINTESTINAL ENDOSCOPY  2018    Dr. Leonardo Benton   antral gastric ulcer. esophagus re-dilated at the end of procedure. Family History:     Family History   Problem Relation Age of Onset    Heart Disease Mother     Heart Attack Mother     Arthritis Mother     Other Maternal Grandmother         (gout)    Allergies Daughter     Allergies Daughter     Other Brother         ( at age 21 secondary to auto accident).  Cataracts Neg Hx     Diabetes Neg Hx     Glaucoma Neg Hx        Social Hx:     Social History     Socioeconomic History    Marital status: Single     Spouse name: Not on file    Number of children: Not on file    Years of education: Not on file    Highest education level: Not on file   Occupational History    Not on file   Social Needs    Financial resource strain: Not on file    Food insecurity     Worry: Not on file     Inability: Not on file    Transportation needs     Medical: Not on file     Non-medical: Not on file   Tobacco Use    Smoking status: Never Smoker    Smokeless tobacco: Never Used    Tobacco comment: Never smoked. James Guerra [facility], 2016.    Substance and Sexual Activity    Alcohol use: No     Alcohol/week: 0.0 standard drinks    Drug use: No    Sexual activity: Not on file   Lifestyle    Physical activity     Days per week: Not on file     Minutes per session: Not on file    Stress: Not on file   Relationships    Social connections     Talks on phone: Not on file     Gets together: Not on file     Attends Rastafari service: Not on file     Active member of club or organization: Not on file     Attends meetings of clubs or organizations: Not on file     Relationship status: Not on file    Intimate partner violence Fear of current or ex partner: Not on file     Emotionally abused: Not on file     Physically abused: Not on file     Forced sexual activity: Not on file   Other Topics Concern    Not on file   Social History Narrative    Not on file       Current Medications:     Current Outpatient Medications:     glucose monitoring kit (FREESTYLE) monitoring kit, 1 kit by Does not apply route daily On Touch glucometer and test strips-testing BID, PRN, Disp: 1 kit, Rfl: 0    levothyroxine (SYNTHROID) 88 MCG tablet, take 1 tablet by mouth once daily, Disp: 90 tablet, Rfl: 3    metFORMIN (GLUCOPHAGE) 500 MG tablet, take 1 tablet by mouth twice a day with meals, Disp: 180 tablet, Rfl: 1    gabapentin (NEURONTIN) 300 MG capsule, take 1 capsule by mouth twice a day, Disp: 60 capsule, Rfl: 0    omeprazole (PRILOSEC) 20 MG delayed release capsule, take 1 capsule by mouth twice a day, Disp: 60 capsule, Rfl: 3    fluticasone (FLONASE) 50 MCG/ACT nasal spray, instill 2 sprays into each nostril once daily, Disp: 16 g, Rfl: 5    blood glucose monitor kit and supplies, Test 2 times a day & as needed for symptoms of irregular blood glucose., Disp: 1 kit, Rfl: 0    blood glucose test strips (FREESTYLE LITE) strip, USE  STRIPS TO CHECK GLUCOSE TWICE DAILY AND AS NEEDED FOR HYPER/HYPOGLYCEMIC SYMPTOMS., Disp: 100 strip, Rfl: 3    sucralfate (CARAFATE) 1 GM tablet, Take 1 g by mouth, Disp: , Rfl:     loratadine (CLARITIN) 10 MG tablet, take 1 tablet by mouth once daily, Disp: 30 tablet, Rfl: 11    SOFT TOUCH LANCETS MISC, 1 Device by Does not apply route 2 times daily, Disp: 200 each, Rfl: 3     ROS:   CV: neg   Endocrine:    Resp:     GI:       Neuro:   PE:     General appearance:  Normal                 Ability to Communicate:  Normal       Head & Face:  Normal   Salivary Glands:  Normal              Facial Strength:  Normal   Ears:    Pinna:  Normal            EAC:  Normal      TMs:  Normal       [place]: Trevino Rinne     Finger Rub     Nose:    External: Normal    Septum: To R 50%    Turbinates: Normal             Nasal Cavity: Normal         Naso Pharyngoscopy:     Nasal Endoscopy:      Oral Cavity & Oral Pharynx:    Tongue:  Normal    Teeth & Gums:             Palate:  [place]     Tonsils:      FOM:  Normal     Other:      Neck:    Thyroid:Normal       Lymph nodes: Normal           Trachea:  Normal      Masses:  Normal    Other:        Eyes:    EOMs:      Nystagmus:      Neurological:    CN V:      CN VII:       Gait & Station:      Romberg:      Tandem Gait:      [place]:       Oriented x 3: Normal     Affect:  Normal    Data reviewed:      ASSESSMENT:   Diagnosis Orders   1. Allergic rhinitis, unspecified seasonality, unspecified trigger         PLAN:cont flonase in evening, see prn  Return if symptoms worsen or fail to improve. No orders of the defined types were placed in this encounter.         Gricelda Vaughn MD

## 2020-03-16 RX ORDER — GABAPENTIN 300 MG/1
CAPSULE ORAL
Qty: 60 CAPSULE | Refills: 0 | Status: SHIPPED | OUTPATIENT
Start: 2020-03-16 | End: 2020-04-13

## 2020-03-23 ENCOUNTER — OFFICE VISIT (OUTPATIENT)
Dept: PRIMARY CARE CLINIC | Age: 75
End: 2020-03-23
Payer: MEDICARE

## 2020-03-23 VITALS
WEIGHT: 152 LBS | HEIGHT: 65 IN | OXYGEN SATURATION: 97 % | DIASTOLIC BLOOD PRESSURE: 80 MMHG | BODY MASS INDEX: 25.33 KG/M2 | HEART RATE: 76 BPM | TEMPERATURE: 98.2 F | SYSTOLIC BLOOD PRESSURE: 112 MMHG

## 2020-03-23 PROCEDURE — 99214 OFFICE O/P EST MOD 30 MIN: CPT | Performed by: FAMILY MEDICINE

## 2020-03-23 PROCEDURE — 99212 OFFICE O/P EST SF 10 MIN: CPT | Performed by: FAMILY MEDICINE

## 2020-03-23 RX ORDER — DOXYCYCLINE HYCLATE 100 MG/1
100 CAPSULE ORAL 2 TIMES DAILY
Qty: 20 CAPSULE | Refills: 0 | Status: SHIPPED | OUTPATIENT
Start: 2020-03-23 | End: 2020-04-02

## 2020-03-23 ASSESSMENT — ENCOUNTER SYMPTOMS
EYE PAIN: 1
SHORTNESS OF BREATH: 1
SINUS PRESSURE: 1
RHINORRHEA: 0
SORE THROAT: 0
DIARRHEA: 0
NAUSEA: 0
COUGH: 1
VOMITING: 0

## 2020-03-23 NOTE — PROGRESS NOTES
[address]  [address], [address] [place] [address]  [phone]      Svitlana Rebolledo is a 66 y.o. female who is c/o of Headache (sinus pressure x2weeks)      HPI:     Headache    This is a new problem. The current episode started in the past 7 days. The problem occurs intermittently. The problem has been gradually worsening. The pain is located in the frontal (wrapping around her head) region. The pain is at a severity of 10/10. Associated symptoms include coughing (mild, dry), dizziness (intermittent), eye pain (\"burning\") and sinus pressure. Pertinent negatives include no ear pain (just \"plugged up\"), fever (none in the past 2 weeks), nausea, rhinorrhea, sore throat or vomiting. She has tried nothing (Claritin, Flonase (did run out of this and will get more tomorrow)) for the symptoms. Her past medical history is significant for sinus disease (recurrent sinusitis per pt). No known sick contacts. No recent travel or exposure to anyone that has recently traveled. Subjective:      Past Medical History:   Diagnosis Date    Allergic conjunctivitis     (stable)    Chronic sinusitis     Constipation     Dizzy spells     Dry eye syndrome     Edentulous     (does not wear dentures)    Essential hypertension 7/6/2016    Fracture of lateral malleolus     (avulsion fracture at the tip of the lateral malleolus - right ankle).     Hearing loss     Hypothyroidism     Interstitial cystitis     Irritable bowel syndrome     Keratitis     (secondary to dry eye syndrome)    Parotid gland enlargement     stasis, consider sjorgens    Pseudophakos     (bilateral)    Seasonal rhinitis     Stress incontinence     Type II or unspecified type diabetes mellitus without mention of complication, not stated as uncontrolled     Urethral stenosis     Xerostomia       Past Surgical History:   Procedure Laterality Date    APPENDECTOMY  07-   Larayne Leavens CATARACT REMOVAL WITH IMPLANT Right 9/2/2003    (Dr. Anne Marie Khoury)    CATARACT REMOVAL WITH IMPLANT Left 8/12/2003    (Dr. Anne Marie Khoury)    CHOLECYSTECTOMY  07/24/1986    COLONOSCOPY  9/8/14    normal    CYSTOURETHROSCOPY/URETHRAL DILATION  2/8/12    multiple    EYE SURGERY Bilateral 12/13/2018    Bilateral Transscleral cyclophotocoagulation G6 laser (micropulse) performed by Fei Phelps MD at [address]  6/2/1977    (Dr. Bony Toscano)   [facility] ENDOSCOPY  6/4/15    gastritis, biopsy x 2    UPPER GASTROINTESTINAL ENDOSCOPY  04/02/2018    Dr. Leonardo Benton   antral gastric ulcer. esophagus re-dilated at the end of procedure. Social History     Tobacco Use    Smoking status: Never Smoker    Smokeless tobacco: Never Used    Tobacco comment: Never smoked. James Guerra [facility], 7/14/2016. Substance Use Topics    Alcohol use: No     Alcohol/week: 0.0 standard drinks      Current Outpatient Medications   Medication Sig Dispense Refill    doxycycline hyclate (VIBRAMYCIN) 100 MG capsule Take 1 capsule by mouth 2 times daily for 10 days With food. 20 capsule 0    blood glucose test strips (ONE TOUCH ULTRA TEST) strip use 1 TEST STRIP to TEST BLOOD SUGAR twice a day 100 strip 3    gabapentin (NEURONTIN) 300 MG capsule take 1 capsule by mouth twice a day 60 capsule 0    levothyroxine (SYNTHROID) 88 MCG tablet take 1 tablet by mouth once daily 90 tablet 3    metFORMIN (GLUCOPHAGE) 500 MG tablet take 1 tablet by mouth twice a day with meals 180 tablet 1    omeprazole (PRILOSEC) 20 MG delayed release capsule take 1 capsule by mouth twice a day 60 capsule 3    fluticasone (FLONASE) 50 MCG/ACT nasal spray instill 2 sprays into each nostril once daily 16 g 5    blood glucose monitor kit and supplies Test 2 times a day & as needed for symptoms of irregular blood glucose.  1 kit 0    loratadine (CLARITIN) 10 MG tablet take 1 tablet by mouth once daily 30 tablet 11    SOFT TOUCH LANCETS MISC 1 Device by Does not apply route 2 times daily 200 each 3    sucralfate (CARAFATE) 1 GM tablet Take 1 g by mouth       No current facility-administered medications for this visit. Allergies   Allergen Reactions    Latex Rash    Aleve [Naproxen]      Stomach problems    Amoxicillin      Other reaction(s): mucositis    Aspirin Other (See Comments)     Dizziness and tears her stomach up    Dye [Iodides]      IV DYE    Flu Virus Vaccine     Influenza Vaccines     Reglan [Metoclopramide] Other (See Comments)     Dystonic reaction, involuntary movements       Review of Systems   Constitutional: Negative for chills (none in the past 2 weeks) and fever (none in the past 2 weeks). HENT: Positive for congestion (worse on the R side) and sinus pressure. Negative for ear pain (just \"plugged up\"), rhinorrhea and sore throat. Eyes: Positive for pain (\"burning\"). Respiratory: Positive for cough (mild, dry) and shortness of breath (mild). Gastrointestinal: Negative for diarrhea, nausea and vomiting. Neurological: Positive for dizziness (intermittent) and headaches. Objective:     Vitals:    03/23/20 1700   BP: 112/80   Site: Left Upper Arm   Position: Sitting   Cuff Size: Medium Adult   Pulse: 76   Temp: 98.2 °F (36.8 °C)   SpO2: 97%   Weight: 152 lb (68.9 kg)   Height: 5' 5\" (1.651 m)     Physical Exam  Vitals signs and nursing note reviewed. Constitutional:       General: She is not in acute distress. Appearance: She is well-developed. HENT:      Head: Normocephalic and atraumatic. Right Ear: Tympanic membrane, ear canal and external ear normal.      Left Ear: Tympanic membrane, ear canal and external ear normal.      Nose: Mucosal edema present. Right Sinus: Maxillary sinus tenderness and frontal sinus tenderness present. Left Sinus: Maxillary sinus tenderness and frontal sinus tenderness present. Mouth/Throat:      Pharynx: Posterior oropharyngeal erythema (Mild) present.  No

## 2020-03-23 NOTE — PATIENT INSTRUCTIONS
hot, wet towel or a warm gel pack on your face 3 or 4 times a day for 5 to 10 minutes each time. · Try a decongestant nasal spray like oxymetazoline (Afrin). Do not use it for more than 3 days in a row. Using it for more than 3 days can make your congestion worse. When should you call for help? Call your doctor now or seek immediate medical care if:    · You have new or worse swelling or redness in your face or around your eyes.     · You have a new or higher fever.    Watch closely for changes in your health, and be sure to contact your doctor if:    · You have new or worse facial pain.     · The mucus from your nose becomes thicker (like pus) or has new blood in it.     · You are not getting better as expected. Where can you learn more? Go to https://[employer]eb.Vitasol. org and sign in to your [employer] account. Enter B773 in the [employer] box to learn more about \"Sinusitis: Care Instructions. \"     If you do not have an account, please click on the \"Sign Up Now\" link. Current as of: July 28, 2019Content Version: 12.4  © 2122-5745 Healthwise, Incorporated. Care instructions adapted under license by [facility] ([facility]). If you have questions about a medical condition or this instruction, always ask your healthcare professional. [address]  any warranty or liability for your use of this information.

## 2020-03-31 ENCOUNTER — TELEPHONE (OUTPATIENT)
Dept: FAMILY MEDICINE CLINIC | Age: 75
End: 2020-03-31
Payer: MEDICARE

## 2020-03-31 PROCEDURE — G0179 MD RECERTIFICATION HHA PT: HCPCS | Performed by: FAMILY MEDICINE

## 2020-04-06 RX ORDER — GABAPENTIN 300 MG/1
CAPSULE ORAL
Qty: 60 CAPSULE | Refills: 0 | OUTPATIENT
Start: 2020-04-06

## 2020-04-13 RX ORDER — GABAPENTIN 300 MG/1
CAPSULE ORAL
Qty: 60 CAPSULE | Refills: 0 | Status: SHIPPED | OUTPATIENT
Start: 2020-04-13 | End: 2020-05-27

## 2020-04-22 ENCOUNTER — HOSPITAL ENCOUNTER (EMERGENCY)
Age: 75
Discharge: HOME | End: 2020-04-22
Attending: EMERGENCY MEDICINE
Payer: MEDICARE

## 2020-04-22 VITALS
SYSTOLIC BLOOD PRESSURE: 143 MMHG | RESPIRATION RATE: 16 BRPM | HEART RATE: 68 BPM | WEIGHT: 150 LBS | BODY MASS INDEX: 24.99 KG/M2 | DIASTOLIC BLOOD PRESSURE: 62 MMHG | HEIGHT: 65 IN | TEMPERATURE: 97 F | OXYGEN SATURATION: 98 %

## 2020-04-22 LAB
-: NORMAL
ABSOLUTE EOS #: 0.07 K/UL (ref 0–0.44)
ABSOLUTE IMMATURE GRANULOCYTE: <0.03 K/UL (ref 0–0.3)
ABSOLUTE LYMPH #: 2.47 K/UL (ref 1.1–3.7)
ABSOLUTE MONO #: 0.6 K/UL (ref 0.1–1.2)
AMORPHOUS: NORMAL
ANION GAP SERPL CALCULATED.3IONS-SCNC: 12 MMOL/L (ref 9–17)
BACTERIA: NORMAL
BASOPHILS # BLD: 1 % (ref 0–2)
BASOPHILS ABSOLUTE: 0.04 K/UL (ref 0–0.2)
BILIRUBIN URINE: NEGATIVE
BUN BLDV-MCNC: 27 MG/DL (ref 8–23)
BUN/CREAT BLD: 40 (ref 9–20)
CALCIUM SERPL-MCNC: 10.3 MG/DL (ref 8.6–10.4)
CASTS UA: NORMAL /LPF (ref 0–2)
CHLORIDE BLD-SCNC: 101 MMOL/L (ref 98–107)
CO2: 28 MMOL/L (ref 20–31)
COLOR: ABNORMAL
COMMENT UA: ABNORMAL
CREAT SERPL-MCNC: 0.68 MG/DL (ref 0.5–0.9)
CRYSTALS, UA: NORMAL /HPF
DIFFERENTIAL TYPE: NORMAL
EOSINOPHILS RELATIVE PERCENT: 1 % (ref 1–4)
EPITHELIAL CELLS UA: NORMAL /HPF (ref 0–5)
GFR AFRICAN AMERICAN: >60 ML/MIN
GFR NON-AFRICAN AMERICAN: >60 ML/MIN
GFR SERPL CREATININE-BSD FRML MDRD: ABNORMAL ML/MIN/{1.73_M2}
GFR SERPL CREATININE-BSD FRML MDRD: ABNORMAL ML/MIN/{1.73_M2}
GLUCOSE BLD-MCNC: 125 MG/DL (ref 70–99)
GLUCOSE URINE: NEGATIVE
HCT VFR BLD CALC: 41 % (ref 36.3–47.1)
HEMOGLOBIN: 12.9 G/DL (ref 11.9–15.1)
IMMATURE GRANULOCYTES: 0 %
KETONES, URINE: NEGATIVE
LEUKOCYTE ESTERASE, URINE: NEGATIVE
LYMPHOCYTES # BLD: 40 % (ref 24–43)
MCH RBC QN AUTO: 28.2 PG (ref 25.2–33.5)
MCHC RBC AUTO-ENTMCNC: 31.5 G/DL (ref 25.2–33.5)
MCV RBC AUTO: 89.7 FL (ref 82.6–102.9)
MONOCYTES # BLD: 10 % (ref 3–12)
MUCUS: NORMAL
NITRITE, URINE: NEGATIVE
NRBC AUTOMATED: 0 PER 100 WBC
OTHER OBSERVATIONS UA: NORMAL
PDW BLD-RTO: 14.4 % (ref 11.8–14.4)
PH UA: 5.5 (ref 5–6)
PLATELET # BLD: 294 K/UL (ref 138–453)
PLATELET ESTIMATE: NORMAL
PMV BLD AUTO: 9.5 FL (ref 8.1–13.5)
POTASSIUM SERPL-SCNC: 4.5 MMOL/L (ref 3.7–5.3)
PROTEIN UA: NEGATIVE
RBC # BLD: 4.57 M/UL (ref 3.95–5.11)
RBC # BLD: NORMAL 10*6/UL
RBC UA: NORMAL /HPF (ref 0–4)
RENAL EPITHELIAL, UA: NORMAL /HPF
SEG NEUTROPHILS: 49 % (ref 36–65)
SEGMENTED NEUTROPHILS ABSOLUTE COUNT: 3.03 K/UL (ref 1.5–8.1)
SODIUM BLD-SCNC: 141 MMOL/L (ref 135–144)
SPECIFIC GRAVITY UA: 1 (ref 1.01–1.02)
TRICHOMONAS: NORMAL
TURBIDITY: ABNORMAL
URINE HGB: NEGATIVE
UROBILINOGEN, URINE: NORMAL
WBC # BLD: 6.2 K/UL (ref 3.5–11.3)
WBC # BLD: NORMAL 10*3/UL
WBC UA: NORMAL /HPF (ref 0–4)
YEAST: NORMAL

## 2020-04-22 PROCEDURE — 36415 COLL VENOUS BLD VENIPUNCTURE: CPT

## 2020-04-22 PROCEDURE — 99283 EMERGENCY DEPT VISIT LOW MDM: CPT

## 2020-04-22 PROCEDURE — 85025 COMPLETE CBC W/AUTO DIFF WBC: CPT

## 2020-04-22 PROCEDURE — 80048 BASIC METABOLIC PNL TOTAL CA: CPT

## 2020-04-22 PROCEDURE — 81001 URINALYSIS AUTO W/SCOPE: CPT

## 2020-04-22 NOTE — ED PROVIDER NOTES
seizure, CVA, electrolyte abnormality    DIAGNOSTIC RESULTS         LABS:  Results for orders placed or performed during the hospital encounter of 04/22/20   CBC Auto Differential   Result Value Ref Range    WBC 6.2 3.5 - 11.3 k/uL    RBC 4.57 3.95 - 5.11 m/uL    Hemoglobin 12.9 11.9 - 15.1 g/dL    Hematocrit 41.0 36.3 - 47.1 %    MCV 89.7 82.6 - 102.9 fL    MCH 28.2 25.2 - 33.5 pg    MCHC 31.5 25.2 - 33.5 g/dL    RDW 14.4 11.8 - 14.4 %    Platelets 900 011 - 227 k/uL    MPV 9.5 8.1 - 13.5 fL    NRBC Automated 0.0 0.0 per 100 WBC    Differential Type NOT REPORTED     WBC Morphology NOT REPORTED     RBC Morphology NOT REPORTED     Platelet Estimate NOT REPORTED     Seg Neutrophils 49 36 - 65 %    Lymphocytes 40 24 - 43 %    Monocytes 10 3 - 12 %    Eosinophils % 1 1 - 4 %    Basophils 1 0 - 2 %    Immature Granulocytes 0 0 %    Segs Absolute 3.03 1.50 - 8.10 k/uL    Absolute Lymph # 2.47 1.10 - 3.70 k/uL    Absolute Mono # 0.60 0.10 - 1.20 k/uL    Absolute Eos # 0.07 0.00 - 0.44 k/uL    Basophils Absolute 0.04 0.00 - 0.20 k/uL    Absolute Immature Granulocyte <0.03 0.00 - 0.30 k/uL   Basic Metabolic Panel   Result Value Ref Range    Glucose 125 (H) 70 - 99 mg/dL    BUN 27 (H) 8 - 23 mg/dL    CREATININE 0.68 0.50 - 0.90 mg/dL    Bun/Cre Ratio 40 (H) 9 - 20    Calcium 10.3 8.6 - 10.4 mg/dL    Sodium 141 135 - 144 mmol/L    Potassium 4.5 3.7 - 5.3 mmol/L    Chloride 101 98 - 107 mmol/L    CO2 28 20 - 31 mmol/L    Anion Gap 12 9 - 17 mmol/L    GFR Non-African American >60 >60 mL/min    GFR African American >60 >60 mL/min    GFR Comment          GFR Staging NOT REPORTED    Urinalysis Reflex to Culture   Result Value Ref Range    Color, UA NOT REPORTED YELLOW    Turbidity UA NOT REPORTED CLEAR    Glucose, Ur NEGATIVE NEGATIVE    Bilirubin Urine NEGATIVE NEGATIVE    Ketones, Urine NEGATIVE NEGATIVE    Specific Gravity, UA 1.005 (L) 1.010 - 1.025    Urine Hgb NEGATIVE NEGATIVE    pH, UA 5.5 5.0 - 6.0    Protein, UA NEGATIVE

## 2020-04-28 ENCOUNTER — OFFICE VISIT (OUTPATIENT)
Dept: FAMILY MEDICINE CLINIC | Age: 75
End: 2020-04-28
Payer: MEDICARE

## 2020-04-28 VITALS
TEMPERATURE: 98.9 F | HEIGHT: 65 IN | BODY MASS INDEX: 25.49 KG/M2 | DIASTOLIC BLOOD PRESSURE: 70 MMHG | WEIGHT: 153 LBS | HEART RATE: 80 BPM | SYSTOLIC BLOOD PRESSURE: 122 MMHG | OXYGEN SATURATION: 98 %

## 2020-04-28 PROCEDURE — G8399 PT W/DXA RESULTS DOCUMENT: HCPCS | Performed by: FAMILY MEDICINE

## 2020-04-28 PROCEDURE — 4040F PNEUMOC VAC/ADMIN/RCVD: CPT | Performed by: FAMILY MEDICINE

## 2020-04-28 PROCEDURE — 1123F ACP DISCUSS/DSCN MKR DOCD: CPT | Performed by: FAMILY MEDICINE

## 2020-04-28 PROCEDURE — 99213 OFFICE O/P EST LOW 20 MIN: CPT | Performed by: FAMILY MEDICINE

## 2020-04-28 PROCEDURE — G8417 CALC BMI ABV UP PARAM F/U: HCPCS | Performed by: FAMILY MEDICINE

## 2020-04-28 PROCEDURE — 1036F TOBACCO NON-USER: CPT | Performed by: FAMILY MEDICINE

## 2020-04-28 PROCEDURE — G8427 DOCREV CUR MEDS BY ELIG CLIN: HCPCS | Performed by: FAMILY MEDICINE

## 2020-04-28 PROCEDURE — 99213 OFFICE O/P EST LOW 20 MIN: CPT

## 2020-04-28 PROCEDURE — 1090F PRES/ABSN URINE INCON ASSESS: CPT | Performed by: FAMILY MEDICINE

## 2020-04-28 ASSESSMENT — ENCOUNTER SYMPTOMS
GASTROINTESTINAL NEGATIVE: 1
SORE THROAT: 0
RESPIRATORY NEGATIVE: 1
ALLERGIC/IMMUNOLOGIC NEGATIVE: 1
SINUS PAIN: 0
EYES NEGATIVE: 1

## 2020-04-29 NOTE — TELEPHONE ENCOUNTER
Belen Lopez called requesting a refill of the below medication which has been pended for you:     Requested Prescriptions     Pending Prescriptions Disp Refills    metFORMIN (GLUCOPHAGE) 500 MG tablet [Pharmacy Med Name: METFORMIN  MG TABLET] 180 tablet 1     Sig: take 1 tablet by mouth twice a day with meals       Last Appointment Date: 4/28/2020  Next Appointment Date: 8/4/2020    Allergies   Allergen Reactions    Latex Rash    Aleve [Naproxen]      Stomach problems    Amoxicillin      Other reaction(s): mucositis    Aspirin Other (See Comments)     Dizziness and tears her stomach up    Dye [Iodides]      IV DYE    Flu Virus Vaccine     Influenza Vaccines     Reglan [Metoclopramide] Other (See Comments)     Dystonic reaction, involuntary movements

## 2020-05-12 ENCOUNTER — OFFICE VISIT (OUTPATIENT)
Dept: OPTOMETRY | Age: 75
End: 2020-05-12
Payer: MEDICARE

## 2020-05-12 PROCEDURE — 4040F PNEUMOC VAC/ADMIN/RCVD: CPT | Performed by: OPTOMETRIST

## 2020-05-12 PROCEDURE — 99211 OFF/OP EST MAY X REQ PHY/QHP: CPT

## 2020-05-12 PROCEDURE — 1123F ACP DISCUSS/DSCN MKR DOCD: CPT | Performed by: OPTOMETRIST

## 2020-05-12 PROCEDURE — 99213 OFFICE O/P EST LOW 20 MIN: CPT | Performed by: OPTOMETRIST

## 2020-05-12 PROCEDURE — G8427 DOCREV CUR MEDS BY ELIG CLIN: HCPCS | Performed by: OPTOMETRIST

## 2020-05-12 PROCEDURE — 1036F TOBACCO NON-USER: CPT | Performed by: OPTOMETRIST

## 2020-05-12 PROCEDURE — G8399 PT W/DXA RESULTS DOCUMENT: HCPCS | Performed by: OPTOMETRIST

## 2020-05-12 PROCEDURE — G8417 CALC BMI ABV UP PARAM F/U: HCPCS | Performed by: OPTOMETRIST

## 2020-05-12 PROCEDURE — 1090F PRES/ABSN URINE INCON ASSESS: CPT | Performed by: OPTOMETRIST

## 2020-05-12 ASSESSMENT — REFRACTION_WEARINGRX
OS_AXIS: 100
OS_CYLINDER: -1.25
OS_SPHERE: -0.25
OS_ADD: +2.75
OD_AXIS: 084
OD_CYLINDER: -2.75
OD_ADD: +2.75
OD_SPHERE: PLANO
SPECS_TYPE: BIFOCAL

## 2020-05-12 ASSESSMENT — TONOMETRY
OS_IOP_MMHG: 13
OD_IOP_MMHG: 23
IOP_METHOD: NON-CONTACT AIR PUFF

## 2020-05-12 ASSESSMENT — ENCOUNTER SYMPTOMS
EYES NEGATIVE: 1
ALLERGIC/IMMUNOLOGIC NEGATIVE: 0
GASTROINTESTINAL NEGATIVE: 0
RESPIRATORY NEGATIVE: 0

## 2020-05-12 ASSESSMENT — VISUAL ACUITY
CORRECTION_TYPE: GLASSES
OD_CC+: -1
OS_CC: 20/40
OD_CC: 20/50 OU
METHOD: SNELLEN - LINEAR

## 2020-05-12 ASSESSMENT — PACHYMETRY
OD_CT(UM): 510
OS_CT(UM): 518

## 2020-05-12 ASSESSMENT — SLIT LAMP EXAM - LIDS
COMMENTS: 3+ MEIBOMIAN GLAND DYSFUNCTION
COMMENTS: 3+ MEIBOMIAN GLAND DYSFUNCTION

## 2020-05-12 NOTE — PROGRESS NOTES
Boaz Tirado presents today for   Chief Complaint   Patient presents with    Burning Eyes    Dry Eye    Itchy Eye    Eye Problem   . HPI     Patient states that both her eyes are bothering her. Hurts to keep them open and vision is very blurry  State she has been using her OTC eye drops 3 x daily in both eyes and that doesn't seem to help much. She has also taken allergy medication and that hasn't helped a lot either. States eyes itch and burn and she is wiping away a lot of clear matter. Mostly her right eye today  Was wearing sunglasses and that did seem to help. Some mattering in the morning          Current Outpatient Medications   Medication Sig Dispense Refill    neomycin-polymyxin-dexamethasone (MAXITROL) 0.1 % ophthalmic suspension Place 1 drop into both eyes 4 times daily for 10 days 2 mL 0    metFORMIN (GLUCOPHAGE) 500 MG tablet take 1 tablet by mouth twice a day with meals 180 tablet 1    gabapentin (NEURONTIN) 300 MG capsule take 1 capsule by mouth twice a day 60 capsule 0    blood glucose test strips (ONE TOUCH ULTRA TEST) strip use 1 TEST STRIP to TEST BLOOD SUGAR twice a day 100 strip 3    levothyroxine (SYNTHROID) 88 MCG tablet take 1 tablet by mouth once daily 90 tablet 3    omeprazole (PRILOSEC) 20 MG delayed release capsule take 1 capsule by mouth twice a day 60 capsule 3    fluticasone (FLONASE) 50 MCG/ACT nasal spray instill 2 sprays into each nostril once daily 16 g 5    blood glucose monitor kit and supplies Test 2 times a day & as needed for symptoms of irregular blood glucose. 1 kit 0    loratadine (CLARITIN) 10 MG tablet take 1 tablet by mouth once daily 30 tablet 11    SOFT TOUCH LANCETS MISC 1 Device by Does not apply route 2 times daily 200 each 3    sucralfate (CARAFATE) 1 GM tablet Take 1 g by mouth       No current facility-administered medications for this visit.           Family History   Problem Relation Age of Onset    Heart Disease Mother     Heart day x 10 days        Return in about 1 month (around 6/12/2020) for red eye follow up .

## 2020-05-27 ENCOUNTER — TELEPHONE (OUTPATIENT)
Dept: FAMILY MEDICINE CLINIC | Age: 75
End: 2020-05-27
Payer: MEDICARE

## 2020-05-27 PROCEDURE — G0179 MD RECERTIFICATION HHA PT: HCPCS | Performed by: FAMILY MEDICINE

## 2020-05-27 RX ORDER — GABAPENTIN 300 MG/1
CAPSULE ORAL
Qty: 60 CAPSULE | Refills: 0 | Status: SHIPPED | OUTPATIENT
Start: 2020-05-27 | End: 2020-06-29 | Stop reason: SDUPTHER

## 2020-05-27 NOTE — TELEPHONE ENCOUNTER
Mitchel Dickson called requesting a refill of the below medication which has been pended for you: [facility] from [place] reviewed. Gabapentin 300 mg last filled 4/13/20 #60/30days. Report available for your review.     Requested Prescriptions     Pending Prescriptions Disp Refills    gabapentin (NEURONTIN) 300 MG capsule [Pharmacy Med Name: GABAPENTIN 300 MG CAPSULE] 60 capsule 0     Sig: take 1 capsule by mouth twice a day       Last Appointment Date: 4/28/2020  Next Appointment Date: 8/4/2020    Allergies   Allergen Reactions    Latex Rash    Aleve [Naproxen]      Stomach problems    Amoxicillin      Other reaction(s): mucositis    Aspirin Other (See Comments)     Dizziness and tears her stomach up    Dye [Iodides]      IV DYE    Flu Virus Vaccine     Influenza Vaccines     Reglan [Metoclopramide] Other (See Comments)     Dystonic reaction, involuntary movements

## 2020-05-27 NOTE — TELEPHONE ENCOUNTER
Home health certification and plan of care done 5/27/20 on patient for date services 05/25/20-7/23/20. Verified medications. Physician time spent is 15 minutes for activities to coordinate services, documenting, medical decision making, and review of reports, treatment plans, and test results.

## 2020-06-03 ENCOUNTER — OFFICE VISIT (OUTPATIENT)
Dept: OPTOMETRY | Age: 75
End: 2020-06-03
Payer: MEDICARE

## 2020-06-03 PROCEDURE — 1123F ACP DISCUSS/DSCN MKR DOCD: CPT | Performed by: OPTOMETRIST

## 2020-06-03 PROCEDURE — G8417 CALC BMI ABV UP PARAM F/U: HCPCS | Performed by: OPTOMETRIST

## 2020-06-03 PROCEDURE — G8427 DOCREV CUR MEDS BY ELIG CLIN: HCPCS | Performed by: OPTOMETRIST

## 2020-06-03 PROCEDURE — 99213 OFFICE O/P EST LOW 20 MIN: CPT | Performed by: OPTOMETRIST

## 2020-06-03 PROCEDURE — G8399 PT W/DXA RESULTS DOCUMENT: HCPCS | Performed by: OPTOMETRIST

## 2020-06-03 PROCEDURE — 4040F PNEUMOC VAC/ADMIN/RCVD: CPT | Performed by: OPTOMETRIST

## 2020-06-03 PROCEDURE — 99211 OFF/OP EST MAY X REQ PHY/QHP: CPT

## 2020-06-03 PROCEDURE — 1090F PRES/ABSN URINE INCON ASSESS: CPT | Performed by: OPTOMETRIST

## 2020-06-03 PROCEDURE — 1036F TOBACCO NON-USER: CPT | Performed by: OPTOMETRIST

## 2020-06-03 ASSESSMENT — PACHYMETRY
OD_CT(UM): 510
OS_CT(UM): 518

## 2020-06-03 ASSESSMENT — VISUAL ACUITY
OS_CC: 20/80
METHOD: SNELLEN - LINEAR
OD_CC+: -1
CORRECTION_TYPE: GLASSES

## 2020-06-03 ASSESSMENT — ENCOUNTER SYMPTOMS
EYES NEGATIVE: 1
RESPIRATORY NEGATIVE: 0
ALLERGIC/IMMUNOLOGIC NEGATIVE: 0
GASTROINTESTINAL NEGATIVE: 0

## 2020-06-03 ASSESSMENT — TONOMETRY
OD_IOP_MMHG: 20
OS_IOP_MMHG: 11
IOP_METHOD: NON-CONTACT AIR PUFF

## 2020-06-03 NOTE — PROGRESS NOTES
Heather Kim presents today for   Chief Complaint   Patient presents with    Blurred Vision    Burning Eyes    Itchy Eye    Eye Problem   . HPI     Blurred Vision     In both eyes. Comments     Patient is in for a follow up today after being given remy-poly-dexa to use 4 x a day for 10 days ; patient states that it almost made her eyes worse. Eyes are still itching and buring, and vision is getting more blurry  States has air purifier next to her that she puts right beside her to help her breath she isn't sure if that is causing the problem or not. States she just started using claritin about 4 days ago and has not noticed that it has helped. Astrid and scraping in the eyes  No change with the drops   Hasn't used any drops in the last few days                Current Outpatient Medications   Medication Sig Dispense Refill    gabapentin (NEURONTIN) 300 MG capsule take 1 capsule by mouth twice a day 60 capsule 0    metFORMIN (GLUCOPHAGE) 500 MG tablet take 1 tablet by mouth twice a day with meals 180 tablet 1    blood glucose test strips (ONE TOUCH ULTRA TEST) strip use 1 TEST STRIP to TEST BLOOD SUGAR twice a day 100 strip 3    levothyroxine (SYNTHROID) 88 MCG tablet take 1 tablet by mouth once daily 90 tablet 3    omeprazole (PRILOSEC) 20 MG delayed release capsule take 1 capsule by mouth twice a day 60 capsule 3    fluticasone (FLONASE) 50 MCG/ACT nasal spray instill 2 sprays into each nostril once daily 16 g 5    blood glucose monitor kit and supplies Test 2 times a day & as needed for symptoms of irregular blood glucose. 1 kit 0    loratadine (CLARITIN) 10 MG tablet take 1 tablet by mouth once daily 30 tablet 11    SOFT TOUCH LANCETS MISC 1 Device by Does not apply route 2 times daily 200 each 3    sucralfate (CARAFATE) 1 GM tablet Take 1 g by mouth       No current facility-administered medications for this visit.           Family History   Problem Relation Age of Onset    Heart Disease Mother     Heart Attack Mother     Arthritis Mother     Other Maternal Grandmother         (gout)    Allergies Daughter     Allergies Daughter     Other Brother         ( at age 21 secondary to auto accident).  Cataracts Neg Hx     Diabetes Neg Hx     Glaucoma Neg Hx      Social History     Socioeconomic History    Marital status: Single     Spouse name: None    Number of children: None    Years of education: None    Highest education level: None   Occupational History    None   Social Needs    Financial resource strain: None    Food insecurity     Worry: None     Inability: None    Transportation needs     Medical: None     Non-medical: None   Tobacco Use    Smoking status: Never Smoker    Smokeless tobacco: Never Used    Tobacco comment: Never smoked. Tonia Dhillon Rcp, 2016. Substance and Sexual Activity    Alcohol use: No     Alcohol/week: 0.0 standard drinks    Drug use: No    Sexual activity: None   Lifestyle    Physical activity     Days per week: None     Minutes per session: None    Stress: None   Relationships    Social connections     Talks on phone: None     Gets together: None     Attends Confucianism service: None     Active member of club or organization: None     Attends meetings of clubs or organizations: None     Relationship status: None    Intimate partner violence     Fear of current or ex partner: None     Emotionally abused: None     Physically abused: None     Forced sexual activity: None   Other Topics Concern    None   Social History Narrative    None     Past Medical History:   Diagnosis Date    Allergic conjunctivitis     (stable)    Chronic sinusitis     Constipation     Dizzy spells     Dry eye syndrome     Edentulous     (does not wear dentures)    Essential hypertension 2016    Fracture of lateral malleolus     (avulsion fracture at the tip of the lateral malleolus - right ankle).     Hearing loss    

## 2020-06-09 RX ORDER — HYPROMELLOSE 3 MG/G
1 GEL OPHTHALMIC NIGHTLY
Qty: 1 TUBE | Refills: 3 | Status: SHIPPED | OUTPATIENT
Start: 2020-06-09 | End: 2020-10-30 | Stop reason: ALTCHOICE

## 2020-06-18 ENCOUNTER — OFFICE VISIT (OUTPATIENT)
Dept: OPTOMETRY | Age: 75
End: 2020-06-18
Payer: MEDICARE

## 2020-06-18 PROCEDURE — G8417 CALC BMI ABV UP PARAM F/U: HCPCS | Performed by: OPTOMETRIST

## 2020-06-18 PROCEDURE — 1123F ACP DISCUSS/DSCN MKR DOCD: CPT | Performed by: OPTOMETRIST

## 2020-06-18 PROCEDURE — 99212 OFFICE O/P EST SF 10 MIN: CPT

## 2020-06-18 PROCEDURE — 4040F PNEUMOC VAC/ADMIN/RCVD: CPT | Performed by: OPTOMETRIST

## 2020-06-18 PROCEDURE — 99213 OFFICE O/P EST LOW 20 MIN: CPT | Performed by: OPTOMETRIST

## 2020-06-18 PROCEDURE — 1090F PRES/ABSN URINE INCON ASSESS: CPT | Performed by: OPTOMETRIST

## 2020-06-18 PROCEDURE — G8427 DOCREV CUR MEDS BY ELIG CLIN: HCPCS | Performed by: OPTOMETRIST

## 2020-06-18 PROCEDURE — G8399 PT W/DXA RESULTS DOCUMENT: HCPCS | Performed by: OPTOMETRIST

## 2020-06-18 PROCEDURE — 1036F TOBACCO NON-USER: CPT | Performed by: OPTOMETRIST

## 2020-06-18 ASSESSMENT — PACHYMETRY
OD_CT(UM): 510
OS_CT(UM): 518

## 2020-06-18 ASSESSMENT — REFRACTION_WEARINGRX
OD_CYLINDER: -2.75
OD_AXIS: 084
OS_SPHERE: -0.25
OD_SPHERE: PLANO
OD_ADD: +2.75
OS_AXIS: 100
OS_CYLINDER: -1.25
OS_ADD: +2.75
SPECS_TYPE: BIFOCAL

## 2020-06-18 ASSESSMENT — SLIT LAMP EXAM - LIDS
COMMENTS: NORMAL
COMMENTS: NORMAL

## 2020-06-18 ASSESSMENT — TONOMETRY
IOP_METHOD: NON-CONTACT AIR PUFF
OD_IOP_MMHG: 14
OS_IOP_MMHG: 12

## 2020-06-18 ASSESSMENT — ENCOUNTER SYMPTOMS
RESPIRATORY NEGATIVE: 0
ALLERGIC/IMMUNOLOGIC NEGATIVE: 0
EYES NEGATIVE: 1
GASTROINTESTINAL NEGATIVE: 0

## 2020-06-18 ASSESSMENT — VISUAL ACUITY
CORRECTION_TYPE: GLASSES
METHOD: SNELLEN - LINEAR
OS_CC: 20/50

## 2020-06-18 NOTE — PROGRESS NOTES
Yan Notice presents today for   Chief Complaint   Patient presents with    2 Week Follow-Up   . HPI     2 week follow dry eye  Systane Gel at night : will use during the day if she takes a nap, seems to help her for a longer period of time. Systane complete 4 x daily : states ran out of eye drops a few days ago  Doesn't have to wear sunglasses anymore  Doing much better            Current Outpatient Medications   Medication Sig Dispense Refill    hydroxypropyl methylcellulose (GONIOSOL) 2.5 % ophthalmic solution Place 1 drop into both eyes 3 times daily 15 mL 5    hypromellose (SYSTANE OVERNIGHT THERAPY) 0.3 % GEL ophthalmic gel Place 1 inch into both eyes nightly 1 Tube 3    gabapentin (NEURONTIN) 300 MG capsule take 1 capsule by mouth twice a day 60 capsule 0    metFORMIN (GLUCOPHAGE) 500 MG tablet take 1 tablet by mouth twice a day with meals 180 tablet 1    blood glucose test strips (ONE TOUCH ULTRA TEST) strip use 1 TEST STRIP to TEST BLOOD SUGAR twice a day 100 strip 3    levothyroxine (SYNTHROID) 88 MCG tablet take 1 tablet by mouth once daily 90 tablet 3    omeprazole (PRILOSEC) 20 MG delayed release capsule take 1 capsule by mouth twice a day 60 capsule 3    fluticasone (FLONASE) 50 MCG/ACT nasal spray instill 2 sprays into each nostril once daily 16 g 5    blood glucose monitor kit and supplies Test 2 times a day & as needed for symptoms of irregular blood glucose. 1 kit 0    loratadine (CLARITIN) 10 MG tablet take 1 tablet by mouth once daily 30 tablet 11    SOFT TOUCH LANCETS MISC 1 Device by Does not apply route 2 times daily 200 each 3    sucralfate (CARAFATE) 1 GM tablet Take 1 g by mouth       No current facility-administered medications for this visit.           Family History   Problem Relation Age of Onset    Heart Disease Mother     Heart Attack Mother     Arthritis Mother     Other Maternal Grandmother         (gout)    Allergies Daughter     Allergies Daughter    [facility]

## 2020-06-25 ENCOUNTER — VIRTUAL VISIT (OUTPATIENT)
Dept: FAMILY MEDICINE CLINIC | Age: 75
End: 2020-06-25
Payer: MEDICARE

## 2020-06-25 PROCEDURE — 1123F ACP DISCUSS/DSCN MKR DOCD: CPT | Performed by: FAMILY MEDICINE

## 2020-06-25 PROCEDURE — G0438 PPPS, INITIAL VISIT: HCPCS | Performed by: FAMILY MEDICINE

## 2020-06-25 PROCEDURE — 4040F PNEUMOC VAC/ADMIN/RCVD: CPT | Performed by: FAMILY MEDICINE

## 2020-06-25 ASSESSMENT — PATIENT HEALTH QUESTIONNAIRE - PHQ9
SUM OF ALL RESPONSES TO PHQ QUESTIONS 1-9: 0
SUM OF ALL RESPONSES TO PHQ QUESTIONS 1-9: 0

## 2020-06-25 ASSESSMENT — LIFESTYLE VARIABLES: HOW OFTEN DO YOU HAVE A DRINK CONTAINING ALCOHOL: 0

## 2020-06-25 NOTE — PROGRESS NOTES
Medicare Annual Wellness Visit  Name: Catia Wylie Date: 2020   MRN: [id number] Sex: Female   Age: 66 y.o. Ethnicity: Non-/Non    : 1945 Race: Omi Anderson is here for Medicare AWV (AWV-LPN)    Screenings for behavioral, psychosocial and functional/safety risks, and cognitive dysfunction are all negative except as indicated below. These results, as well as other patient data from the [address] form, are documented in Flowsheets linked to this Encounter. Allergies   Allergen Reactions    Latex Rash    Aleve [Naproxen]      Stomach problems    Amoxicillin      Other reaction(s): mucositis    Aspirin Other (See Comments)     Dizziness and tears her stomach up    Dye [Iodides]      IV DYE    Flu Virus Vaccine     Influenza Vaccines     Reglan [Metoclopramide] Other (See Comments)     Dystonic reaction, involuntary movements       Prior to Visit Medications    Medication Sig Taking?  Authorizing Provider   hydroxypropyl methylcellulose (GONIOSOL) 2.5 % ophthalmic solution Place 1 drop into both eyes 3 times daily Yes Apolonia Ni, OD   hypromellose (SYSTANE OVERNIGHT THERAPY) 0.3 % GEL ophthalmic gel Place 1 inch into both eyes nightly Yes Apolonia Ni, OD   gabapentin (NEURONTIN) 300 MG capsule take 1 capsule by mouth twice a day Yes Yo Velez MD   metFORMIN (GLUCOPHAGE) 500 MG tablet take 1 tablet by mouth twice a day with meals Yes Yo Velez MD   blood glucose test strips (ONE TOUCH ULTRA TEST) strip use 1 TEST STRIP to TEST BLOOD SUGAR twice a day Yes Yo Velez MD   levothyroxine (SYNTHROID) 88 MCG tablet take 1 tablet by mouth once daily Yes Yo Velez MD   omeprazole (PRILOSEC) 20 MG delayed release capsule take 1 capsule by mouth twice a day Yes Yo Velez MD   fluticasone (FLONASE) 50 MCG/ACT nasal spray instill 2 sprays into each nostril once daily Yes Yo Velez MD   blood glucose monitor kit and Relation Age of Onset    Heart Disease Mother     Heart Attack Mother     Arthritis Mother     Other Maternal Grandmother         (gout)    Allergies Daughter     Allergies Daughter     Other Brother         ( at age 21 secondary to auto accident).  Cataracts Neg Hx     Diabetes Neg Hx     Glaucoma Neg Hx        CareTeam (Including outside providers/suppliers regularly involved in providing care):   Patient Care Team:  Ashanti Caputo MD as PCP - Elle Bonner MD as PCP - [facility] Empaneled Provider  Nadira Diop, [facility] as Advanced Practice Nurse (Nurse Practitioner)    Wt Readings from Last 3 Encounters:   20 153 lb (69.4 kg)   20 150 lb (68 kg)   20 152 lb (68.9 kg)     There were no vitals filed for this visit. There is no height or weight on file to calculate BMI. Based upon direct observation of the patient, evaluation of cognition reveals recent and remote memory intact. Patient's complete Health Risk Assessment and screening values have been reviewed and are found in Flowsheets. The following problems were reviewed today and where indicated follow up appointments were made and/or referrals ordered. Positive Risk Factor Screenings with Interventions:     General Health:  General  In general, how would you say your health is?: Fair  In the past 7 days, have you experienced any of the following?  New or Increased Pain, New or Increased Fatigue, Loneliness, Social Isolation, Stress or Anger?: (!) New or Increased Pain(back pain)  Do you get the social and emotional support that you need?: Yes  Do you have a Living Will?: (!) No  General Health Risk Interventions:  · Pain issues: patient advised to follow-up in this office for further evaluation and treatment within 1 week(s) Patient is scheduled for appointment 2020    Health Habits/Nutrition:  Health Habits/Nutrition  Do you exercise for at least 20 minutes 2-3 times per week?: Yes  Have you lost any weight without trying in the past 3 months?: No  Do you eat fewer than 2 meals per day?: No  Have you seen a dentist within the past year?: (!) No  There is no height or weight on file to calculate BMI. Health Habits/Nutrition Interventions:  · Dental exam overdue:  patient encouraged to make appointment with his/her dentist    Hearing/Vision:  No exam data present  Hearing/Vision  Do you or your family notice any trouble with your hearing?: (!) Yes(Hearing aids)  Do you have difficulty driving, watching TV, or doing any of your daily activities because of your eyesight?: (!) Yes  Have you had an eye exam within the past year?: Yes  Hearing/Vision Interventions:  · . Safety:  Safety  Do you have working smoke detectors?: Yes  Have all throw rugs been removed or fastened?: (!) No  Do you have non-slip mats or surfaces in all bathtubs/showers?: Yes  Do all of your stairways have a railing or banister?: Yes  Are your doorways, halls and stairs free of clutter?: Yes  Do you always fasten your seatbelt when you are in a car?: Yes  Safety Interventions:  · Home safety tips provided    ADL:  ADLs  In the past 7 days, did you need help from others to perform any of the following everyday activities? Eating, dressing, grooming, bathing, toileting, or walking/balance?: (!) Dressing, Bathing(Patient recieves home health services. Home health aid visits on Mondays and Wednesdays)  In the past 7 days, did you need help from others to take care of any of the following? Laundry, housekeeping, banking/finances, shopping, telephone use, food preparation, transportation, or taking medications?: (!) None, Laundry, Housekeeping, Transportation, Shopping(Patient receives home health services. [employer] transportation. )  ADL Interventions:  · Patient currently has home health services.      Personalized Preventive Plan   Current Health Maintenance Status  Immunization History   Administered Date(s) Administered    DT (pediatric) 08/08/2000    Pneumococcal Conjugate 13-valent ([id number]) 10/02/2016    Pneumococcal Polysaccharide ([id number]) 08/01/2013        Health Maintenance   Topic Date Due    Annual Wellness Visit (AWV)  01/19/2020    DTaP/Tdap/Td vaccine (2 - Tdap) 02/04/2021 (Originally 8/8/2010)    TSH testing  02/25/2021    Potassium monitoring  04/22/2021    Creatinine monitoring  04/22/2021    DEXA (modify frequency per FRAX score)  Completed    Pneumococcal 65+ years Vaccine  Completed    Hepatitis A vaccine  Aged Out    Hib vaccine  Aged Out    Meningococcal (ACWY) vaccine  Aged Out     Recommendations for [facility] Due: see orders and patient instructions/AVS.  . Recommended screening schedule for the next 5-10 years is provided to the patient in written form: see Patient Instructions/AVS.    Kristi SACNHEZ LPN, 0/94/8020, performed the documented evaluation under the direct supervision of the attending physician. Allyson Fowler is a 66 y.o. female being evaluated by a Virtual Visit (telephone) encounter to address concerns as mentioned above. A caregiver was present when appropriate. Due to this being a TeleHealth encounter (During [place] [address] public [facility] emergency), evaluation of the following organ systems was limited: Vitals/Constitutional/EENT/Resp/CV/GI//MS/Neuro/Skin/Heme-Lymph-Imm. Pursuant to the emergency declaration under the [address] [place] [address], [address] [place] [address] authority and the [employer] and [employer] Act, this Virtual Visit was conducted with patient's (and/or legal guardian's) consent, to reduce the patient's risk of exposure to COVID-19 and provide necessary medical care. The patient (and/or legal guardian) has also been advised to contact this office for worsening conditions or problems, and seek emergency medical treatment and/or call 911 if deemed necessary.      Patient identification was verified at the start of the visit: Yes    Total time spent for this encounter: Not billed by time    Services were provided through a telephone discussion virtually to substitute for in-person clinic visit. Patient and provider were located at their individual homes. --Shari Horn LPN on 3/57/6151 at 70:96 PM    This encounter was performed under my, Alicia Brown, direct supervision, 6/25/2020. Electronically signed by Renetta Fall MD on 7/1/2020 at 1:42 PM    An electronic signature was used to authenticate this note.

## 2020-06-29 ENCOUNTER — OFFICE VISIT (OUTPATIENT)
Dept: FAMILY MEDICINE CLINIC | Age: 75
End: 2020-06-29
Payer: MEDICARE

## 2020-06-29 VITALS
HEART RATE: 72 BPM | DIASTOLIC BLOOD PRESSURE: 64 MMHG | BODY MASS INDEX: 26.16 KG/M2 | SYSTOLIC BLOOD PRESSURE: 118 MMHG | HEIGHT: 65 IN | WEIGHT: 157 LBS | TEMPERATURE: 97.7 F

## 2020-06-29 PROCEDURE — 1123F ACP DISCUSS/DSCN MKR DOCD: CPT | Performed by: FAMILY MEDICINE

## 2020-06-29 PROCEDURE — G8399 PT W/DXA RESULTS DOCUMENT: HCPCS | Performed by: FAMILY MEDICINE

## 2020-06-29 PROCEDURE — 99214 OFFICE O/P EST MOD 30 MIN: CPT | Performed by: FAMILY MEDICINE

## 2020-06-29 PROCEDURE — G8417 CALC BMI ABV UP PARAM F/U: HCPCS | Performed by: FAMILY MEDICINE

## 2020-06-29 PROCEDURE — 4040F PNEUMOC VAC/ADMIN/RCVD: CPT | Performed by: FAMILY MEDICINE

## 2020-06-29 PROCEDURE — 1036F TOBACCO NON-USER: CPT | Performed by: FAMILY MEDICINE

## 2020-06-29 PROCEDURE — 1090F PRES/ABSN URINE INCON ASSESS: CPT | Performed by: FAMILY MEDICINE

## 2020-06-29 PROCEDURE — 99213 OFFICE O/P EST LOW 20 MIN: CPT

## 2020-06-29 PROCEDURE — G8427 DOCREV CUR MEDS BY ELIG CLIN: HCPCS | Performed by: FAMILY MEDICINE

## 2020-06-29 RX ORDER — FLUTICASONE PROPIONATE 50 MCG
SPRAY, SUSPENSION (ML) NASAL
Qty: 16 G | Refills: 5 | Status: SHIPPED | OUTPATIENT
Start: 2020-06-29 | End: 2021-08-04 | Stop reason: SDUPTHER

## 2020-06-29 RX ORDER — OMEPRAZOLE 20 MG/1
CAPSULE, DELAYED RELEASE ORAL
Qty: 60 CAPSULE | Refills: 3 | Status: SHIPPED | OUTPATIENT
Start: 2020-06-29 | End: 2020-10-20

## 2020-06-29 RX ORDER — GABAPENTIN 300 MG/1
CAPSULE ORAL
Qty: 90 CAPSULE | Refills: 3 | Status: SHIPPED | OUTPATIENT
Start: 2020-06-29 | End: 2020-08-04 | Stop reason: SDUPTHER

## 2020-06-29 ASSESSMENT — PATIENT HEALTH QUESTIONNAIRE - PHQ9
SUM OF ALL RESPONSES TO PHQ9 QUESTIONS 1 & 2: 0
SUM OF ALL RESPONSES TO PHQ QUESTIONS 1-9: 0
SUM OF ALL RESPONSES TO PHQ QUESTIONS 1-9: 0
2. FEELING DOWN, DEPRESSED OR HOPELESS: 0
1. LITTLE INTEREST OR PLEASURE IN DOING THINGS: 0

## 2020-06-29 ASSESSMENT — ENCOUNTER SYMPTOMS
GASTROINTESTINAL NEGATIVE: 1
ALLERGIC/IMMUNOLOGIC NEGATIVE: 1
EYES NEGATIVE: 1
BACK PAIN: 1
SINUS PAIN: 0
SORE THROAT: 0
RESPIRATORY NEGATIVE: 1

## 2020-06-29 NOTE — PROGRESS NOTES
2020     Isak Oconnor (:  1945) is a 66 y.o. female, here for evaluation of the following medical concerns:    HPI   Acute visit for neuropathy. She is noticing more persistent pain and symptoms into the right leg at present. Sitting seems more problematic. Numbness and tingling described. At the level of the foot, she feels the toes are fairly consistently numb. No numbness of concern in the left leg or foot on review. Gabapentin helping, but currently not as efficacious and she is asking for increasing dose. She notices her balance is becoming worse and she has had some close calls with falls. No injuries. Past Medical History:   Diagnosis Date    Allergic conjunctivitis     (stable)    Chronic sinusitis     Constipation     Dizzy spells     Dry eye syndrome     Edentulous     (does not wear dentures)    Essential hypertension 2016    Fracture of lateral malleolus     (avulsion fracture at the tip of the lateral malleolus - right ankle).     Hearing loss     Hypothyroidism     Interstitial cystitis     Irritable bowel syndrome     Keratitis     (secondary to dry eye syndrome)    Parotid gland enlargement     stasis, consider sjorgens    Pseudophakos     (bilateral)    Seasonal rhinitis     Stress incontinence     Type II or unspecified type diabetes mellitus without mention of complication, not stated as uncontrolled     Urethral stenosis     Xerostomia      Past Surgical History:   Procedure Laterality Date    APPENDECTOMY  1986    CATARACT REMOVAL WITH IMPLANT Right 2003    (Dr. Rodriguez Pollen)    CATARACT REMOVAL WITH IMPLANT Left 2003    (Dr. Rodriguez Pollen)    CHOLECYSTECTOMY  1986    COLONOSCOPY  14    normal    CYSTOURETHROSCOPY/URETHRAL DILATION  12    multiple    EYE SURGERY Bilateral 2018    Bilateral Transscleral cyclophotocoagulation G6 laser (micropulse) performed by Luis Armando Mello MD at [address] [place] [address] LIGATION  6/2/1977    (Dr. Sarahi Poole)    UPPER GASTROINTESTINAL ENDOSCOPY  6/4/15    gastritis, biopsy x 2    UPPER GASTROINTESTINAL ENDOSCOPY  04/02/2018    Dr. Soco Meraz   antral gastric ulcer. esophagus re-dilated at the end of procedure. Review of Systems   Constitutional: Negative. HENT: Negative. Negative for congestion, sinus pain and sore throat. Eyes: Negative. Respiratory: Negative. Cardiovascular: Negative. Gastrointestinal: Negative. Endocrine: Negative. Genitourinary: Negative. Musculoskeletal: Positive for back pain (some lower back pain, not as bad as in the past.  ) and gait problem. Skin: Negative. Allergic/Immunologic: Negative. Neurological: Positive for tremors and numbness (right leg and foot. ). Negative for dizziness, seizures, speech difficulty and weakness. Hematological: Negative. Psychiatric/Behavioral: Negative. Prior to Visit Medications    Medication Sig Taking?  Authorizing Provider   hydroxypropyl methylcellulose (GONIOSOL) 2.5 % ophthalmic solution Place 1 drop into both eyes 3 times daily Yes Apolonia Ni, GILBERTO   hypromellose (SYSTANE OVERNIGHT THERAPY) 0.3 % GEL ophthalmic gel Place 1 inch into both eyes nightly Yes Apolonia Ni, OD   gabapentin (NEURONTIN) 300 MG capsule take 1 capsule by mouth twice a day Yes Ginette Ch MD   metFORMIN (GLUCOPHAGE) 500 MG tablet take 1 tablet by mouth twice a day with meals Yes Ginette Ch MD   levothyroxine (SYNTHROID) 88 MCG tablet take 1 tablet by mouth once daily Yes Ginette Ch MD   omeprazole (PRILOSEC) 20 MG delayed release capsule take 1 capsule by mouth twice a day Yes Ginette Ch MD   fluticasone (FLONASE) 50 MCG/ACT nasal spray instill 2 sprays into each nostril once daily Yes Ginette Ch MD   loratadine (CLARITIN) 10 MG tablet take 1 tablet by mouth once daily Yes Ginette Ch MD   blood glucose test strips (ONE TOUCH ULTRA TEST) strip use 1 TEST STRIP to TEST BLOOD abnormal.   Psychiatric:         Mood and Affect: Mood normal.         Behavior: Behavior normal.         Thought Content: Thought content normal.     /64 (Site: Right Upper Arm, Position: Sitting, Cuff Size: Large Adult)   Pulse 72   Temp 97.7 °F (36.5 °C) (Temporal)   Ht 5' 5\" (1.651 m)   Wt 157 lb (71.2 kg)   LMP  (LMP Unknown)   BMI 26.13 kg/m²   EXAMINATION:   3 XRAY VIEWS OF THE LUMBAR SPINE       8/3/2018 10:10 am       COMPARISON:   None.       HISTORY:   ORDERING SYSTEM PROVIDED HISTORY: Chronic bilateral low back pain with   bilateral sciatica   TECHNOLOGIST PROVIDED HISTORY:   Reason for exam:->PAIN   Ordering Physician Provided Reason for Exam: Pain in lower back for 30 years   or so. Radiates down legs. No surgical hx   Acuity: Chronic   Type of Exam: Ongoing       FINDINGS:   2 images were provided.  Surgical clips project in the right upper quadrant. Postop changes also project over the right iliac wing.  AP alignment is   normal.  Degenerative changes are noted throughout the lumbar spine, greatest   at L4-5 and L5-S1.  L4-5 and L5-S1 disc space narrowing and vacuum discs are   noted.  There is no acute fracture.  Right greater than left hip degenerative   changes are noted           Impression   Degenerative changes throughout the lumbar spine without acute fracture       MRI 8/2016  Impression       1. Moderate to severe facet degenerative changes at all levels in the lumbar spine.       2. Moderate spinal canal stenosis at L3-L4 and L4-L5.        3. Epidural lipomatosis narrows the thecal sac at L5-S1.       4. Moderate left neural foramina narrowing at L4-L5. ASSESSMENT/PLAN:  Encounter Diagnosis   Name Primary?  Lumbar stenosis with neurogenic claudication Yes     Currently with more right leg numbness and tingling . Still intermittent, mostly with walking and wt. Bearing. Sitting and lying down seems to help.   She is getting more permanent/constant numbness in the

## 2020-07-07 ENCOUNTER — TELEPHONE (OUTPATIENT)
Dept: FAMILY MEDICINE CLINIC | Age: 75
End: 2020-07-07

## 2020-07-07 ENCOUNTER — TELEPHONE (OUTPATIENT)
Dept: OPTOMETRY | Age: 75
End: 2020-07-07

## 2020-07-07 NOTE — TELEPHONE ENCOUNTER
Patient is calling to see if she should continue to use the hydroxypropyl methylcellulose (GONIOSOL) 2.5 % ophthalmic solution. She is having trouble seeing, she states her vision is so blurry she cannot even get up to use the restroom for about 2-3 hours after putting the drops in her eyes. She also ran out of the Systane gel she was using at night yesterday.

## 2020-07-09 ENCOUNTER — HOSPITAL ENCOUNTER (OUTPATIENT)
Dept: MRI IMAGING | Age: 75
Discharge: HOME | End: 2020-07-11
Payer: MEDICARE

## 2020-07-09 ENCOUNTER — OFFICE VISIT (OUTPATIENT)
Dept: PODIATRY | Age: 75
End: 2020-07-09
Payer: MEDICARE

## 2020-07-09 VITALS
HEIGHT: 65 IN | SYSTOLIC BLOOD PRESSURE: 122 MMHG | BODY MASS INDEX: 25.83 KG/M2 | HEART RATE: 70 BPM | DIASTOLIC BLOOD PRESSURE: 74 MMHG | WEIGHT: 155 LBS

## 2020-07-09 PROCEDURE — 99999 PR OFFICE/OUTPT VISIT,PROCEDURE ONLY: CPT | Performed by: PODIATRIST

## 2020-07-09 PROCEDURE — 11720 DEBRIDE NAIL 1-5: CPT | Performed by: PODIATRIST

## 2020-07-09 PROCEDURE — 72148 MRI LUMBAR SPINE W/O DYE: CPT

## 2020-07-09 NOTE — PROGRESS NOTES
Foot Care Worksheet  PCP: Selam Maria MD  Last visit: 06/27/2019    Nail description:  Thick , Yellow , Crumbly , Marked limitation of ambulation     Pain resulting from thickened and dystrophy of nail plate No    Nails involved  Right   1  (T5-T9)  Left     5  (TA-T4)    Q7 1 Class A Finding - Non traumatic amputation of foot No    Q8 2 Class B Findings - Absent DP pulse No, Absent PT pulse No, Advanced tropic changes (3 required) Yes    Decrease hair growth Yes, Nail changes/thickening Yes, Pigmented changes/discoloration Yes, Skin texture (thin, shiny) No, Skin color (rubor/redness) No    Q9 1 Class B and 2 Class C Findings  Claudication No, Temperature change Yes, Paresthesia Yes, Burning No, Edema Yes

## 2020-07-09 NOTE — TELEPHONE ENCOUNTER
Have the patient get more systane gel and also use artificial tears such as systane drops that contain glycol and glycerinpropylene in them. Rather that the hydroxypropylmethycellulose using. Go back to the drops that were working. Also, schedule with Dr. Melida oYung for follow up if no improvement over the weekend with the drops.

## 2020-07-09 NOTE — PROGRESS NOTES
accident).  Cataracts Neg Hx     Diabetes Neg Hx     Glaucoma Neg Hx        Social History     Tobacco Use    Smoking status: Never Smoker    Smokeless tobacco: Never Used    Tobacco comment: Never smoked. Abhinav Kovacs [facility], 7/14/2016. Substance Use Topics    Alcohol use: No     Alcohol/week: 0.0 standard drinks       Review of Systems: All 12 systems reviewed and pertinent positives noted above. Lower Extremity Physical Examination:     Vitals:   Vitals:    07/09/20 1519   BP: 122/74   Pulse: 70     General: AAO x 3 in NAD. Vascular: DP and PT pulses palpable 2/4, bilateral.  CFT <3 seconds, bilateral.  Hair growth present to the level of the digits, bilateral.  Edema present, bilateral.  Varicosities present, bilateral. Erythema absent, bilateral. Distal Rubor absent bilateral.  Temperature within normal limits bilateral. Hyperpigmentation present bilateral. Atrophic skin yes. Neurological: Sensation Impaired to light touch to level of digits, bilateral.  Protective sensation intact  10/10 sites via 5.07/10g [place] Monofilament, bilateral.  negative Tinel's, bilateral.  negative Valleix sign, bilateral.  Vibratory abnormal  bilateral.  Reflexes Decreased bilateral.  Paresthesias positive. Dysthesias positive. Sharp/dull abnormal  bilateral.  Musculoskeletal: Muscle strength 5/5, bilateral.  Pain absent upon palpation bilateral. Normal medial longitudinal arch, bilateral.  Ankle ROM decresed,bilateral.  1st MPJ ROM within normal limits, bilateral.  Dorsally contracted digits absent. No other foot deformities. Integument: Warm, dry, supple, bilateral.  Open lesion absent, bilateral.  Interdigital maceration absent to web spaces bilateral.   Nails left 5 and right 1 thickened, dystrophic and crumbly, discolored with subungual debris. .  Fissures absent, bilateral. Hyperkeratotic tissue is absent. Asessment: Patient is a 66 y.o. female with:    Diagnosis Orders   1.  DM (diabetes mellitus), type 2 with neurological complications (HCC)  [facility] DIABETES FOOT EXAM    IA DEBRIDEMENT OF NAIL(S), 1-5   2. Dermatophytosis of nail  HM DIABETES FOOT EXAM    IA DEBRIDEMENT OF NAIL(S), 1-5       Plan: Patient examined and evaluated. Current condition and treatment options discussed in detail. DM foot ed and exam  All nails as mentioned above debrided in length and thickness. Patient advised OTC methods for treatment of the mycotic nails. Patient will follow up in 10 weeks. Contact office with any questions/problems/concerns.

## 2020-07-23 ENCOUNTER — TELEPHONE (OUTPATIENT)
Dept: FAMILY MEDICINE CLINIC | Age: 75
End: 2020-07-23
Payer: MEDICARE

## 2020-07-23 PROCEDURE — G0179 MD RECERTIFICATION HHA PT: HCPCS | Performed by: FAMILY MEDICINE

## 2020-08-04 ENCOUNTER — OFFICE VISIT (OUTPATIENT)
Dept: FAMILY MEDICINE CLINIC | Age: 75
End: 2020-08-04
Payer: MEDICARE

## 2020-08-04 VITALS
DIASTOLIC BLOOD PRESSURE: 64 MMHG | WEIGHT: 155 LBS | BODY MASS INDEX: 25.79 KG/M2 | HEART RATE: 68 BPM | SYSTOLIC BLOOD PRESSURE: 110 MMHG | TEMPERATURE: 95.9 F

## 2020-08-04 PROBLEM — E11.40 DIABETIC NEUROPATHY (HCC): Status: ACTIVE | Noted: 2020-08-04

## 2020-08-04 PROCEDURE — 99214 OFFICE O/P EST MOD 30 MIN: CPT | Performed by: FAMILY MEDICINE

## 2020-08-04 PROCEDURE — G8427 DOCREV CUR MEDS BY ELIG CLIN: HCPCS | Performed by: FAMILY MEDICINE

## 2020-08-04 PROCEDURE — G8417 CALC BMI ABV UP PARAM F/U: HCPCS | Performed by: FAMILY MEDICINE

## 2020-08-04 PROCEDURE — 1123F ACP DISCUSS/DSCN MKR DOCD: CPT | Performed by: FAMILY MEDICINE

## 2020-08-04 PROCEDURE — G8399 PT W/DXA RESULTS DOCUMENT: HCPCS | Performed by: FAMILY MEDICINE

## 2020-08-04 PROCEDURE — 99213 OFFICE O/P EST LOW 20 MIN: CPT

## 2020-08-04 PROCEDURE — 1036F TOBACCO NON-USER: CPT | Performed by: FAMILY MEDICINE

## 2020-08-04 PROCEDURE — 4040F PNEUMOC VAC/ADMIN/RCVD: CPT | Performed by: FAMILY MEDICINE

## 2020-08-04 PROCEDURE — 1090F PRES/ABSN URINE INCON ASSESS: CPT | Performed by: FAMILY MEDICINE

## 2020-08-04 RX ORDER — SUCRALFATE 1 G/1
1 TABLET ORAL DAILY
Qty: 30 TABLET | Refills: 5 | Status: SHIPPED | OUTPATIENT
Start: 2020-08-04 | End: 2021-01-26

## 2020-08-04 RX ORDER — GABAPENTIN 300 MG/1
CAPSULE ORAL
Qty: 270 CAPSULE | Refills: 0 | Status: SHIPPED | OUTPATIENT
Start: 2020-08-04 | End: 2021-02-04

## 2020-08-04 ASSESSMENT — ENCOUNTER SYMPTOMS
RESPIRATORY NEGATIVE: 1
TROUBLE SWALLOWING: 1
ALLERGIC/IMMUNOLOGIC NEGATIVE: 1
GASTROINTESTINAL NEGATIVE: 1
BACK PAIN: 0
EYES NEGATIVE: 1
RHINORRHEA: 1
SINUS PRESSURE: 1

## 2020-08-04 NOTE — PROGRESS NOTES
MD at [address]  6/2/1979    (Dr. Naila Austin)    UPPER GASTROINTESTINAL ENDOSCOPY  6/4/15    gastritis, biopsy x 2    UPPER GASTROINTESTINAL ENDOSCOPY  04/02/2018    Dr. Vivi Triana   antral gastric ulcer. esophagus re-dilated at the end of procedure. Current Outpatient Medications   Medication Sig Dispense Refill    metFORMIN (GLUCOPHAGE) 500 MG tablet take 1 tablet by mouth twice a day with meals 180 tablet 1    omeprazole (PRILOSEC) 20 MG delayed release capsule take 1 capsule by mouth twice a day 60 capsule 3    fluticasone (FLONASE) 50 MCG/ACT nasal spray instill 2 sprays into each nostril once daily 16 g 5    gabapentin (NEURONTIN) 300 MG capsule Take 1 capsule by mouth 3 times /day. 90 capsule 3    hydroxypropyl methylcellulose (GONIOSOL) 2.5 % ophthalmic solution Place 1 drop into both eyes 3 times daily 15 mL 5    hypromellose (SYSTANE OVERNIGHT THERAPY) 0.3 % GEL ophthalmic gel Place 1 inch into both eyes nightly (Patient taking differently: Place 1 inch into both eyes daily ) 1 Tube 3    blood glucose test strips (ONE TOUCH ULTRA TEST) strip use 1 TEST STRIP to TEST BLOOD SUGAR twice a day 100 strip 3    levothyroxine (SYNTHROID) 88 MCG tablet take 1 tablet by mouth once daily 90 tablet 3    blood glucose monitor kit and supplies Test 2 times a day & as needed for symptoms of irregular blood glucose. 1 kit 0    sucralfate (CARAFATE) 1 GM tablet Take 1 g by mouth      loratadine (CLARITIN) 10 MG tablet take 1 tablet by mouth once daily 30 tablet 11    SOFT TOUCH LANCETS MISC 1 Device by Does not apply route 2 times daily 200 each 3     No current facility-administered medications for this visit.       Allergies   Allergen Reactions    Latex Rash    Aleve [Naproxen]      Stomach problems    Amoxicillin      Other reaction(s): mucositis    Aspirin Other (See Comments)     Dizziness and tears her stomach up    Dye [Iodides]      IV DYE    Flu Virus Vaccine     Influenza Vaccines     Reglan [Metoclopramide] Other (See Comments)     Dystonic reaction, involuntary movements     . Hypertension     Rash   Associated symptoms include congestion and rhinorrhea. Other   Associated symptoms include arthralgias (knees), congestion and myalgias (foot cramps described). Pertinent negatives include no rash. Diabetes   Hypoglycemia symptoms include dizziness. Review of Systems   Constitutional: Negative. HENT: Positive for congestion, rhinorrhea, sinus pressure, sneezing and trouble swallowing (thick sputum). Eyes: Negative. Respiratory: Negative. Cardiovascular: Negative. Gastrointestinal: Negative. Endocrine: Negative. Genitourinary: Negative. Musculoskeletal: Positive for arthralgias (knees) and myalgias (foot cramps described). Negative for back pain (back better at present. ) and gait problem (not using cane or walker any more). Skin: Negative. Negative for rash. Allergic/Immunologic: Negative. Neurological: Positive for dizziness. Hematological: Negative. Psychiatric/Behavioral: Negative. Objective:   Physical Exam  Constitutional:       General: She is not in acute distress. Appearance: She is well-developed. HENT:      Head: Normocephalic and atraumatic. Right Ear: Tympanic membrane and external ear normal.      Left Ear: Tympanic membrane and external ear normal.      Nose: Septal deviation (to the right), mucosal edema and congestion present. No rhinorrhea. Mouth/Throat:      Pharynx: No oropharyngeal exudate. Eyes:      General: No scleral icterus. Conjunctiva/sclera: Conjunctivae normal.   Neck:      Musculoskeletal: Neck supple. Thyroid: No thyromegaly. Cardiovascular:      Rate and Rhythm: Normal rate and regular rhythm. Heart sounds: Normal heart sounds. No murmur. Pulmonary:      Effort: Pulmonary effort is normal. No respiratory distress. Breath sounds: Normal breath sounds.  No wheezing. Abdominal:      General: Bowel sounds are normal. There is no distension. Palpations: Abdomen is soft. Tenderness: There is no abdominal tenderness. There is no rebound. Musculoskeletal: Normal range of motion. General: No tenderness. Skin:     General: Skin is warm and dry. Findings: No erythema or rash. Neurological:      Mental Status: She is alert and oriented to person, place, and time. Psychiatric:         Behavior: Behavior normal.         Thought Content: Thought content normal.         Judgment: Judgment normal.       /64 (Site: Right Upper Arm, Position: Sitting, Cuff Size: Large Adult)   Pulse 68   Temp 95.9 °F (35.5 °C) (Temporal)   Wt 155 lb (70.3 kg)   LMP  (LMP Unknown)   BMI 25.79 kg/m²        Assessment:       Encounter Diagnoses   Name Primary?  Type 2 diabetes mellitus without complication, without long-term current use of insulin (HCC) Yes    Essential hypertension, malignant     Delayed gastric emptying     Parotid gland enlargement     Other diabetic neurological complication associated with type 2 diabetes mellitus (HCC)     Irritable bowel syndrome with constipation     Osteoarthritis of multiple joints, unspecified osteoarthritis type     Gastroesophageal reflux disease without esophagitis     Lumbar stenosis with neurogenic claudication     Hypothyroidism, unspecified type     High cholesterol                Plan:         diabetes: doing well by home checks. last  a1c at 6.4%. Cont. Current metformin bid dosing. Updating eye exam.  Updating labs at present. Htn: improved at present. She stopped her low dose of lisinopril long term at present. Declining to use at present. Delayed gastric emptying. Currently, some post prandial bloating and increased bowel sounds by report. Not really painful. No vomiting. EGD 4/2/18: antral ulceration found. Esophagus re-dilated at that time.    She has prn gastroenterology consult if symptoms worsen. EGD 11/ 18/19. Minimal gastritis without ulceration. Single dilation with dilator. Some evidence for thick secretions and laryngitis. She continues on omeprazole. No gastritis or gerd concerns at present. Parotid enlargement/exrostomia; still with dry mouth concerns. Not using artificial saliva, she tried and did not like it. Nothing visible as far as enlargement. She feels fullness at times. Some chronic rhinitis and sinusitis issues ongoing. She describes intermittent sneezing and congestions despite daily antihistamine and flonase use. Concerns for sjogrens in the past.   She has somewhat severe septal deviation and likely has a lot of mechanical obstruction, especially on the right. Rec. ENT consult to consider mechanical fix to her nose issues. Thought not to be bad enough to need repair by patient recall. Some seasonal allergies likely in play at present. Ibs; some intermittent constipation. We discussed current bowel regimen and made plans to add more if needed. Oa: seeing more knee pain. Currently her back pain is better and she is not needing her cane or walker for extended walking. Weather related exacerbation of knee pain at present. Discussed nsaid use. She has had some stomach complaints on aleve in the past.   Conservative with nsaids at present due to stomach ulceration in April 2018. Consider injection trial if willing. Gerd: some recent heartburn recurrent. She was noting some recurrent esophageal pain with swallowing, and some episodes of choking recently. She had prior dilation of esophagus in [address] [place] [address] 8/2016, and again 11/2016. Repeat EGD 4/2/18 with antral gastric ulcer . Repeated dilation at that time. repeat dilation 11/19 through Dr. Armand Sandoval in [address] [place] [address]. She continues on omeprazole 20mg/day. Acute on chronic back pain. Neuropathy symptoms in feet by description, also more problematic acutely. Stiff with poor rom.

## 2020-08-14 ENCOUNTER — TELEPHONE (OUTPATIENT)
Dept: FAMILY MEDICINE CLINIC | Age: 75
End: 2020-08-14

## 2020-08-14 NOTE — TELEPHONE ENCOUNTER
Spoke with patient and she says she will wait for Arin to give her a call says she has had this before and would like to have an appt for the injection on Thursday if possible.

## 2020-08-14 NOTE — TELEPHONE ENCOUNTER
Patient had an appt with GO on 8/4. Legs tingling and numb, not getting better. Patient states GO told her she can come in and get steroid injections and she wants to do that. No appointment set up yet due to [facility] not having anything open. Please advise.

## 2020-08-20 ENCOUNTER — OFFICE VISIT (OUTPATIENT)
Dept: FAMILY MEDICINE CLINIC | Age: 75
End: 2020-08-20
Payer: MEDICARE

## 2020-08-20 VITALS
BODY MASS INDEX: 26.16 KG/M2 | SYSTOLIC BLOOD PRESSURE: 124 MMHG | RESPIRATION RATE: 16 BRPM | HEART RATE: 70 BPM | HEIGHT: 65 IN | OXYGEN SATURATION: 96 % | WEIGHT: 157 LBS | TEMPERATURE: 98.2 F | DIASTOLIC BLOOD PRESSURE: 72 MMHG

## 2020-08-20 PROCEDURE — G8417 CALC BMI ABV UP PARAM F/U: HCPCS | Performed by: FAMILY MEDICINE

## 2020-08-20 PROCEDURE — 20610 DRAIN/INJ JOINT/BURSA W/O US: CPT | Performed by: FAMILY MEDICINE

## 2020-08-20 PROCEDURE — 1123F ACP DISCUSS/DSCN MKR DOCD: CPT | Performed by: FAMILY MEDICINE

## 2020-08-20 PROCEDURE — 99213 OFFICE O/P EST LOW 20 MIN: CPT

## 2020-08-20 PROCEDURE — G8399 PT W/DXA RESULTS DOCUMENT: HCPCS | Performed by: FAMILY MEDICINE

## 2020-08-20 PROCEDURE — 1090F PRES/ABSN URINE INCON ASSESS: CPT | Performed by: FAMILY MEDICINE

## 2020-08-20 PROCEDURE — 99214 OFFICE O/P EST MOD 30 MIN: CPT | Performed by: FAMILY MEDICINE

## 2020-08-20 PROCEDURE — 4040F PNEUMOC VAC/ADMIN/RCVD: CPT | Performed by: FAMILY MEDICINE

## 2020-08-20 PROCEDURE — 1036F TOBACCO NON-USER: CPT | Performed by: FAMILY MEDICINE

## 2020-08-20 PROCEDURE — G8427 DOCREV CUR MEDS BY ELIG CLIN: HCPCS | Performed by: FAMILY MEDICINE

## 2020-08-20 RX ORDER — TRIAMCINOLONE ACETONIDE 40 MG/ML
80 INJECTION, SUSPENSION INTRA-ARTICULAR; INTRAMUSCULAR ONCE
Status: COMPLETED | OUTPATIENT
Start: 2020-08-20 | End: 2020-08-20

## 2020-08-20 RX ADMIN — TRIAMCINOLONE ACETONIDE 80 MG: 40 INJECTION, SUSPENSION INTRA-ARTICULAR; INTRAMUSCULAR at 15:01

## 2020-08-20 ASSESSMENT — ENCOUNTER SYMPTOMS
GASTROINTESTINAL NEGATIVE: 1
BACK PAIN: 1
SINUS PRESSURE: 0
TROUBLE SWALLOWING: 1
EYES NEGATIVE: 1
RESPIRATORY NEGATIVE: 1
ALLERGIC/IMMUNOLOGIC NEGATIVE: 1
RHINORRHEA: 0

## 2020-08-20 ASSESSMENT — PATIENT HEALTH QUESTIONNAIRE - PHQ9
2. FEELING DOWN, DEPRESSED OR HOPELESS: 0
SUM OF ALL RESPONSES TO PHQ9 QUESTIONS 1 & 2: 0
SUM OF ALL RESPONSES TO PHQ QUESTIONS 1-9: 0
SUM OF ALL RESPONSES TO PHQ QUESTIONS 1-9: 0
1. LITTLE INTEREST OR PLEASURE IN DOING THINGS: 0

## 2020-08-20 NOTE — PROGRESS NOTES
2020     Jyoti Saeed (:  1945) is a 66 y.o. female, here for evaluation of the following medical concerns:    HPI   Acute urgent care visit for recurrent right lateral hip pain. Some radicular pain described. She endorses down the lateral leg to the ankle. About 2 weeks of pain. No initiating injury or event recalled. Issues endorsed on 20 with pain and symptoms down the right leg into the foot with toe numbness. Updated MRI dated 20. Stable exam.         Severe central canal stenosis at L4-L5.         Moderate central canal stenosis at L3-L4.  Trace grade 1 anterolisthesis at    this level.         Mild foraminal stenosis involving the lower 3 lumbar levels. (she has no concerns for symptoms in the left leg at present.  )   History of right trochanteric bursitis , last injected 17    Review of Systems   Constitutional: Negative. HENT: Positive for trouble swallowing (thick sputum). Negative for congestion, rhinorrhea, sinus pressure and sneezing. Eyes: Negative. Respiratory: Negative. Cardiovascular: Negative. Gastrointestinal: Negative. Endocrine: Negative. Genitourinary: Negative. Musculoskeletal: Positive for arthralgias (knees), back pain and myalgias (foot cramps described, lateral hip, right gluteal area). Negative for gait problem (not using cane or walker any more). Skin: Negative. Negative for rash. Allergic/Immunologic: Negative. Neurological: Positive for dizziness and numbness (right toes). Hematological: Negative. Psychiatric/Behavioral: Negative. Prior to Visit Medications    Medication Sig Taking? Authorizing Provider   sucralfate (CARAFATE) 1 GM tablet Take 1 tablet by mouth daily Yes Carmen Prince MD   gabapentin (NEURONTIN) 300 MG capsule Take 1 capsule by mouth 3 times /day.  Yes Carmen Prince MD   metFORMIN (GLUCOPHAGE) 500 MG tablet take 1 tablet by mouth twice a day with meals Yes Carmen Prince MD   omeprazole (PRILOSEC) 20 MG delayed release capsule take 1 capsule by mouth twice a day Yes Ashanti Caputo MD   fluticasone (FLONASE) 50 MCG/ACT nasal spray instill 2 sprays into each nostril once daily Yes Ashanti Caputo MD   hydroxypropyl methylcellulose (GONIOSOL) 2.5 % ophthalmic solution Place 1 drop into both eyes 3 times daily Yes Apolonia Ni OD   hypromellose (SYSTANE OVERNIGHT THERAPY) 0.3 % GEL ophthalmic gel Place 1 inch into both eyes nightly  Patient taking differently: Place 1 inch into both eyes daily  Yes Apolonia Ni OD   blood glucose test strips (ONE TOUCH ULTRA TEST) strip use 1 TEST STRIP to TEST BLOOD SUGAR twice a day Yes Ashanti Caputo MD   levothyroxine (SYNTHROID) 88 MCG tablet take 1 tablet by mouth once daily Yes Ashanti Caputo MD   blood glucose monitor kit and supplies Test 2 times a day & as needed for symptoms of irregular blood glucose. Yes Ashanti Caputo MD   loratadine (CLARITIN) 10 MG tablet take 1 tablet by mouth once daily Yes Ashanti Caputo MD   SOFT TOUCH LANCETS MISC 1 Device by Does not apply route 2 times daily Yes Ashanti Caputo MD        Social History     Tobacco Use    Smoking status: Never Smoker    Smokeless tobacco: Never Used    Tobacco comment: Never smoked. Missael Magana [facility], 7/14/2016. Substance Use Topics    Alcohol use: No     Alcohol/week: 0.0 standard drinks        Vitals:    08/20/20 1340   BP: 124/72   Pulse: 70   Resp: 16   Temp: 98.2 °F (36.8 °C)   SpO2: 96%   Weight: 157 lb (71.2 kg)   Height: 5' 5\" (1.651 m)     Estimated body mass index is 26.13 kg/m² as calculated from the following:    Height as of this encounter: 5' 5\" (1.651 m). Weight as of this encounter: 157 lb (71.2 kg). Physical Exam  Constitutional:       General: She is not in acute distress. Appearance: She is well-developed. HENT:      Head: Normocephalic and atraumatic.       Right Ear: Tympanic membrane and external ear normal.      Left Ear: Tympanic membrane and external ear normal.      Nose: Septal deviation (to the right), mucosal edema and congestion present. No rhinorrhea. Mouth/Throat:      Pharynx: No oropharyngeal exudate. Eyes:      General: No scleral icterus. Conjunctiva/sclera: Conjunctivae normal.   Neck:      Musculoskeletal: Neck supple. Thyroid: No thyromegaly. Cardiovascular:      Rate and Rhythm: Normal rate and regular rhythm. Heart sounds: Normal heart sounds. No murmur. Pulmonary:      Effort: Pulmonary effort is normal. No respiratory distress. Breath sounds: Normal breath sounds. No wheezing. Abdominal:      General: Bowel sounds are normal. There is no distension. Palpations: Abdomen is soft. Tenderness: There is no abdominal tenderness. There is no rebound. Musculoskeletal: Normal range of motion. Right hip: She exhibits tenderness (trochanteric bursae area pain to palpation, reproducible. ). Legs:    Skin:     General: Skin is warm and dry. Findings: No erythema or rash. Neurological:      Mental Status: She is alert and oriented to person, place, and time. Psychiatric:         Behavior: Behavior normal.         Thought Content: Thought content normal.         Judgment: Judgment normal.         ASSESSMENT/PLAN:  Encounter Diagnoses   Name Primary?  Trochanteric bursitis, right hip Yes    Lumbar stenosis with neurogenic claudication      Currently with 2 diagnosis of the right leg. She has evidence for recurrent trochanteric bursitis on exam.  She did well with last injection in 2017. She desires injection again today. Procedure: after consent and sterile prep.: 5 cc lidocaine followed by 2 cc kenalog injected into the bursae area level with fenestrated pattern. Patient tolerated well. She has additional radicular symptoms and numbness in the toes more likely related to known spinal/foraminal stenosis. Current no issues with the left leg.   We have offered neurosurgery consultation . She doesn't think it has progressed to the point she would consider surgery at present. An electronic signature was used to authenticate this note.     --Joseph Garcia MD on 8/20/2020 at 2:27 PM

## 2020-08-25 ENCOUNTER — TELEPHONE (OUTPATIENT)
Dept: FAMILY MEDICINE CLINIC | Age: 75
End: 2020-08-25

## 2020-08-25 NOTE — TELEPHONE ENCOUNTER
Pt calling stating she's having terrible cramps in her R leg, states it last 15-20 min, pt states at last visit [facility] gave her a pain shot and that lasted approx 2 days, pt questions if she needs a referral to PT, please advise at above number.

## 2020-08-27 ENCOUNTER — HOSPITAL ENCOUNTER (OUTPATIENT)
Dept: LAB | Age: 75
Discharge: HOME | End: 2020-08-27
Payer: MEDICARE

## 2020-08-27 LAB
ANION GAP SERPL CALCULATED.3IONS-SCNC: 8 MMOL/L (ref 9–17)
BUN BLDV-MCNC: 28 MG/DL (ref 8–23)
BUN/CREAT BLD: 32 (ref 9–20)
CALCIUM SERPL-MCNC: 10.6 MG/DL (ref 8.6–10.4)
CHLORIDE BLD-SCNC: 104 MMOL/L (ref 98–107)
CO2: 28 MMOL/L (ref 20–31)
CREAT SERPL-MCNC: 0.88 MG/DL (ref 0.5–0.9)
GFR AFRICAN AMERICAN: >60 ML/MIN
GFR NON-AFRICAN AMERICAN: >60 ML/MIN
GFR SERPL CREATININE-BSD FRML MDRD: ABNORMAL ML/MIN/{1.73_M2}
GFR SERPL CREATININE-BSD FRML MDRD: ABNORMAL ML/MIN/{1.73_M2}
GLUCOSE BLD-MCNC: 129 MG/DL (ref 70–99)
POTASSIUM SERPL-SCNC: 4 MMOL/L (ref 3.7–5.3)
SODIUM BLD-SCNC: 140 MMOL/L (ref 135–144)

## 2020-08-27 PROCEDURE — 80048 BASIC METABOLIC PNL TOTAL CA: CPT

## 2020-08-27 PROCEDURE — 36415 COLL VENOUS BLD VENIPUNCTURE: CPT

## 2020-08-28 ENCOUNTER — OFFICE VISIT (OUTPATIENT)
Dept: PRIMARY CARE CLINIC | Age: 75
End: 2020-08-28
Payer: MEDICARE

## 2020-08-28 VITALS
BODY MASS INDEX: 25.66 KG/M2 | DIASTOLIC BLOOD PRESSURE: 72 MMHG | HEART RATE: 62 BPM | RESPIRATION RATE: 18 BRPM | OXYGEN SATURATION: 96 % | SYSTOLIC BLOOD PRESSURE: 112 MMHG | HEIGHT: 65 IN | WEIGHT: 154 LBS | TEMPERATURE: 97.7 F

## 2020-08-28 PROCEDURE — 1123F ACP DISCUSS/DSCN MKR DOCD: CPT | Performed by: FAMILY MEDICINE

## 2020-08-28 PROCEDURE — G8399 PT W/DXA RESULTS DOCUMENT: HCPCS | Performed by: FAMILY MEDICINE

## 2020-08-28 PROCEDURE — G8417 CALC BMI ABV UP PARAM F/U: HCPCS | Performed by: FAMILY MEDICINE

## 2020-08-28 PROCEDURE — 1090F PRES/ABSN URINE INCON ASSESS: CPT | Performed by: FAMILY MEDICINE

## 2020-08-28 PROCEDURE — 99212 OFFICE O/P EST SF 10 MIN: CPT | Performed by: FAMILY MEDICINE

## 2020-08-28 PROCEDURE — 99214 OFFICE O/P EST MOD 30 MIN: CPT | Performed by: FAMILY MEDICINE

## 2020-08-28 PROCEDURE — G8427 DOCREV CUR MEDS BY ELIG CLIN: HCPCS | Performed by: FAMILY MEDICINE

## 2020-08-28 PROCEDURE — 1036F TOBACCO NON-USER: CPT | Performed by: FAMILY MEDICINE

## 2020-08-28 PROCEDURE — 4040F PNEUMOC VAC/ADMIN/RCVD: CPT | Performed by: FAMILY MEDICINE

## 2020-08-28 ASSESSMENT — PATIENT HEALTH QUESTIONNAIRE - PHQ9
SUM OF ALL RESPONSES TO PHQ QUESTIONS 1-9: 0
1. LITTLE INTEREST OR PLEASURE IN DOING THINGS: 0
2. FEELING DOWN, DEPRESSED OR HOPELESS: 0
SUM OF ALL RESPONSES TO PHQ QUESTIONS 1-9: 0
SUM OF ALL RESPONSES TO PHQ9 QUESTIONS 1 & 2: 0

## 2020-08-28 ASSESSMENT — ENCOUNTER SYMPTOMS: CONSTIPATION: 0

## 2020-08-28 NOTE — PROGRESS NOTES
[address]  [address], [address]  [phone]      Car Prasad is a 66 y.o. female who is c/o of Other (leg cramps. was told that she was dehydrated.)      HPI:     Leg Pain    The incident occurred more than 1 week ago (2-3 months ago - were intermittent, but have become more frequent). The pain is present in the right leg. The quality of the pain is described as cramping. The pain has been worsening (Last at least 20 mins; usually in the middle of the night or sometimes during the day) since onset. Had to call 9-1-1 two weeks ago due to a cramp in her R leg - had a hard time getting out of bed overnight to go to the bathroom. EMS stayed with her while she used the restroom and got back to bed; was not taken to [facility] nurse 3 days ago - had BMP ordered to check for electrolyte abnormality. BUN elevated at 28, and BUN/Cr ratio elevated at 32. K+ normal, Calcium 10.6 (pt states she does normally run a little high). Pt spoke with her [facility] nurse regarding this and her symptoms, and was recommended to come to UC for evaluation of her cramping. Has been drinking sugar-free Rod-aid more often x past month, but states she does drink at least 3 bottles of water daily. Does not drink any alcohol; has soda infrequently. Has been trying to eat more melon due to having been constipated; has had looser stools x past week due to this, but denies that it is diarrhea. Has not had as many cramps in the past week; admits that she did start eating more dill pickles, as recommended. Has also used heating pad and ice, and is trying to do more stretches daily.           Subjective:      Past Medical History:   Diagnosis Date    Allergic conjunctivitis     (stable)    Chronic sinusitis     Constipation     Dizzy spells     Dry eye syndrome     Edentulous     (does not wear dentures)    Essential hypertension take 1 capsule by mouth twice a day 60 capsule 3    fluticasone (FLONASE) 50 MCG/ACT nasal spray instill 2 sprays into each nostril once daily 16 g 5    hydroxypropyl methylcellulose (GONIOSOL) 2.5 % ophthalmic solution Place 1 drop into both eyes 3 times daily 15 mL 5    hypromellose (SYSTANE OVERNIGHT THERAPY) 0.3 % GEL ophthalmic gel Place 1 inch into both eyes nightly (Patient taking differently: Place 1 inch into both eyes daily ) 1 Tube 3    blood glucose test strips (ONE TOUCH ULTRA TEST) strip use 1 TEST STRIP to TEST BLOOD SUGAR twice a day 100 strip 3    levothyroxine (SYNTHROID) 88 MCG tablet take 1 tablet by mouth once daily 90 tablet 3    blood glucose monitor kit and supplies Test 2 times a day & as needed for symptoms of irregular blood glucose. 1 kit 0    loratadine (CLARITIN) 10 MG tablet take 1 tablet by mouth once daily 30 tablet 11    SOFT TOUCH LANCETS MISC 1 Device by Does not apply route 2 times daily 200 each 3     No current facility-administered medications for this visit. Allergies   Allergen Reactions    Latex Rash    Aleve [Naproxen]      Stomach problems    Amoxicillin      Other reaction(s): mucositis    Aspirin Other (See Comments)     Dizziness and tears her stomach up    Dye [Iodides]      IV DYE    Influenza Vaccines     Reglan [Metoclopramide] Other (See Comments)     Dystonic reaction, involuntary movements       Review of Systems   Constitutional: Negative for fever. Gastrointestinal: Negative for constipation. Musculoskeletal: Positive for myalgias. Neurological: Positive for light-headedness (all summer). Psychiatric/Behavioral: Positive for sleep disturbance (due to cramps waking her). Objective:     Vitals:    08/28/20 0945   BP: 112/72   Pulse: 62   Resp: 18   Temp: 97.7 °F (36.5 °C)   SpO2: 96%   Weight: 154 lb (69.9 kg)   Height: 5' 5\" (1.651 m)     Physical Exam  Vitals signs and nursing note reviewed.    Constitutional:       General: She is not in acute distress. Appearance: She is well-developed. HENT:      Head: Normocephalic and atraumatic. Right Ear: Tympanic membrane, ear canal and external ear normal.      Left Ear: Tympanic membrane, ear canal and external ear normal.      Nose: Nose normal.      Mouth/Throat:      Mouth: Mucous membranes are moist.      Pharynx: Oropharynx is clear. No posterior oropharyngeal erythema. Eyes:      Conjunctiva/sclera: Conjunctivae normal.   Neck:      Musculoskeletal: Neck supple. Cardiovascular:      Rate and Rhythm: Normal rate and regular rhythm. Heart sounds: Normal heart sounds. Pulmonary:      Effort: Pulmonary effort is normal. No respiratory distress. Breath sounds: Normal breath sounds. Abdominal:      General: Bowel sounds are normal. There is no distension. Palpations: Abdomen is soft. Tenderness: There is no abdominal tenderness. Musculoskeletal:         General: Tenderness present. Comments: Negative Barb's sign   Skin:     General: Skin is warm and dry. Neurological:      Mental Status: She is alert and oriented to person, place, and time. Assessment:       Diagnosis Orders   1. Muscle cramps         Plan:      Return if symptoms worsen or fail to improve in 1-2 weeks. No orders of the defined types were placed in this encounter. No orders of the defined types were placed in this encounter. Patient given educational materials - see patient instructions. Discussed use, benefit, and side effects of prescribed medications. All patient questions answered. Pt voiced understanding.        Electronically signed by Rogelio Ferrer DO on 9/6/2020 at 11:36 PM

## 2020-08-28 NOTE — PATIENT INSTRUCTIONS
Patient Education        Muscle Cramps: Care Instructions  Your Care Instructions     A muscle cramp occurs when a muscle tightens up suddenly. A cramp often happens in the legs. A muscle cramp is also called a muscle spasm or a charley horse. Muscle cramps usually last less than a minute. However, the pain may last for several minutes. Leg cramps that occur at night may wake you up. Heavy exercise, dehydration, and being overweight can increase your risk of getting cramps. An imbalance of certain chemicals in your blood, called electrolytes, can also lead to muscle cramps. Pregnant women sometimes get muscle cramps during sleep. Muscle cramps can be treated by stretching and massaging the muscle. If cramps keep coming back, your doctor may prescribe medicine that relaxes your muscles. Follow-up care is a key part of your treatment and safety. Be sure to make and go to all appointments, and call your doctor if you are having problems. It's also a good idea to know your test results and keep a list of the medicines you take. How can you care for yourself at home? · Drink plenty of fluids to prevent dehydration. Choose water and other caffeine-free clear liquids until you feel better. If you have kidney, heart, or liver disease and have to limit fluids, talk with your doctor before you increase the amount of fluids you drink. · Stretch your muscles every day, especially before and after exercise and at bedtime. Regular stretching can relax your muscles and may prevent cramps. · Do not suddenly increase the amount of exercise you get. Increase your exercise a little each week. · When you get a cramp, stretch and massage the muscle. You can also take a warm shower or bath to relax the muscle. A heating pad placed on the muscle can also help. · Take a daily multivitamin supplement.   · Ask your doctor if you can take an over-the-counter pain medicine, such as acetaminophen (Tylenol), ibuprofen (Advil, Motrin), or naproxen (Aleve). Be safe with medicines. Read and follow all instructions on the label. When should you call for help? Watch closely for changes in your health, and be sure to contact your doctor if:  · You get muscle cramps often that do not go away after home treatment. · Your muscle cramps often wake you up at night. · You do not get better as expected. Where can you learn more? Go to https://[employer]RunMyProcess. org and sign in to your [employer] account. Enter F493 in the [employer] box to learn more about \"Muscle Cramps: Care Instructions. \"     If you do not have an account, please click on the \"Sign Up Now\" link. Current as of: March 2, 2020               Content Version: 12.5  © 4339-6683 Healthwise, Incorporated. Care instructions adapted under license by [facility] ([facility]). If you have questions about a medical condition or this instruction, always ask your healthcare professional. [address] any warranty or liability for your use of this information.

## 2020-09-03 ENCOUNTER — TELEPHONE (OUTPATIENT)
Dept: FAMILY MEDICINE CLINIC | Age: 75
End: 2020-09-03

## 2020-09-10 ENCOUNTER — HOSPITAL ENCOUNTER (EMERGENCY)
Age: 75
Discharge: HOME | End: 2020-09-10
Attending: EMERGENCY MEDICINE
Payer: MEDICARE

## 2020-09-10 VITALS
BODY MASS INDEX: 24.99 KG/M2 | SYSTOLIC BLOOD PRESSURE: 118 MMHG | HEIGHT: 65 IN | DIASTOLIC BLOOD PRESSURE: 63 MMHG | OXYGEN SATURATION: 96 % | WEIGHT: 150 LBS | TEMPERATURE: 96.6 F | RESPIRATION RATE: 16 BRPM | HEART RATE: 63 BPM

## 2020-09-10 LAB
ABSOLUTE EOS #: 0.06 K/UL (ref 0–0.44)
ABSOLUTE IMMATURE GRANULOCYTE: 0.03 K/UL (ref 0–0.3)
ABSOLUTE LYMPH #: 2.1 K/UL (ref 1.1–3.7)
ABSOLUTE MONO #: 0.66 K/UL (ref 0.1–1.2)
ANION GAP SERPL CALCULATED.3IONS-SCNC: 8 MMOL/L (ref 9–17)
BASOPHILS # BLD: 1 % (ref 0–2)
BASOPHILS ABSOLUTE: 0.03 K/UL (ref 0–0.2)
BUN BLDV-MCNC: 19 MG/DL (ref 8–23)
BUN/CREAT BLD: 24 (ref 9–20)
CALCIUM SERPL-MCNC: 10.4 MG/DL (ref 8.6–10.4)
CHLORIDE BLD-SCNC: 102 MMOL/L (ref 98–107)
CO2: 30 MMOL/L (ref 20–31)
CREAT SERPL-MCNC: 0.78 MG/DL (ref 0.5–0.9)
DIFFERENTIAL TYPE: ABNORMAL
EOSINOPHILS RELATIVE PERCENT: 1 % (ref 1–4)
GFR AFRICAN AMERICAN: >60 ML/MIN
GFR NON-AFRICAN AMERICAN: >60 ML/MIN
GFR SERPL CREATININE-BSD FRML MDRD: ABNORMAL ML/MIN/{1.73_M2}
GFR SERPL CREATININE-BSD FRML MDRD: ABNORMAL ML/MIN/{1.73_M2}
GLUCOSE BLD-MCNC: 153 MG/DL (ref 70–99)
HCT VFR BLD CALC: 40.7 % (ref 36.3–47.1)
HEMOGLOBIN: 12.4 G/DL (ref 11.9–15.1)
IMMATURE GRANULOCYTES: 1 %
LYMPHOCYTES # BLD: 32 % (ref 24–43)
MAGNESIUM: 1.9 MG/DL (ref 1.6–2.6)
MCH RBC QN AUTO: 27.7 PG (ref 25.2–33.5)
MCHC RBC AUTO-ENTMCNC: 30.5 G/DL (ref 25.2–33.5)
MCV RBC AUTO: 90.8 FL (ref 82.6–102.9)
MONOCYTES # BLD: 10 % (ref 3–12)
NRBC AUTOMATED: 0 PER 100 WBC
PDW BLD-RTO: 15.7 % (ref 11.8–14.4)
PLATELET # BLD: 297 K/UL (ref 138–453)
PLATELET ESTIMATE: ABNORMAL
PMV BLD AUTO: 9.5 FL (ref 8.1–13.5)
POTASSIUM SERPL-SCNC: 4.3 MMOL/L (ref 3.7–5.3)
RBC # BLD: 4.48 M/UL (ref 3.95–5.11)
RBC # BLD: ABNORMAL 10*6/UL
SEG NEUTROPHILS: 55 % (ref 36–65)
SEGMENTED NEUTROPHILS ABSOLUTE COUNT: 3.62 K/UL (ref 1.5–8.1)
SODIUM BLD-SCNC: 140 MMOL/L (ref 135–144)
WBC # BLD: 6.5 K/UL (ref 3.5–11.3)
WBC # BLD: ABNORMAL 10*3/UL

## 2020-09-10 PROCEDURE — 85025 COMPLETE CBC W/AUTO DIFF WBC: CPT

## 2020-09-10 PROCEDURE — 83735 ASSAY OF MAGNESIUM: CPT

## 2020-09-10 PROCEDURE — 80048 BASIC METABOLIC PNL TOTAL CA: CPT

## 2020-09-10 PROCEDURE — 99284 EMERGENCY DEPT VISIT MOD MDM: CPT

## 2020-09-10 PROCEDURE — 36415 COLL VENOUS BLD VENIPUNCTURE: CPT

## 2020-09-10 RX ORDER — HYDROCODONE BITARTRATE AND ACETAMINOPHEN 5; 325 MG/1; MG/1
1 TABLET ORAL 2 TIMES DAILY PRN
Qty: 14 TABLET | Refills: 0 | Status: SHIPPED | OUTPATIENT
Start: 2020-09-10 | End: 2020-09-17

## 2020-09-10 ASSESSMENT — PAIN DESCRIPTION - DESCRIPTORS: DESCRIPTORS: ACHING

## 2020-09-10 ASSESSMENT — ENCOUNTER SYMPTOMS
COUGH: 0
SHORTNESS OF BREATH: 0

## 2020-09-10 ASSESSMENT — PAIN SCALES - GENERAL
PAINLEVEL_OUTOF10: 5
PAINLEVEL_OUTOF10: 3

## 2020-09-10 ASSESSMENT — PAIN DESCRIPTION - LOCATION: LOCATION: LEG

## 2020-09-10 ASSESSMENT — PAIN DESCRIPTION - PAIN TYPE: TYPE: ACUTE PAIN;CHRONIC PAIN

## 2020-09-10 ASSESSMENT — PAIN DESCRIPTION - ORIENTATION: ORIENTATION: LEFT;RIGHT

## 2020-09-10 NOTE — ED PROVIDER NOTES
[address] [facility] ED  EMERGENCY DEPARTMENT ENCOUNTER      Pt Name: Michael Hubbard  MRN: [id number]  [place] 1945  Date of evaluation: 9/10/2020  Provider: Rafael Hammans, MD    CHIEF COMPLAINT     Chief Complaint   Patient presents with    Leg Pain     \"chalrie hoarses\" bilat right >Left    Dizziness     when changes position for months         HISTORY OF PRESENT ILLNESS   (Location/Symptom, Timing/Onset, Context/Setting,Quality, Duration, Modifying Factors, Severity)  Note limiting factors. Michael Hubbard is a 66 y.o. female who presents to the emergency department with a chief complaint of bilateral leg cramps that she states she has had all summer. She recently underwent MRI of the lumbar spine in July and was found to have significant spinal stenosis and has been diagnosed with neurogenic claudication. She is on gabapentin 300 mg 3 times a day. Leg discomfort can come on at rest or with activity. She was advised neurosurgery consult but declined it according to her primary care physician's notes. The history is provided by the patient and medical records. Nursing Notes werereviewed. REVIEW OF SYSTEMS    (2-9 systems for level 4, 10 or more for level 5)     Review of Systems   Constitutional: Negative for fever. Respiratory: Negative for cough and shortness of breath. All other systems reviewed and are negative. Except as noted above the remainder of the review of systems was reviewed and negative. PAST MEDICAL HISTORY     Past Medical History:   Diagnosis Date    Allergic conjunctivitis     (stable)    Chronic sinusitis     Constipation     Dizzy spells     Dry eye syndrome     Edentulous     (does not wear dentures)    Essential hypertension 7/6/2016    Fracture of lateral malleolus     (avulsion fracture at the tip of the lateral malleolus - right ankle).     Hearing loss     Hypothyroidism     Interstitial cystitis     Irritable bowel syndrome     Keratitis     (secondary to dry eye syndrome)    Parotid gland enlargement     stasis, consider sjorgens    Pseudophakos     (bilateral)    Seasonal rhinitis     Stress incontinence     Type II or unspecified type diabetes mellitus without mention of complication, not stated as uncontrolled     Urethral stenosis     Xerostomia          SURGICALHISTORY       Past Surgical History:   Procedure Laterality Date    APPENDECTOMY  07-    CATARACT REMOVAL WITH IMPLANT Right 9/2/2003    (Dr. Nik Carr)    CATARACT REMOVAL WITH IMPLANT Left 8/12/2003    (Dr. Nik Carr)    CHOLECYSTECTOMY  07/24/1986    COLONOSCOPY  9/8/14    normal    CYSTOURETHROSCOPY/URETHRAL DILATION  2/8/12    multiple    EYE SURGERY Bilateral 12/13/2018    Bilateral Transscleral cyclophotocoagulation G6 laser (micropulse) performed by Kirti Ayala MD at [address]  6/2/1977    (Dr. Jessica Aaron)   [facility] ENDOSCOPY  6/4/15    gastritis, biopsy x 2    UPPER GASTROINTESTINAL ENDOSCOPY  04/02/2018    Dr. Ximena Vicente   antral gastric ulcer. esophagus re-dilated at the end of procedure. CURRENT MEDICATIONS       Previous Medications    BLOOD GLUCOSE MONITOR KIT AND SUPPLIES    Test 2 times a day & as needed for symptoms of irregular blood glucose. BLOOD GLUCOSE TEST STRIPS (ONE TOUCH ULTRA TEST) STRIP    use 1 TEST STRIP to TEST BLOOD SUGAR twice a day    FLUTICASONE (FLONASE) 50 MCG/ACT NASAL SPRAY    instill 2 sprays into each nostril once daily    GABAPENTIN (NEURONTIN) 300 MG CAPSULE    Take 1 capsule by mouth 3 times /day.     HYDROXYPROPYL METHYLCELLULOSE (GONIOSOL) 2.5 % OPHTHALMIC SOLUTION    Place 1 drop into both eyes 3 times daily    HYPROMELLOSE (SYSTANE OVERNIGHT THERAPY) 0.3 % GEL OPHTHALMIC GEL    Place 1 inch into both eyes nightly    LEVOTHYROXINE (SYNTHROID) 88 MCG TABLET    take 1 tablet by mouth once daily    LORATADINE (CLARITIN) 10 MG TABLET    take 1 tablet by mouth once daily    METFORMIN (GLUCOPHAGE) 500 MG TABLET    take 1 tablet by mouth twice a day with meals    OMEPRAZOLE (PRILOSEC) 20 MG DELAYED RELEASE CAPSULE    take 1 capsule by mouth twice a day    SOFT TOUCH LANCETS MISC    1 Device by Does not apply route 2 times daily    SUCRALFATE (CARAFATE) 1 GM TABLET    Take 1 tablet by mouth daily       ALLERGIES     Latex; Aleve [naproxen]; Amoxicillin; Aspirin; Dye [iodides]; Influenza vaccines; and Reglan [metoclopramide]    FAMILY HISTORY       Family History   Problem Relation Age of Onset    Heart Disease Mother     Heart Attack Mother     Arthritis Mother     Other Maternal Grandmother         (gout)    Allergies Daughter     Allergies Daughter     Other Brother         ( at age 21 secondary to auto accident).  Cataracts Neg Hx     Diabetes Neg Hx     Glaucoma Neg Hx           SOCIAL HISTORY       Social History     Socioeconomic History    Marital status: Single     Spouse name: None    Number of children: None    Years of education: None    Highest education level: None   Occupational History    None   Social Needs    Financial resource strain: None    Food insecurity     Worry: None     Inability: None    Transportation needs     Medical: None     Non-medical: None   Tobacco Use    Smoking status: Never Smoker    Smokeless tobacco: Never Used    Tobacco comment: Never smoked. Janae Jin Rcp, 2016.    Substance and Sexual Activity    Alcohol use: No     Alcohol/week: 0.0 standard drinks    Drug use: No    Sexual activity: None   Lifestyle    Physical activity     Days per week: None     Minutes per session: None    Stress: None   Relationships    Social connections     Talks on phone: None     Gets together: None     Attends [employer] service: None     Active member of club or organization: None     Attends meetings of clubs or organizations: None     Relationship status: None    Intimate partner violence     Fear of current or ex partner: None     Emotionally abused: None     Physically abused: None     Forced sexual activity: None   Other Topics Concern    None   Social History Narrative    None       SCREENINGS    Hershey Coma Scale  Eye Opening: Spontaneous  Best Verbal Response: Oriented  Best Motor Response: Obeys commands  Christine Coma Scale Score: 15        PHYSICAL EXAM    (up to 7 for level 4, 8 or more for level 5)     ED Triage Vitals [09/10/20 1212]   BP Temp Temp Source Pulse Resp SpO2 Height Weight   115/80 96.6 °F (35.9 °C) Tympanic 75 16 98 % 5' 5\" (1.651 m) 150 lb (68 kg)       Physical Exam  Vitals signs reviewed. Constitutional:       General: She is not in acute distress. HENT:      Head: Normocephalic. Right Ear: External ear normal.      Left Ear: External ear normal.      Nose: Nose normal.   Eyes:      Extraocular Movements: Extraocular movements intact. Neck:      Musculoskeletal: Neck supple. Cardiovascular:      Rate and Rhythm: Normal rate and regular rhythm. Pulmonary:      Effort: Pulmonary effort is normal.      Breath sounds: Normal breath sounds. Abdominal:      Palpations: Abdomen is soft. Tenderness: There is no abdominal tenderness. Musculoskeletal: Normal range of motion. General: No swelling or tenderness. Right lower leg: No edema. Left lower leg: No edema. Comments: Straight leg raising is intact bilaterally. Skin:     General: Skin is warm and dry. Coloration: Skin is not pale. Neurological:      General: No focal deficit present. Mental Status: She is alert and oriented to person, place, and time. Mental status is at baseline.    Psychiatric:         Mood and Affect: Mood normal.         DIAGNOSTIC RESULTS     EKG: All EKG's are interpreted by the Emergency Department Physician who either signs orCo-signs this chart in the absence of a cardiologist.    RADIOLOGY:   Non-plain film images such as CT, Ultrasound and MRI are read by the gastric emptying K30    Xerostomia R68.2    Essential hypertension I10    Acute pancreatitis K85.90    Chronic bilateral low back pain with bilateral sciatica M54.42, M54.41, G89.29    Lumbar stenosis with neurogenic claudication M48.062    Acquired spondylolisthesis M43.10    Type 2 diabetes mellitus without complication, without long-term current use of insulin (HCC) E11.9    Primary open angle glaucoma (POAG) of both eyes, moderate stage H40.1132    Diabetes type 2, no ocular involvement (HCC) E11.9    Pseudophakia of both eyes Z96.1    Hypothyroidism E03.9    High cholesterol E78.00    Dry eye syndrome of both eyes H04.123    Meibomian gland dysfunction (MGD) H02.889    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 ED Medicationsadministered this visit:  Medications - No data to display    New Prescriptions from this visit:    New Prescriptions    HYDROCODONE-ACETAMINOPHEN (NORCO) 5-325 MG PER TABLET    Take 1 tablet by mouth 2 times daily as needed for Pain for up to 7 days. Follow-up:  Mesfin Gray MD  [address] [place] [address]  [phone]              Final Impression:   1.  Leg cramps               (Please note that portions of this note were completed with a voice recognitionprogram.  Efforts were made to edit the dictations but occasionally words are mis-transcribed.)    Autumn Kim MD (electronically signed)  Attending Emergency Physician            Autumn Kim MD  09/10/20 2883

## 2020-09-14 ENCOUNTER — TELEPHONE (OUTPATIENT)
Dept: FAMILY MEDICINE CLINIC | Age: 75
End: 2020-09-14

## 2020-09-17 ENCOUNTER — OFFICE VISIT (OUTPATIENT)
Dept: PAIN MANAGEMENT | Age: 75
End: 2020-09-17
Payer: MEDICARE

## 2020-09-17 ENCOUNTER — OFFICE VISIT (OUTPATIENT)
Dept: PODIATRY | Age: 75
End: 2020-09-17
Payer: MEDICARE

## 2020-09-17 VITALS
HEIGHT: 65 IN | SYSTOLIC BLOOD PRESSURE: 122 MMHG | DIASTOLIC BLOOD PRESSURE: 72 MMHG | WEIGHT: 155 LBS | BODY MASS INDEX: 25.83 KG/M2

## 2020-09-17 VITALS
HEART RATE: 68 BPM | HEIGHT: 65 IN | BODY MASS INDEX: 25.99 KG/M2 | WEIGHT: 156 LBS | DIASTOLIC BLOOD PRESSURE: 72 MMHG | SYSTOLIC BLOOD PRESSURE: 120 MMHG

## 2020-09-17 PROCEDURE — G8417 CALC BMI ABV UP PARAM F/U: HCPCS | Performed by: PHYSICAL MEDICINE & REHABILITATION

## 2020-09-17 PROCEDURE — 1090F PRES/ABSN URINE INCON ASSESS: CPT | Performed by: PODIATRIST

## 2020-09-17 PROCEDURE — 4040F PNEUMOC VAC/ADMIN/RCVD: CPT | Performed by: PODIATRIST

## 2020-09-17 PROCEDURE — 20610 DRAIN/INJ JOINT/BURSA W/O US: CPT | Performed by: PHYSICAL MEDICINE & REHABILITATION

## 2020-09-17 PROCEDURE — 4040F PNEUMOC VAC/ADMIN/RCVD: CPT | Performed by: PHYSICAL MEDICINE & REHABILITATION

## 2020-09-17 PROCEDURE — 99215 OFFICE O/P EST HI 40 MIN: CPT | Performed by: PHYSICAL MEDICINE & REHABILITATION

## 2020-09-17 PROCEDURE — G8417 CALC BMI ABV UP PARAM F/U: HCPCS | Performed by: PODIATRIST

## 2020-09-17 PROCEDURE — 99213 OFFICE O/P EST LOW 20 MIN: CPT | Performed by: PODIATRIST

## 2020-09-17 PROCEDURE — G8399 PT W/DXA RESULTS DOCUMENT: HCPCS | Performed by: PHYSICAL MEDICINE & REHABILITATION

## 2020-09-17 PROCEDURE — 1036F TOBACCO NON-USER: CPT | Performed by: PODIATRIST

## 2020-09-17 PROCEDURE — 1090F PRES/ABSN URINE INCON ASSESS: CPT | Performed by: PHYSICAL MEDICINE & REHABILITATION

## 2020-09-17 PROCEDURE — G8427 DOCREV CUR MEDS BY ELIG CLIN: HCPCS | Performed by: PHYSICAL MEDICINE & REHABILITATION

## 2020-09-17 PROCEDURE — G8427 DOCREV CUR MEDS BY ELIG CLIN: HCPCS | Performed by: PODIATRIST

## 2020-09-17 PROCEDURE — 1123F ACP DISCUSS/DSCN MKR DOCD: CPT | Performed by: PODIATRIST

## 2020-09-17 PROCEDURE — 1123F ACP DISCUSS/DSCN MKR DOCD: CPT | Performed by: PHYSICAL MEDICINE & REHABILITATION

## 2020-09-17 PROCEDURE — G8399 PT W/DXA RESULTS DOCUMENT: HCPCS | Performed by: PODIATRIST

## 2020-09-17 PROCEDURE — 99204 OFFICE O/P NEW MOD 45 MIN: CPT | Performed by: PHYSICAL MEDICINE & REHABILITATION

## 2020-09-17 PROCEDURE — 1036F TOBACCO NON-USER: CPT | Performed by: PHYSICAL MEDICINE & REHABILITATION

## 2020-09-17 PROCEDURE — 11720 DEBRIDE NAIL 1-5: CPT | Performed by: PODIATRIST

## 2020-09-17 ASSESSMENT — ENCOUNTER SYMPTOMS
EYES NEGATIVE: 1
CONSTIPATION: 0
BACK PAIN: 1
ALLERGIC/IMMUNOLOGIC NEGATIVE: 1
DIARRHEA: 0
RESPIRATORY NEGATIVE: 1

## 2020-09-17 NOTE — PROGRESS NOTES
Subjective:  Allie Owens is a 66 y.o. female who presents to the office today for foot pain b/l. Pt adds she is being seen for her low back issues and not sure if it is related. Pt feels like her feet get very sore in her shoes. Pain is aching. No tx tried. Pt also presents for routine DM foot care. Currently denies F/C/N/V. Allergies   Allergen Reactions    Latex Rash    Aleve [Naproxen]      Stomach problems    Amoxicillin      Other reaction(s): mucositis    Aspirin Other (See Comments)     Dizziness and tears her stomach up    Dye [Iodides]      IV DYE    Influenza Vaccines     Reglan [Metoclopramide] Other (See Comments)     Dystonic reaction, involuntary movements       Past Medical History:   Diagnosis Date    Allergic conjunctivitis     (stable)    Chronic sinusitis     Constipation     Dizzy spells     Dry eye syndrome     Edentulous     (does not wear dentures)    Essential hypertension 7/6/2016    Fracture of lateral malleolus     (avulsion fracture at the tip of the lateral malleolus - right ankle).  Hearing loss     Hypothyroidism     Interstitial cystitis     Irritable bowel syndrome     Keratitis     (secondary to dry eye syndrome)    Parotid gland enlargement     stasis, consider sjorgens    Pseudophakos     (bilateral)    Seasonal rhinitis     Stress incontinence     Type II or unspecified type diabetes mellitus without mention of complication, not stated as uncontrolled     Urethral stenosis     Xerostomia        Prior to Admission medications    Medication Sig Start Date End Date Taking?  Authorizing Provider   Diabetic Shoe MISC by Does not apply route Dispense DM shoes and insoles  DM (diabetes mellitus), type 2 with neurological complications ([facility] [address]  (primary encounter diagnosis)  dermatophytosis of nail  Hammer toe, unspecified laterality 9/17/20  Yes Orly Read DPM   HYDROcodone-acetaminophen (NORCO) 5-325 MG per tablet Take 1 tablet by mouth 2 times daily as needed for Pain for up to 7 days. 9/10/20 9/17/20 Yes Santana Frederick MD   sucralfate (CARAFATE) 1 GM tablet Take 1 tablet by mouth daily 8/4/20 12/1/21 Yes Kianna Avendaño MD   metFORMIN (GLUCOPHAGE) 500 MG tablet take 1 tablet by mouth twice a day with meals 7/22/20  Yes Kianna Avendaño MD   omeprazole (PRILOSEC) 20 MG delayed release capsule take 1 capsule by mouth twice a day 6/29/20  Yes Kianna Avendaño MD   fluticasone [facility]) 50 MCG/ACT nasal spray instill 2 sprays into each nostril once daily 6/29/20  Yes Kianna Avendaño MD   hydroxypropyl methylcellulose (GONIOSOL) 2.5 % ophthalmic solution Place 1 drop into both eyes 3 times daily 6/18/20  Yes Apolonia Mary Card, OD   hypromellose (SYSTANE OVERNIGHT THERAPY) 0.3 % GEL ophthalmic gel Place 1 inch into both eyes nightly  Patient taking differently: Place 1 inch into both eyes daily  6/9/20  Yes Apolonia Mary Card, OD   blood glucose test strips (ONE TOUCH ULTRA TEST) strip use 1 TEST STRIP to TEST BLOOD SUGAR twice a day 3/18/20  Yes Kianna Avendaño MD   levothyroxine (SYNTHROID) 88 MCG tablet take 1 tablet by mouth once daily 2/3/20  Yes Kianna Avendaño MD   blood glucose monitor kit and supplies Test 2 times a day & as needed for symptoms of irregular blood glucose. 7/1/19  Yes Kianna Avendaño MD   loratadine (CLARITIN) 10 MG tablet take 1 tablet by mouth once daily 10/1/18  Yes Kianna Avendaño MD   SOFT TOUCH LANCETS MISC 1 Device by Does not apply route 2 times daily 8/16/16  Yes Kianna Avendaño MD   gabapentin (NEURONTIN) 300 MG capsule Take 1 capsule by mouth 3 times /day.  8/4/20 9/9/20  Kianna Avendaño MD       Past Surgical History:   Procedure Laterality Date    APPENDECTOMY  52-    CATARACT REMOVAL WITH IMPLANT Right 9/2/2003    (Dr. Nik Carr)    CATARACT REMOVAL WITH IMPLANT Left 8/12/2003    (Dr. Nik Carr)   [facility]  07/24/1986    COLONOSCOPY  9/8/14    normal    CYSTOURETHROSCOPY/URETHRAL DILATION  2/8/12 multiple    EYE SURGERY Bilateral 2018    Bilateral Transscleral cyclophotocoagulation G6 laser (micropulse) performed by Burney Severance, MD at [address]  1977    (Dr. Edith Mcrae)    UPPER GASTROINTESTINAL ENDOSCOPY  6/4/15    gastritis, biopsy x 2    UPPER GASTROINTESTINAL ENDOSCOPY  2018    Dr. Jewels Glasgow   antral gastric ulcer. esophagus re-dilated at the end of procedure. Family History   Problem Relation Age of Onset    Heart Disease Mother     Heart Attack Mother     Arthritis Mother     Other Maternal Grandmother         (gout)    Allergies Daughter     Allergies Daughter     Other Brother         ( at age 21 secondary to auto accident).  Cataracts Neg Hx     Diabetes Neg Hx     Glaucoma Neg Hx        Social History     Tobacco Use    Smoking status: Never Smoker    Smokeless tobacco: Never Used    Tobacco comment: Never smoked. [employer]av [employer], 2016. Substance Use Topics    Alcohol use: No     Alcohol/week: 0.0 standard drinks       ROS: All 14 ROS systems reviewed and pertinent positives noted above, all others negative. Lower Extremity Physical Examination:     Vitals:   Vitals:    20 1525   BP: 120/72   Pulse: 68     General: AAO x 3 in NAD. Vascular: DP and PT pulses palpable 2/4, bilateral.  CFT <3 seconds, bilateral.  Hair growth present to the level of the digits, bilateral.  Edema present, bilateral.  Varicosities present, bilateral. Erythema absent, bilateral. Distal Rubor absent bilateral.  Temperature within normal limits bilateral. Hyperpigmentation present bilateral. Atrophic skin yes. Neurological: Sensation Impaired to light touch to level of digits, bilateral.  Protective sensation intact  10/10 sites via 5.07/10g [place]-Christi Monofilament, bilateral.  negative Tinel's, bilateral.  negative Valleix sign, bilateral.  Vibratory abnormal  bilateral.  Reflexes Decreased bilateral.  Paresthesias positive.   Dysthesias positive. Sharp/dull abnormal  bilateral.  Musculoskeletal: Muscle strength 5/5, bilateral.  Pain present upon palpation bilateral, generalized in forefoot b/l. Normal medial longitudinal arch, bilateral.  Ankle ROM decreased,bilateral.  1st MPJ ROM within normal limits, bilateral.  Dorsally contracted digits present. No other foot deformities. Integument: Warm, dry, supple, bilateral.  Open lesion absent, bilateral.  Interdigital maceration absent to web spaces bilateral.   Nails left 5 and right 1 thickened, dystrophic and crumbly, discolored with subungual debris. .  Fissures absent, bilateral. Hyperkeratotic tissue is absent. Asessment: Patient is a 66 y.o. female with:    Diagnosis Orders   1. DM (diabetes mellitus), type 2 with neurological complications ([facility])   DIABETES FOOT EXAM    UT DEBRIDEMENT OF NAIL(S), 1-5   2. Dermatophytosis of nail   DIABETES FOOT EXAM    UT DEBRIDEMENT OF NAIL(S), 1-5   3. Hammer toe, unspecified laterality  HM DIABETES FOOT EXAM    UT DEBRIDEMENT OF NAIL(S), 1-5       Plan: Patient examined and evaluated. Current condition and treatment options discussed in detail. DM foot ed and exam  Orders Placed This Encounter   Medications    Diabetic Shoe MISC     Sig: by Does not apply route Dispense DM shoes and insoles  DM (diabetes mellitus), type 2 with [facility] complications [facility]  (primary encounter diagnosis)  dermatophytosis of nail  Hammer toe, unspecified laterality     Dispense:  1 each     Refill:  0     Other tx options discussed if pain would worsen. No current need for other Rx meds. All nails as mentioned above debrided in length and thickness. Patient advised OTC methods for treatment of the mycotic nails. Patient will follow up in 10 weeks. Contact office with any questions/problems/concerns.

## 2020-09-17 NOTE — PROGRESS NOTES
Laron Mc  1945 9/17/20      PAIN MANAGEMENT CLINIC PROCEDURE NOTE    CHIEF COMPLAINT: This is a 66 y.o. female patient who presents to the Pain Management Clinic with a history of pain in the right knee(s). PRE-PROCEDURE DIAGNOSIS: Right. Knee OA/Pain     POST-PROCEDURE DIAGNOSIS: same as pre-procedure diagnosis    Vitals:    09/17/20 1402   BP: 122/72       Pain Score:  10 - Worst pain ever    . PROCEDURE:     The procedure was explained, including risks and benefits, and the patient has agreed to proceed. The injection site was marked on the patients skin. A large area around the injection site was cleaned with chlora-prep. Lauro Marquez JOINT INJECTION  A 25G 1.5 inch needle was inserted into the right knee(s) joint/ space using a anterior approach. Depth and direction of the needle were monitored at all times. The syringe was aspirated and was negative for heme. Synovial fluid  was not aspirated. Then, 3 ml of  1% lidocaine and 40mg of kenalog (NDC# 5858-5908-55) was injected into the joint. .The injection site was cleaned and dressed with a spot band aid. The patient tolerated the procedure well and with out complication The patient was instructed avoid heat and excessive activity for 24-48 hours.

## 2020-09-17 NOTE — PROGRESS NOTES
Foot Care Worksheet  PCP: Andreea Ferrer MD  Last visit: 8/20/2020    Nail description:  Thick , Yellow , Crumbly , Marked limitation of ambulation     Pain resulting from thickened and dystrophy of nail plate No    Nails involved  Right   1  (T5-T9)  Left     5  (TA-T4)    Q7 1 Class A Finding - Non traumatic amputation of foot No    Q8 2 Class B Findings - Absent DP pulse No, Absent PT pulse No, Advanced tropic changes (3 required) Yes    Decrease hair growth Yes, Nail changes/thickening Yes, Pigmented changes/discoloration Yes, Skin texture (thin, shiny) No, Skin color (rubor/redness) No    Q9 1 Class B and 2 Class C Findings  Claudication No, Temperature change Yes, Paresthesia Yes, Burning No, Edema Yes

## 2020-09-17 NOTE — PATIENT INSTRUCTIONS
[facility]  [address]  Telephone [phone]  Fax [phone]      PROCEDURE INSTRUCTIONS FOR  PAIN MANAGEMENT PROCEDURES WITHOUT IV SEDATION    Laron Mc scheduled to see Dr. La Nena Pickens to undergo the following procedure:  Bilateral L5 Transforaminal JESSE    Procedure Date: ____10/2/2020 and 10/16/2020____  You will receive a phone call the day prior to your procedure to confirm a time or arrival.    Report to the [place] [id number], Registration office on the 1st floor in the hospital, after check in and signing of paper work you will then go to the second floor to the surgery center. 1. Stop the following medications prior to the procedure:    NONE  Stop blood thinners as directed before the injection, with permission from your cardiologist or primary care physician. We will send a letter to them requesting permission to hold the blood thinners. If you take Warfarin (Coumadin), you must have your blood drawn for an INR the day before the procedure. INR must be less than 1.5. if not complete prior to check in the procedure this will be drawn at the procedure center prior to having the procedure completed. 2.  Take all routine medications unless otherwise instructed. Ok to take vitamins and antiinflammatory medications    3. EATING & DRINKING:  Ok to eat or drink before the injection - no IV sedation will be used. 4. If you are allergic to contrast or iodine, you must take benadryl and prednisone prior to the injection to prevent an allergic reaction. Follow the directions on the prescription for the times to take the medication. 5. Oral valium can be prescribed if your are anxious about the injections or feel that you can not lay still during the injection. If you take valium, you must have a . Make arrangements for a family member or friend to drive you to the surgery center.   Your ride must stay in the hospital while you are having the injection done. If they cannot stay, the injection will be rescheduled. The valium may affect your judgment following the procedure and driving a vehicle within 24 hours after the sedation could be dangerous. 6.  Wear simple loose clothing, which can be easily changed. 7.  Leave jewelry (including rings) and other valuables at home. 8.  You will be asked to sign several forms prior to surgery; patients under the age of 25 must have a parent or legal guardian sign the permit to be able to do the procedure. 9.  You must have finished any antibiotic prescribed for recent infections. If required, please take pre-procedure antibiotic or other pre-procedure medications as instructed. 10. Bring inhalers and pain medications with you to your procedure. 11. Bring your MRI/CT films if they were done outside of the [facility]. 12. If you should develop a cold, sore throat, cough, fever or other new indication of illness or infection, or are started on antibiotics within 2 weeks of the scheduled procedure, please notify the [facility] office as early as possible at [phone]. If calling after 4:30pm the day prior to your scheduled procedure please contact [phone] and Leave a Voice Message.

## 2020-09-22 ENCOUNTER — TELEPHONE (OUTPATIENT)
Dept: PAIN MANAGEMENT | Age: 75
End: 2020-09-22

## 2020-09-22 NOTE — TELEPHONE ENCOUNTER
Pt called and said we needed a fax number to fax her proof of appointment for her transportation and gave me [phone]    I left a vm for bubba at [facility] transportation asking for a form to be faxed today.      Awaiting form

## 2020-09-29 ENCOUNTER — TELEPHONE (OUTPATIENT)
Dept: FAMILY MEDICINE CLINIC | Age: 75
End: 2020-09-29
Payer: MEDICARE

## 2020-09-29 PROCEDURE — G0179 MD RECERTIFICATION HHA PT: HCPCS | Performed by: FAMILY MEDICINE

## 2020-09-29 NOTE — TELEPHONE ENCOUNTER
Home health plan of care reviewed and certification completed on 09/29/20 on patient for service dates 09/22/20-11/20/20.   Medications reviewed and verified  Physician time spent on activities to coordinate services, documenting medical decision making, reviewing of reports, treatment plan and test results is 20 minutes

## 2020-10-20 RX ORDER — OMEPRAZOLE 20 MG/1
CAPSULE, DELAYED RELEASE ORAL
Qty: 180 CAPSULE | Refills: 1 | Status: SHIPPED | OUTPATIENT
Start: 2020-10-20 | End: 2021-04-13

## 2020-10-20 NOTE — TELEPHONE ENCOUNTER
Isaias Madrid called requesting a refill of the below medication which has been pended for you:     Requested Prescriptions     Pending Prescriptions Disp Refills    omeprazole (PRILOSEC) 20 MG delayed release capsule [Pharmacy Med Name: OMEPRAZOLE DR 20 MG CAPSULE] 180 capsule 1     Sig: take 1 capsule by mouth twice a day       Last Appointment Date: 8/20/2020  Next Appointment Date: 2/4/2021    Allergies   Allergen Reactions    Latex Rash    Aleve [Naproxen]      Stomach problems    Amoxicillin      Other reaction(s): mucositis    Aspirin Other (See Comments)     Dizziness and tears her stomach up    Dye [Iodides]      IV DYE    Influenza Vaccines     Reglan [Metoclopramide] Other (See Comments)     Dystonic reaction, involuntary movements

## 2020-10-26 RX ORDER — GLUCOSAMINE HCL/CHONDROITIN SU 500-400 MG
CAPSULE ORAL
Qty: 100 STRIP | Refills: 3 | Status: SHIPPED | OUTPATIENT
Start: 2020-10-26 | End: 2020-10-27 | Stop reason: SDUPTHER

## 2020-10-26 NOTE — TELEPHONE ENCOUNTER
Patrick Bahena called requesting a refill of the below medication which has been pended for you:     Requested Prescriptions     Pending Prescriptions Disp Refills    blood glucose monitor strips 100 strip 3     Sig: Test 2 times a day & as needed for symptoms of irregular blood glucose. Dispense sufficient amount for indicated testing frequency plus additional to accommodate PRN testing needs.        Last Appointment Date: 8/20/2020  Next Appointment Date: 2/4/2021    Allergies   Allergen Reactions    Latex Rash    Aleve [Naproxen]      Stomach problems    Amoxicillin      Other reaction(s): mucositis    Aspirin Other (See Comments)     Dizziness and tears her stomach up    Dye [Iodides]      IV DYE    Influenza Vaccines     Reglan [Metoclopramide] Other (See Comments)     Dystonic reaction, involuntary movements

## 2020-10-27 RX ORDER — GLUCOSAMINE HCL/CHONDROITIN SU 500-400 MG
CAPSULE ORAL
Qty: 100 STRIP | Refills: 3 | Status: SHIPPED | OUTPATIENT
Start: 2020-10-27 | End: 2020-11-18 | Stop reason: SDUPTHER

## 2020-10-27 NOTE — TELEPHONE ENCOUNTER
Needs resent to the pharmacy Rx is pended for signature    Ayleen Diamond called requesting a refill of the below medication which has been pended for you:     Requested Prescriptions     Pending Prescriptions Disp Refills    blood glucose monitor strips 100 strip 3     Sig: Test 2 times a day & as needed for symptoms of irregular blood glucose. Dispense sufficient amount for indicated testing frequency plus additional to accommodate PRN testing needs.        Last Appointment Date: 08/20/20  Next Appointment Date: Visit date not found    Allergies   Allergen Reactions    Latex Rash    Aleve [Naproxen]      Stomach problems    Amoxicillin      Other reaction(s): mucositis    Aspirin Other (See Comments)     Dizziness and tears her stomach up    Dye [Iodides]      IV DYE    Influenza Vaccines     Reglan [Metoclopramide] Other (See Comments)     Dystonic reaction, involuntary movements

## 2020-10-30 ENCOUNTER — OFFICE VISIT (OUTPATIENT)
Dept: PAIN MANAGEMENT | Age: 75
End: 2020-10-30
Payer: MEDICARE

## 2020-10-30 VITALS
HEIGHT: 65 IN | WEIGHT: 158 LBS | DIASTOLIC BLOOD PRESSURE: 70 MMHG | RESPIRATION RATE: 16 BRPM | SYSTOLIC BLOOD PRESSURE: 115 MMHG | BODY MASS INDEX: 26.33 KG/M2

## 2020-10-30 PROCEDURE — 1123F ACP DISCUSS/DSCN MKR DOCD: CPT | Performed by: NURSE PRACTITIONER

## 2020-10-30 PROCEDURE — 99213 OFFICE O/P EST LOW 20 MIN: CPT

## 2020-10-30 PROCEDURE — G8427 DOCREV CUR MEDS BY ELIG CLIN: HCPCS | Performed by: NURSE PRACTITIONER

## 2020-10-30 PROCEDURE — 1036F TOBACCO NON-USER: CPT | Performed by: NURSE PRACTITIONER

## 2020-10-30 PROCEDURE — 4040F PNEUMOC VAC/ADMIN/RCVD: CPT | Performed by: NURSE PRACTITIONER

## 2020-10-30 PROCEDURE — 1090F PRES/ABSN URINE INCON ASSESS: CPT | Performed by: NURSE PRACTITIONER

## 2020-10-30 PROCEDURE — G8399 PT W/DXA RESULTS DOCUMENT: HCPCS | Performed by: NURSE PRACTITIONER

## 2020-10-30 PROCEDURE — 99214 OFFICE O/P EST MOD 30 MIN: CPT | Performed by: NURSE PRACTITIONER

## 2020-10-30 PROCEDURE — G8484 FLU IMMUNIZE NO ADMIN: HCPCS | Performed by: NURSE PRACTITIONER

## 2020-10-30 PROCEDURE — G8417 CALC BMI ABV UP PARAM F/U: HCPCS | Performed by: NURSE PRACTITIONER

## 2020-10-30 ASSESSMENT — ENCOUNTER SYMPTOMS
CONSTIPATION: 0
BACK PAIN: 1
DIARRHEA: 0
SHORTNESS OF BREATH: 1

## 2020-10-30 NOTE — PROGRESS NOTES
Subjective:      Patient ID: Angela Lagos is a 66 y.o. female. Chief Complaint   Patient presents with    Other     pt did not have her injections due to a ride - doesn't want to do them right now    Knee Pain     right side, injection did not help, states that she about fell the other day    Other     pt is taking a tsp of mustard a day and it is helping her back pain     HPI Here today for routine pain clinic recheck. Cancelled lumbar epidural injections, could not get a ride.      Majority of her pain is right knee, received injection, effective for two weeks only    Pain Assessment  Location of Pain: Knee  Location Modifiers: Right  Severity of Pain: 6  Quality of Pain: Aching, Popping, Cracking  Duration of Pain: Persistent  Frequency of Pain: Intermittent  Aggravating Factors: Bending, Stretching, Straightening, Exercise, Kneeling, Walking, Standing, Squatting  Limiting Behavior: Some  Relieving Factors: Rest(muscle rub)    Allergies   Allergen Reactions    Latex Rash    Aleve [Naproxen]      Stomach problems    Amoxicillin      Other reaction(s): mucositis    Aspirin Other (See Comments)     Dizziness and tears her stomach up    Dye [Iodides]      IV DYE    Influenza Vaccines     Reglan [Metoclopramide] Other (See Comments)     Dystonic reaction, involuntary movements       Outpatient Medications Marked as Taking for the 10/30/20 encounter (Office Visit) with TAY Francis CNP   Medication Sig Dispense Refill    diclofenac sodium (VOLTAREN) 1 % GEL Apply 2 g topically 4 times daily 5 Tube 5    omeprazole (PRILOSEC) 20 MG delayed release capsule take 1 capsule by mouth twice a day 180 capsule 1    sucralfate (CARAFATE) 1 GM tablet Take 1 tablet by mouth daily 30 tablet 5    metFORMIN (GLUCOPHAGE) 500 MG tablet take 1 tablet by mouth twice a day with meals 180 tablet 1    fluticasone (FLONASE) 50 MCG/ACT nasal spray instill 2 sprays into each nostril once daily 16 g 5    hydroxypropyl methylcellulose (GONIOSOL) 2.5 % ophthalmic solution Place 1 drop into both eyes 3 times daily 15 mL 5    levothyroxine (SYNTHROID) 88 MCG tablet take 1 tablet by mouth once daily 90 tablet 3    loratadine (CLARITIN) 10 MG tablet take 1 tablet by mouth once daily 30 tablet 11       Past Medical History:   Diagnosis Date    Allergic conjunctivitis     (stable)    Chronic sinusitis     Constipation     Dizzy spells     Dry eye syndrome     Edentulous     (does not wear dentures)    Essential hypertension 7/6/2016    Fracture of lateral malleolus     (avulsion fracture at the tip of the lateral malleolus - right ankle).  Hearing loss     Hypothyroidism     Interstitial cystitis     Irritable bowel syndrome     Keratitis     (secondary to dry eye syndrome)    Parotid gland enlargement     stasis, consider sjorgens    Pseudophakos     (bilateral)    Seasonal rhinitis     Stress incontinence     Type II or unspecified type diabetes mellitus without mention of complication, not stated as uncontrolled     Urethral stenosis     Xerostomia        Past Surgical History:   Procedure Laterality Date    APPENDECTOMY  07-    CATARACT REMOVAL WITH IMPLANT Right 9/2/2003    (Dr. Oscar Thurman)    CATARACT REMOVAL WITH IMPLANT Left 8/12/2003    (Dr. Oscar Thurman)    CHOLECYSTECTOMY  07/24/1986    COLONOSCOPY  9/8/14    normal    CYSTOURETHROSCOPY/URETHRAL DILATION  2/8/12    multiple    EYE SURGERY Bilateral 12/13/2018    Bilateral Transscleral cyclophotocoagulation G6 laser (micropulse) performed by Mary Borden MD at [address]  6/2/1977    (Dr. Mark Diaz)   [facility] ENDOSCOPY  6/4/15    gastritis, biopsy x 2    UPPER GASTROINTESTINAL ENDOSCOPY  04/02/2018    Dr. Twin Asif   antral gastric ulcer. esophagus re-dilated at the end of procedure.          Family History   Problem Relation Age of Onset    Heart Disease Mother     Heart Attack Mother    Ray Arthritis Mother     Other Maternal Grandmother         (gout)    Allergies Daughter     Allergies Daughter     Other Brother         ( at age 21 secondary to auto accident).  Cataracts Neg Hx     Diabetes Neg Hx     Glaucoma Neg Hx        Social History     Socioeconomic History    Marital status: Single     Spouse name: None    Number of children: None    Years of education: None    Highest education level: None   Occupational History    None   Social Needs    Financial resource strain: None    Food insecurity     Worry: None     Inability: None    Transportation needs     Medical: None     Non-medical: None   Tobacco Use    Smoking status: Never Smoker    Smokeless tobacco: Never Used    Tobacco comment: Never smoked. [employer] [facility], 2016. Substance and Sexual Activity    Alcohol use: No     Alcohol/week: 0.0 standard drinks    Drug use: No    Sexual activity: None   Lifestyle    Physical activity     Days per week: None     Minutes per session: None    Stress: None   Relationships    Social connections     Talks on phone: None     Gets together: None     Attends L[employer] service: None     Active member of club or organization: None     Attends meetings of clubs or organizations: None     Relationship status: None    Intimate partner violence     Fear of current or ex partner: None     Emotionally abused: None     Physically abused: None     Forced sexual activity: None   Other Topics Concern    None   Social History Narrative    None     Review of Systems   Constitutional: Positive for activity change and fatigue. Respiratory: Positive for shortness of breath. Cardiovascular: Negative for chest pain. Gastrointestinal: Negative for constipation and diarrhea. Genitourinary: Negative for difficulty urinating. Musculoskeletal: Positive for arthralgias, back pain, gait problem (states losing her balance) and myalgias.    Neurological: Positive for numbness (feet, history neuropathy). Psychiatric/Behavioral: Negative for sleep disturbance. Objective:   Physical Exam  Vitals signs reviewed. Constitutional:       General: She is awake. She is not in acute distress. Appearance: She is well-developed. She is not ill-appearing, toxic-appearing or diaphoretic. Interventions: She is not intubated. HENT:      Head: Normocephalic and atraumatic. Right Ear: External ear normal. Decreased hearing noted. Left Ear: External ear normal. Decreased hearing noted. Pulmonary:      Effort: Pulmonary effort is normal. No tachypnea, bradypnea, accessory muscle usage, prolonged expiration, respiratory distress or retractions. She is not intubated. Musculoskeletal:      Comments: Ambulates with quad cane   Skin:     General: Skin is warm and dry. Capillary Refill: Capillary refill takes less than 2 seconds. Coloration: Skin is not ashen or jaundiced. Nails: There is no clubbing. Neurological:      Mental Status: She is alert and oriented to person, place, and time. GCS: GCS eye subscore is 4. GCS verbal subscore is 5. GCS motor subscore is 6. Cranial Nerves: No cranial nerve deficit. Psychiatric:         Speech: Speech normal.         Behavior: Behavior is cooperative. Assessment:       Diagnosis Orders   1. Chronic pain of right knee  XR KNEE RIGHT (3 VIEWS)   2. Other diabetic neurological complication associated with type 2 diabetes mellitus ([facility] [address]     3. Chronic bilateral low back pain with bilateral sciatica     4. Lumbar stenosis with neurogenic claudication             Plan:      Voltaren gel to right knee, right knee xray, consider euflexxa, failed steroid injection.  PT eval and treat for gait, knee and back pain        TAY Boyer - CNP

## 2020-11-16 ENCOUNTER — HOSPITAL ENCOUNTER (OUTPATIENT)
Dept: PHYSICAL THERAPY | Age: 75
Setting detail: THERAPIES SERIES
Discharge: HOME | End: 2020-11-16
Payer: MEDICARE

## 2020-11-16 NOTE — PROGRESS NOTES
Physical Therapy  Outpatient Physical Therapy    [] [place]  Phone: [phone]  Fax: [phone]      [] [place]  Phone: [phone]  Fax: [phone]    Physical Therapy  Cancellation/No-show Note  Patient Name:  Lopez Wolff  :  1945   Date:  2020  Cancelled visits to date: 0  No-shows to date: 1    For today's appointment patient:  []  Cancelled  []  Rescheduled appointment  [x]  No-show     Reason given by patient:  []  Patient ill  []  Conflicting appointment  [x]  No transportation    []  Conflict with work  []  No reason given  []  Other:     Comments:      Electronically signed by: Duncan Beck PT

## 2020-11-18 NOTE — TELEPHONE ENCOUNTER
Patrick Bahena called requesting a refill of the below medication which has been pended for you:     Requested Prescriptions     Pending Prescriptions Disp Refills    blood glucose monitor strips 300 strip 3     Sig: Test 2 times a day & as needed for symptoms of irregular blood glucose. Dispense sufficient amount for indicated testing frequency plus additional to accommodate PRN testing needs.        Last Appointment Date: 8/20/2020  Next Appointment Date: 2/4/2021    Allergies   Allergen Reactions    Latex Rash    Aleve [Naproxen]      Stomach problems    Amoxicillin      Other reaction(s): mucositis    Aspirin Other (See Comments)     Dizziness and tears her stomach up    Dye [Iodides]      IV DYE    Influenza Vaccines     Reglan [Metoclopramide] Other (See Comments)     Dystonic reaction, involuntary movements

## 2020-11-19 ENCOUNTER — TELEPHONE (OUTPATIENT)
Dept: PRIMARY CARE CLINIC | Age: 75
End: 2020-11-19
Payer: MEDICARE

## 2020-11-19 PROCEDURE — G0179 MD RECERTIFICATION HHA PT: HCPCS | Performed by: FAMILY MEDICINE

## 2020-11-19 RX ORDER — GLUCOSAMINE HCL/CHONDROITIN SU 500-400 MG
CAPSULE ORAL
Qty: 300 STRIP | Refills: 3 | Status: SHIPPED | OUTPATIENT
Start: 2020-11-19 | End: 2021-08-04

## 2020-12-10 ENCOUNTER — OFFICE VISIT (OUTPATIENT)
Dept: PODIATRY | Age: 75
End: 2020-12-10
Payer: MEDICARE

## 2020-12-10 VITALS
HEIGHT: 65 IN | DIASTOLIC BLOOD PRESSURE: 68 MMHG | WEIGHT: 160 LBS | HEART RATE: 64 BPM | BODY MASS INDEX: 26.66 KG/M2 | SYSTOLIC BLOOD PRESSURE: 115 MMHG

## 2020-12-10 PROCEDURE — 11720 DEBRIDE NAIL 1-5: CPT | Performed by: PODIATRIST

## 2020-12-10 PROCEDURE — 99999 PR OFFICE/OUTPT VISIT,PROCEDURE ONLY: CPT | Performed by: PODIATRIST

## 2020-12-10 NOTE — PROGRESS NOTES
Subjective:  Ann Donohue is a 66 y.o. female who presents to the office today for routine DM foot care. .  Currently denies F/C/N/V. Allergies   Allergen Reactions    Latex Rash    Aleve [Naproxen]      Stomach problems    Amoxicillin      Other reaction(s): mucositis    Aspirin Other (See Comments)     Dizziness and tears her stomach up    Dye [Iodides]      IV DYE    Influenza Vaccines     Reglan [Metoclopramide] Other (See Comments)     Dystonic reaction, involuntary movements       Past Medical History:   Diagnosis Date    Allergic conjunctivitis     (stable)    Chronic sinusitis     Constipation     Dizzy spells     Dry eye syndrome     Edentulous     (does not wear dentures)    Essential hypertension 7/6/2016    Fracture of lateral malleolus     (avulsion fracture at the tip of the lateral malleolus - right ankle).  Hearing loss     Hypothyroidism     Interstitial cystitis     Irritable bowel syndrome     Keratitis     (secondary to dry eye syndrome)    Parotid gland enlargement     stasis, consider sjorgens    Pseudophakos     (bilateral)    Seasonal rhinitis     Stress incontinence     Type II or unspecified type diabetes mellitus without mention of complication, not stated as uncontrolled     Urethral stenosis     Xerostomia        Prior to Admission medications    Medication Sig Start Date End Date Taking? Authorizing Provider   blood glucose monitor strips Test 2 times a day & as needed for symptoms of irregular blood glucose. Dispense sufficient amount for indicated testing frequency plus additional to accommodate PRN testing needs.  11/19/20  Yes Medina Milligan MD   diclofenac sodium (VOLTAREN) 1 % GEL Apply 2 g topically 4 times daily 10/30/20  Yes TAY Villa - CNP   omeprazole (PRILOSEC) 20 MG delayed release capsule take 1 capsule by mouth twice a day 10/20/20  Yes Medina Milligan MD   Diabetic Shoe MISC by Does not apply route Dispense DM shoes and insoles  DM (diabetes mellitus), type 2 with neurological complications ([facility] [address]  (primary encounter diagnosis)  dermatophytosis of nail  Hammer toe, unspecified laterality 9/17/20  Yes Raisa Chandler DPM   sucralfate (CARAFATE) 1 GM tablet Take 1 tablet by mouth daily 8/4/20 12/1/21 Yes Nessa Cotton MD   metFORMIN (GLUCOPHAGE) 500 MG tablet take 1 tablet by mouth twice a day with meals 7/22/20  Yes Nessa Cotton MD   fluticasone [facility]) 50 MCG/ACT nasal spray instill 2 sprays into each nostril once daily 6/29/20  Yes Nessa Cotton MD   hydroxypropyl methylcellulose (GONIOSOL) 2.5 % ophthalmic solution Place 1 drop into both eyes 3 times daily 6/18/20  Yes Apolonia Qiu OD   blood glucose test strips (ONE TOUCH ULTRA TEST) strip use 1 TEST STRIP to TEST BLOOD SUGAR twice a day 3/18/20  Yes Nessa Cotton MD   levothyroxine (SYNTHROID) 88 MCG tablet take 1 tablet by mouth once daily 2/3/20  Yes Nessa Cotton MD   blood glucose monitor kit and supplies Test 2 times a day & as needed for symptoms of irregular blood glucose. 7/1/19  Yes Nessa Cotton MD   loratadine (CLARITIN) 10 MG tablet take 1 tablet by mouth once daily 10/1/18  Yes Nessa Cotton MD   SOFT TOUCH LANCETS MISC 1 Device by Does not apply route 2 times daily 8/16/16  Yes Nessa Cotton MD   gabapentin (NEURONTIN) 300 MG capsule Take 1 capsule by mouth 3 times /day.  8/4/20 9/9/20  Nessa Cotton MD       Past Surgical History:   Procedure Laterality Date    APPENDECTOMY  07-    CATARACT REMOVAL WITH IMPLANT Right 9/2/2003    (Dr. Neela Benavides)    CATARACT REMOVAL WITH IMPLANT Left 8/12/2003    (Dr. Neela Benavides)    CHOLECYSTECTOMY  07/24/1986    COLONOSCOPY  9/8/14    normal    CYSTOURETHROSCOPY/URETHRAL DILATION  2/8/12    multiple    EYE SURGERY Bilateral 12/13/2018    Bilateral Transscleral cyclophotocoagulation G6 laser (micropulse) performed by Maribell Wang MD at [address]  6/2/1977    (Dr. Sharif Gleason)   [address] GASTROINTESTINAL ENDOSCOPY  6/4/15    gastritis, biopsy x 2    UPPER GASTROINTESTINAL ENDOSCOPY  2018    Dr. Bonnie Guaman   antral gastric ulcer. esophagus re-dilated at the end of procedure. Family History   Problem Relation Age of Onset    Heart Disease Mother     Heart Attack Mother     Arthritis Mother     Other Maternal Grandmother         (gout)    Allergies Daughter     Allergies Daughter     Other Brother         ( at age 21 secondary to auto accident).  Cataracts Neg Hx     Diabetes Neg Hx     Glaucoma Neg Hx        Social History     Tobacco Use    Smoking status: Never Smoker    Smokeless tobacco: Never Used    Tobacco comment: Never smoked. Ines Natarajan Rcp, 2016. Substance Use Topics    Alcohol use: No     Alcohol/week: 0.0 standard drinks       Review of Systems: All 12 systems reviewed and pertinent positives noted above. Lower Extremity Physical Examination:     Vitals:   Vitals:    12/10/20 1532   BP: 115/68   Pulse: 64     General: AAO x 3 in NAD. Vascular: DP and PT pulses palpable 2/4, bilateral.  CFT <3 seconds, bilateral.  Hair growth present to the level of the digits, bilateral.  Edema present, bilateral.  Varicosities present, bilateral. Erythema absent, bilateral. Distal Rubor absent bilateral.  Temperature within normal limits bilateral. Hyperpigmentation present bilateral. Atrophic skin yes. Neurological: Sensation Impaired to light touch to level of digits, bilateral.  Protective sensation intact  10/10 sites via 5.07/10g [place]-Christi Monofilament, bilateral.  negative Tinel's, bilateral.  negative Valleix sign, bilateral.  Vibratory abnormal  bilateral.  Reflexes Decreased bilateral.  Paresthesias positive. Dysthesias positive.   Sharp/dull abnormal  bilateral.  Musculoskeletal: Muscle strength 5/5, bilateral.  Pain absent upon palpation bilateral. Normal medial longitudinal arch, bilateral.  Ankle ROM decresed,bilateral.  1st MPJ ROM

## 2020-12-10 NOTE — PROGRESS NOTES
Foot Care Worksheet  PCP: Zuleyka Martinez MD  Last visit: 8/20/2020    Nail description:  Thick , Yellow , Crumbly , Marked limitation of ambulation     Pain resulting from thickened and dystrophy of nail plate No    Nails involved  Right   1  (T5-T9)  Left     5  (TA-T4)    Q7 1 Class A Finding - Non traumatic amputation of foot No    Q8 2 Class B Findings - Absent DP pulse No, Absent PT pulse No, Advanced tropic changes (3 required) Yes    Decrease hair growth Yes, Nail changes/thickening Yes, Pigmented changes/discoloration Yes, Skin texture (thin, shiny) No, Skin color (rubor/redness) No    Q9 1 Class B and 2 Class C Findings  Claudication No, Temperature change Yes, Paresthesia Yes, Burning No, Edema Yes

## 2020-12-14 ENCOUNTER — TELEPHONE (OUTPATIENT)
Dept: FAMILY MEDICINE CLINIC | Age: 75
End: 2020-12-14

## 2020-12-14 NOTE — TELEPHONE ENCOUNTER
Wanted to let you know what company she is using:  [employer] for her diabetic supplies. [phone].

## 2020-12-16 ENCOUNTER — TELEPHONE (OUTPATIENT)
Dept: FAMILY MEDICINE CLINIC | Age: 75
End: 2020-12-16

## 2020-12-17 ENCOUNTER — OFFICE VISIT (OUTPATIENT)
Dept: OPTOMETRY | Age: 75
End: 2020-12-17
Payer: MEDICARE

## 2020-12-17 PROCEDURE — 99211 OFF/OP EST MAY X REQ PHY/QHP: CPT

## 2020-12-17 PROCEDURE — 1123F ACP DISCUSS/DSCN MKR DOCD: CPT | Performed by: OPTOMETRIST

## 2020-12-17 PROCEDURE — G8427 DOCREV CUR MEDS BY ELIG CLIN: HCPCS | Performed by: OPTOMETRIST

## 2020-12-17 PROCEDURE — G8417 CALC BMI ABV UP PARAM F/U: HCPCS | Performed by: OPTOMETRIST

## 2020-12-17 PROCEDURE — G8484 FLU IMMUNIZE NO ADMIN: HCPCS | Performed by: OPTOMETRIST

## 2020-12-17 PROCEDURE — 99213 OFFICE O/P EST LOW 20 MIN: CPT | Performed by: OPTOMETRIST

## 2020-12-17 PROCEDURE — 1090F PRES/ABSN URINE INCON ASSESS: CPT | Performed by: OPTOMETRIST

## 2020-12-17 PROCEDURE — 1036F TOBACCO NON-USER: CPT | Performed by: OPTOMETRIST

## 2020-12-17 PROCEDURE — G8399 PT W/DXA RESULTS DOCUMENT: HCPCS | Performed by: OPTOMETRIST

## 2020-12-17 PROCEDURE — 4040F PNEUMOC VAC/ADMIN/RCVD: CPT | Performed by: OPTOMETRIST

## 2020-12-17 ASSESSMENT — REFRACTION_MANIFEST
OS_CYLINDER: -1.25
OD_CYLINDER: -2.75
OS_ADD: +2.75
OS_SPHERE: -0.25
OS_AXIS: 089
OD_ADD: +2.75
OD_AXIS: 083
OD_SPHERE: PLANO

## 2020-12-17 ASSESSMENT — REFRACTION_WEARINGRX
OD_ADD: +2.75
OS_ADD: +2.75
OD_CYLINDER: -2.75
OD_AXIS: 084
OD_SPHERE: PLANO
OS_SPHERE: -0.25
OS_CYLINDER: -1.25
SPECS_TYPE: BIFOCAL
OS_AXIS: 100

## 2020-12-17 ASSESSMENT — KERATOMETRY
OD_AXISANGLE_DEGREES: 175
OS_AXISANGLE2_DEGREES: 098
OS_K1POWER_DIOPTERS: 42.00
OS_K2POWER_DIOPTERS: 43.50
OD_K2POWER_DIOPTERS: 43.75
OS_AXISANGLE_DEGREES: 008
METHOD_AUTO_MANUAL: AUTOMATED
OD_AXISANGLE2_DEGREES: 085
OD_K1POWER_DIOPTERS: 41.00

## 2020-12-17 ASSESSMENT — SLIT LAMP EXAM - LIDS
COMMENTS: NORMAL
COMMENTS: NORMAL

## 2020-12-17 ASSESSMENT — PACHYMETRY
OS_CT(UM): 518
OD_CT(UM): 510

## 2020-12-17 ASSESSMENT — TONOMETRY
OS_IOP_MMHG: 13
IOP_METHOD: NON-CONTACT AIR PUFF
OD_IOP_MMHG: 18

## 2020-12-17 ASSESSMENT — VISUAL ACUITY
METHOD: SNELLEN - LINEAR
OS_CC: 20/60
CORRECTION_TYPE: GLASSES
OD_CC: 20/50 OU

## 2020-12-17 NOTE — PROGRESS NOTES
Kristine Payne presents today for   Chief Complaint   Patient presents with    Blurred Vision    Dry Eye    Vision Exam   .    HPI     Blurred Vision     In both eyes. Vision is blurred. Context:  distance vision and reading. Comments     Last Vision Exam: 6/18/2020 AW  Last Ophthalmology Exam: 2/2020 Dr. Matthew Martinez ; cat sx ou  Last Filled Glasses Rx: 12/3/2018  Insurance: Medicare  Update: 6 month complete exam - recheck dry eye  Artifical tears Systane  3-4 x daily ; has not used for a few days since she knew she was coming to her appointment today  Distance and reading are getting more blurry  Bright lights are still bothersome but the eyes are doing much better that they had been in the past                  Current Outpatient Medications   Medication Sig Dispense Refill    blood glucose monitor strips Test 2 times a day & as needed for symptoms of irregular blood glucose. Dispense sufficient amount for indicated testing frequency plus additional to accommodate PRN testing needs.  300 strip 3    diclofenac sodium (VOLTAREN) 1 % GEL Apply 2 g topically 4 times daily 5 Tube 5    omeprazole (PRILOSEC) 20 MG delayed release capsule take 1 capsule by mouth twice a day 180 capsule 1    Diabetic Shoe MISC by Does not apply route Dispense DM shoes and insoles  DM (diabetes mellitus), type 2 with neurological complications (HCC)  (primary encounter diagnosis)  dermatophytosis of nail  Hammer toe, unspecified laterality 1 each 0    sucralfate (CARAFATE) 1 GM tablet Take 1 tablet by mouth daily 30 tablet 5    metFORMIN (GLUCOPHAGE) 500 MG tablet take 1 tablet by mouth twice a day with meals 180 tablet 1    fluticasone (FLONASE) 50 MCG/ACT nasal spray instill 2 sprays into each nostril once daily 16 g 5    hydroxypropyl methylcellulose (GONIOSOL) 2.5 % ophthalmic solution Place 1 drop into both eyes 3 times daily 15 mL 5    blood glucose test strips (ONE TOUCH ULTRA TEST) strip use 1 TEST STRIP Relationship status: None    Intimate partner violence     Fear of current or ex partner: None     Emotionally abused: None     Physically abused: None     Forced sexual activity: None   Other Topics Concern    None   Social History Narrative    None     Past Medical History:   Diagnosis Date    Allergic conjunctivitis     (stable)    Chronic sinusitis     Constipation     Dizzy spells     Dry eye syndrome     Edentulous     (does not wear dentures)    Essential hypertension 7/6/2016    Fracture of lateral malleolus     (avulsion fracture at the tip of the lateral malleolus - right ankle).     Hearing loss     Hypothyroidism     Interstitial cystitis     Irritable bowel syndrome     Keratitis     (secondary to dry eye syndrome)    Parotid gland enlargement     stasis, consider sjorgens    Pseudophakos     (bilateral)    Seasonal rhinitis     Stress incontinence     Type II or unspecified type diabetes mellitus without mention of complication, not stated as uncontrolled     Urethral stenosis     Xerostomia            Main Ophthalmology Exam     External Exam       Right Left    External Normal Normal          Slit Lamp Exam       Right Left    Lids/Lashes Normal Normal    Conjunctiva/Sclera White and quiet White and quiet    Cornea dried out appearance  dried out appearance     Anterior Chamber Deep and quiet Deep and quiet    Iris Round and reactive Round and reactive    Lens Clear Clear    Vitreous Normal Normal                   Tonometry     Tonometry (Non-contact air puff, 3:03 PM)       Right Left    Pressure 18 13   IOPg:  15.8             12.2  CH:  8.4          10.9  WS: 4.9          7.8                  Not recorded         Not recorded          Visual Acuity (Snellen - Linear)       Right Left    Dist cc 20/50 20/60    Near cc 20/50 OU     Correction: Glasses          Pupils     Pupils       Pupils    Right PERRL    Left PERRL              Neuro/Psych     Neuro/Psych Oriented x3: Yes    Mood/Affect: Normal              Keratometry     Keratometry (Automated)       K1 Axis K2 Axis    Right 41.00 085 43.75 175    Left 42.00 098 43.50 008                  Ophthalmology Exam     Wearing Rx       Sphere Cylinder Axis Add    Right [place] -2.75 084 +2.75    Left -0.25 -1.25 100 +2.75    Age: 1yrs    Type: Bifocal              Manifest Refraction     Manifest Refraction       Sphere Cylinder [place] Add [place]    Right [place] -2.75 083 20/40+ +2.75     Left -0.25 -1.25 089 20/40 +2.75 . Manifest Refraction #2 (Auto)       Sphere Cylinder [place] Dist [place] Add [place]    Right +0.25 -2.75 082       Left +0.75 -1.75 089                  Final Rx       Sphere Cylinder [place] Dist [place] Add Near [place]    Right [place] -2.75 083 20/40+ +2.75     Left -0.25 -1.25 089 20/40 +2.75 . Expiration Date: 12/18/2022            1. Pseudophakia of both eyes    2. Meibomian gland dysfunction (MGD)    3. Dry eye syndrome of both eyes           Patient Instructions   Recommend to continue to use the artificial tears preservative free 4x per day to keep the eyes lubricated. Return in about 1 year (around 12/17/2021) for complete eye exam; 4 months for vf and oct and then exam with Jannet .

## 2020-12-17 NOTE — PATIENT INSTRUCTIONS
Recommend to continue to use the artificial tears preservative free 4x per day to keep the eyes lubricated.

## 2021-01-08 RX ORDER — LEVOTHYROXINE SODIUM 88 UG/1
TABLET ORAL
Qty: 90 TABLET | Refills: 3 | Status: SHIPPED | OUTPATIENT
Start: 2021-01-08 | End: 2021-10-06

## 2021-01-15 ENCOUNTER — TELEPHONE (OUTPATIENT)
Dept: FAMILY MEDICINE CLINIC | Age: 76
End: 2021-01-15

## 2021-01-15 NOTE — TELEPHONE ENCOUNTER
Discuss BS readings and diet with patient-advised to write down everything she eats, date and time BS, and a bottle of her protein drink along to appointment on Feb 4

## 2021-01-15 NOTE — TELEPHONE ENCOUNTER
Pt calling stating her sugar was going too low, it was 71 so pt eats peanut butter but then it went to 198, please call pt with recommendations at above number.

## 2021-01-20 ENCOUNTER — TELEPHONE (OUTPATIENT)
Dept: FAMILY MEDICINE CLINIC | Age: 76
End: 2021-01-20
Payer: MEDICARE

## 2021-01-20 DIAGNOSIS — H40.1132 PRIMARY OPEN ANGLE GLAUCOMA (POAG) OF BOTH EYES, MODERATE STAGE: ICD-10-CM

## 2021-01-20 DIAGNOSIS — H43.813 DEGENERATION OF POSTERIOR VITREOUS BODY OF BOTH EYES: ICD-10-CM

## 2021-01-20 DIAGNOSIS — M54.41 CHRONIC BILATERAL LOW BACK PAIN WITH BILATERAL SCIATICA: ICD-10-CM

## 2021-01-20 DIAGNOSIS — K58.1 IRRITABLE BOWEL SYNDROME WITH CONSTIPATION: ICD-10-CM

## 2021-01-20 DIAGNOSIS — K30 DELAYED GASTRIC EMPTYING: ICD-10-CM

## 2021-01-20 DIAGNOSIS — E03.9 HYPOTHYROIDISM, UNSPECIFIED TYPE: ICD-10-CM

## 2021-01-20 DIAGNOSIS — K85.90 ACUTE PANCREATITIS, UNSPECIFIED COMPLICATION STATUS, UNSPECIFIED PANCREATITIS TYPE: Primary | ICD-10-CM

## 2021-01-20 DIAGNOSIS — M54.42 CHRONIC BILATERAL LOW BACK PAIN WITH BILATERAL SCIATICA: ICD-10-CM

## 2021-01-20 DIAGNOSIS — E11.49 OTHER DIABETIC NEUROLOGICAL COMPLICATION ASSOCIATED WITH TYPE 2 DIABETES MELLITUS (HCC): ICD-10-CM

## 2021-01-20 DIAGNOSIS — M48.062 LUMBAR STENOSIS WITH NEUROGENIC CLAUDICATION: ICD-10-CM

## 2021-01-20 DIAGNOSIS — E11.9 DIABETES TYPE 2, NO OCULAR INVOLVEMENT (HCC): ICD-10-CM

## 2021-01-20 DIAGNOSIS — G89.29 CHRONIC BILATERAL LOW BACK PAIN WITH BILATERAL SCIATICA: ICD-10-CM

## 2021-01-20 PROCEDURE — G0179 MD RECERTIFICATION HHA PT: HCPCS | Performed by: FAMILY MEDICINE

## 2021-01-20 NOTE — TELEPHONE ENCOUNTER
Home health plan of cre reviewed and certification completed on 01/19/21 on patient for service dates 01/20/21-03/20/21. Medications reviewed and verified. Physician time spent on activities to coordinate services, documenting medical decisison making, reviewing of reports, treatment plans and test results is 20 minutes.

## 2021-01-26 ENCOUNTER — HOSPITAL ENCOUNTER (EMERGENCY)
Age: 76
Discharge: HOME | End: 2021-01-26
Attending: EMERGENCY MEDICINE
Payer: MEDICARE

## 2021-01-26 VITALS
HEIGHT: 65 IN | WEIGHT: 160 LBS | TEMPERATURE: 97.3 F | BODY MASS INDEX: 26.66 KG/M2 | RESPIRATION RATE: 16 BRPM | OXYGEN SATURATION: 93 % | HEART RATE: 73 BPM | SYSTOLIC BLOOD PRESSURE: 122 MMHG | DIASTOLIC BLOOD PRESSURE: 56 MMHG

## 2021-01-26 DIAGNOSIS — E16.2 HYPOGLYCEMIA: Primary | ICD-10-CM

## 2021-01-26 LAB
ABSOLUTE EOS #: 0.09 K/UL (ref 0–0.44)
ABSOLUTE IMMATURE GRANULOCYTE: 0.05 K/UL (ref 0–0.3)
ABSOLUTE LYMPH #: 2.56 K/UL (ref 1.1–3.7)
ABSOLUTE MONO #: 0.71 K/UL (ref 0.1–1.2)
ALBUMIN SERPL-MCNC: 4 G/DL (ref 3.5–5.2)
ALBUMIN/GLOBULIN RATIO: 1.4 (ref 1–2.5)
ALP BLD-CCNC: 79 U/L (ref 35–104)
ALT SERPL-CCNC: 26 U/L (ref 5–33)
ANION GAP SERPL CALCULATED.3IONS-SCNC: 9 MMOL/L (ref 9–17)
AST SERPL-CCNC: 23 U/L
BASOPHILS # BLD: 1 % (ref 0–2)
BASOPHILS ABSOLUTE: 0.04 K/UL (ref 0–0.2)
BILIRUB SERPL-MCNC: 0.22 MG/DL (ref 0.3–1.2)
BUN BLDV-MCNC: 21 MG/DL (ref 8–23)
BUN/CREAT BLD: 28 (ref 9–20)
CALCIUM SERPL-MCNC: 10 MG/DL (ref 8.6–10.4)
CHLORIDE BLD-SCNC: 102 MMOL/L (ref 98–107)
CHP ED QC CHECK: NORMAL
CO2: 28 MMOL/L (ref 20–31)
CREAT SERPL-MCNC: 0.76 MG/DL (ref 0.5–0.9)
DIFFERENTIAL TYPE: ABNORMAL
EOSINOPHILS RELATIVE PERCENT: 1 % (ref 1–4)
GFR AFRICAN AMERICAN: >60 ML/MIN
GFR NON-AFRICAN AMERICAN: >60 ML/MIN
GFR SERPL CREATININE-BSD FRML MDRD: ABNORMAL ML/MIN/{1.73_M2}
GFR SERPL CREATININE-BSD FRML MDRD: ABNORMAL ML/MIN/{1.73_M2}
GLUCOSE BLD-MCNC: 102 MG/DL (ref 65–105)
GLUCOSE BLD-MCNC: 119 MG/DL (ref 70–99)
GLUCOSE BLD-MCNC: 143 MG/DL
GLUCOSE BLD-MCNC: 143 MG/DL (ref 65–105)
HCT VFR BLD CALC: 40.3 % (ref 36.3–47.1)
HEMOGLOBIN: 12.2 G/DL (ref 11.9–15.1)
IMMATURE GRANULOCYTES: 1 %
LYMPHOCYTES # BLD: 37 % (ref 24–43)
MCH RBC QN AUTO: 28.2 PG (ref 25.2–33.5)
MCHC RBC AUTO-ENTMCNC: 30.3 G/DL (ref 25.2–33.5)
MCV RBC AUTO: 93.1 FL (ref 82.6–102.9)
MONOCYTES # BLD: 10 % (ref 3–12)
NRBC AUTOMATED: 0 PER 100 WBC
PDW BLD-RTO: 14.7 % (ref 11.8–14.4)
PLATELET # BLD: 309 K/UL (ref 138–453)
PLATELET ESTIMATE: ABNORMAL
PMV BLD AUTO: 10 FL (ref 8.1–13.5)
POTASSIUM SERPL-SCNC: 3.8 MMOL/L (ref 3.7–5.3)
RBC # BLD: 4.33 M/UL (ref 3.95–5.11)
RBC # BLD: ABNORMAL 10*6/UL
SEG NEUTROPHILS: 50 % (ref 36–65)
SEGMENTED NEUTROPHILS ABSOLUTE COUNT: 3.54 K/UL (ref 1.5–8.1)
SODIUM BLD-SCNC: 139 MMOL/L (ref 135–144)
TOTAL PROTEIN: 6.8 G/DL (ref 6.4–8.3)
TROPONIN INTERP: NORMAL
TROPONIN INTERP: NORMAL
TROPONIN T: NORMAL NG/ML
TROPONIN T: NORMAL NG/ML
TROPONIN, HIGH SENSITIVITY: 13 NG/L (ref 0–14)
TROPONIN, HIGH SENSITIVITY: 7 NG/L (ref 0–14)
WBC # BLD: 7 K/UL (ref 3.5–11.3)
WBC # BLD: ABNORMAL 10*3/UL

## 2021-01-26 PROCEDURE — 85025 COMPLETE CBC W/AUTO DIFF WBC: CPT

## 2021-01-26 PROCEDURE — 80053 COMPREHEN METABOLIC PANEL: CPT

## 2021-01-26 PROCEDURE — 82947 ASSAY GLUCOSE BLOOD QUANT: CPT

## 2021-01-26 PROCEDURE — 99284 EMERGENCY DEPT VISIT MOD MDM: CPT

## 2021-01-26 PROCEDURE — 84484 ASSAY OF TROPONIN QUANT: CPT

## 2021-01-26 PROCEDURE — 36415 COLL VENOUS BLD VENIPUNCTURE: CPT

## 2021-01-26 PROCEDURE — 93005 ELECTROCARDIOGRAM TRACING: CPT | Performed by: EMERGENCY MEDICINE

## 2021-01-26 RX ORDER — SUCRALFATE 1 G/1
TABLET ORAL
Qty: 180 TABLET | Refills: 1 | Status: SHIPPED | OUTPATIENT
Start: 2021-01-26 | End: 2021-06-10 | Stop reason: ALTCHOICE

## 2021-01-26 ASSESSMENT — ENCOUNTER SYMPTOMS
NAUSEA: 0
VOMITING: 0
SHORTNESS OF BREATH: 0
ABDOMINAL PAIN: 0
EYE PAIN: 0
COUGH: 0
CONSTIPATION: 0
BACK PAIN: 0
DIARRHEA: 0

## 2021-01-26 NOTE — ED PROVIDER NOTES
[facility]  eMERGENCY dEPARTMENT eNCOUnter      Pt Name: Henry Anders  MRN: [id number]  [place] 1945  Date of evaluation: 1/26/2021      CHIEF COMPLAINT       Chief Complaint   Patient presents with    Hypoglycemia     Pt c/o blood sugar reading 75. States she gets shaky when it goes below 100. Ate peanut butter and drank gatorade. Glucose ~ 120's per EMS. HISTORY OF PRESENT ILLNESS    Henry Anders is a 66 y.o. female who presents hyperglycemia patient says she is very shaky if is below 100 she said she got up from a nap confused thinking it was actually 4:00 in the morning when it was just in the afternoon she was very shaky as she called for help with the got there they checked her sugar was 72 she ate a peanut butter and she is feeling much better now her blood sugars been over 100 since this been no fevers no chills no cough no abdominal pain no nausea vomiting or diarrhea no chest pain      REVIEW OF SYSTEMS         Review of Systems   Constitutional: Negative for chills and fever. HENT: Negative for congestion and ear pain. Eyes: Negative for pain and visual disturbance. Respiratory: Negative for cough and shortness of breath. Cardiovascular: Negative for chest pain, palpitations and leg swelling. Gastrointestinal: Negative for abdominal pain, constipation, diarrhea, nausea and vomiting. Endocrine: Negative for polydipsia and polyuria. Genitourinary: Negative for difficulty urinating, dysuria, frequency, vaginal bleeding and vaginal discharge. Musculoskeletal: Negative for back pain, joint swelling, myalgias, neck pain and neck stiffness. Skin: Negative for rash. Neurological: Positive for light-headedness. Negative for dizziness, weakness and headaches. Hematological: Negative for adenopathy. Does not bruise/bleed easily. Psychiatric/Behavioral: Negative for confusion, self-injury and suicidal ideas.          PAST MEDICAL HISTORY    has a past medical history of Allergic conjunctivitis, Chronic sinusitis, Constipation, Dizzy spells, Dry eye syndrome, Edentulous, Essential hypertension, Fracture of lateral malleolus, Hearing loss, Hypothyroidism, Interstitial cystitis, Irritable bowel syndrome, Keratitis, Parotid gland enlargement, Pseudophakos, Seasonal rhinitis, Stress incontinence, Type II or unspecified type diabetes mellitus without mention of complication, not stated as uncontrolled, Urethral stenosis, and Xerostomia. SURGICAL HISTORY      has a past surgical history that includes Cataract removal with implant (Right, 9/2/2003); Cataract removal with implant (Left, 8/12/2003); Cholecystectomy (07/24/1986); Tubal ligation (6/2/1977); Cystourethroscopy/Urethral Dilation (2/8/12); Appendectomy (09-); Colonoscopy (9/8/14); Upper gastrointestinal endoscopy (6/4/15); Upper gastrointestinal endoscopy (04/02/2018); and Eye surgery (Bilateral, 12/13/2018). CURRENT MEDICATIONS       Previous Medications    BLOOD GLUCOSE MONITOR KIT AND SUPPLIES    Test 2 times a day & as needed for symptoms of irregular blood glucose. BLOOD GLUCOSE MONITOR STRIPS    Test 2 times a day & as needed for symptoms of irregular blood glucose. Dispense sufficient amount for indicated testing frequency plus additional to accommodate PRN testing needs. BLOOD GLUCOSE TEST STRIPS (ONE TOUCH ULTRA TEST) STRIP    use 1 TEST STRIP to TEST BLOOD SUGAR twice a day    DIABETIC SHOE MISC    by Does not apply route Dispense DM shoes and insoles  DM (diabetes mellitus), type 2 with neurological complications (HCC)  (primary encounter diagnosis)  dermatophytosis of nail  Hammer toe, unspecified laterality    DICLOFENAC SODIUM (VOLTAREN) 1 % GEL    Apply 2 g topically 4 times daily    FLUTICASONE (FLONASE) 50 MCG/ACT NASAL SPRAY    instill 2 sprays into each nostril once daily    GABAPENTIN (NEURONTIN) 300 MG CAPSULE    Take 1 capsule by mouth 3 times /day.     HYDROXYPROPYL METHYLCELLULOSE (GONIOSOL) 2.5 % OPHTHALMIC SOLUTION    Place 1 drop into both eyes 3 times daily    LEVOTHYROXINE (SYNTHROID) 88 MCG TABLET    take 1 tablet by mouth once daily    LORATADINE (CLARITIN) 10 MG TABLET    take 1 tablet by mouth once daily    METFORMIN (GLUCOPHAGE) 500 MG TABLET    take 1 tablet by mouth twice a day with meals    OMEPRAZOLE (PRILOSEC) 20 MG DELAYED RELEASE CAPSULE    take 1 capsule by mouth twice a day    SOFT TOUCH LANCETS MISC    1 Device by Does not apply route 2 times daily    SUCRALFATE (CARAFATE) 1 GM TABLET    take 1 tablet by mouth once daily       ALLERGIES     is allergic to latex; aleve [naproxen]; amoxicillin; aspirin; dye [iodides]; influenza vaccines; and reglan [metoclopramide]. FAMILY HISTORY     She indicated that her mother is alive. She indicated that her father is . She indicated that the status of her brother is unknown. She indicated that her maternal grandmother is . She indicated that her maternal grandfather is . She indicated that her paternal grandmother is . She indicated that her paternal grandfather is . She indicated that both of her daughters are alive. She indicated that the status of her neg hx is unknown.     family history includes Allergies in her daughter and daughter; Arthritis in her mother; Heart Attack in her mother; Heart Disease in her mother; Other in her brother and maternal grandmother. SOCIAL HISTORY      reports that she has never smoked. She has never used smokeless tobacco. She reports that she does not drink alcohol or use drugs. PHYSICAL EXAM     INITIAL VITALS:  height is 5' 5\" (1.651 m) and weight is 160 lb (72.6 kg). Her tympanic temperature is 97.3 °F (36.3 °C). Her blood pressure is 139/73 and her pulse is 74. Her respiration is 16 and oxygen saturation is 95%. Physical Exam  Constitutional:       Appearance: Normal appearance. She is well-developed. She is obese.  She is not ill-appearing or diaphoretic. HENT:      Head: Normocephalic and atraumatic. Mouth/Throat:      Pharynx: No oropharyngeal exudate or posterior oropharyngeal erythema. Eyes:      Conjunctiva/sclera: Conjunctivae normal.      Pupils: Pupils are equal, round, and reactive to light. Neck:      Musculoskeletal: Normal range of motion. Cardiovascular:      Rate and Rhythm: Normal rate and regular rhythm. Pulmonary:      Effort: Pulmonary effort is normal.      Breath sounds: Normal breath sounds. Abdominal:      General: Bowel sounds are normal.      Palpations: Abdomen is soft. Musculoskeletal: Normal range of motion. General: No tenderness. Skin:     General: Skin is warm and dry. Capillary Refill: Capillary refill takes less than 2 seconds. Neurological:      General: No focal deficit present. Mental Status: She is alert and oriented to person, place, and time.    Psychiatric:         Behavior: Behavior normal.           DIFFERENTIAL DIAGNOSIS/ MDM:     Weak and shaky hypoglycemia I will just make sure nothing else is going on, will check troponins EKG basic labs will feed her dinner and then recheck sugar    DIAGNOSTIC RESULTS     EKG: All EKG's are interpreted by the Emergency Department Physician who either signs or Co-signs this chart in the absence of a cardiologist.  EKG shows a normal sinus rhythm rate of 60 bpm ID interval is 206 ms QRS is 88 ms QT corrected is 384 ms there is no acute ST or T wave changes seen      RADIOLOGY:   I directly visualized the following  images and reviewed the radiologist interpretations:          ED BEDSIDE ULTRASOUND:       LABS:  Labs Reviewed   COMPREHENSIVE METABOLIC PANEL - Abnormal; Notable for the following components:       Result Value    Glucose 119 (*)     Bun/Cre Ratio 28 (*)     Total Bilirubin 0.22 (*)     All other components within normal limits   CBC WITH AUTO DIFFERENTIAL - Abnormal; Notable for the following components: RDW 14.7 (*)     Immature Granulocytes 1 (*)     All other components within normal limits   TROPONIN   TROPONIN   POC GLUCOSE FINGERSTICK           EMERGENCY DEPARTMENT COURSE:   Vitals:    Vitals:    01/26/21 1744   BP: 139/73   Pulse: 74   Resp: 16   Temp: 97.3 °F (36.3 °C)   TempSrc: Tympanic   SpO2: 95%   Weight: 160 lb (72.6 kg)   Height: 5' 5\" (1.651 m)     -------------------------  BP: 139/73, Temp: 97.3 °F (36.3 °C), Pulse: 74, Resp: 16        Re-evaluation Notes        CRITICAL CARE:   None        CONSULTS:      PROCEDURES:  None    FINAL IMPRESSION    No diagnosis found. DISPOSITION/PLAN   DISPOSITION care of this patient is passed to the oncoming physician at 1930 hrs. pending repeat blood sugar and troponin and EKG    Condition on Disposition    Stable    PATIENT REFERRED TO:  No follow-up provider specified. DISCHARGE MEDICATIONS:  New Prescriptions    No medications on file       (Please note that portions of this note were completed with a voice recognition program.  Efforts were made to edit the dictations but occasionally words are mis-transcribed.)    Keane MD, F.A.A.E.M.   Attending Emergency Physician                          Trini Dunn MD  01/26/21 1033

## 2021-01-26 NOTE — ED NOTES
Pt ambulatory to and from bathroom with a walker without difficulty. Resp even and NL.      Alina Lang RN  01/26/21 5455

## 2021-01-26 NOTE — ED NOTES
Pt given diabetic meal tray per MD hall. Pt talking on cell phone in no acute distress.      Breezy Márquez RN  01/26/21 2291

## 2021-01-27 LAB
EKG ATRIAL RATE: 68 BPM
EKG ATRIAL RATE: 71 BPM
EKG P AXIS: 41 DEGREES
EKG P AXIS: 62 DEGREES
EKG P-R INTERVAL: 206 MS
EKG P-R INTERVAL: 206 MS
EKG Q-T INTERVAL: 362 MS
EKG Q-T INTERVAL: 392 MS
EKG QRS DURATION: 80 MS
EKG QRS DURATION: 88 MS
EKG QTC CALCULATION (BAZETT): 384 MS
EKG QTC CALCULATION (BAZETT): 425 MS
EKG R AXIS: 7 DEGREES
EKG R AXIS: 89 DEGREES
EKG T AXIS: -14 DEGREES
EKG T AXIS: 35 DEGREES
EKG VENTRICULAR RATE: 68 BPM
EKG VENTRICULAR RATE: 71 BPM

## 2021-01-27 NOTE — ED PROVIDER NOTES
ADDENDUM:      Care of this patient was assumed from Dr. Barrera Beltrán. The patient was seen for Hypoglycemia (Pt c/o blood sugar reading 75. States she gets shaky when it goes below 100. Ate peanut butter and drank gatorade. Glucose ~ 120's per EMS.)  . The patient's initial evaluation and plan have been discussed with the prior provider who initially evaluated the patient. Nursing Notes, Past Medical Hx, Past Surgical Hx, Social Hx, Allergies, and Family Hx were all reviewed. Diagnostic Results     EKG   EKG Interpretation    Interpreted by emergency department physician    Rhythm: normal sinus   Rate: normal  Axis: normal  Conduction: normal  ST Segments: no acute change  T Waves: no acute change  Q Waves: no acute change    Clinical Impression: no acute change, but this is a nonspecific EKG.      RADIOLOGY:   Non-plain film images such as CT, Ultrasound and MRI are read by the radiologist. Plain radiographic images are visualized and the radiologist interpretations are reviewed as follows:     None obtained    LABS:   Results for orders placed or performed during the hospital encounter of 01/26/21   Comprehensive Metabolic Panel   Result Value Ref Range    Glucose 119 (H) 70 - 99 mg/dL    BUN 21 8 - 23 mg/dL    CREATININE 0.76 0.50 - 0.90 mg/dL    Bun/Cre Ratio 28 (H) 9 - 20    Calcium 10.0 8.6 - 10.4 mg/dL    Sodium 139 135 - 144 mmol/L    Potassium 3.8 3.7 - 5.3 mmol/L    Chloride 102 98 - 107 mmol/L    CO2 28 20 - 31 mmol/L    Anion Gap 9 9 - 17 mmol/L    Alkaline Phosphatase 79 35 - 104 U/L    ALT 26 5 - 33 U/L    AST 23 <32 U/L    Total Bilirubin 0.22 (L) 0.3 - 1.2 mg/dL    Total Protein 6.8 6.4 - 8.3 g/dL    Albumin 4.0 3.5 - 5.2 g/dL    Albumin/Globulin Ratio 1.4 1.0 - 2.5    GFR Non-African American >60 >60 mL/min    GFR African American >60 >60 mL/min    GFR Comment          GFR Staging NOT REPORTED    CBC Auto Differential   Result Value Ref Range    WBC 7.0 3.5 - 11.3 k/uL    RBC 4.33 3.95 - 5.11 122/56, Temp: 97.3 °F (36.3 °C), Pulse: 73, Resp: 16     ED Course     The patient was given the following medications:  No orders of the defined types were placed in this encounter. During the emergency department course, patient was given diabetic diet. She has been resting comfortably in the did not develop any new symptoms. In fact she is feeling back to normal and prefers to go home. Repeat blood sugar is 143. Medical Decision Making      66-year-old female patient presented with symptoms suggestive of hypoglycemia. She felt confused and shaky. Her blood sugar was 72 and she ate peanut butter and is feeling much better now. She denies any chest pain, abdominal pain, nausea, vomiting, constipation or diarrhea. No history of fever or chills. She also denies any urinary symptoms. Her 2 sets of cardiac markers are unremarkable. EKG is nonspecific. Patient is feeling much better and blood sugar is up to 143. Plan is to discharge the patient with instructions to plenty of oral fluids, continue current medications, frequent blood sugar monitoring for next 24 to 48 hours, follow-up with PCP, return if worse. Disposition     FINAL IMPRESSION      1. Hypoglycemia          DISPOSITION/PLAN   DISPOSITION        PATIENT REFERRED TO:  Radha Rutherford MD  [address] [place] [address] Nolan Galvan  [phone]    Call in 1 day  For reevaluation of current symptoms    [facility]. [phone]    If symptoms worsen      DISCHARGE MEDICATIONS:  Discharge Medication List as of 1/26/2021  8:14 PM                (Please note that portions of this note were completed with a voice recognition program.  Efforts were made to edit the dictations but occasionally words are mis-transcribed.)    Eid MD, F.A.C.E.P.   Attending Emergency Medicine Physician                Oralia Colon MD  01/26/21 [id number]

## 2021-02-02 ENCOUNTER — HOSPITAL ENCOUNTER (OUTPATIENT)
Dept: LAB | Age: 76
Discharge: HOME | End: 2021-02-02
Payer: MEDICARE

## 2021-02-02 DIAGNOSIS — I10 ESSENTIAL HYPERTENSION, MALIGNANT: ICD-10-CM

## 2021-02-02 DIAGNOSIS — E11.9 TYPE 2 DIABETES MELLITUS WITHOUT COMPLICATION, WITHOUT LONG-TERM CURRENT USE OF INSULIN (HCC): ICD-10-CM

## 2021-02-02 DIAGNOSIS — E78.00 HIGH CHOLESTEROL: ICD-10-CM

## 2021-02-02 DIAGNOSIS — E03.9 HYPOTHYROIDISM, UNSPECIFIED TYPE: ICD-10-CM

## 2021-02-02 LAB
ABSOLUTE EOS #: 0.09 K/UL (ref 0–0.44)
ABSOLUTE IMMATURE GRANULOCYTE: 0.03 K/UL (ref 0–0.3)
ABSOLUTE LYMPH #: 2.61 K/UL (ref 1.1–3.7)
ABSOLUTE MONO #: 0.67 K/UL (ref 0.1–1.2)
ALBUMIN SERPL-MCNC: 4.2 G/DL (ref 3.5–5.2)
ALBUMIN/GLOBULIN RATIO: 1.3 (ref 1–2.5)
ALP BLD-CCNC: 91 U/L (ref 35–104)
ALT SERPL-CCNC: 27 U/L (ref 5–33)
ANION GAP SERPL CALCULATED.3IONS-SCNC: 13 MMOL/L (ref 9–17)
AST SERPL-CCNC: 29 U/L
BASOPHILS # BLD: 1 % (ref 0–2)
BASOPHILS ABSOLUTE: 0.05 K/UL (ref 0–0.2)
BILIRUB SERPL-MCNC: 0.42 MG/DL (ref 0.3–1.2)
BUN BLDV-MCNC: 22 MG/DL (ref 8–23)
BUN/CREAT BLD: 27 (ref 9–20)
CALCIUM SERPL-MCNC: 10.3 MG/DL (ref 8.6–10.4)
CHLORIDE BLD-SCNC: 102 MMOL/L (ref 98–107)
CHOLESTEROL/HDL RATIO: 2.9
CHOLESTEROL: 234 MG/DL
CO2: 27 MMOL/L (ref 20–31)
CREAT SERPL-MCNC: 0.82 MG/DL (ref 0.5–0.9)
DIFFERENTIAL TYPE: ABNORMAL
EOSINOPHILS RELATIVE PERCENT: 1 % (ref 1–4)
ESTIMATED AVERAGE GLUCOSE: 151 MG/DL
GFR AFRICAN AMERICAN: >60 ML/MIN
GFR NON-AFRICAN AMERICAN: >60 ML/MIN
GFR SERPL CREATININE-BSD FRML MDRD: ABNORMAL ML/MIN/{1.73_M2}
GFR SERPL CREATININE-BSD FRML MDRD: ABNORMAL ML/MIN/{1.73_M2}
GLUCOSE BLD-MCNC: 147 MG/DL (ref 70–99)
HBA1C MFR BLD: 6.9 % (ref 4–6)
HCT VFR BLD CALC: 42.2 % (ref 36.3–47.1)
HDLC SERPL-MCNC: 80 MG/DL
HEMOGLOBIN: 12.7 G/DL (ref 11.9–15.1)
IMMATURE GRANULOCYTES: 0 %
LDL CHOLESTEROL: 126 MG/DL (ref 0–130)
LYMPHOCYTES # BLD: 37 % (ref 24–43)
MCH RBC QN AUTO: 27.8 PG (ref 25.2–33.5)
MCHC RBC AUTO-ENTMCNC: 30.1 G/DL (ref 25.2–33.5)
MCV RBC AUTO: 92.3 FL (ref 82.6–102.9)
MONOCYTES # BLD: 10 % (ref 3–12)
NRBC AUTOMATED: 0 PER 100 WBC
PDW BLD-RTO: 14.7 % (ref 11.8–14.4)
PLATELET # BLD: 350 K/UL (ref 138–453)
PLATELET ESTIMATE: ABNORMAL
PMV BLD AUTO: 9.3 FL (ref 8.1–13.5)
POTASSIUM SERPL-SCNC: 4.1 MMOL/L (ref 3.7–5.3)
RBC # BLD: 4.57 M/UL (ref 3.95–5.11)
RBC # BLD: ABNORMAL 10*6/UL
SEG NEUTROPHILS: 51 % (ref 36–65)
SEGMENTED NEUTROPHILS ABSOLUTE COUNT: 3.53 K/UL (ref 1.5–8.1)
SODIUM BLD-SCNC: 142 MMOL/L (ref 135–144)
THYROXINE, FREE: 1.17 NG/DL (ref 0.93–1.7)
TOTAL PROTEIN: 7.4 G/DL (ref 6.4–8.3)
TRIGL SERPL-MCNC: 139 MG/DL
TSH SERPL DL<=0.05 MIU/L-ACNC: 0.68 MIU/L (ref 0.3–5)
VLDLC SERPL CALC-MCNC: ABNORMAL MG/DL (ref 1–30)
WBC # BLD: 7 K/UL (ref 3.5–11.3)
WBC # BLD: ABNORMAL 10*3/UL

## 2021-02-02 PROCEDURE — 80061 LIPID PANEL: CPT

## 2021-02-02 PROCEDURE — 83036 HEMOGLOBIN GLYCOSYLATED A1C: CPT

## 2021-02-02 PROCEDURE — 80053 COMPREHEN METABOLIC PANEL: CPT

## 2021-02-02 PROCEDURE — 85025 COMPLETE CBC W/AUTO DIFF WBC: CPT

## 2021-02-02 PROCEDURE — 36415 COLL VENOUS BLD VENIPUNCTURE: CPT

## 2021-02-02 PROCEDURE — 84443 ASSAY THYROID STIM HORMONE: CPT

## 2021-02-02 PROCEDURE — 84439 ASSAY OF FREE THYROXINE: CPT

## 2021-02-04 ENCOUNTER — OFFICE VISIT (OUTPATIENT)
Dept: FAMILY MEDICINE CLINIC | Age: 76
End: 2021-02-04
Payer: MEDICARE

## 2021-02-04 VITALS
SYSTOLIC BLOOD PRESSURE: 110 MMHG | DIASTOLIC BLOOD PRESSURE: 70 MMHG | BODY MASS INDEX: 27.66 KG/M2 | WEIGHT: 166 LBS | HEART RATE: 68 BPM | HEIGHT: 65 IN

## 2021-02-04 DIAGNOSIS — E11.9 TYPE 2 DIABETES MELLITUS WITHOUT COMPLICATION, WITHOUT LONG-TERM CURRENT USE OF INSULIN (HCC): Primary | ICD-10-CM

## 2021-02-04 DIAGNOSIS — M54.41 CHRONIC BILATERAL LOW BACK PAIN WITH BILATERAL SCIATICA: ICD-10-CM

## 2021-02-04 DIAGNOSIS — K58.1 IRRITABLE BOWEL SYNDROME WITH CONSTIPATION: ICD-10-CM

## 2021-02-04 DIAGNOSIS — E11.9 DIABETES TYPE 2, NO OCULAR INVOLVEMENT (HCC): ICD-10-CM

## 2021-02-04 DIAGNOSIS — I10 ESSENTIAL HYPERTENSION: ICD-10-CM

## 2021-02-04 DIAGNOSIS — M54.42 CHRONIC BILATERAL LOW BACK PAIN WITH BILATERAL SCIATICA: ICD-10-CM

## 2021-02-04 DIAGNOSIS — G89.29 CHRONIC BILATERAL LOW BACK PAIN WITH BILATERAL SCIATICA: ICD-10-CM

## 2021-02-04 DIAGNOSIS — E78.00 HIGH CHOLESTEROL: ICD-10-CM

## 2021-02-04 DIAGNOSIS — K30 DELAYED GASTRIC EMPTYING: ICD-10-CM

## 2021-02-04 DIAGNOSIS — M15.9 OSTEOARTHRITIS OF MULTIPLE JOINTS, UNSPECIFIED OSTEOARTHRITIS TYPE: ICD-10-CM

## 2021-02-04 DIAGNOSIS — K11.1 PAROTID GLAND ENLARGEMENT: ICD-10-CM

## 2021-02-04 DIAGNOSIS — K21.9 GASTROESOPHAGEAL REFLUX DISEASE WITHOUT ESOPHAGITIS: ICD-10-CM

## 2021-02-04 DIAGNOSIS — E03.9 HYPOTHYROIDISM, UNSPECIFIED TYPE: ICD-10-CM

## 2021-02-04 PROCEDURE — G8417 CALC BMI ABV UP PARAM F/U: HCPCS | Performed by: FAMILY MEDICINE

## 2021-02-04 PROCEDURE — 99213 OFFICE O/P EST LOW 20 MIN: CPT

## 2021-02-04 PROCEDURE — 1090F PRES/ABSN URINE INCON ASSESS: CPT | Performed by: FAMILY MEDICINE

## 2021-02-04 PROCEDURE — G8484 FLU IMMUNIZE NO ADMIN: HCPCS | Performed by: FAMILY MEDICINE

## 2021-02-04 PROCEDURE — G8399 PT W/DXA RESULTS DOCUMENT: HCPCS | Performed by: FAMILY MEDICINE

## 2021-02-04 PROCEDURE — 4040F PNEUMOC VAC/ADMIN/RCVD: CPT | Performed by: FAMILY MEDICINE

## 2021-02-04 PROCEDURE — 1036F TOBACCO NON-USER: CPT | Performed by: FAMILY MEDICINE

## 2021-02-04 PROCEDURE — G8427 DOCREV CUR MEDS BY ELIG CLIN: HCPCS | Performed by: FAMILY MEDICINE

## 2021-02-04 PROCEDURE — 1123F ACP DISCUSS/DSCN MKR DOCD: CPT | Performed by: FAMILY MEDICINE

## 2021-02-04 PROCEDURE — 99214 OFFICE O/P EST MOD 30 MIN: CPT | Performed by: FAMILY MEDICINE

## 2021-02-04 ASSESSMENT — ENCOUNTER SYMPTOMS
RHINORRHEA: 0
TROUBLE SWALLOWING: 1
GASTROINTESTINAL NEGATIVE: 1
SINUS PRESSURE: 0
ALLERGIC/IMMUNOLOGIC NEGATIVE: 1
EYES NEGATIVE: 1
RESPIRATORY NEGATIVE: 1
BACK PAIN: 1

## 2021-02-04 NOTE — PROGRESS NOTES
Subjective:      Patient ID: Ulysses Savage is a 66 y.o. female. HPI Acute follow up on diabetes. Seeing issues with low and high readings. She has recently gotten the [employer] 14 day monitor system. On checks today, her home meter is about 50 points higher then our meter reading, which is closer to what her [place] system is measuring today. bs 130 this am.  She had a low of 77 and called the squad. She had some shaking rigors and was a little confused. Sabrina reader reviewed today. Past Medical History:   Diagnosis Date    Allergic conjunctivitis     (stable)    Chronic sinusitis     Constipation     Dizzy spells     Dry eye syndrome     Edentulous     (does not wear dentures)    Essential hypertension 7/6/2016    Fracture of lateral malleolus     (avulsion fracture at the tip of the lateral malleolus - right ankle).     Hearing loss     Hypothyroidism     Interstitial cystitis     Irritable bowel syndrome     Keratitis     (secondary to dry eye syndrome)    Parotid gland enlargement     stasis, consider sjorgens    Pseudophakos     (bilateral)    Seasonal rhinitis     Stress incontinence     Type II or unspecified type diabetes mellitus without mention of complication, not stated as uncontrolled     Urethral stenosis     Xerostomia      Past Surgical History:   Procedure Laterality Date    APPENDECTOMY  07-    CATARACT REMOVAL WITH IMPLANT Right 9/2/2003    (Dr. Luis Armando Dale)    CATARACT REMOVAL WITH IMPLANT Left 8/12/2003    (Dr. Luis Armando Dale)    CHOLECYSTECTOMY  07/24/1986    COLONOSCOPY  9/8/14    normal    CYSTOURETHROSCOPY/URETHRAL DILATION  2/8/12    multiple    EYE SURGERY Bilateral 12/13/2018    Bilateral Transscleral cyclophotocoagulation G6 laser (micropulse) performed by Luane Phalen, MD at [address]  6/2/1977    (Dr. Gemma Daly)   [facility] ENDOSCOPY  6/4/15    gastritis, biopsy x 2    UPPER GASTROINTESTINAL ENDOSCOPY back pain and myalgias (foot cramps described, lateral hip, right gluteal area). Negative for gait problem (not using cane or walker any more). Skin: Negative. Negative for rash. Allergic/Immunologic: Negative. Neurological: Positive for dizziness and numbness (right toes). Hematological: Negative. Psychiatric/Behavioral: Positive for confusion (with lower bs x 1). Objective:   Physical Exam  Constitutional:       General: She is not in acute distress. Appearance: She is well-developed. HENT:      Head: Normocephalic and atraumatic. Right Ear: Tympanic membrane and external ear normal.      Left Ear: Tympanic membrane and external ear normal.      Nose: Septal deviation (to the right), mucosal edema and congestion present. No rhinorrhea. Mouth/Throat:      Pharynx: No oropharyngeal exudate. Eyes:      General: No scleral icterus. Conjunctiva/sclera: Conjunctivae normal.   Neck:      Musculoskeletal: Neck supple. Thyroid: No thyromegaly. Cardiovascular:      Rate and Rhythm: Normal rate and regular rhythm. Heart sounds: Normal heart sounds. No murmur. Pulmonary:      Effort: Pulmonary effort is normal. No respiratory distress. Breath sounds: Normal breath sounds. No wheezing. Abdominal:      General: Bowel sounds are normal. There is no distension. Palpations: Abdomen is soft. Tenderness: There is no abdominal tenderness. There is no rebound. Musculoskeletal: Normal range of motion. General: No tenderness. Right hip: Tenderness: trochanteric bursae area pain to palpation, reproducible. Legs:    Skin:     General: Skin is warm and dry. Findings: No erythema or rash. Neurological:      Mental Status: She is alert and oriented to person, place, and time. Psychiatric:         Behavior: Behavior normal.         Thought Content:  Thought content normal.         Judgment: Judgment normal.       /70 (Site: Left Upper Arm, Position: Sitting, Cuff Size: Large Adult)   Pulse 68   Ht 5' 5\" (1.651 m)   Wt 166 lb (75.3 kg)   LMP  (LMP Unknown)   BMI 27.62 kg/m²   Hospital Outpatient Visit on 02/02/2021   Component Date Value Ref Range Status    WBC 02/02/2021 7.0  3.5 - 11.3 k/uL Final    RBC 02/02/2021 4.57  3.95 - 5.11 m/uL Final    Hemoglobin 02/02/2021 12.7  11.9 - 15.1 g/dL Final    Hematocrit 02/02/2021 42.2  36.3 - 47.1 % Final    MCV 02/02/2021 92.3  82.6 - 102.9 fL Final    MCH 02/02/2021 27.8  25.2 - 33.5 pg Final    MCHC 02/02/2021 30.1  25.2 - 33.5 g/dL Final    RDW 02/02/2021 14.7* 11.8 - 14.4 % Final    Platelets 59/67/3823 350  138 - 453 k/uL Final    MPV 02/02/2021 9.3  8.1 - 13.5 fL Final    NRBC Automated 02/02/2021 0.0  0.0 per 100 WBC Final    Differential Type 02/02/2021 NOT REPORTED   Final    Seg Neutrophils 02/02/2021 51  36 - 65 % Final    Lymphocytes 02/02/2021 37  24 - 43 % Final    Monocytes 02/02/2021 10  3 - 12 % Final    Eosinophils % 02/02/2021 1  1 - 4 % Final    Basophils 02/02/2021 1  0 - 2 % Final    Immature Granulocytes 02/02/2021 0  0 % Final    Segs Absolute 02/02/2021 3.53  1.50 - 8.10 k/uL Final    Absolute Lymph # 02/02/2021 2.61  1.10 - 3.70 k/uL Final    Absolute Mono # 02/02/2021 0.67  0.10 - 1.20 k/uL Final    Absolute Eos # 02/02/2021 0.09  0.00 - 0.44 k/uL Final    Basophils Absolute 02/02/2021 0.05  0.00 - 0.20 k/uL Final    Absolute Immature Granulocyte 02/02/2021 0.03  0.00 - 0.30 k/uL Final    WBC Morphology 02/02/2021 NOT REPORTED   Final    RBC Morphology 02/02/2021 ANISOCYTOSIS PRESENT   Final    Platelet Estimate 21/55/1658 NOT REPORTED   Final    Glucose 02/02/2021 147* 70 - 99 mg/dL Final    BUN 02/02/2021 22  8 - 23 mg/dL Final    CREATININE 02/02/2021 0.82  0.50 - 0.90 mg/dL Final    Bun/Cre Ratio 02/02/2021 27* 9 - 20 Final    Calcium 02/02/2021 10.3  8.6 - 10.4 mg/dL Final    Sodium 02/02/2021 142  135 - 144 mmol/L Final    Potassium 02/02/2021 4.1  3.7 - 5.3 mmol/L Final    Chloride 02/02/2021 102  98 - 107 mmol/L Final    CO2 02/02/2021 27  20 - 31 mmol/L Final    Anion Gap 02/02/2021 13  9 - 17 mmol/L Final    Alkaline Phosphatase 02/02/2021 91  35 - 104 U/L Final    ALT 02/02/2021 27  5 - 33 U/L Final    AST 02/02/2021 29  <32 U/L Final    Total Bilirubin 02/02/2021 0.42  0.3 - 1.2 mg/dL Final    Total Protein 02/02/2021 7.4  6.4 - 8.3 g/dL Final    Albumin 02/02/2021 4.2  3.5 - 5.2 g/dL Final    Albumin/Globulin Ratio 02/02/2021 1.3  1.0 - 2.5 Final    GFR Non- 02/02/2021 >60  >60 mL/min Final    GFR  02/02/2021 >60  >60 mL/min Final    GFR Comment 02/02/2021        Final    GFR Staging 02/02/2021 NOT REPORTED   Final    Hemoglobin A1C 02/02/2021 6.9* 4.0 - 6.0 % Final    Estimated Avg Glucose 02/02/2021 151  mg/dL Final    Thyroxine, Free 02/02/2021 1.17  0.93 - 1.70 ng/dL Final    Cholesterol 02/02/2021 234* <200 mg/dL Final    HDL 02/02/2021 80  >40 mg/dL Final    LDL Cholesterol 02/02/2021 126  0 - 130 mg/dL Final    Chol/HDL Ratio 02/02/2021 2.9  <5 Final    Triglycerides 02/02/2021 139  <150 mg/dL Final    VLDL 02/02/2021 NOT REPORTED  1 - 30 mg/dL Final    TSH 02/02/2021 0.68  0.30 - 5.00 mIU/L Final   Admission on 01/26/2021, Discharged on 01/26/2021   Component Date Value Ref Range Status    Glucose 01/26/2021 119* 70 - 99 mg/dL Final    BUN 01/26/2021 21  8 - 23 mg/dL Final    CREATININE 01/26/2021 0.76  0.50 - 0.90 mg/dL Final    Bun/Cre Ratio 01/26/2021 28* 9 - 20 Final    Calcium 01/26/2021 10.0  8.6 - 10.4 mg/dL Final    Sodium 01/26/2021 139  135 - 144 mmol/L Final    Potassium 01/26/2021 3.8  3.7 - 5.3 mmol/L Final    Chloride 01/26/2021 102  98 - 107 mmol/L Final    CO2 01/26/2021 28  20 - 31 mmol/L Final    Anion Gap 01/26/2021 9  9 - 17 mmol/L Final    Alkaline Phosphatase 01/26/2021 79  35 - 104 U/L Final    ALT 01/26/2021 26  5 - 33 U/L Final    AST 01/26/2021 23  <32 U/L Final    Total Bilirubin 01/26/2021 0.22* 0.3 - 1.2 mg/dL Final    Total Protein 01/26/2021 6.8  6.4 - 8.3 g/dL Final    Albumin 01/26/2021 4.0  3.5 - 5.2 g/dL Final    Albumin/Globulin Ratio 01/26/2021 1.4  1.0 - 2.5 Final    GFR Non- 01/26/2021 >60  >60 mL/min Final    GFR  01/26/2021 >60  >60 mL/min Final    GFR Comment 01/26/2021        Final    GFR Staging 01/26/2021 NOT REPORTED   Final    WBC 01/26/2021 7.0  3.5 - 11.3 k/uL Final    RBC 01/26/2021 4.33  3.95 - 5.11 m/uL Final    Hemoglobin 01/26/2021 12.2  11.9 - 15.1 g/dL Final    Hematocrit 01/26/2021 40.3  36.3 - 47.1 % Final    MCV 01/26/2021 93.1  82.6 - 102.9 fL Final    MCH 01/26/2021 28.2  25.2 - 33.5 pg Final    MCHC 01/26/2021 30.3  25.2 - 33.5 g/dL Final    RDW 01/26/2021 14.7* 11.8 - 14.4 % Final    Platelets 25/65/5718 309  138 - 453 k/uL Final    MPV 01/26/2021 10.0  8.1 - 13.5 fL Final    NRBC Automated 01/26/2021 0.0  0.0 per 100 WBC Final    Differential Type 01/26/2021 NOT REPORTED   Final    Seg Neutrophils 01/26/2021 50  36 - 65 % Final    Lymphocytes 01/26/2021 37  24 - 43 % Final    Monocytes 01/26/2021 10  3 - 12 % Final    Eosinophils % 01/26/2021 1  1 - 4 % Final    Basophils 01/26/2021 1  0 - 2 % Final    Immature Granulocytes 01/26/2021 1* 0 % Final    Segs Absolute 01/26/2021 3.54  1.50 - 8.10 k/uL Final    Absolute Lymph # 01/26/2021 2.56  1.10 - 3.70 k/uL Final    Absolute Mono # 01/26/2021 0.71  0.10 - 1.20 k/uL Final    Absolute Eos # 01/26/2021 0.09  0.00 - 0.44 k/uL Final    Basophils Absolute 01/26/2021 0.04  0.00 - 0.20 k/uL Final    Absolute Immature Granulocyte 01/26/2021 0.05  0.00 - 0.30 k/uL Final    WBC Morphology 01/26/2021 NOT REPORTED   Final    RBC Morphology 01/26/2021 ANISOCYTOSIS PRESENT   Final    Platelet Estimate 07/46/1054 NOT REPORTED   Final    Ventricular Rate 01/26/2021 68  BPM Final    Atrial Rate 01/26/2021 68 BPM Final    P-R Interval 01/26/2021 206  ms Final    QRS Duration 01/26/2021 88  ms Final    Q-T Interval 01/26/2021 362  ms Final    QTc Calculation (Bazett) 01/26/2021 384  ms Final    P Axis 01/26/2021 41  degrees Final    R Axis 01/26/2021 89  degrees Final    T Axis 01/26/2021 -14  degrees Final    Troponin, High Sensitivity 01/26/2021 7  0 - 14 ng/L Final    Troponin T 01/26/2021 NOT REPORTED  <0.03 ng/mL Final    Troponin Interp 01/26/2021 NOT REPORTED   Final    POC Glucose 01/26/2021 102  65 - 105 mg/dL Final    Troponin, High Sensitivity 01/26/2021 13  0 - 14 ng/L Final    Troponin T 01/26/2021 NOT REPORTED  <0.03 ng/mL Final    Troponin Interp 01/26/2021 NOT REPORTED   Final    Ventricular Rate 01/26/2021 71  BPM Final    Atrial Rate 01/26/2021 71  BPM Final    P-R Interval 01/26/2021 206  ms Final    QRS Duration 01/26/2021 80  ms Final    Q-T Interval 01/26/2021 392  ms Final    QTc Calculation (Bazett) 01/26/2021 425  ms Final    P Axis 01/26/2021 62  degrees Final    R Axis 01/26/2021 7  degrees Final    T Axis 01/26/2021 35  degrees Final    Glucose 01/26/2021 143  mg/dL Final    QC OK? 01/26/2021 ok   Final    POC Glucose 01/26/2021 143* 65 - 105 mg/dL Final       Assessment:       Encounter Diagnoses   Name Primary?  Type 2 diabetes mellitus without complication, without long-term current use of insulin ([facility]) Yes    Essential hypertension     Delayed gastric emptying     Parotid gland enlargement     Irritable bowel syndrome with constipation     Diabetes type 2, no ocular involvement ([facility])     Osteoarthritis of multiple joints, unspecified osteoarthritis type     Gastroesophageal reflux disease without esophagitis     Chronic bilateral low back pain with bilateral sciatica     Hypothyroidism, unspecified type     High cholesterol              Plan:             diabetes: doing well by home checks. last  a1c at 6.9%. Cont. Current metformin bid dosing. Updating eye exam.  she is only on metformin at present. She describes hypoglycemia once , down in the 70's. Really shouldn't be bottoming out? ? Denies insulin or other drug use for sugar. She now has a [employer] 14 days system and is staying in range on review. Discussed a plan for dealing with lows/percieved lows.       Htn: improved at present. She stopped her low dose of lisinopril long term at present. Declining to use at present.       Delayed gastric emptying. Currently, some post prandial bloating and increased bowel sounds by report. Not really painful. No vomiting. EGD 4/2/18: antral ulceration found. Esophagus re-dilated at that time. She has prn gastroenterology consult if symptoms worsen. EGD 11/ 18/19. Minimal gastritis without ulceration. Single dilation with dilator. Some evidence for thick secretions and laryngitis. She continues on omeprazole. No gastritis or gerd concerns at present. no food sticking.      Parotid enlargement/exrostomia; still with dry mouth concerns. Not using artificial saliva, she tried and did not like it. Nothing visible as far as enlargement. She feels fullness at times. dry mouth ongoing. Using gatorade sugar free.       Some chronic rhinitis and sinusitis issues ongoing. She describes intermittent sneezing and congestions despite daily antihistamine and flonase use. Concerns for sjogrens in the past.   She has somewhat severe septal deviation and likely has a lot of mechanical obstruction, especially on the right. Rec. ENT consult to consider mechanical fix to her nose issues. Thought not to be bad enough to need repair by patient recall. Some seasonal allergies likely in play at present.          Ibs; some intermittent constipation. We discussed current bowel regimen and made plans to add more if needed.       Oa: seeing more knee pain. Currently her back pain is better and she is not needing her cane or walker for extended walking.   Weather related exacerbation of knee pain at present. Discussed nsaid use. She has had some stomach complaints on aleve in the past.   Conservative with nsaids at present due to stomach ulceration in April 2018. Consider injection trial if willing.       Gerd: some recent heartburn recurrent. She was noting some recurrent esophageal pain with swallowing, and some episodes of choking recently. [facility] [place], and again 11/2016. Repeat EGD 4/2/18 with antral gastric ulcer . Repeated dilation at that time. repeat dilation 11/19 through [facility] Che [facility] [place]. She continues on omeprazole 20mg/day.         Acute on chronic back pain. Neuropathy symptoms in feet by description, also more problematic acutely. Stiff with poor rom. Known moderate spinal canal stenosis at L3-4, L4-5 from 2016 MRI. Numbness in both feet endorsed at present. neurontin use once a day to occasional twice a day when she has sharp pains. Seems to be helping. Back pain improved enough that she is no longer using a walker or cane.       Hypothyroid: well balanced. Cont. Current dosing.      Hyperlipidemia:  Discussed rec. For treatment given diabetes diagnosis. Discussed side effects and precautions.   Updating labs.         Sharad Ramesh MD

## 2021-03-08 DIAGNOSIS — G62.9 NEUROPATHY: Primary | ICD-10-CM

## 2021-03-09 RX ORDER — GABAPENTIN 300 MG/1
CAPSULE ORAL
Qty: 270 CAPSULE | Refills: 0 | Status: SHIPPED | OUTPATIENT
Start: 2021-03-09 | End: 2021-06-10 | Stop reason: ALTCHOICE

## 2021-03-11 ENCOUNTER — OFFICE VISIT (OUTPATIENT)
Dept: PODIATRY | Age: 76
End: 2021-03-11
Payer: MEDICARE

## 2021-03-11 VITALS
DIASTOLIC BLOOD PRESSURE: 80 MMHG | HEART RATE: 76 BPM | RESPIRATION RATE: 20 BRPM | BODY MASS INDEX: 27.22 KG/M2 | SYSTOLIC BLOOD PRESSURE: 132 MMHG | WEIGHT: 163.6 LBS

## 2021-03-11 DIAGNOSIS — E11.49 DM (DIABETES MELLITUS), TYPE 2 WITH NEUROLOGICAL COMPLICATIONS (HCC): Primary | ICD-10-CM

## 2021-03-11 DIAGNOSIS — B35.1 DERMATOPHYTOSIS OF NAIL: ICD-10-CM

## 2021-03-11 PROCEDURE — 99999 PR OFFICE/OUTPT VISIT,PROCEDURE ONLY: CPT | Performed by: PODIATRIST

## 2021-03-11 PROCEDURE — 11720 DEBRIDE NAIL 1-5: CPT | Performed by: PODIATRIST

## 2021-03-11 NOTE — PROGRESS NOTES
Foot Care Worksheet  PCP: Aryan Knott MD  Last visit: 02 / 04 / 2021    Nail description:  Thick , Yellow , Crumbly , Marked limitation of ambulation     Pain resulting from thickened and dystrophy of nail plate No    Nails involved  Right   1  (T5-T9)  Left     5  (TA-T4)    Q7 1 Class A Finding - Non traumatic amputation of foot No    Q8 2 Class B Findings - Absent DP pulse No, Absent PT pulse No, Advanced tropic changes (3 required) Yes    Decrease hair growth Yes, Nail changes/thickening Yes, Pigmented changes/discoloration Yes, Skin texture (thin, shiny) No, Skin color (rubor/redness) No    Q9 1 Class B and 2 Class C Findings  Claudication No, Temperature change Yes, Paresthesia Yes, Burning No, Edema Yes

## 2021-03-11 NOTE — PROGRESS NOTES
Subjective:  Niyah Chang is a 66 y.o. female who presents to the office today for routine DM foot care. .  Currently denies F/C/N/V. Allergies   Allergen Reactions    Latex Rash    Aleve [Naproxen]      Stomach problems    Amoxicillin      Other reaction(s): mucositis    Aspirin Other (See Comments)     Dizziness and tears her stomach up    Dye [Iodides]      IV DYE    Influenza Vaccines     Reglan [Metoclopramide] Other (See Comments)     Dystonic reaction, involuntary movements       Past Medical History:   Diagnosis Date    Allergic conjunctivitis     (stable)    Chronic sinusitis     Constipation     Dizzy spells     Dry eye syndrome     Edentulous     (does not wear dentures)    Essential hypertension 7/6/2016    Fracture of lateral malleolus     (avulsion fracture at the tip of the lateral malleolus - right ankle).  Hearing loss     Hypothyroidism     Interstitial cystitis     Irritable bowel syndrome     Keratitis     (secondary to dry eye syndrome)    Parotid gland enlargement     stasis, consider sjorgens    Pseudophakos     (bilateral)    Seasonal rhinitis     Stress incontinence     Type II or unspecified type diabetes mellitus without mention of complication, not stated as uncontrolled     Urethral stenosis     Xerostomia        Prior to Admission medications    Medication Sig Start Date End Date Taking? Authorizing Provider   gabapentin (NEURONTIN) 300 MG capsule take 1 capsule by mouth three times a day 3/9/21 6/8/21 Yes Cassy Estrada MD   metFORMIN (GLUCOPHAGE) 500 MG tablet take 1 tablet by mouth twice a day with meals 2/4/21  Yes Cassy Estrada MD   sucralfate (CARAFATE) 1 GM tablet take 1 tablet by mouth once daily 1/26/21  Yes Cassy Estrada MD   levothyroxine (SYNTHROID) 88 MCG tablet take 1 tablet by mouth once daily 1/8/21  Yes Cassy Estrada MD   blood glucose monitor strips Test 2 times a day & as needed for symptoms of irregular blood glucose. Dispense sufficient amount for indicated testing frequency plus additional to accommodate PRN testing needs. 11/19/20  Yes Sharad Ramesh MD   diclofenac sodium (VOLTAREN) 1 % GEL Apply 2 g topically 4 times daily 10/30/20  Yes TAY Concepcion CNP   omeprazole (PRILOSEC) 20 MG delayed release capsule take 1 capsule by mouth twice a day 10/20/20  Yes Sharad Ramesh MD   fluticasone [facility]) 50 MCG/ACT nasal spray instill 2 sprays into each nostril once daily 6/29/20  Yes Sharad Ramesh MD   hydroxypropyl methylcellulose (GONIOSOL) 2.5 % ophthalmic solution Place 1 drop into both eyes 3 times daily 6/18/20  Yes Apolonia Ni, GILBERTO   blood glucose test strips (ONE TOUCH ULTRA TEST) strip use 1 TEST STRIP to TEST BLOOD SUGAR twice a day 3/18/20  Yes Sharad Ramesh MD   blood glucose monitor kit and supplies Test 2 times a day & as needed for symptoms of irregular blood glucose. 7/1/19  Yes Sharad Ramesh MD   loratadine (CLARITIN) 10 MG tablet take 1 tablet by mouth once daily 10/1/18  Yes Sharad Ramesh MD   SOFT TOUCH LANCETS MISC 1 Device by Does not apply route 2 times daily 8/16/16  Yes Sharad Ramesh MD       Past Surgical History:   Procedure Laterality Date    APPENDECTOMY  66-    CATARACT REMOVAL WITH IMPLANT Right 9/2/2003    (Dr. Bam Bui)    CATARACT REMOVAL WITH IMPLANT Left 8/12/2003    (Dr. Bam Bui)    CHOLECYSTECTOMY  07/24/1986    COLONOSCOPY  9/8/14    normal    CYSTOURETHROSCOPY/URETHRAL DILATION  2/8/12    multiple    EYE SURGERY Bilateral 12/13/2018    Bilateral Transscleral cyclophotocoagulation G6 laser (micropulse) performed by Suly Smith MD at [address]  6/2/1977    (Dr. Cruz Herrera)   [facility] ENDOSCOPY  6/4/15    gastritis, biopsy x 2    UPPER GASTROINTESTINAL ENDOSCOPY  04/02/2018    Dr. Che Romero   antral gastric ulcer. esophagus re-dilated at the end of procedure.          Family History   Problem Relation Age of Onset    Heart Disease Mother     Heart Attack Mother     Arthritis Mother     Other Maternal Grandmother         (gout)    Allergies Daughter     Allergies Daughter     Other Brother         ( at age 21 secondary to auto accident).  Cataracts Neg Hx     Diabetes Neg Hx     Glaucoma Neg Hx        Social History     Tobacco Use    Smoking status: Never Smoker    Smokeless tobacco: Never Used    Tobacco comment: Never smoked. Jackelyn Mendiola Rcp, 2016. Substance Use Topics    Alcohol use: No     Alcohol/week: 0.0 standard drinks       Review of Systems: All 12 systems reviewed and pertinent positives noted above. Lower Extremity Physical Examination:     Vitals:   Vitals:    21 1518   BP: 132/80   Pulse: 76   Resp: 20     General: AAO x 3 in NAD. Vascular: DP and PT pulses palpable 2/4, bilateral.  CFT <3 seconds, bilateral.  Hair growth present to the level of the digits, bilateral.  Edema present, bilateral.  Varicosities present, bilateral. Erythema absent, bilateral. Distal Rubor absent bilateral.  Temperature within normal limits bilateral. Hyperpigmentation present bilateral. Atrophic skin yes. Neurological: Sensation Impaired to light touch to level of digits, bilateral.  Protective sensation intact  10/10 sites via 5.07/10g [place] Monofilament, bilateral.  negative Tinel's, bilateral.  negative Valleix sign, bilateral.  Vibratory abnormal  bilateral.  Reflexes Decreased bilateral.  Paresthesias positive. Dysthesias positive. Sharp/dull abnormal  bilateral.  Musculoskeletal: Muscle strength 5/5, bilateral.  Pain absent upon palpation bilateral. Normal medial longitudinal arch, bilateral.  Ankle ROM decresed,bilateral.  1st MPJ ROM within normal limits, bilateral.  Dorsally contracted digits absent. No other foot deformities.   Integument: Warm, dry, supple, bilateral.  Open lesion absent, bilateral.  Interdigital maceration absent to web spaces bilateral.   Nails left 5 and right 1 thickened, dystrophic and crumbly, discolored with subungual debris. .  Fissures absent, bilateral. Hyperkeratotic tissue is absent. Asessment: Patient is a 66 y.o. female with:    Diagnosis Orders   1. DM (diabetes mellitus), type 2 with neurological complications ([address]     2. Dermatophytosis of nail         Plan: Patient examined and evaluated. Current condition and treatment options discussed in detail. DM foot ed and exam  All nails as mentioned above debrided in length and thickness. Patient advised OTC methods for treatment of the mycotic nails. Patient will follow up in 10 weeks. Contact office with any questions/problems/concerns.

## 2021-03-23 ENCOUNTER — TELEPHONE (OUTPATIENT)
Dept: FAMILY MEDICINE CLINIC | Age: 76
End: 2021-03-23
Payer: MEDICARE

## 2021-03-23 DIAGNOSIS — E11.49 OTHER DIABETIC NEUROLOGICAL COMPLICATION ASSOCIATED WITH TYPE 2 DIABETES MELLITUS (HCC): ICD-10-CM

## 2021-03-23 DIAGNOSIS — E11.65 TYPE 2 DIABETES MELLITUS WITH HYPERGLYCEMIA, WITHOUT LONG-TERM CURRENT USE OF INSULIN (HCC): Primary | ICD-10-CM

## 2021-03-23 DIAGNOSIS — H90.5 CENTRAL HEARING LOSS: ICD-10-CM

## 2021-03-23 DIAGNOSIS — M15.9 GENERALIZED OSTEOARTHROSIS, INVOLVING MULTIPLE SITES: ICD-10-CM

## 2021-03-23 DIAGNOSIS — Z51.89 ENCOUNTER FOR VOCATIONAL THERAPY: ICD-10-CM

## 2021-03-23 DIAGNOSIS — K21.00 GASTROESOPHAGEAL REFLUX DISEASE WITH ESOPHAGITIS, UNSPECIFIED WHETHER HEMORRHAGE: ICD-10-CM

## 2021-03-23 DIAGNOSIS — I10 ESSENTIAL HYPERTENSION, BENIGN: ICD-10-CM

## 2021-03-23 DIAGNOSIS — K85.90 ACUTE PANCREATITIS, UNSPECIFIED COMPLICATION STATUS, UNSPECIFIED PANCREATITIS TYPE: ICD-10-CM

## 2021-03-23 DIAGNOSIS — K58.1 IRRITABLE BOWEL SYNDROME WITH CONSTIPATION: ICD-10-CM

## 2021-03-23 PROCEDURE — G0179 MD RECERTIFICATION HHA PT: HCPCS | Performed by: FAMILY MEDICINE

## 2021-03-23 NOTE — TELEPHONE ENCOUNTER
Home health plan of care reviewed andcertification completed on 03/23/21 on pateint for service dates 03/21/21-05/19/21. Medications reviewed and verified. Physician time spent on activities to coordinate services, documenting medical decision making, reviewing of reports, treatment plans and test results is 20 minutes.

## 2021-04-13 RX ORDER — OMEPRAZOLE 20 MG/1
CAPSULE, DELAYED RELEASE ORAL
Qty: 180 CAPSULE | Refills: 1 | Status: SHIPPED | OUTPATIENT
Start: 2021-04-13 | End: 2021-09-30

## 2021-04-13 NOTE — TELEPHONE ENCOUNTER
Kvng Bateman called requesting a refill of the below medication which has been pended for you:     Requested Prescriptions     Pending Prescriptions Disp Refills    omeprazole (PRILOSEC) 20 MG delayed release capsule [Pharmacy Med Name: OMEPRAZOLE DR 20 MG CAPSULE] 180 capsule 1     Sig: take 1 capsule by mouth twice a day       Last Appointment Date: 2/4/2021  Next Appointment Date: 8/4/2021    Allergies   Allergen Reactions    Latex Rash    Aleve [Naproxen]      Stomach problems    Amoxicillin      Other reaction(s): mucositis    Aspirin Other (See Comments)     Dizziness and tears her stomach up    Dye [Iodides]      IV DYE    Influenza Vaccines     Reglan [Metoclopramide] Other (See Comments)     Dystonic reaction, involuntary movements

## 2021-05-20 ENCOUNTER — OFFICE VISIT (OUTPATIENT)
Dept: PODIATRY | Age: 76
End: 2021-05-20
Payer: MEDICARE

## 2021-05-20 VITALS
DIASTOLIC BLOOD PRESSURE: 70 MMHG | HEART RATE: 68 BPM | BODY MASS INDEX: 27.66 KG/M2 | HEIGHT: 65 IN | SYSTOLIC BLOOD PRESSURE: 122 MMHG | WEIGHT: 166 LBS

## 2021-05-20 DIAGNOSIS — E11.49 DM (DIABETES MELLITUS), TYPE 2 WITH NEUROLOGICAL COMPLICATIONS (HCC): Primary | ICD-10-CM

## 2021-05-20 DIAGNOSIS — B35.1 DERMATOPHYTOSIS OF NAIL: ICD-10-CM

## 2021-05-20 PROCEDURE — 99999 PR OFFICE/OUTPT VISIT,PROCEDURE ONLY: CPT | Performed by: PODIATRIST

## 2021-05-20 PROCEDURE — 11720 DEBRIDE NAIL 1-5: CPT | Performed by: PODIATRIST

## 2021-05-20 NOTE — PROGRESS NOTES
Subjective:  Leonidas Lopez is a 66 y.o. female who presents to the office today for routine DM foot care. .  Currently denies F/C/N/V. Allergies   Allergen Reactions    Latex Rash    Aleve [Naproxen]      Stomach problems    Amoxicillin      Other reaction(s): mucositis    Aspirin Other (See Comments)     Dizziness and tears her stomach up    Dye [Iodides]      IV DYE    Influenza Vaccines     Reglan [Metoclopramide] Other (See Comments)     Dystonic reaction, involuntary movements       Past Medical History:   Diagnosis Date    Allergic conjunctivitis     (stable)    Chronic sinusitis     Constipation     Dizzy spells     Dry eye syndrome     Edentulous     (does not wear dentures)    Essential hypertension 7/6/2016    Fracture of lateral malleolus     (avulsion fracture at the tip of the lateral malleolus - right ankle).  Hearing loss     Hypothyroidism     Interstitial cystitis     Irritable bowel syndrome     Keratitis     (secondary to dry eye syndrome)    Parotid gland enlargement     stasis, consider sjorgens    Pseudophakos     (bilateral)    Seasonal rhinitis     Stress incontinence     Type II or unspecified type diabetes mellitus without mention of complication, not stated as uncontrolled     Urethral stenosis     Xerostomia        Prior to Admission medications    Medication Sig Start Date End Date Taking?  Authorizing Provider   omeprazole (PRILOSEC) 20 MG delayed release capsule take 1 capsule by mouth twice a day 4/13/21  Yes Jesse Dickey MD   gabapentin (NEURONTIN) 300 MG capsule take 1 capsule by mouth three times a day 3/9/21 6/8/21 Yes Jesse Dickey MD   metFORMIN (GLUCOPHAGE) 500 MG tablet take 1 tablet by mouth twice a day with meals 2/4/21  Yes Jesse Dickey MD   sucralfate (CARAFATE) 1 GM tablet take 1 tablet by mouth once daily 1/26/21  Yes Jesse Dickey MD   levothyroxine (SYNTHROID) 88 MCG tablet take 1 tablet by mouth once daily 1/8/21  Yes Meng Raza MD   blood glucose monitor strips Test 2 times a day & as needed for symptoms of irregular blood glucose. Dispense sufficient amount for indicated testing frequency plus additional to accommodate PRN testing needs. 11/19/20  Yes Meng Raza MD   diclofenac sodium (VOLTAREN) 1 % GEL Apply 2 g topically 4 times daily 10/30/20  Yes Sindhu Elizabeth, APRN - CNP   fluticasone [facility]) 50 MCG/ACT nasal spray instill 2 sprays into each nostril once daily 6/29/20  Yes Meng Raza MD   hydroxypropyl methylcellulose (GONIOSOL) 2.5 % ophthalmic solution Place 1 drop into both eyes 3 times daily 6/18/20  Yes Apolonia Ni, OD   blood glucose test strips (ONE TOUCH ULTRA TEST) strip use 1 TEST STRIP to TEST BLOOD SUGAR twice a day 3/18/20  Yes Meng Raza MD   blood glucose monitor kit and supplies Test 2 times a day & as needed for symptoms of irregular blood glucose. 7/1/19  Yes Meng Raza MD   loratadine (CLARITIN) 10 MG tablet take 1 tablet by mouth once daily 10/1/18  Yes Meng Raza MD   SOFT TOUCH LANCETS MISC 1 Device by Does not apply route 2 times daily 8/16/16  Yes Meng Raza MD       Past Surgical History:   Procedure Laterality Date    APPENDECTOMY  53-    CATARACT REMOVAL WITH IMPLANT Right 9/2/2003    (Dr. Tricia Moreno)    CATARACT REMOVAL WITH IMPLANT Left 8/12/2003    (Dr. Tricia Moreno)    CHOLECYSTECTOMY  07/24/1986    COLONOSCOPY  9/8/14    normal    CYSTOURETHROSCOPY/URETHRAL DILATION  2/8/12    multiple    EYE SURGERY Bilateral 12/13/2018    Bilateral Transscleral cyclophotocoagulation G6 laser (micropulse) performed by Sruthi Roy MD at [address]  6/2/1977    (Dr. Get Newell)   [facility] ENDOSCOPY  6/4/15    gastritis, biopsy x 2    UPPER GASTROINTESTINAL ENDOSCOPY  04/02/2018    Dr. Nicholas Molina   antral gastric ulcer. esophagus re-dilated at the end of procedure.          Family History   Problem Relation Age of Onset    Heart Disease Mother     Heart Attack Mother     Arthritis Mother     Other Maternal Grandmother         (gout)    Allergies Daughter     Allergies Daughter     Other Brother         ( at age 21 secondary to auto accident).  Cataracts Neg Hx     Diabetes Neg Hx     Glaucoma Neg Hx        Social History     Tobacco Use    Smoking status: Never Smoker    Smokeless tobacco: Never Used    Tobacco comment: Never smoked. German Overton Rcp, 2016. Substance Use Topics    Alcohol use: No     Alcohol/week: 0.0 standard drinks       Review of Systems: All 12 systems reviewed and pertinent positives noted above. Lower Extremity Physical Examination:     Vitals:   Vitals:    21 1548   BP: 122/70   Pulse: 68     General: AAO x 3 in NAD. Vascular: DP and PT pulses palpable 2/4, bilateral.  CFT <3 seconds, bilateral.  Hair growth present to the level of the digits, bilateral.  Edema present, bilateral.  Varicosities present, bilateral. Erythema absent, bilateral. Distal Rubor absent bilateral.  Temperature within normal limits bilateral. Hyperpigmentation present bilateral. Atrophic skin yes. Neurological: Sensation Impaired to light touch to level of digits, bilateral.  Protective sensation intact  10/10 sites via 5.07/10g [place]Christi Monofilament, bilateral.  negative Tinel's, bilateral.  negative Valleix sign, bilateral.  Vibratory abnormal  bilateral.  Reflexes Decreased bilateral.  Paresthesias positive. Dysthesias positive. Sharp/dull abnormal  bilateral.  Musculoskeletal: Muscle strength 5/5, bilateral.  Pain absent upon palpation bilateral. Normal medial longitudinal arch, bilateral.  Ankle ROM decresed,bilateral.  1st MPJ ROM within normal limits, bilateral.  Dorsally contracted digits absent. No other foot deformities.   Integument: Warm, dry, supple, bilateral.  Open lesion absent, bilateral.  Interdigital maceration absent to web spaces bilateral.   Nails left 5 and right 1 thickened, dystrophic and crumbly, discolored with subungual debris. .  Fissures absent, bilateral. Hyperkeratotic tissue is absent. Asessment: Patient is a 66 y.o. female with:    Diagnosis Orders   1. DM (diabetes mellitus), type 2 with neurological complications ([address]     2. Dermatophytosis of nail         Plan: Patient examined and evaluated. Current condition and treatment options discussed in detail. DM foot ed and exam  All nails as mentioned above debrided in length and thickness. Patient advised OTC methods for treatment of the mycotic nails. Patient will follow up in 10 weeks. Contact office with any questions/problems/concerns.

## 2021-06-07 ENCOUNTER — TELEPHONE (OUTPATIENT)
Dept: FAMILY MEDICINE CLINIC | Age: 76
End: 2021-06-07
Payer: COMMERCIAL

## 2021-06-07 DIAGNOSIS — E11.9 TYPE 2 DIABETES MELLITUS WITHOUT COMPLICATION, WITHOUT LONG-TERM CURRENT USE OF INSULIN (HCC): ICD-10-CM

## 2021-06-07 DIAGNOSIS — E11.49 OTHER DIABETIC NEUROLOGICAL COMPLICATION ASSOCIATED WITH TYPE 2 DIABETES MELLITUS (HCC): ICD-10-CM

## 2021-06-07 DIAGNOSIS — K85.90 ACUTE PANCREATITIS, UNSPECIFIED COMPLICATION STATUS, UNSPECIFIED PANCREATITIS TYPE: Primary | ICD-10-CM

## 2021-06-07 DIAGNOSIS — K11.1 PAROTID GLAND ENLARGEMENT: ICD-10-CM

## 2021-06-07 DIAGNOSIS — E03.9 HYPOTHYROIDISM, UNSPECIFIED TYPE: ICD-10-CM

## 2021-06-07 DIAGNOSIS — K58.1 IRRITABLE BOWEL SYNDROME WITH CONSTIPATION: ICD-10-CM

## 2021-06-07 DIAGNOSIS — I10 ESSENTIAL HYPERTENSION: ICD-10-CM

## 2021-06-07 PROCEDURE — G0179 MD RECERTIFICATION HHA PT: HCPCS | Performed by: FAMILY MEDICINE

## 2021-06-07 NOTE — TELEPHONE ENCOUNTER
Home health plan of care reviewed and certification completed on 06/07/21 on patient for service dates 05/20/21-07/18/21. Medications reviewed and verified.   Physician time spent on activities to coordinate services, docementing medical decision making, reviewing of reports, treatment plans and test results is 20 minutes

## 2021-06-10 ENCOUNTER — OFFICE VISIT (OUTPATIENT)
Dept: FAMILY MEDICINE CLINIC | Age: 76
End: 2021-06-10
Payer: COMMERCIAL

## 2021-06-10 VITALS
BODY MASS INDEX: 25.99 KG/M2 | DIASTOLIC BLOOD PRESSURE: 72 MMHG | SYSTOLIC BLOOD PRESSURE: 128 MMHG | HEIGHT: 65 IN | WEIGHT: 156 LBS | HEART RATE: 68 BPM

## 2021-06-10 DIAGNOSIS — E16.2 HYPOGLYCEMIA: ICD-10-CM

## 2021-06-10 DIAGNOSIS — E11.9 TYPE 2 DIABETES MELLITUS WITHOUT COMPLICATION, WITHOUT LONG-TERM CURRENT USE OF INSULIN (HCC): Primary | ICD-10-CM

## 2021-06-10 ASSESSMENT — PATIENT HEALTH QUESTIONNAIRE - PHQ9
SUM OF ALL RESPONSES TO PHQ QUESTIONS 1-9: 0
SUM OF ALL RESPONSES TO PHQ9 QUESTIONS 1 & 2: 0
1. LITTLE INTEREST OR PLEASURE IN DOING THINGS: 0
SUM OF ALL RESPONSES TO PHQ QUESTIONS 1-9: 0
2. FEELING DOWN, DEPRESSED OR HOPELESS: 0
SUM OF ALL RESPONSES TO PHQ QUESTIONS 1-9: 0

## 2021-06-15 ENCOUNTER — TELEPHONE (OUTPATIENT)
Dept: FAMILY MEDICINE CLINIC | Age: 76
End: 2021-06-15

## 2021-06-15 NOTE — TELEPHONE ENCOUNTER
Returned call-phone #'s received to call 0 [facility] that patient can hold Metformin as long as Blood sugar remains down

## 2021-06-22 ENCOUNTER — OFFICE VISIT (OUTPATIENT)
Dept: OPTOMETRY | Age: 76
End: 2021-06-22
Payer: COMMERCIAL

## 2021-06-22 DIAGNOSIS — H52.203 ASTIGMATISM OF BOTH EYES WITH PRESBYOPIA: Primary | ICD-10-CM

## 2021-06-22 DIAGNOSIS — H40.1132 PRIMARY OPEN ANGLE GLAUCOMA (POAG) OF BOTH EYES, MODERATE STAGE: ICD-10-CM

## 2021-06-22 DIAGNOSIS — H04.123 DRY EYE SYNDROME OF BOTH EYES: ICD-10-CM

## 2021-06-22 DIAGNOSIS — E11.9 NON-INSULIN DEPENDENT TYPE 2 DIABETES MELLITUS (HCC): ICD-10-CM

## 2021-06-22 DIAGNOSIS — H52.4 ASTIGMATISM OF BOTH EYES WITH PRESBYOPIA: Primary | ICD-10-CM

## 2021-06-22 PROCEDURE — 92014 COMPRE OPH EXAM EST PT 1/>: CPT | Performed by: OPTOMETRIST

## 2021-06-22 PROCEDURE — 92015 DETERMINE REFRACTIVE STATE: CPT | Performed by: OPTOMETRIST

## 2021-06-22 PROCEDURE — 92250 FUNDUS PHOTOGRAPHY W/I&R: CPT | Performed by: OPTOMETRIST

## 2021-06-22 RX ORDER — POLYETHYLENE GLYCOL 400 AND PROPYLENE GLYCOL 4; 3 MG/ML; MG/ML
1 SOLUTION/ DROPS OPHTHALMIC PRN
Qty: 1 BOTTLE | Refills: 5 | Status: SHIPPED | OUTPATIENT
Start: 2021-06-22

## 2021-06-22 RX ORDER — TROPICAMIDE 10 MG/ML
1 SOLUTION/ DROPS OPHTHALMIC ONCE
Status: COMPLETED | OUTPATIENT
Start: 2021-06-22 | End: 2021-06-22

## 2021-06-22 RX ADMIN — TROPICAMIDE 1 DROP: 10 SOLUTION/ DROPS OPHTHALMIC at 13:47

## 2021-06-22 ASSESSMENT — TONOMETRY
IOP_METHOD: NON-CONTACT AIR PUFF
OS_IOP_MMHG: 15
OD_IOP_MMHG: 17

## 2021-06-22 ASSESSMENT — KERATOMETRY
OD_K2POWER_DIOPTERS: 43.75
OS_K2POWER_DIOPTERS: 43.50
OD_AXISANGLE2_DEGREES: 080
OS_AXISANGLE_DEGREES: 014
OD_AXISANGLE_DEGREES: 170
OS_K1POWER_DIOPTERS: 42.25
METHOD_AUTO_MANUAL: AUTOMATED
OS_AXISANGLE2_DEGREES: 104
OD_K1POWER_DIOPTERS: 41.50

## 2021-06-22 ASSESSMENT — SLIT LAMP EXAM - LIDS
COMMENTS: NORMAL
COMMENTS: NORMAL

## 2021-06-22 ASSESSMENT — REFRACTION_WEARINGRX
OS_AXIS: 089
SPECS_TYPE: BIFOCAL
OD_AXIS: 083
OD_ADD: +2.75
OS_SPHERE: -0.25
OD_SPHERE: PLANO
OD_CYLINDER: -2.75
OS_CYLINDER: -1.25
OS_ADD: +2.75

## 2021-06-22 ASSESSMENT — ENCOUNTER SYMPTOMS
ALLERGIC/IMMUNOLOGIC NEGATIVE: 0
RESPIRATORY NEGATIVE: 0
EYES NEGATIVE: 0
GASTROINTESTINAL NEGATIVE: 0

## 2021-06-22 ASSESSMENT — PACHYMETRY
OD_CT(UM): 510
OS_CT(UM): 518

## 2021-06-22 ASSESSMENT — VISUAL ACUITY
CORRECTION_TYPE: GLASSES
METHOD: SNELLEN - LINEAR
OD_CC: 20/50 OU
OD_CC+: -1
OS_CC: 20/40

## 2021-06-22 ASSESSMENT — REFRACTION_MANIFEST
OS_AXIS: 089
OD_ADD: +2.75
OD_CYLINDER: -2.75
OS_SPHERE: -0.25
OS_ADD: +2.75
OD_AXIS: 083
OD_SPHERE: PLANO
OS_CYLINDER: -1.25

## 2021-06-22 NOTE — PROGRESS NOTES
times a day & as needed for symptoms of irregular blood glucose. 1 kit 0    loratadine (CLARITIN) 10 MG tablet take 1 tablet by mouth once daily 30 tablet 11    SOFT TOUCH LANCETS MISC 1 Device by Does not apply route 2 times daily 200 each 3     No current facility-administered medications for this visit. ROS     Positive for: Endocrine    Negative for: Constitutional, Gastrointestinal, Neurological, Skin, Genitourinary, Musculoskeletal, HENT, Cardiovascular, Eyes, Respiratory, Psychiatric, Allergic/Imm, Heme/Lymph          Family History   Problem Relation Age of Onset    Heart Disease Mother     Heart Attack Mother     Arthritis Mother     Other Maternal Grandmother         (gout)    Allergies Daughter     Allergies Daughter     Other Brother         ( at age 21 secondary to auto accident).  Cataracts Neg Hx     Diabetes Neg Hx     Glaucoma Neg Hx      Social History     Socioeconomic History    Marital status: Single     Spouse name: None    Number of children: None    Years of education: None    Highest education level: None   Occupational History    None   Tobacco Use    Smoking status: Never Smoker    Smokeless tobacco: Never Used    Tobacco comment: Never smoked. [facility], 2016. Vaping Use    Vaping Use: Never used   Substance and Sexual Activity    Alcohol use: No     Alcohol/week: 0.0 standard drinks    Drug use: No    Sexual activity: None   Other Topics Concern    None   Social History Narrative    None     Social Determinants of Health     Financial Resource Strain:     Difficulty of Paying Living Expenses:    Food Insecurity:     Worried About Running Out of Food in the Last Year:     Ran Out of Food in the Last Year:    Transportation Needs:     Lack of Transportation (Medical):      Lack of Transportation (Non-Medical):    Physical Activity:     Days of Exercise per Week:     Minutes of Exercise per Session:    Stress:     Feeling of Stress : Social Connections:     Frequency of Communication with Friends and Family:     Frequency of Social Gatherings with Friends and Family:     Attends [employer] Services:     Active Member of Clubs or Organizations:     Attends Club or Organization Meetings:     Marital Status:    Intimate Partner Violence:     Fear of Current or Ex-Partner:     Emotionally Abused:     Physically Abused:     Sexually Abused:        Past Medical History:   Diagnosis Date    Allergic conjunctivitis     (stable)    Chronic sinusitis     Constipation     Dizzy spells     Dry eye syndrome     Edentulous     (does not wear dentures)    Essential hypertension 2016    Fracture of lateral malleolus     (avulsion fracture at the tip of the lateral malleolus - right ankle).     Hearing loss     Hypothyroidism     Interstitial cystitis     Irritable bowel syndrome     Keratitis     (secondary to dry eye syndrome)    Parotid gland enlargement     stasis, consider sjorgens    Pseudophakos     (bilateral)    Seasonal rhinitis     Stress incontinence     Type II or unspecified type diabetes mellitus without mention of complication, not stated as uncontrolled     Urethral stenosis     Xerostomia          Main Ophthalmology Exam     External Exam       Right Left    External Normal Normal          Slit Lamp Exam       Right Left    Lids/Lashes Normal Normal    Conjunctiva/Sclera 1+ Injection 1+ Injection    Cornea dry eye  dry eye     Anterior Chamber Deep and quiet Deep and quiet    Iris Round and reactive Round and reactive    Lens Posterior chamber intraocular lens Clear    Vitreous Normal Normal          Fundus Exam       Right Left    Disc Normal Normal    C/D Ratio 0.7 .7    Macula Normal Normal    Vessels Normal Normal    Periphery Normal Normal                  Tonometry     Tonometry (Non-contact air puff, 1:21 PM)       Right Left    Pressure 17 15   IOP.4             13.1  CH:  9.9 good shape. We will do studies in 6 months and compare to monitor for any progression         Glycemic control as per PCP   Patient Instructions   New glasses recommended    Use artificial tears 4x per day     No timolol drops needed for now (6 months testing to make sure of no progression)      Return in about 6 months (around 12/22/2021) for vf,. oct, then 2 weeks dilation (no drops)(previous G6 and timolol).

## 2021-06-22 NOTE — PATIENT INSTRUCTIONS
New glasses recommended    Use artificial tears 4x per day     No timolol drops needed for now (6 months testing to make sure of no progression)

## 2021-07-13 DIAGNOSIS — G62.9 NEUROPATHY: ICD-10-CM

## 2021-07-13 NOTE — TELEPHONE ENCOUNTER
Patient states her right big toe is getting worse. Patient states shooting pain and gabapentin is helping. Wants it evaluated in office. But has no way to get into the office. Please advise.

## 2021-07-14 RX ORDER — GABAPENTIN 300 MG/1
CAPSULE ORAL
Qty: 270 CAPSULE | Refills: 0 | Status: SHIPPED | OUTPATIENT
Start: 2021-07-14 | End: 2021-12-20

## 2021-07-20 ENCOUNTER — TELEPHONE (OUTPATIENT)
Dept: FAMILY MEDICINE CLINIC | Age: 76
End: 2021-07-20
Payer: COMMERCIAL

## 2021-07-20 DIAGNOSIS — I10 ESSENTIAL HYPERTENSION, MALIGNANT: ICD-10-CM

## 2021-07-20 DIAGNOSIS — E03.9 HYPOTHYROIDISM, UNSPECIFIED TYPE: ICD-10-CM

## 2021-07-20 DIAGNOSIS — M48.062 LUMBAR STENOSIS WITH NEUROGENIC CLAUDICATION: ICD-10-CM

## 2021-07-20 DIAGNOSIS — K85.90 ACUTE PANCREATITIS, UNSPECIFIED COMPLICATION STATUS, UNSPECIFIED PANCREATITIS TYPE: ICD-10-CM

## 2021-07-20 DIAGNOSIS — E11.9 TYPE 2 DIABETES MELLITUS WITHOUT COMPLICATION, WITHOUT LONG-TERM CURRENT USE OF INSULIN (HCC): Primary | ICD-10-CM

## 2021-07-20 DIAGNOSIS — H43.813 DEGENERATION OF POSTERIOR VITREOUS BODY OF BOTH EYES: ICD-10-CM

## 2021-07-20 DIAGNOSIS — M15.9 GENERALIZED OSTEOARTHROSIS, INVOLVING MULTIPLE SITES: ICD-10-CM

## 2021-07-20 DIAGNOSIS — Z51.89: ICD-10-CM

## 2021-07-20 DIAGNOSIS — K58.1 IRRITABLE BOWEL SYNDROME WITH CONSTIPATION: ICD-10-CM

## 2021-07-20 DIAGNOSIS — K21.9 GASTROESOPHAGEAL REFLUX DISEASE WITHOUT ESOPHAGITIS: ICD-10-CM

## 2021-07-20 DIAGNOSIS — H90.5 CENTRAL HEARING LOSS: ICD-10-CM

## 2021-07-20 PROCEDURE — G0180 MD CERTIFICATION HHA PATIENT: HCPCS | Performed by: FAMILY MEDICINE

## 2021-07-20 NOTE — TELEPHONE ENCOUNTER
Home health plan of care reviewed and certification completed on 07/20/21 on patient for service dates 07/19/21-09/16/21. Medications reviewed and verified,  Physician time spent on activities to coordinate services, documenting medical decision making, reviewing of reports, treatment plan and test results is 20 minutes.

## 2021-07-29 ENCOUNTER — OFFICE VISIT (OUTPATIENT)
Dept: PODIATRY | Age: 76
End: 2021-07-29
Payer: COMMERCIAL

## 2021-07-29 VITALS
RESPIRATION RATE: 20 BRPM | DIASTOLIC BLOOD PRESSURE: 70 MMHG | HEART RATE: 72 BPM | WEIGHT: 156 LBS | SYSTOLIC BLOOD PRESSURE: 128 MMHG | BODY MASS INDEX: 25.96 KG/M2

## 2021-07-29 DIAGNOSIS — E11.49 DM (DIABETES MELLITUS), TYPE 2 WITH NEUROLOGICAL COMPLICATIONS (HCC): Primary | ICD-10-CM

## 2021-07-29 DIAGNOSIS — B35.1 DERMATOPHYTOSIS OF NAIL: ICD-10-CM

## 2021-07-29 PROCEDURE — 11720 DEBRIDE NAIL 1-5: CPT | Performed by: PODIATRIST

## 2021-07-29 PROCEDURE — 99999 PR OFFICE/OUTPT VISIT,PROCEDURE ONLY: CPT | Performed by: PODIATRIST

## 2021-07-29 NOTE — PROGRESS NOTES
Subjective:  Jagdeep Goncalves is a 66 y.o. female who presents to the office today for routine DM foot care. .  Currently denies F/C/N/V. Allergies   Allergen Reactions    Latex Rash    Aleve [Naproxen]      Stomach problems    Amoxicillin      Other reaction(s): mucositis    Aspirin Other (See Comments)     Dizziness and tears her stomach up    Dye [Iodides]      IV DYE    Influenza Vaccines     Reglan [Metoclopramide] Other (See Comments)     Dystonic reaction, involuntary movements       Past Medical History:   Diagnosis Date    Allergic conjunctivitis     (stable)    Chronic sinusitis     Constipation     Dizzy spells     Dry eye syndrome     Edentulous     (does not wear dentures)    Essential hypertension 7/6/2016    Fracture of lateral malleolus     (avulsion fracture at the tip of the lateral malleolus - right ankle).  Hearing loss     Hypothyroidism     Interstitial cystitis     Irritable bowel syndrome     Keratitis     (secondary to dry eye syndrome)    Parotid gland enlargement     stasis, consider sjorgens    Pseudophakos     (bilateral)    Seasonal rhinitis     Stress incontinence     Type II or unspecified type diabetes mellitus without mention of complication, not stated as uncontrolled     Urethral stenosis     Xerostomia        Prior to Admission medications    Medication Sig Start Date End Date Taking?  Authorizing Provider   gabapentin (NEURONTIN) 300 MG capsule take 1 capsule by mouth three times a day 7/14/21 10/12/21 Yes Alina Burgess MD   polyethyl glycol-propyl glycol 0.4-0.3 % (SYSTANE) 0.4-0.3 % ophthalmic solution Place 1 drop into both eyes as needed for Dry Eyes 6/22/21  Yes Apolonia Juarez Cramp, OD   omeprazole (PRILOSEC) 20 MG delayed release capsule take 1 capsule by mouth twice a day  Patient taking differently: Daily  4/13/21  Yes Alina Burgess MD   metFORMIN (GLUCOPHAGE) 500 MG tablet take 1 tablet by mouth twice a day with meals 2/4/21  Yes Ventura Metz MD   levothyroxine (SYNTHROID) 88 MCG tablet take 1 tablet by mouth once daily 1/8/21  Yes Ventura Metz MD   blood glucose monitor strips Test 2 times a day & as needed for symptoms of irregular blood glucose. Dispense sufficient amount for indicated testing frequency plus additional to accommodate PRN testing needs. 11/19/20  Yes Ventura Metz MD   diclofenac sodium (VOLTAREN) 1 % GEL Apply 2 g topically 4 times daily 10/30/20  Yes Larance , APRN - CNP   fluticasone [facility]) 50 MCG/ACT nasal spray instill 2 sprays into each nostril once daily 6/29/20  Yes Ventura Metz MD   hydroxypropyl methylcellulose (GONIOSOL) 2.5 % ophthalmic solution Place 1 drop into both eyes 3 times daily 6/18/20  Yes Apolonia Ni, GILBERTO   blood glucose test strips (ONE TOUCH ULTRA TEST) strip use 1 TEST STRIP to TEST BLOOD SUGAR twice a day 3/18/20  Yes Ventura Metz MD   blood glucose monitor kit and supplies Test 2 times a day & as needed for symptoms of irregular blood glucose. 7/1/19  Yes Ventura Metz MD   loratadine (CLARITIN) 10 MG tablet take 1 tablet by mouth once daily 10/1/18  Yes Ventura Metz MD   SOFT TOUCH LANCETS MISC 1 Device by Does not apply route 2 times daily 8/16/16  Yes Ventura Metz MD       Past Surgical History:   Procedure Laterality Date    APPENDECTOMY  55-    CATARACT REMOVAL WITH IMPLANT Right 9/2/2003    (Dr. Mabel Arreola)    CATARACT REMOVAL WITH IMPLANT Left 8/12/2003    (Dr. Mabel Arreola)    CHOLECYSTECTOMY  07/24/1986    COLONOSCOPY  9/8/14    normal    CYSTOURETHROSCOPY/URETHRAL DILATION  2/8/12    multiple    EYE SURGERY Bilateral 12/13/2018    Bilateral Transscleral cyclophotocoagulation G6 laser (micropulse) performed by Aryan Downey MD at [facility]  6/2/1977    (Dr. Efra Prieto)   [facility] END[facility]  6/4/15    gastritis, biopsy x 2    UPPER GASTROINTESTINAL ENDOSCOPY  04/02/2018    Dr. Pierce Mar   antral gastric ulcer.   esophagus re-dilated at the end of procedure. Family History   Problem Relation Age of Onset    Heart Disease Mother     Heart Attack Mother     Arthritis Mother     Other Maternal Grandmother         (gout)    Allergies Daughter     Allergies Daughter     Other Brother         ( at age 21 secondary to auto accident).  Cataracts Neg Hx     Diabetes Neg Hx     Glaucoma Neg Hx        Social History     Tobacco Use    Smoking status: Never Smoker    Smokeless tobacco: Never Used    Tobacco comment: Never smoked. Annmarie Coello Rcp, 2016. Substance Use Topics    Alcohol use: No     Alcohol/week: 0.0 standard drinks       Review of Systems: All 12 systems reviewed and pertinent positives noted above. Lower Extremity Physical Examination:     Vitals:   Vitals:    21 1551   BP: 128/70   Pulse: 72   Resp: 20     General: AAO x 3 in NAD. Vascular: DP and PT pulses palpable 2/4, bilateral.  CFT <3 seconds, bilateral.  Hair growth present to the level of the digits, bilateral.  Edema present, bilateral.  Varicosities present, bilateral. Erythema absent, bilateral. Distal Rubor absent bilateral.  Temperature within normal limits bilateral. Hyperpigmentation present bilateral. Atrophic skin yes. Neurological: Sensation Impaired to light touch to level of digits, bilateral.  Protective sensation intact  10/10 sites via 5.07/10g Falconer-Christi Monofilament, bilateral.  negative Tinel's, bilateral.  negative Valleix sign, bilateral.  Vibratory abnormal  bilateral.  Reflexes Decreased bilateral.  Paresthesias positive. Dysthesias positive. Sharp/dull abnormal  bilateral.  Musculoskeletal: Muscle strength 5/5, bilateral.  Pain absent upon palpation bilateral. Normal medial longitudinal arch, bilateral.  Ankle ROM decresed,bilateral.  1st MPJ ROM within normal limits, bilateral.  Dorsally contracted digits absent. No other foot deformities.   Integument: Warm, dry, supple, bilateral.  Open lesion absent, bilateral.  Interdigital maceration absent to web spaces bilateral.   Nails left 5 and right 1 thickened, dystrophic and crumbly, discolored with subungual debris. .  Fissures absent, bilateral. Hyperkeratotic tissue is absent. Asessment: Patient is a 66 y.o. female with:    Diagnosis Orders   1. DM (diabetes mellitus), type 2 with neurological complications ([address]     2. Dermatophytosis of nail         Plan: Patient examined and evaluated. Current condition and treatment options discussed in detail. DM foot ed and exam  All nails as mentioned above debrided in length and thickness. Patient advised OTC methods for treatment of the mycotic nails. Patient will follow up in 10 weeks. Contact office with any questions/problems/concerns.

## 2021-08-04 ENCOUNTER — OFFICE VISIT (OUTPATIENT)
Dept: FAMILY MEDICINE CLINIC | Age: 76
End: 2021-08-04
Payer: MEDICAID

## 2021-08-04 VITALS
DIASTOLIC BLOOD PRESSURE: 64 MMHG | SYSTOLIC BLOOD PRESSURE: 118 MMHG | WEIGHT: 164 LBS | HEIGHT: 65 IN | HEART RATE: 68 BPM | BODY MASS INDEX: 27.32 KG/M2

## 2021-08-04 DIAGNOSIS — K21.9 GASTROESOPHAGEAL REFLUX DISEASE WITHOUT ESOPHAGITIS: ICD-10-CM

## 2021-08-04 DIAGNOSIS — E11.9 TYPE 2 DIABETES MELLITUS WITHOUT COMPLICATION, WITHOUT LONG-TERM CURRENT USE OF INSULIN (HCC): Primary | ICD-10-CM

## 2021-08-04 DIAGNOSIS — E11.49 OTHER DIABETIC NEUROLOGICAL COMPLICATION ASSOCIATED WITH TYPE 2 DIABETES MELLITUS (HCC): ICD-10-CM

## 2021-08-04 DIAGNOSIS — M48.062 LUMBAR STENOSIS WITH NEUROGENIC CLAUDICATION: ICD-10-CM

## 2021-08-04 DIAGNOSIS — K11.7 XEROSTOMIA: ICD-10-CM

## 2021-08-04 DIAGNOSIS — K30 DELAYED GASTRIC EMPTYING: ICD-10-CM

## 2021-08-04 DIAGNOSIS — I10 ESSENTIAL HYPERTENSION, MALIGNANT: ICD-10-CM

## 2021-08-04 DIAGNOSIS — K58.1 IRRITABLE BOWEL SYNDROME WITH CONSTIPATION: ICD-10-CM

## 2021-08-04 DIAGNOSIS — J31.0 CHRONIC RHINITIS: ICD-10-CM

## 2021-08-04 DIAGNOSIS — M15.9 GENERALIZED OSTEOARTHROSIS, INVOLVING MULTIPLE SITES: ICD-10-CM

## 2021-08-04 DIAGNOSIS — E03.9 HYPOTHYROIDISM, UNSPECIFIED TYPE: ICD-10-CM

## 2021-08-04 PROCEDURE — 99214 OFFICE O/P EST MOD 30 MIN: CPT | Performed by: FAMILY MEDICINE

## 2021-08-04 RX ORDER — FLUTICASONE PROPIONATE 50 MCG
SPRAY, SUSPENSION (ML) NASAL
Qty: 16 G | Refills: 5 | Status: SHIPPED | OUTPATIENT
Start: 2021-08-04 | End: 2022-03-09 | Stop reason: ALTCHOICE

## 2021-08-04 ASSESSMENT — ENCOUNTER SYMPTOMS
BACK PAIN: 1
RHINORRHEA: 0
ALLERGIC/IMMUNOLOGIC NEGATIVE: 1
EYES NEGATIVE: 1
RESPIRATORY NEGATIVE: 1
TROUBLE SWALLOWING: 1
GASTROINTESTINAL NEGATIVE: 1
SINUS PRESSURE: 0

## 2021-08-04 ASSESSMENT — PATIENT HEALTH QUESTIONNAIRE - PHQ9
SUM OF ALL RESPONSES TO PHQ QUESTIONS 1-9: 0
SUM OF ALL RESPONSES TO PHQ QUESTIONS 1-9: 0
SUM OF ALL RESPONSES TO PHQ9 QUESTIONS 1 & 2: 0
SUM OF ALL RESPONSES TO PHQ QUESTIONS 1-9: 0
2. FEELING DOWN, DEPRESSED OR HOPELESS: 0
1. LITTLE INTEREST OR PLEASURE IN DOING THINGS: 0

## 2021-08-04 NOTE — PROGRESS NOTES
Subjective:      Patient ID: Lopez Wolff is a 66 y.o. female. Diabetes  Hypoglycemia symptoms include confusion (with lower bs x 1) and dizziness. Scheduled follow up on diabetes. No interval hypoglycemia. Low of 99. High up to 190. Mostly normal range on review of her numbers. She stopped metformin. Concerns for lows , even though not common for the drug, she was seeing lows down to 60. Using dietary control at present. Still having some persistent paresthesias down the right lateral leg to the toes level. Gabapentin is helping some from her perspective. Past Medical History:   Diagnosis Date    Allergic conjunctivitis     (stable)    Chronic sinusitis     Constipation     Dizzy spells     Dry eye syndrome     Edentulous     (does not wear dentures)    Essential hypertension 7/6/2016    Fracture of lateral malleolus     (avulsion fracture at the tip of the lateral malleolus - right ankle).     Hearing loss     Hypothyroidism     Interstitial cystitis     Irritable bowel syndrome     Keratitis     (secondary to dry eye syndrome)    Parotid gland enlargement     stasis, consider sjorgens    Pseudophakos     (bilateral)    Seasonal rhinitis     Stress incontinence     Type II or unspecified type diabetes mellitus without mention of complication, not stated as uncontrolled     Urethral stenosis     Xerostomia      Past Surgical History:   Procedure Laterality Date    APPENDECTOMY  07/24/1986    CATARACT REMOVAL WITH IMPLANT Right 09/02/2003    (Dr. Gayle Montes)    CATARACT REMOVAL WITH IMPLANT Left 08/12/2003    (Dr. Gayle Montes)    CHOLECYSTECTOMY  07/24/1986    COLONOSCOPY  09/08/2014    normal    CYSTOURETHROSCOPY/URETHRAL DILATION  02/08/2012    multiple    EYE SURGERY Bilateral 12/13/2018    Bilateral Transscleral cyclophotocoagulation G6 laser (micropulse) performed by Samantha Pendleton MD at [address] [place] [address]  06/02/1977    (Dr. Milena Ornelas)   [facility] GASTROINTESTINAL ENDOSCOPY  06/04/2015    gastritis, biopsy x 2    UPPER GASTROINTESTINAL ENDOSCOPY  04/02/2018    Dr. Cody Amezcua   antral gastric ulcer. esophagus re-dilated at the end of procedure.  UPPER GASTROINTESTINAL ENDOSCOPY  07/28/2021    [facility] EGD Dr. Mariane Mohs     Current Outpatient Medications   Medication Sig Dispense Refill    gabapentin (NEURONTIN) 300 MG capsule take 1 capsule by mouth three times a day 270 capsule 0    polyethyl glycol-propyl glycol 0.4-0.3 % (SYSTANE) 0.4-0.3 % ophthalmic solution Place 1 drop into both eyes as needed for Dry Eyes 1 Bottle 5    omeprazole (PRILOSEC) 20 MG delayed release capsule take 1 capsule by mouth twice a day (Patient taking differently: Daily ) 180 capsule 1    levothyroxine (SYNTHROID) 88 MCG tablet take 1 tablet by mouth once daily 90 tablet 3    diclofenac sodium (VOLTAREN) 1 % GEL Apply 2 g topically 4 times daily 5 Tube 5    fluticasone (FLONASE) 50 MCG/ACT nasal spray instill 2 sprays into each nostril once daily 16 g 5    loratadine (CLARITIN) 10 MG tablet take 1 tablet by mouth once daily 30 tablet 11    SOFT TOUCH LANCETS MISC 1 Device by Does not apply route 2 times daily 200 each 3    blood glucose test strips (ONE TOUCH ULTRA TEST) strip use 1 TEST STRIP to TEST BLOOD SUGAR twice a day 100 strip 3     No current facility-administered medications for this visit. Recent esophageal dilation described. Review of Systems   Constitutional: Negative. HENT: Positive for trouble swallowing (thick sputum). Negative for congestion, rhinorrhea, sinus pressure and sneezing. Eyes: Negative. Respiratory: Negative. Cardiovascular: Negative. Gastrointestinal: Negative. Endocrine: Negative. Genitourinary: Negative. Musculoskeletal: Positive for arthralgias (knees), back pain and myalgias (foot cramps described, lateral hip, right gluteal area). Negative for gait problem (not using cane or walker any more). Skin: Negative. Negative for rash. Allergic/Immunologic: Negative. Neurological: Positive for dizziness and numbness (right toes). Hematological: Negative. Psychiatric/Behavioral: Positive for confusion (with lower bs x 1). Having   Objective:   Physical Exam  Constitutional:       General: She is not in acute distress. Appearance: She is well-developed. HENT:      Head: Normocephalic and atraumatic. Right Ear: Tympanic membrane and external ear normal.      Left Ear: Tympanic membrane and external ear normal.      Nose: Septal deviation (to the right), mucosal edema and congestion present. No rhinorrhea. Mouth/Throat:      Pharynx: No oropharyngeal exudate. Eyes:      General: No scleral icterus. Conjunctiva/sclera: Conjunctivae normal.   Neck:      Thyroid: No thyromegaly. Cardiovascular:      Rate and Rhythm: Normal rate and regular rhythm. Heart sounds: Normal heart sounds. No murmur heard. Pulmonary:      Effort: Pulmonary effort is normal. No respiratory distress. Breath sounds: Normal breath sounds. No wheezing. Abdominal:      General: Bowel sounds are normal. There is no distension. Palpations: Abdomen is soft. Tenderness: There is no abdominal tenderness. There is no rebound. Musculoskeletal:         General: No tenderness. Normal range of motion. Cervical back: Neck supple. Right hip: Tenderness: trochanteric bursae area pain to palpation, reproducible. Legs:    Skin:     General: Skin is warm and dry. Findings: No erythema or rash. Neurological:      Mental Status: She is alert and oriented to person, place, and time. Psychiatric:         Behavior: Behavior normal.         Thought Content:  Thought content normal.         Judgment: Judgment normal.       /64 (Site: Right Upper Arm, Position: Sitting, Cuff Size: Large Adult)   Pulse 68   Ht 5' 5\" (1.651 m)   Wt 164 lb (74.4 kg)   LMP  (LMP Unknown) BMI 27.29 kg/m²     Assessment:       Encounter Diagnoses   Name Primary?  Type 2 diabetes mellitus without complication, without long-term current use of insulin ([facility]) Yes    Essential hypertension, malignant     Delayed gastric emptying     Xerostomia     Chronic rhinitis     Irritable bowel syndrome with constipation     Generalized osteoarthrosis, involving multiple sites     Gastroesophageal reflux disease without esophagitis     Hypothyroidism, unspecified type     Lumbar stenosis with neurogenic claudication     Other diabetic neurological complication associated with type 2 diabetes mellitus ([facility] [address]                Plan:             diabetes: doing well by home checks. last  a1c at 6.9%. Cont. Current metformin bid dosing. Updating eye exam.  she is only on metformin at present. She describes hypoglycemia once , down in the 70's. Really shouldn't be bottoming out? ? Denies insulin or other drug use for sugar. She now has a MycooN 14 days system and is staying in range on review. Discussed a plan for dealing with lows/percieved lows.       Htn: improved at present. She stopped her low dose of lisinopril long term at present. Declining to use at present.       Delayed gastric emptying. Currently, some post prandial bloating and increased bowel sounds by report. Not really painful. No vomiting. EGD 4/2/18: antral ulceration found. Esophagus re-dilated at that time. She has prn gastroenterology consult if symptoms worsen. EGD 11/ 18/19. Minimal gastritis without ulceration. Single dilation with dilator. Some evidence for thick secretions and laryngitis. She continues on omeprazole. No gastritis or gerd concerns at present. no food sticking.      Parotid enlargement/exrostomia; still with dry mouth concerns. Not using artificial saliva, she tried and did not like it. Nothing visible as far as enlargement. She feels fullness at times. dry mouth ongoing. Using gatorade sugar free. neuropathy also.       Hypothyroid: well balanced. Cont. Current dosing. Awaiting updated labs.      Hyperlipidemia:  Discussed rec. For treatment given diabetes diagnosis. Discussed side effects and precautions. Updating labs.       Labs pending draw. She will get labs done on the way out today. Has not had covid vaccine yet. She has a fear of ending in the hospital as she has had with flu and pneumonia shots. Rec. She get this completed. Extended discussion on benefit risk. Willing to consider today after discussion.     Debbie Gleason MD

## 2021-08-11 ENCOUNTER — HOSPITAL ENCOUNTER (OUTPATIENT)
Dept: LAB | Age: 76
Discharge: HOME | End: 2021-08-11
Payer: COMMERCIAL

## 2021-08-11 ENCOUNTER — TELEPHONE (OUTPATIENT)
Dept: OPTOMETRY | Age: 76
End: 2021-08-11

## 2021-08-11 ENCOUNTER — IMMUNIZATION (OUTPATIENT)
Dept: LAB | Age: 76
End: 2021-08-11
Payer: COMMERCIAL

## 2021-08-11 DIAGNOSIS — I10 ESSENTIAL HYPERTENSION: ICD-10-CM

## 2021-08-11 DIAGNOSIS — E78.00 HIGH CHOLESTEROL: ICD-10-CM

## 2021-08-11 DIAGNOSIS — E03.9 HYPOTHYROIDISM, UNSPECIFIED TYPE: ICD-10-CM

## 2021-08-11 DIAGNOSIS — E11.9 DIABETES TYPE 2, NO OCULAR INVOLVEMENT (HCC): ICD-10-CM

## 2021-08-11 LAB
ABSOLUTE EOS #: 0.74 K/UL (ref 0–0.44)
ABSOLUTE IMMATURE GRANULOCYTE: 0.78 K/UL (ref 0–0.3)
ABSOLUTE LYMPH #: 2.23 K/UL (ref 1.1–3.7)
ABSOLUTE MONO #: 0.76 K/UL (ref 0.1–1.2)
ALBUMIN SERPL-MCNC: 4.3 G/DL (ref 3.5–5.2)
ALBUMIN/GLOBULIN RATIO: 1.3 (ref 1–2.5)
ALP BLD-CCNC: 91 U/L (ref 35–104)
ALT SERPL-CCNC: 17 U/L (ref 5–33)
ANION GAP SERPL CALCULATED.3IONS-SCNC: 8 MMOL/L (ref 9–17)
AST SERPL-CCNC: 21 U/L
BASOPHILS # BLD: 5 % (ref 0–2)
BASOPHILS ABSOLUTE: 0.38 K/UL (ref 0–0.2)
BILIRUB SERPL-MCNC: 0.27 MG/DL (ref 0.3–1.2)
BUN BLDV-MCNC: 24 MG/DL (ref 8–23)
BUN/CREAT BLD: 28 (ref 9–20)
CALCIUM SERPL-MCNC: 10.8 MG/DL (ref 8.6–10.4)
CHLORIDE BLD-SCNC: 103 MMOL/L (ref 98–107)
CHOLESTEROL/HDL RATIO: 3.6
CHOLESTEROL: 236 MG/DL
CO2: 29 MMOL/L (ref 20–31)
CREAT SERPL-MCNC: 0.86 MG/DL (ref 0.5–0.9)
DIFFERENTIAL TYPE: ABNORMAL
EOSINOPHILS RELATIVE PERCENT: 10 % (ref 1–4)
GFR AFRICAN AMERICAN: >60 ML/MIN
GFR NON-AFRICAN AMERICAN: >60 ML/MIN
GFR SERPL CREATININE-BSD FRML MDRD: ABNORMAL ML/MIN/{1.73_M2}
GFR SERPL CREATININE-BSD FRML MDRD: ABNORMAL ML/MIN/{1.73_M2}
GLUCOSE BLD-MCNC: 160 MG/DL (ref 70–99)
HCT VFR BLD CALC: 34.4 % (ref 36.3–47.1)
HDLC SERPL-MCNC: 65 MG/DL
HEMOGLOBIN: 12 G/DL (ref 11.9–15.1)
IMMATURE GRANULOCYTES: 11 %
LDL CHOLESTEROL: 132 MG/DL (ref 0–130)
LYMPHOCYTES # BLD: 31 % (ref 24–43)
MCH RBC QN AUTO: 28.9 PG (ref 25.2–33.5)
MCHC RBC AUTO-ENTMCNC: 34.9 G/DL (ref 25.2–33.5)
MCV RBC AUTO: 82.9 FL (ref 82.6–102.9)
MONOCYTES # BLD: 11 % (ref 3–12)
NRBC AUTOMATED: ABNORMAL PER 100 WBC
PDW BLD-RTO: 15.5 % (ref 11.8–14.4)
PLATELET # BLD: ABNORMAL K/UL (ref 138–453)
PLATELET ESTIMATE: ABNORMAL
PMV BLD AUTO: ABNORMAL FL (ref 8.1–13.5)
POTASSIUM SERPL-SCNC: 4.7 MMOL/L (ref 3.7–5.3)
RBC # BLD: 4.15 M/UL (ref 3.95–5.11)
RBC # BLD: ABNORMAL 10*6/UL
SEG NEUTROPHILS: 43 % (ref 36–65)
SEGMENTED NEUTROPHILS ABSOLUTE COUNT: 3.12 K/UL (ref 1.5–8.1)
SODIUM BLD-SCNC: 140 MMOL/L (ref 135–144)
THYROXINE, FREE: 0.56 NG/DL (ref 0.93–1.7)
TOTAL PROTEIN: 7.7 G/DL (ref 6.4–8.3)
TRIGL SERPL-MCNC: 196 MG/DL
TSH SERPL DL<=0.05 MIU/L-ACNC: 5.01 MIU/L (ref 0.3–5)
VLDLC SERPL CALC-MCNC: ABNORMAL MG/DL (ref 1–30)
WBC # BLD: 7.6 K/UL (ref 3.5–11.3)
WBC # BLD: ABNORMAL 10*3/UL

## 2021-08-11 PROCEDURE — 36415 COLL VENOUS BLD VENIPUNCTURE: CPT

## 2021-08-11 PROCEDURE — 80061 LIPID PANEL: CPT

## 2021-08-11 PROCEDURE — 83036 HEMOGLOBIN GLYCOSYLATED A1C: CPT

## 2021-08-11 PROCEDURE — 85025 COMPLETE CBC W/AUTO DIFF WBC: CPT

## 2021-08-11 PROCEDURE — 0011A COVID-19, MODERNA VACCINE 100MCG/0.5ML DOSE: CPT | Performed by: INTERNAL MEDICINE

## 2021-08-11 PROCEDURE — 84443 ASSAY THYROID STIM HORMONE: CPT

## 2021-08-11 PROCEDURE — 84439 ASSAY OF FREE THYROXINE: CPT

## 2021-08-11 PROCEDURE — 80053 COMPREHEN METABOLIC PANEL: CPT

## 2021-08-11 PROCEDURE — 91301 COVID-19, MODERNA VACCINE 100MCG/0.5ML DOSE: CPT | Performed by: INTERNAL MEDICINE

## 2021-08-12 LAB
ESTIMATED AVERAGE GLUCOSE: 166 MG/DL
HBA1C MFR BLD: 7.4 % (ref 4–6)

## 2021-08-12 NOTE — TELEPHONE ENCOUNTER
Called patient and she is aware she needs to see an Ophthalmologist. Offered  and [employer]. Patient will call with her decision and appointment.

## 2021-08-12 NOTE — TELEPHONE ENCOUNTER
Please call patient and tell her I want to refer her to specialist to see if she can get some relief.   (Dr. Stu Garcia)  Thank you

## 2021-08-24 ENCOUNTER — TELEPHONE (OUTPATIENT)
Dept: FAMILY MEDICINE CLINIC | Age: 76
End: 2021-08-24

## 2021-08-24 DIAGNOSIS — E11.65 UNCONTROLLED TYPE 2 DIABETES MELLITUS WITH HYPERGLYCEMIA (HCC): Primary | ICD-10-CM

## 2021-08-24 RX ORDER — LANCETS 30 GAUGE
1 EACH MISCELLANEOUS DAILY
Qty: 100 EACH | Refills: 5 | Status: SHIPPED | OUTPATIENT
Start: 2021-08-24

## 2021-08-24 RX ORDER — GLUCOSAMINE HCL/CHONDROITIN SU 500-400 MG
CAPSULE ORAL
Qty: 100 STRIP | Refills: 3 | Status: SHIPPED | OUTPATIENT
Start: 2021-08-24 | End: 2022-08-24 | Stop reason: SDUPTHER

## 2021-08-24 NOTE — TELEPHONE ENCOUNTER
Pt calling stating she got a Freestyle meter every 10 days in the mail? ?? And now her ins won't cover, questioned pt regarding this as this doesn't make sense, pt states she got the meter to check her sugar in the mail every 10 days and was told her ins won't cover unless she talks to her doctor, questioned if this was regarding her test strips or lancets but pt is very confused, please advise.

## 2021-08-30 ENCOUNTER — TELEPHONE (OUTPATIENT)
Dept: FAMILY MEDICINE CLINIC | Age: 76
End: 2021-08-30

## 2021-08-30 NOTE — TELEPHONE ENCOUNTER
Discussed with [facility]-Medicare will not pay for CGM and strips-patient stating she will use the strips solely-[facility] will notify Medicare that patient no longer using the CGB so they can change the billing to cover the strips-Patient aware that [facility] will call patient when they hear from Laury Powell

## 2021-08-30 NOTE — TELEPHONE ENCOUNTER
Pt calling stating she still can't get her test strips at [facility], please advise.

## 2021-09-08 ENCOUNTER — IMMUNIZATION (OUTPATIENT)
Dept: LAB | Age: 76
End: 2021-09-08
Payer: COMMERCIAL

## 2021-09-08 PROCEDURE — 0012A COVID-19, MODERNA VACCINE 100MCG/0.5ML DOSE: CPT | Performed by: INTERNAL MEDICINE

## 2021-09-08 PROCEDURE — 91301 COVID-19, MODERNA VACCINE 100MCG/0.5ML DOSE: CPT | Performed by: INTERNAL MEDICINE

## 2021-09-20 ENCOUNTER — TELEPHONE (OUTPATIENT)
Dept: FAMILY MEDICINE CLINIC | Age: 76
End: 2021-09-20
Payer: COMMERCIAL

## 2021-09-20 DIAGNOSIS — I10 ESSENTIAL HYPERTENSION: ICD-10-CM

## 2021-09-20 DIAGNOSIS — K11.1 PAROTID GLAND ENLARGEMENT: ICD-10-CM

## 2021-09-20 DIAGNOSIS — K58.1 IRRITABLE BOWEL SYNDROME WITH CONSTIPATION: ICD-10-CM

## 2021-09-20 DIAGNOSIS — E11.49 OTHER DIABETIC NEUROLOGICAL COMPLICATION ASSOCIATED WITH TYPE 2 DIABETES MELLITUS (HCC): ICD-10-CM

## 2021-09-20 DIAGNOSIS — E11.9 TYPE 2 DIABETES MELLITUS WITHOUT COMPLICATION, WITHOUT LONG-TERM CURRENT USE OF INSULIN (HCC): ICD-10-CM

## 2021-09-20 DIAGNOSIS — K85.90 ACUTE PANCREATITIS, UNSPECIFIED COMPLICATION STATUS, UNSPECIFIED PANCREATITIS TYPE: Primary | ICD-10-CM

## 2021-09-20 DIAGNOSIS — M48.062 LUMBAR STENOSIS WITH NEUROGENIC CLAUDICATION: ICD-10-CM

## 2021-09-20 DIAGNOSIS — M54.41 CHRONIC BILATERAL LOW BACK PAIN WITH BILATERAL SCIATICA: ICD-10-CM

## 2021-09-20 DIAGNOSIS — M54.42 CHRONIC BILATERAL LOW BACK PAIN WITH BILATERAL SCIATICA: ICD-10-CM

## 2021-09-20 DIAGNOSIS — G89.29 CHRONIC BILATERAL LOW BACK PAIN WITH BILATERAL SCIATICA: ICD-10-CM

## 2021-09-20 PROCEDURE — G0179 MD RECERTIFICATION HHA PT: HCPCS | Performed by: FAMILY MEDICINE

## 2021-09-20 NOTE — TELEPHONE ENCOUNTER
Home health plan of care reviewed and certification completed on 09/20/21 on patient for service dates 09/17/2021-11/15/2021. Medications reviewed and verified.    Physician time spent on activities to coordinate services, documenting medical decision making, reviewing of reports,tretment plans and test results is 20 minutes

## 2021-09-21 NOTE — TELEPHONE ENCOUNTER
Paperwork was faxed today. I spoke to pharmacist and she is waiting for billing to complete their part.  Patient notified

## 2021-09-21 NOTE — TELEPHONE ENCOUNTER
Reta Petersen at [facility] stating Yanelis Benavides Velia Ibrahim must fill out a form for pt to get her test strips, Reta Petersen states the nurse must call the pharmacy about this today.

## 2021-09-30 DIAGNOSIS — K30 DELAYED GASTRIC EMPTYING: Primary | ICD-10-CM

## 2021-09-30 RX ORDER — OMEPRAZOLE 20 MG/1
CAPSULE, DELAYED RELEASE ORAL
Qty: 180 CAPSULE | Refills: 1 | Status: SHIPPED | OUTPATIENT
Start: 2021-09-30 | End: 2022-02-07 | Stop reason: SDUPTHER

## 2021-10-06 ENCOUNTER — TELEPHONE (OUTPATIENT)
Dept: FAMILY MEDICINE CLINIC | Age: 76
End: 2021-10-06

## 2021-10-06 RX ORDER — LEVOTHYROXINE SODIUM 0.1 MG/1
TABLET ORAL
Qty: 90 TABLET | Refills: 3 | Status: SHIPPED | OUTPATIENT
Start: 2021-10-06 | End: 2022-08-10 | Stop reason: DRUGHIGH

## 2021-10-06 NOTE — TELEPHONE ENCOUNTER
Per [employer]- a note needs filled out by GO stating as to when and why patient stopped free style geoff.

## 2021-10-06 NOTE — TELEPHONE ENCOUNTER
Pt calling about her test strips, states she's been trying to get them for over 6 weeks, please advise.

## 2021-10-07 ENCOUNTER — OFFICE VISIT (OUTPATIENT)
Dept: PODIATRY | Age: 76
End: 2021-10-07
Payer: COMMERCIAL

## 2021-10-07 VITALS
SYSTOLIC BLOOD PRESSURE: 120 MMHG | HEIGHT: 65 IN | HEART RATE: 66 BPM | DIASTOLIC BLOOD PRESSURE: 68 MMHG | WEIGHT: 165 LBS | BODY MASS INDEX: 27.49 KG/M2

## 2021-10-07 DIAGNOSIS — E11.49 DM (DIABETES MELLITUS), TYPE 2 WITH NEUROLOGICAL COMPLICATIONS (HCC): Primary | ICD-10-CM

## 2021-10-07 DIAGNOSIS — B35.1 DERMATOPHYTOSIS OF NAIL: ICD-10-CM

## 2021-10-07 PROCEDURE — 11720 DEBRIDE NAIL 1-5: CPT | Performed by: PODIATRIST

## 2021-11-13 ENCOUNTER — OFFICE VISIT (OUTPATIENT)
Dept: PRIMARY CARE CLINIC | Age: 76
End: 2021-11-13
Payer: MEDICAID

## 2021-11-13 VITALS
HEART RATE: 78 BPM | OXYGEN SATURATION: 96 % | WEIGHT: 166 LBS | TEMPERATURE: 97.5 F | SYSTOLIC BLOOD PRESSURE: 138 MMHG | BODY MASS INDEX: 27.66 KG/M2 | DIASTOLIC BLOOD PRESSURE: 80 MMHG | HEIGHT: 65 IN

## 2021-11-13 DIAGNOSIS — L50.9 URTICARIAL RASH: Primary | ICD-10-CM

## 2021-11-13 PROCEDURE — 99213 OFFICE O/P EST LOW 20 MIN: CPT | Performed by: FAMILY MEDICINE

## 2021-11-13 ASSESSMENT — ENCOUNTER SYMPTOMS
GASTROINTESTINAL NEGATIVE: 1
ALLERGIC/IMMUNOLOGIC NEGATIVE: 1
EYES NEGATIVE: 1
RESPIRATORY NEGATIVE: 1

## 2021-11-13 NOTE — PROGRESS NOTES
[facility]     Current Outpatient Medications   Medication Sig Dispense Refill    levothyroxine (SYNTHROID) 100 MCG tablet 1 po daily 90 tablet 3    omeprazole (PRILOSEC) 20 MG delayed release capsule take 1 capsule by mouth twice a day 180 capsule 1    blood glucose monitor strips Test one  time a day & as needed for symptoms of irregular blood glucose. Dispense sufficient amount for indicated testing frequency plus additional to accommodate PRN testing needs. 100 strip 3    Lancets MISC 1 each by Does not apply route daily 100 each 5    fluticasone (FLONASE) 50 MCG/ACT nasal spray instill 2 sprays into each nostril once daily 16 g 5    polyethyl glycol-propyl glycol 0.4-0.3 % (SYSTANE) 0.4-0.3 % ophthalmic solution Place 1 drop into both eyes as needed for Dry Eyes 1 Bottle 5    diclofenac sodium (VOLTAREN) 1 % GEL Apply 2 g topically 4 times daily 5 Tube 5    blood glucose test strips (ONE TOUCH ULTRA TEST) strip use 1 TEST STRIP to TEST BLOOD SUGAR twice a day 100 strip 3    loratadine (CLARITIN) 10 MG tablet take 1 tablet by mouth once daily 30 tablet 11    SOFT TOUCH LANCETS MISC 1 Device by Does not apply route 2 times daily 200 each 3    gabapentin (NEURONTIN) 300 MG capsule take 1 capsule by mouth three times a day 270 capsule 0     No current facility-administered medications for this visit. Allergies   Allergen Reactions    Latex Rash    Aleve [Naproxen]      Stomach problems    Amoxicillin      Other reaction(s): mucositis    Aspirin Other (See Comments)     Dizziness and tears her stomach up    Dye [Iodides]      IV DYE    Influenza Vaccines     Reglan [Metoclopramide] Other (See Comments)     Dystonic reaction, involuntary movements         Review of Systems   Constitutional: Negative. HENT: Negative. Eyes: Negative. Respiratory: Negative. Cardiovascular: Negative. Gastrointestinal: Negative. Endocrine: Negative. Genitourinary: Negative. Musculoskeletal: Negative. Skin: Positive for rash. Negative for wound. Allergic/Immunologic: Negative. Neurological: Negative. Hematological: Negative. Psychiatric/Behavioral: Negative. Objective:   Physical Exam  Constitutional:       General: She is not in acute distress. Appearance: She is well-developed. HENT:      Head: Normocephalic and atraumatic. Right Ear: External ear normal.      Left Ear: External ear normal.      Mouth/Throat:      Pharynx: No oropharyngeal exudate. Eyes:      General: No scleral icterus. Conjunctiva/sclera: Conjunctivae normal.   Neck:      Thyroid: No thyromegaly. Cardiovascular:      Rate and Rhythm: Normal rate and regular rhythm. Heart sounds: Normal heart sounds. No murmur heard. Pulmonary:      Effort: Pulmonary effort is normal. No respiratory distress. Breath sounds: Normal breath sounds. No wheezing. Abdominal:      General: Bowel sounds are normal. There is no distension. Palpations: Abdomen is soft. Tenderness: There is no abdominal tenderness. There is no rebound. Musculoskeletal:         General: No tenderness. Normal range of motion. Cervical back: Neck supple. Skin:     General: Skin is warm and dry. Findings: Rash present. No erythema. Neurological:      Mental Status: She is alert and oriented to person, place, and time. Psychiatric:         Behavior: Behavior normal.         Thought Content: Thought content normal.         Judgment: Judgment normal.       /80   Pulse 78   Temp 97.5 °F (36.4 °C)   Ht 5' 5\" (1.651 m)   Wt 166 lb (75.3 kg)   LMP  (LMP Unknown)   SpO2 96%   BMI 27.62 kg/m²     Assessment:      Urticarial lesion of arms after allergic skin testing. She didn't react while in the office for testing, but has some welts today. At least 3 sites with urticarial welt over 1 cm on right upper arm.   Some other spots look more like localized bruising at the site, without welt. 2 smaller welts on the left arm. Plan:      Re start antihistamine until welts resolve. Take pictures of the welts at present. Allergist may still be able to map out the positive results.               Destiny Pelayo MD

## 2021-11-17 ENCOUNTER — OFFICE VISIT (OUTPATIENT)
Dept: FAMILY MEDICINE CLINIC | Age: 76
End: 2021-11-17
Payer: MEDICAID

## 2021-11-17 ENCOUNTER — TELEPHONE (OUTPATIENT)
Dept: FAMILY MEDICINE CLINIC | Age: 76
End: 2021-11-17

## 2021-11-17 VITALS
WEIGHT: 165 LBS | HEIGHT: 65 IN | HEART RATE: 68 BPM | SYSTOLIC BLOOD PRESSURE: 112 MMHG | DIASTOLIC BLOOD PRESSURE: 64 MMHG | BODY MASS INDEX: 27.49 KG/M2

## 2021-11-17 DIAGNOSIS — M48.062 LUMBAR STENOSIS WITH NEUROGENIC CLAUDICATION: ICD-10-CM

## 2021-11-17 DIAGNOSIS — K85.90 ACUTE PANCREATITIS, UNSPECIFIED COMPLICATION STATUS, UNSPECIFIED PANCREATITIS TYPE: Primary | ICD-10-CM

## 2021-11-17 DIAGNOSIS — E11.9 TYPE 2 DIABETES MELLITUS WITHOUT COMPLICATION, WITHOUT LONG-TERM CURRENT USE OF INSULIN (HCC): Primary | ICD-10-CM

## 2021-11-17 DIAGNOSIS — K11.1 PAROTID GLAND ENLARGEMENT: ICD-10-CM

## 2021-11-17 DIAGNOSIS — M54.42 CHRONIC BILATERAL LOW BACK PAIN WITH BILATERAL SCIATICA: ICD-10-CM

## 2021-11-17 DIAGNOSIS — G62.9 NEUROPATHY: ICD-10-CM

## 2021-11-17 DIAGNOSIS — E11.9 TYPE 2 DIABETES MELLITUS WITHOUT COMPLICATION, WITHOUT LONG-TERM CURRENT USE OF INSULIN (HCC): ICD-10-CM

## 2021-11-17 DIAGNOSIS — E11.49 OTHER DIABETIC NEUROLOGICAL COMPLICATION ASSOCIATED WITH TYPE 2 DIABETES MELLITUS (HCC): ICD-10-CM

## 2021-11-17 DIAGNOSIS — M43.10 ACQUIRED SPONDYLOLISTHESIS: ICD-10-CM

## 2021-11-17 DIAGNOSIS — K58.1 IRRITABLE BOWEL SYNDROME WITH CONSTIPATION: ICD-10-CM

## 2021-11-17 DIAGNOSIS — G89.29 CHRONIC BILATERAL LOW BACK PAIN WITH BILATERAL SCIATICA: ICD-10-CM

## 2021-11-17 DIAGNOSIS — I10 ESSENTIAL HYPERTENSION: ICD-10-CM

## 2021-11-17 DIAGNOSIS — M54.41 CHRONIC BILATERAL LOW BACK PAIN WITH BILATERAL SCIATICA: ICD-10-CM

## 2021-11-17 DIAGNOSIS — K30 DELAYED GASTRIC EMPTYING: ICD-10-CM

## 2021-11-17 PROCEDURE — G0179 MD RECERTIFICATION HHA PT: HCPCS | Performed by: FAMILY MEDICINE

## 2021-11-17 PROCEDURE — 99214 OFFICE O/P EST MOD 30 MIN: CPT | Performed by: FAMILY MEDICINE

## 2021-11-17 PROCEDURE — 3051F HG A1C>EQUAL 7.0%<8.0%: CPT | Performed by: FAMILY MEDICINE

## 2021-11-17 ASSESSMENT — ENCOUNTER SYMPTOMS
RHINORRHEA: 0
BACK PAIN: 1
EYES NEGATIVE: 1
TROUBLE SWALLOWING: 1
SINUS PRESSURE: 0
GASTROINTESTINAL NEGATIVE: 1
RESPIRATORY NEGATIVE: 1
ALLERGIC/IMMUNOLOGIC NEGATIVE: 1

## 2021-11-17 NOTE — PROGRESS NOTES
Subjective:      Patient ID: Jose Martin Natarajan is a 64 y.o. female. Diabetes  Hypoglycemia symptoms include dizziness. Pertinent negatives for hypoglycemia include no confusion. Scheduled follow up on diabetes. Single low last week of 61. She didn't really feel it at the time, but ate something at the time. Mostly normal range on review of her numbers. She stopped metformin. Concerns for lows , even though not common for the drug, she was seeing lows down to 60. Using dietary control at present. Still having some persistent paresthesias down the right lateral leg to the toes level. Gabapentin is helping some from her perspective. She was doing adriane with the 14 day system. She could read the result a lot easier, and was doing better titrating meals and medications on the system. Ran into insurance issues as best she can describe, and has not been able to get the sensors in the last 4 months. She purchases her strips for her old glucometer, but her willingness to check frequently is compromised by the need for a finger stick and strip use. She doesn't feel she can adjust things as well as she was doing with the 14 day device. Trying to avoid sweets. Highest she has gotten recently is 200. She also is having more balance issues. Nurse had to grab her walking to the room as she almost fell into the wall. No focal weakness described. Feet are numb with neuropathy and sciatica. Starting to trip and get more clumsy. Wearing diabetic socks and shoes to protect the feet. proprioception compromised by description. Past Medical History:   Diagnosis Date    Allergic conjunctivitis     (stable)    Chronic sinusitis     Constipation     Dizzy spells     Dry eye syndrome     Edentulous     (does not wear dentures)    Essential hypertension 7/6/2016    Fracture of lateral malleolus     (avulsion fracture at the tip of the lateral malleolus - right ankle).     Hearing loss  Hypothyroidism     Interstitial cystitis     Irritable bowel syndrome     Keratitis     (secondary to dry eye syndrome)    Parotid gland enlargement     stasis, consider sjorgens    Pseudophakos     (bilateral)    Seasonal rhinitis     Stress incontinence     Type II or unspecified type diabetes mellitus without mention of complication, not stated as uncontrolled     Urethral stenosis     Xerostomia      Past Surgical History:   Procedure Laterality Date    APPENDECTOMY  07/24/1986    CATARACT REMOVAL WITH IMPLANT Right 09/02/2003    (Dr. Espinoza Morejon)    CATARACT REMOVAL WITH IMPLANT Left 08/12/2003    (Dr. Espinoza Morejon)    CHOLECYSTECTOMY  07/24/1986    COLONOSCOPY  09/08/2014    normal    CYSTOURETHROSCOPY/URETHRAL DILATION  02/08/2012    multiple    EYE SURGERY Bilateral 12/13/2018    Bilateral Transscleral cyclophotocoagulation G6 laser (micropulse) performed by Jake Mclean MD at [address]  06/02/1977    (Dr. Helen Coffey)    UPPER GASTROINTESTINAL ENDOSCOPY  06/04/2015    gastritis, biopsy x 2    UPPER GASTROINTESTINAL ENDOSCOPY  04/02/2018    Dr. Fiorella Mahmood   antral gastric ulcer. esophagus re-dilated at the end of procedure.  UPPER GASTROINTESTINAL ENDOSCOPY  07/28/2021    [facility] EGD Dr. Brant Buchanan     Current Outpatient Medications   Medication Sig Dispense Refill    levothyroxine (SYNTHROID) 100 MCG tablet 1 po daily 90 tablet 3    omeprazole (PRILOSEC) 20 MG delayed release capsule take 1 capsule by mouth twice a day 180 capsule 1    blood glucose monitor strips Test one  time a day & as needed for symptoms of irregular blood glucose. Dispense sufficient amount for indicated testing frequency plus additional to accommodate PRN testing needs.  100 strip 3    Lancets MISC 1 each by Does not apply route daily 100 each 5    fluticasone (FLONASE) 50 MCG/ACT nasal spray instill 2 sprays into each nostril once daily 16 g 5    gabapentin (NEURONTIN) 300 MG capsule take 1 capsule by mouth three times a day 270 capsule 0    polyethyl glycol-propyl glycol 0.4-0.3 % (SYSTANE) 0.4-0.3 % ophthalmic solution Place 1 drop into both eyes as needed for Dry Eyes 1 Bottle 5    diclofenac sodium (VOLTAREN) 1 % GEL Apply 2 g topically 4 times daily 5 Tube 5    blood glucose test strips (ONE TOUCH ULTRA TEST) strip use 1 TEST STRIP to TEST BLOOD SUGAR twice a day 100 strip 3    loratadine (CLARITIN) 10 MG tablet take 1 tablet by mouth once daily 30 tablet 11    SOFT TOUCH LANCETS MISC 1 Device by Does not apply route 2 times daily 200 each 3     No current facility-administered medications for this visit. Recent esophageal dilation described. Review of Systems   Constitutional: Negative. HENT: Positive for trouble swallowing (thick sputum). Negative for congestion, rhinorrhea, sinus pressure and sneezing. Eyes: Negative. Respiratory: Negative. Cardiovascular: Negative. Gastrointestinal: Negative. Endocrine: Negative. Genitourinary: Negative. Musculoskeletal: Positive for arthralgias (knees), back pain and myalgias (foot cramps described, lateral hip, right gluteal area). Negative for gait problem (not using cane or walker any more). Skin: Negative. Negative for rash. Allergic/Immunologic: Negative. Neurological: Positive for dizziness and numbness (right toes). Hematological: Negative. Psychiatric/Behavioral: Negative for confusion. Objective:   Physical Exam  Constitutional:       General: She is not in acute distress. Appearance: She is well-developed. HENT:      Head: Normocephalic and atraumatic. Right Ear: Tympanic membrane and external ear normal.      Left Ear: Tympanic membrane and external ear normal.      Nose: Septal deviation (to the right), mucosal edema and congestion present. No rhinorrhea. Mouth/Throat:      Pharynx: No oropharyngeal exudate. Eyes:      General: No scleral icterus.      Conjunctiva/sclera: Conjunctivae normal.   Neck:      Thyroid: No thyromegaly. Cardiovascular:      Rate and Rhythm: Normal rate and regular rhythm. Heart sounds: Normal heart sounds. No murmur heard. Pulmonary:      Effort: Pulmonary effort is normal. No respiratory distress. Breath sounds: Normal breath sounds. No wheezing. Abdominal:      General: Bowel sounds are normal. There is no distension. Palpations: Abdomen is soft. Tenderness: There is no abdominal tenderness. There is no rebound. Musculoskeletal:         General: No tenderness. Normal range of motion. Cervical back: Neck supple. Right hip: Tenderness: trochanteric bursae area pain to palpation, reproducible. Legs:    Skin:     General: Skin is warm and dry. Findings: No erythema or rash. Neurological:      Mental Status: She is alert and oriented to person, place, and time. Psychiatric:         Behavior: Behavior normal.         Thought Content: Thought content normal.         Judgment: Judgment normal.       /64 (Site: Right Upper Arm, Position: Sitting, Cuff Size: Large Adult)   Pulse 68   Ht 5' 5\" (1.651 m)   Wt 165 lb (74.8 kg)   LMP  (LMP Unknown)   BMI 27.46 kg/m²     Assessment:       Encounter Diagnosis   Name Primary?  Type 2 diabetes mellitus without complication, without long-term current use of insulin ([facility] [address] Yes     She has done much better on the 14 day freestyle geoff monitoring system . She is much more engaged with the bs readings readily available all day and had made some success. She is prone to hypoglycemia, and cant always read the number on her glucometer. She is using her glucometer and test strips she is buying to cont. To check bs readings, but having trouble getting blood some times, she doesn't like to waste a strip. Trouble reading the glucometer, but not having issues with the [employer] monitor.      Discussing the meter issue with the pharmacist.  Trying to get her the 14 days system as it is easier and safer for her to use and adjust her meals and medications. Gait issues with neuropath and sciatica. Starting to fall more, easily off balance. Worse in the dark and on uneven ground. Becoming more of a fall risk. He is aggreeable to getting a wheeled walker for safety. Will order.           Plan:             As above

## 2021-11-17 NOTE — TELEPHONE ENCOUNTER
Home health plan of care reviewed and certification completed on 11/17/2021 on patient for service dates 11/6/2021-01/14/2022. Medications verified and reviewed. Physician time spent on activities to coordinate services, documenting medical decision making, reviewing of reports, treatment plans and test results is 20 minutes.

## 2021-11-19 ENCOUNTER — TELEPHONE (OUTPATIENT)
Dept: FAMILY MEDICINE CLINIC | Age: 76
End: 2021-11-19

## 2021-11-19 NOTE — TELEPHONE ENCOUNTER
----- Message from Lorraine Chadwick sent at 11/19/2021  2:06 PM EST -----  Subject: Message to Provider    QUESTIONS  Information for Provider? patient calling in to follow up on order for   patient to have a walker, please call patient back. ---------------------------------------------------------------------------  --------------  Marco Antonio TORIBIO  What is the best way for the office to contact you? OK to leave message on   voicemail  Preferred Call Back Phone Number? [phone]  ---------------------------------------------------------------------------  --------------  SCRIPT ANSWERS  Relationship to Patient?  Self

## 2021-11-22 DIAGNOSIS — R29.6 FALLS FREQUENTLY: Primary | ICD-10-CM

## 2021-11-22 DIAGNOSIS — E13.49 OTHER DIABETIC NEUROLOGICAL COMPLICATION ASSOCIATED WITH OTHER SPECIFIED DIABETES MELLITUS (HCC): ICD-10-CM

## 2021-11-22 DIAGNOSIS — R26.9 ABNORMALITY OF GAIT: ICD-10-CM

## 2021-12-19 DIAGNOSIS — G62.9 NEUROPATHY: Primary | ICD-10-CM

## 2021-12-20 NOTE — TELEPHONE ENCOUNTER
Shereen Short called requesting a refill of the below medication which has been pended for you:     [place] from [place], and [place] reviewed.  Gabapentin 300 mg last filled 7/14/21  #270/90 days    GO out of office    Requested Prescriptions     Pending Prescriptions Disp Refills    gabapentin (NEURONTIN) 300 MG capsule [Pharmacy Med Name: GABAPENTIN 300 MG CAPSULE] 90 capsule 0     Sig: take 1 capsule by mouth three times a day       Last Appointment Date: 11/17/2021  Next Appointment Date: 2/7/2022    Allergies   Allergen Reactions    Latex Rash    Aleve [Naproxen]      Stomach problems    Amoxicillin      Other reaction(s): mucositis    Aspirin Other (See Comments)     Dizziness and tears her stomach up    Dye [Iodides]      IV DYE    Influenza Vaccines     Reglan [Metoclopramide] Other (See Comments)     Dystonic reaction, involuntary movements

## 2021-12-21 RX ORDER — LEVOTHYROXINE SODIUM 88 UG/1
TABLET ORAL
Qty: 90 TABLET | Refills: 3 | OUTPATIENT
Start: 2021-12-21

## 2021-12-22 RX ORDER — GABAPENTIN 300 MG/1
CAPSULE ORAL
Qty: 90 CAPSULE | Refills: 0 | Status: SHIPPED | OUTPATIENT
Start: 2021-12-22 | End: 2022-02-07

## 2021-12-23 ENCOUNTER — PROCEDURE VISIT (OUTPATIENT)
Dept: OPTOMETRY | Age: 76
End: 2021-12-23
Payer: COMMERCIAL

## 2021-12-23 DIAGNOSIS — H40.1132 PRIMARY OPEN ANGLE GLAUCOMA (POAG) OF BOTH EYES, MODERATE STAGE: Primary | ICD-10-CM

## 2021-12-23 PROCEDURE — 92083 EXTENDED VISUAL FIELD XM: CPT | Performed by: OPTOMETRIST

## 2021-12-23 PROCEDURE — 92133 CPTRZD OPH DX IMG PST SGM ON: CPT | Performed by: OPTOMETRIST

## 2021-12-23 NOTE — PROGRESS NOTES
[address] [address]  Dept: [phone]  Dept Fax: [phone]        Sj Gunter 1945  is here today for HVF 24-2 and OCT ON     Last HVF was completed: 3/03/20  Last OCT was completed: 2/18/20  Last visit: 6/22/21    Patient is scheduled to follow up on 12/28/21    Patient is currently taking the following eyedrops: holding on timolol    Not recorded         Not recorded           Orders Placed This Encounter   Procedures    MOYA VISUAL FIELD - OU - BOTH EYES    Posterior Segment Optic Nerve OCT - OU - Both Eyes         Diagnosis Orders   1. Primary open angle glaucoma (POAG) of both eyes, moderate stage  MOYA VISUAL FIELD - OU - BOTH EYES    Posterior Segment Optic Nerve OCT - OU - Both Eyes        Current Outpatient Medications   Medication Sig Dispense Refill    gabapentin (NEURONTIN) 300 MG capsule take 1 capsule by mouth three times a day 90 capsule 0    Misc. Devices (ROLLER WALKER) MISC 1 each by Does not apply route daily 1 each 0    levothyroxine (SYNTHROID) 100 MCG tablet 1 po daily 90 tablet 3    omeprazole (PRILOSEC) 20 MG delayed release capsule take 1 capsule by mouth twice a day 180 capsule 1    blood glucose monitor strips Test one  time a day & as needed for symptoms of irregular blood glucose. Dispense sufficient amount for indicated testing frequency plus additional to accommodate PRN testing needs.  100 strip 3    Lancets MISC 1 each by Does not apply route daily 100 each 5    fluticasone (FLONASE) 50 MCG/ACT nasal spray instill 2 sprays into each nostril once daily 16 g 5    polyethyl glycol-propyl glycol 0.4-0.3 % (SYSTANE) 0.4-0.3 % ophthalmic solution Place 1 drop into both eyes as needed for Dry Eyes 1 Bottle 5    diclofenac sodium (VOLTAREN) 1 % GEL Apply 2 g topically 4 times daily 5 Tube 5    blood glucose test strips (ONE TOUCH ULTRA TEST) strip use 1 TEST STRIP to TEST BLOOD SUGAR twice a day 100 strip 3    loratadine (CLARITIN) 10 MG tablet take 1 tablet by mouth once daily 30 tablet 11    SOFT TOUCH LANCETS MISC 1 Device by Does not apply route 2 times daily 200 each 3     No current facility-administered medications for this visit.

## 2021-12-28 ENCOUNTER — OFFICE VISIT (OUTPATIENT)
Dept: OPTOMETRY | Age: 76
End: 2021-12-28
Payer: COMMERCIAL

## 2021-12-28 DIAGNOSIS — Z96.1 PSEUDOPHAKIA OF BOTH EYES: ICD-10-CM

## 2021-12-28 DIAGNOSIS — H52.4 ASTIGMATISM OF BOTH EYES WITH PRESBYOPIA: ICD-10-CM

## 2021-12-28 DIAGNOSIS — E11.9 NON-INSULIN DEPENDENT TYPE 2 DIABETES MELLITUS (HCC): ICD-10-CM

## 2021-12-28 DIAGNOSIS — H40.1132 PRIMARY OPEN ANGLE GLAUCOMA (POAG) OF BOTH EYES, MODERATE STAGE: Primary | ICD-10-CM

## 2021-12-28 DIAGNOSIS — H52.203 ASTIGMATISM OF BOTH EYES WITH PRESBYOPIA: ICD-10-CM

## 2021-12-28 DIAGNOSIS — H53.8 BLURRED VISION, BILATERAL: ICD-10-CM

## 2021-12-28 PROCEDURE — 92250 FUNDUS PHOTOGRAPHY W/I&R: CPT | Performed by: OPTOMETRIST

## 2021-12-28 PROCEDURE — 99214 OFFICE O/P EST MOD 30 MIN: CPT | Performed by: OPTOMETRIST

## 2021-12-28 PROCEDURE — 3051F HG A1C>EQUAL 7.0%<8.0%: CPT | Performed by: OPTOMETRIST

## 2021-12-28 RX ORDER — TROPICAMIDE 10 MG/ML
1 SOLUTION/ DROPS OPHTHALMIC ONCE
Status: COMPLETED | OUTPATIENT
Start: 2021-12-28 | End: 2021-12-28

## 2021-12-28 RX ORDER — TROPICAMIDE 10 MG/ML
1 SOLUTION/ DROPS OPHTHALMIC ONCE
Status: CANCELLED | OUTPATIENT
Start: 2021-12-28 | End: 2021-12-28

## 2021-12-28 RX ORDER — TIMOLOL MALEATE 5 MG/ML
1 SOLUTION/ DROPS OPHTHALMIC 2 TIMES DAILY
Qty: 1 EACH | Refills: 5 | Status: SHIPPED | OUTPATIENT
Start: 2021-12-28 | End: 2022-01-27

## 2021-12-28 RX ADMIN — TROPICAMIDE 1 DROP: 10 SOLUTION/ DROPS OPHTHALMIC at 14:34

## 2021-12-28 ASSESSMENT — VISUAL ACUITY
OS_CC: 20/60
METHOD: SNELLEN - LINEAR
CORRECTION_TYPE: GLASSES

## 2021-12-28 ASSESSMENT — REFRACTION_WEARINGRX
OD_ADD: +2.75
OD_CYLINDER: -2.75
OD_AXIS: 083
OS_CYLINDER: -1.25
OD_SPHERE: PLANO
SPECS_TYPE: BIFOCAL
OS_SPHERE: -0.25
OS_ADD: +2.75
OS_AXIS: 089

## 2021-12-28 ASSESSMENT — PACHYMETRY
OS_CT(UM): 518
OD_CT(UM): 510

## 2021-12-28 ASSESSMENT — TONOMETRY
OD_IOP_MMHG: 18
OS_IOP_MMHG: 13
IOP_METHOD: NON-CONTACT AIR PUFF

## 2021-12-28 ASSESSMENT — SLIT LAMP EXAM - LIDS
COMMENTS: NORMAL
COMMENTS: NORMAL

## 2021-12-28 ASSESSMENT — ENCOUNTER SYMPTOMS
ALLERGIC/IMMUNOLOGIC NEGATIVE: 0
EYES NEGATIVE: 1
RESPIRATORY NEGATIVE: 0
GASTROINTESTINAL NEGATIVE: 0

## 2021-12-28 NOTE — PROGRESS NOTES
Prema Leonard presents today for   Chief Complaint   Patient presents with    6 Month Follow-Up   . HPI     6 month follow up and dilate after studies  HVF/OCT 12/23/21  Vision seems worse but has a lot of sinus pressure too right now  Previous G6 and timolol treatment but no treatment currently   Artificial tears every hour          Current Outpatient Medications   Medication Sig Dispense Refill    timolol (TIMOPTIC) 0.5 % ophthalmic solution Place 1 drop into both eyes 2 times daily 1 each 5    gabapentin (NEURONTIN) 300 MG capsule take 1 capsule by mouth three times a day 90 capsule 0    Misc. Devices (ROLLER WALKER) MISC 1 each by Does not apply route daily 1 each 0    levothyroxine (SYNTHROID) 100 MCG tablet 1 po daily 90 tablet 3    omeprazole (PRILOSEC) 20 MG delayed release capsule take 1 capsule by mouth twice a day 180 capsule 1    blood glucose monitor strips Test one  time a day & as needed for symptoms of irregular blood glucose. Dispense sufficient amount for indicated testing frequency plus additional to accommodate PRN testing needs. 100 strip 3    Lancets MISC 1 each by Does not apply route daily 100 each 5    fluticasone (FLONASE) 50 MCG/ACT nasal spray instill 2 sprays into each nostril once daily 16 g 5    polyethyl glycol-propyl glycol 0.4-0.3 % (SYSTANE) 0.4-0.3 % ophthalmic solution Place 1 drop into both eyes as needed for Dry Eyes 1 Bottle 5    diclofenac sodium (VOLTAREN) 1 % GEL Apply 2 g topically 4 times daily 5 Tube 5    blood glucose test strips (ONE TOUCH ULTRA TEST) strip use 1 TEST STRIP to TEST BLOOD SUGAR twice a day 100 strip 3    loratadine (CLARITIN) 10 MG tablet take 1 tablet by mouth once daily 30 tablet 11    SOFT TOUCH LANCETS MISC 1 Device by Does not apply route 2 times daily 200 each 3     No current facility-administered medications for this visit.          Family History   Problem Relation Age of Onset    Heart Disease Mother     Heart Attack Mother     Arthritis Mother     Other Maternal Grandmother         (gout)    Allergies Daughter     Allergies Daughter     Other Brother         ( at age 21 secondary to auto accident).  Cataracts Neg Hx     Diabetes Neg Hx     Glaucoma Neg Hx      Social History     Socioeconomic History    Marital status: Single     Spouse name: None    Number of children: None    Years of education: None    Highest education level: None   Occupational History    None   Tobacco Use    Smoking status: Never Smoker    Smokeless tobacco: Never Used    Tobacco comment: Never smoked. [facility], 2016. Vaping Use    Vaping Use: Never used   Substance and Sexual Activity    Alcohol use: No     Alcohol/week: 0.0 standard drinks    Drug use: No    Sexual activity: None   Other Topics Concern    None   Social History Narrative    None     Social Determinants of Health     Financial Resource Strain:     Difficulty of Paying Living Expenses: Not on file   Food Insecurity:     Worried About Running Out of Food in the Last Year: Not on file    [place] of Food in the Last Year: Not on file   Transportation Needs:     Lack of Transportation (Medical): Not on file    Lack of Transportation (Non-Medical):  Not on file   Physical Activity:     Days of Exercise per Week: Not on file    Minutes of Exercise per Session: Not on file   Stress:     Feeling of Stress : Not on file   Social Connections:     Frequency of Communication with Friends and Family: Not on file    Frequency of Social Gatherings with Friends and Family: Not on file    Attends Sikhism Services: Not on file    Active Member of Clubs or Organizations: Not on file    Attends Club or Organization Meetings: Not on file    Marital Status: Not on file   Intimate Partner Violence:     Fear of Current or Ex-Partner: Not on file    Emotionally Abused: Not on file    Physically Abused: Not on file    Sexually Abused: Not on file 14.0  CH 8.0 / 11.7  WS 5.8 / 6.7                   Not recorded       Not recorded         Visual Acuity (Snellen - Linear)       Right Left    Dist cc 20/60 20/60    Correction: Glasses   20/50-ou         Not recorded       Neuro/Psych     Neuro/Psych     Oriented x3: Yes    Mood/Affect: Normal              Not recorded           Ophthalmology Exam     Wearing Rx       Sphere Cylinder Axis Add    Right [place] -2.75 083 +2.75    Left -0.25 -1.25 089 +2.75    Age: 6m    Type: Bifocal              Not recorded              Orders Placed This Encounter   Procedures    Color Fundus Photography-OU-Both Eyes         IMPRESSION:  1. Primary open angle glaucoma (POAG) of both eyes, moderate stage    2. Non-insulin dependent type 2 diabetes mellitus ([address]    3. Astigmatism of both eyes with presbyopia    4. Pseudophakia of both eyes    5. Blurred vision, bilateral        PLAN:    1. Timolol prescribed today; We will begin 2x per day in both eyes ;Previous SLT with Sadia and now not on any drops regimen. Scans OCT show mild progression without drops. Referral to Glaucoma specialist will be done in 6 months if there is any progression with the OCT onh and gcc  2. Discussed the patient's diagnosis of diabetes and the impact this can have on their ocular health, potentially even leading to permanent blindness. I discussed with the patient the importance of continued follow-up and management with their primary care physician to control their glycemic, blood pressure, and lipid levels. The patient verbalized understanding. Patient Instructions   Begin timolol 2x per day      Return in about 1 month (around 1/28/2022) for 1 month iop check ;   Misty Jasso

## 2022-01-04 ENCOUNTER — OFFICE VISIT (OUTPATIENT)
Dept: PODIATRY | Age: 77
End: 2022-01-04
Payer: MEDICAID

## 2022-01-04 VITALS
BODY MASS INDEX: 28.66 KG/M2 | WEIGHT: 172 LBS | SYSTOLIC BLOOD PRESSURE: 110 MMHG | DIASTOLIC BLOOD PRESSURE: 68 MMHG | HEART RATE: 68 BPM | HEIGHT: 65 IN

## 2022-01-04 DIAGNOSIS — E11.49 DM (DIABETES MELLITUS), TYPE 2 WITH NEUROLOGICAL COMPLICATIONS (HCC): Primary | ICD-10-CM

## 2022-01-04 DIAGNOSIS — B35.1 DERMATOPHYTOSIS OF NAIL: ICD-10-CM

## 2022-01-04 PROCEDURE — 11720 DEBRIDE NAIL 1-5: CPT | Performed by: PODIATRIST

## 2022-01-04 PROCEDURE — 99999 PR OFFICE/OUTPT VISIT,PROCEDURE ONLY: CPT | Performed by: PODIATRIST

## 2022-01-04 NOTE — PROGRESS NOTES
Foot Care Worksheet  PCP: Lizeth Lane MD  Last visit: 11/17/2021    Nail description:  Thick , Yellow , Crumbly , Marked limitation of ambulation     Pain resulting from thickened and dystrophy of nail plate No    Nails involved  Right   1  (T5-T9)  Left     5  (TA-T4)    Q7 1 Class A Finding - Non traumatic amputation of foot No    Q8 2 Class B Findings - Absent DP pulse No, Absent PT pulse No, Advanced tropic changes (3 required) Yes    Decrease hair growth Yes, Nail changes/thickening Yes, Pigmented changes/discoloration Yes, Skin texture (thin, shiny) No, Skin color (rubor/redness) No    Q9 1 Class B and 2 Class C Findings  Claudication No, Temperature change Yes, Paresthesia Yes, Burning No, Edema Yes

## 2022-01-04 NOTE — PROGRESS NOTES
Subjective:  Matt Carey is a 64 y.o. female who presents to the office today for routine DM foot care. .  Currently denies F/C/N/V. Allergies   Allergen Reactions    Latex Rash    Aleve [Naproxen]      Stomach problems    Amoxicillin      Other reaction(s): mucositis    Aspirin Other (See Comments)     Dizziness and tears her stomach up    Dye [Iodides]      IV DYE    Influenza Vaccines     Reglan [Metoclopramide] Other (See Comments)     Dystonic reaction, involuntary movements       Past Medical History:   Diagnosis Date    Allergic conjunctivitis     (stable)    Chronic sinusitis     Constipation     Dizzy spells     Dry eye syndrome     Edentulous     (does not wear dentures)    Essential hypertension 7/6/2016    Fracture of lateral malleolus     (avulsion fracture at the tip of the lateral malleolus - right ankle).  Hearing loss     Hypothyroidism     Interstitial cystitis     Irritable bowel syndrome     Keratitis     (secondary to dry eye syndrome)    Parotid gland enlargement     stasis, consider sjorgens    Pseudophakos     (bilateral)    Seasonal rhinitis     Stress incontinence     Type II or unspecified type diabetes mellitus without mention of complication, not stated as uncontrolled     Urethral stenosis     Xerostomia        Prior to Admission medications    Medication Sig Start Date End Date Taking? Authorizing Provider   timolol (TIMOPTIC) 0.5 % ophthalmic solution Place 1 drop into both eyes 2 times daily 12/28/21 1/27/22 Yes Apolonia Guthrie, OD   gabapentin (NEURONTIN) 300 MG capsule take 1 capsule by mouth three times a day 12/22/21 1/20/22 Yes Sonu Mcdonald MD   Misc.  Devices (ROLLER [place]) MISC 1 each by Does not apply route daily 11/22/21  Yes Susana Cruz MD   levothyroxine (SYNTHROID) 100 MCG tablet 1 po daily 10/6/21  Yes Susana Cruz MD   omeprazole (PRILOSEC) 20 MG delayed release capsule take 1 capsule by mouth twice a day 9/30/21  Yes Mary Mancilla MD   blood glucose monitor strips Test one  time a day & as needed for symptoms of irregular blood glucose. Dispense sufficient amount for indicated testing frequency plus additional to accommodate PRN testing needs. 8/24/21  Yes Mary Mancilla MD   Lancets MISC 1 each by Does not apply route daily 8/24/21  Yes Mary Mancilla MD   fluticasone Frosty Erb) 50 MCG/ACT nasal spray instill 2 sprays into each nostril once daily 8/4/21  Yes Mary Mancilla MD   polyethyl glycol-propyl glycol 0.4-0.3 % (SYSTANE) 0.4-0.3 % ophthalmic solution Place 1 drop into both eyes as needed for Dry Eyes 6/22/21  Yes Apolonia Lo, OD   diclofenac sodium (VOLTAREN) 1 % GEL Apply 2 g topically 4 times daily 10/30/20  Yes TAY Gonzales - CNP   blood glucose test strips (ONE TOUCH ULTRA TEST) strip use 1 TEST STRIP to TEST BLOOD SUGAR twice a day 3/18/20  Yes Mary Mancilla MD   loratadine (CLARITIN) 10 MG tablet take 1 tablet by mouth once daily 10/1/18  Yes Mary Mancilla MD   SOFT TOUCH LANCETS MISC 1 Device by Does not apply route 2 times daily 8/16/16  Yes Mary Mancilla MD       Past Surgical History:   Procedure Laterality Date    APPENDECTOMY  07/24/1986    CATARACT REMOVAL WITH IMPLANT Right 09/02/2003    (Dr. Gelacio Haines)    CATARACT REMOVAL WITH IMPLANT Left 08/12/2003    (Dr. Gelacio Haines)    CHOLECYSTECTOMY  07/24/1986    COLONOSCOPY  09/08/2014    normal    CYSTOURETHROSCOPY/URETHRAL DILATION  02/08/2012    multiple    EYE SURGERY Bilateral 12/13/2018    Bilateral Transscleral cyclophotocoagulation G6 laser (micropulse) performed by Jeffrey Del Rio MD at [address]  06/02/1977    (Dr. Christia Riedel)    UPPER GASTROINTESTINAL ENDOSCOPY  06/04/2015    gastritis, biopsy x 2    UPPER GASTROINTESTINAL ENDOSCOPY  04/02/2018    Dr. Raymonde Curling   antral gastric ulcer. esophagus re-dilated at the end of procedure.       UPPER GASTROINTESTINAL ENDOSCOPY  07/28/2021    [facility] EGD Dr. Estrada Zuluaga Family History   Problem Relation Age of Onset    Heart Disease Mother     Heart Attack Mother     Arthritis Mother     Other Maternal Grandmother         (gout)    Allergies Daughter     Allergies Daughter     Other Brother         ( at age 21 secondary to auto accident).  Cataracts Neg Hx     Diabetes Neg Hx     Glaucoma Neg Hx        Social History     Tobacco Use    Smoking status: Never Smoker    Smokeless tobacco: Never Used    Tobacco comment: Never smoked. [facility], 2016. Substance Use Topics    Alcohol use: No     Alcohol/week: 0.0 standard drinks       Review of Systems: All 12 systems reviewed and pertinent positives noted above. Lower Extremity Physical Examination:     Vitals:   Vitals:    22 1514   BP: 110/68   Pulse: 68     General: AAO x 3 in NAD. Vascular: DP and PT pulses palpable 2/4, bilateral.  CFT <3 seconds, bilateral.  Hair growth present to the level of the digits, bilateral.  Edema present, bilateral.  Varicosities present, bilateral. Erythema absent, bilateral. Distal Rubor absent bilateral.  Temperature within normal limits bilateral. Hyperpigmentation present bilateral. Atrophic skin yes. Neurological: Sensation Impaired to light touch to level of digits, bilateral.  Protective sensation intact  10/10 sites via 5.07/10g Clark-Christi Monofilament, bilateral.  negative Tinel's, bilateral.  negative Valleix sign, bilateral.  Vibratory abnormal  bilateral.  Reflexes Decreased bilateral.  Paresthesias positive. Dysthesias positive. Sharp/dull abnormal  bilateral.  Musculoskeletal: Muscle strength 5/5, bilateral.  Pain absent upon palpation bilateral. Normal medial longitudinal arch, bilateral.  Ankle ROM decresed,bilateral.  1st MPJ ROM within normal limits, bilateral.  Dorsally contracted digits absent. No other foot deformities.   Integument: Warm, dry, supple, bilateral.  Open lesion absent, bilateral.  Interdigital maceration absent to web spaces bilateral.   Nails left 5 and right 1 thickened, dystrophic and crumbly, discolored with subungual debris. .  Fissures absent, bilateral. Hyperkeratotic tissue is absent. Asessment: Patient is a 64 y.o. female with:    Diagnosis Orders   1. DM (diabetes mellitus), type 2 with neurological complications ([address]     2. Dermatophytosis of nail         Plan: Patient examined and evaluated. Current condition and treatment options discussed in detail. DM foot ed and exam  All nails as mentioned above debrided in length and thickness. Patient advised OTC methods for treatment of the mycotic nails. Patient will follow up in 10 weeks. Contact office with any questions/problems/concerns.

## 2022-01-13 ENCOUNTER — TELEPHONE (OUTPATIENT)
Dept: FAMILY MEDICINE CLINIC | Age: 77
End: 2022-01-13

## 2022-01-13 DIAGNOSIS — M54.42 CHRONIC BILATERAL LOW BACK PAIN WITH BILATERAL SCIATICA: ICD-10-CM

## 2022-01-13 DIAGNOSIS — M54.41 CHRONIC BILATERAL LOW BACK PAIN WITH BILATERAL SCIATICA: ICD-10-CM

## 2022-01-13 DIAGNOSIS — K58.1 IRRITABLE BOWEL SYNDROME WITH CONSTIPATION: ICD-10-CM

## 2022-01-13 DIAGNOSIS — G89.29 CHRONIC BILATERAL LOW BACK PAIN WITH BILATERAL SCIATICA: ICD-10-CM

## 2022-01-13 DIAGNOSIS — I10 ESSENTIAL HYPERTENSION: ICD-10-CM

## 2022-01-13 DIAGNOSIS — H43.813 DEGENERATION OF POSTERIOR VITREOUS BODY OF BOTH EYES: ICD-10-CM

## 2022-01-13 DIAGNOSIS — E11.49 OTHER DIABETIC NEUROLOGICAL COMPLICATION ASSOCIATED WITH TYPE 2 DIABETES MELLITUS (HCC): ICD-10-CM

## 2022-01-13 DIAGNOSIS — K30 DELAYED GASTRIC EMPTYING: ICD-10-CM

## 2022-01-13 DIAGNOSIS — K11.1 PAROTID GLAND ENLARGEMENT: ICD-10-CM

## 2022-01-13 DIAGNOSIS — E11.9 TYPE 2 DIABETES MELLITUS WITHOUT COMPLICATION, WITHOUT LONG-TERM CURRENT USE OF INSULIN (HCC): ICD-10-CM

## 2022-01-13 DIAGNOSIS — K85.90 ACUTE PANCREATITIS, UNSPECIFIED COMPLICATION STATUS, UNSPECIFIED PANCREATITIS TYPE: Primary | ICD-10-CM

## 2022-01-13 NOTE — TELEPHONE ENCOUNTER
Home health plan of care reviewed and certification completed on 01/13/2022 on patient for service dates 01/15/22-03/15/22. Medications reviewed and verified. Physician time spent on activities to coordinate services, documenting medical decision making, reviewing of reports, treatment plans and test results is 20 minutes.

## 2022-02-04 DIAGNOSIS — G62.9 NEUROPATHY: ICD-10-CM

## 2022-02-07 ENCOUNTER — OFFICE VISIT (OUTPATIENT)
Dept: FAMILY MEDICINE CLINIC | Age: 77
End: 2022-02-07
Payer: MEDICAID

## 2022-02-07 VITALS
BODY MASS INDEX: 27.66 KG/M2 | DIASTOLIC BLOOD PRESSURE: 68 MMHG | SYSTOLIC BLOOD PRESSURE: 120 MMHG | WEIGHT: 166 LBS | HEIGHT: 65 IN | HEART RATE: 72 BPM

## 2022-02-07 DIAGNOSIS — K11.7 XEROSTOMIA: ICD-10-CM

## 2022-02-07 DIAGNOSIS — M15.9 GENERALIZED OSTEOARTHROSIS, INVOLVING MULTIPLE SITES: ICD-10-CM

## 2022-02-07 DIAGNOSIS — M54.42 CHRONIC BILATERAL LOW BACK PAIN WITH BILATERAL SCIATICA: ICD-10-CM

## 2022-02-07 DIAGNOSIS — M54.41 CHRONIC BILATERAL LOW BACK PAIN WITH BILATERAL SCIATICA: ICD-10-CM

## 2022-02-07 DIAGNOSIS — K21.9 GASTROESOPHAGEAL REFLUX DISEASE WITHOUT ESOPHAGITIS: ICD-10-CM

## 2022-02-07 DIAGNOSIS — E03.9 HYPOTHYROIDISM, UNSPECIFIED TYPE: ICD-10-CM

## 2022-02-07 DIAGNOSIS — K58.1 IRRITABLE BOWEL SYNDROME WITH CONSTIPATION: ICD-10-CM

## 2022-02-07 DIAGNOSIS — G89.29 CHRONIC BILATERAL LOW BACK PAIN WITH BILATERAL SCIATICA: ICD-10-CM

## 2022-02-07 DIAGNOSIS — K30 DELAYED GASTRIC EMPTYING: ICD-10-CM

## 2022-02-07 DIAGNOSIS — E11.65 UNCONTROLLED TYPE 2 DIABETES MELLITUS WITH HYPERGLYCEMIA (HCC): Primary | ICD-10-CM

## 2022-02-07 DIAGNOSIS — E78.00 HIGH CHOLESTEROL: ICD-10-CM

## 2022-02-07 DIAGNOSIS — J31.0 CHRONIC RHINITIS: ICD-10-CM

## 2022-02-07 DIAGNOSIS — K11.1 PAROTID GLAND ENLARGEMENT: ICD-10-CM

## 2022-02-07 DIAGNOSIS — I10 ESSENTIAL HYPERTENSION: ICD-10-CM

## 2022-02-07 PROCEDURE — 1036F TOBACCO NON-USER: CPT | Performed by: FAMILY MEDICINE

## 2022-02-07 PROCEDURE — 1090F PRES/ABSN URINE INCON ASSESS: CPT | Performed by: FAMILY MEDICINE

## 2022-02-07 PROCEDURE — 1123F ACP DISCUSS/DSCN MKR DOCD: CPT | Performed by: FAMILY MEDICINE

## 2022-02-07 PROCEDURE — G8427 DOCREV CUR MEDS BY ELIG CLIN: HCPCS | Performed by: FAMILY MEDICINE

## 2022-02-07 PROCEDURE — G8399 PT W/DXA RESULTS DOCUMENT: HCPCS | Performed by: FAMILY MEDICINE

## 2022-02-07 PROCEDURE — G8484 FLU IMMUNIZE NO ADMIN: HCPCS | Performed by: FAMILY MEDICINE

## 2022-02-07 PROCEDURE — G8417 CALC BMI ABV UP PARAM F/U: HCPCS | Performed by: FAMILY MEDICINE

## 2022-02-07 PROCEDURE — 99214 OFFICE O/P EST MOD 30 MIN: CPT | Performed by: FAMILY MEDICINE

## 2022-02-07 PROCEDURE — 4040F PNEUMOC VAC/ADMIN/RCVD: CPT | Performed by: FAMILY MEDICINE

## 2022-02-07 PROCEDURE — 99213 OFFICE O/P EST LOW 20 MIN: CPT | Performed by: FAMILY MEDICINE

## 2022-02-07 RX ORDER — OMEPRAZOLE 20 MG/1
CAPSULE, DELAYED RELEASE ORAL
Qty: 180 CAPSULE | Refills: 1 | Status: SHIPPED | OUTPATIENT
Start: 2022-02-07 | End: 2022-09-09

## 2022-02-07 RX ORDER — GABAPENTIN 300 MG/1
CAPSULE ORAL
Qty: 90 CAPSULE | Refills: 0 | Status: SHIPPED | OUTPATIENT
Start: 2022-02-07 | End: 2022-03-30

## 2022-02-07 RX ORDER — BLOOD-GLUCOSE METER
1 KIT MISCELLANEOUS 2 TIMES DAILY
Qty: 1 KIT | Refills: 0 | Status: SHIPPED | OUTPATIENT
Start: 2022-02-07 | End: 2022-04-27 | Stop reason: SDUPTHER

## 2022-02-07 ASSESSMENT — ENCOUNTER SYMPTOMS
TROUBLE SWALLOWING: 1
GASTROINTESTINAL NEGATIVE: 1
SINUS PRESSURE: 0
EYES NEGATIVE: 1
BACK PAIN: 0
RESPIRATORY NEGATIVE: 1
ALLERGIC/IMMUNOLOGIC NEGATIVE: 1

## 2022-02-07 NOTE — PROGRESS NOTES
Subjective:      Patient ID: Yoko Woodson is a 64 y.o. female. Routine follow up on chronic medical conditions, refills, and review of updated labs. I have reviewed the patient's medical history in detail and updated the computerized patient record. Doing ok at present. bs control seems \"not bad\". Rare elevation by report. No low sugar concerns. Neuropathy in the legs a little better after titrating neurontin dose up at last visit. Lower back/sacral area discomfort better. Seems stable over the interval.     Knee arthritis evident ongoing. She notices recent weather changes have made the knees feel worse. Compliant with medications at present. Past Medical History:   Diagnosis Date    Allergic conjunctivitis     (stable)    Chronic sinusitis     Constipation     Dizzy spells     Dry eye syndrome     Edentulous     (does not wear dentures)    Essential hypertension 7/6/2016    Fracture of lateral malleolus     (avulsion fracture at the tip of the lateral malleolus - right ankle).     Hearing loss     Hypothyroidism     Interstitial cystitis     Irritable bowel syndrome     Keratitis     (secondary to dry eye syndrome)    Parotid gland enlargement     stasis, consider sjorgens    Pseudophakos     (bilateral)    Seasonal rhinitis     Stress incontinence     Type II or unspecified type diabetes mellitus without mention of complication, not stated as uncontrolled     Urethral stenosis     Xerostomia      Past Surgical History:   Procedure Laterality Date    APPENDECTOMY  07/24/1986    CATARACT REMOVAL WITH IMPLANT Right 09/02/2003    (Dr. Bianca Linares)    CATARACT REMOVAL WITH IMPLANT Left 08/12/2003    (Dr. Bianca Linares)    CHOLECYSTECTOMY  07/24/1986    COLONOSCOPY  09/08/2014    normal    CYSTOURETHROSCOPY/URETHRAL DILATION  02/08/2012    multiple    EYE SURGERY Bilateral 12/13/2018    Bilateral Transscleral cyclophotocoagulation G6 laser (micropulse) performed by report. Not really painful. No vomiting. EGD 4/2/18: antral ulceration found. Esophagus re-dilated at that time. She has prn gastroenterology consult if symptoms worsen. EGD 11/ 18/19. Minimal gastritis without ulceration. Single dilation with dilator. Some evidence for thick secretions and laryngitis. She continues on omeprazole. No gastritis or gerd concerns at present. Parotid enlargement/exrostomia; still with dry mouth concerns. Not using artificial saliva, she tried and did not like it. Nothing visible as far as enlargement. She feels fullness at times. Using hard candy (sugar free) to help with dryness. Some chronic rhinitis and sinusitis issues ongoing. She describes intermittent sneezing and congestions despite daily antihistamine and flonase use. Concerns for sjogrens in the past.   She has somewhat severe septal deviation and likely has a lot of mechanical obstruction, especially on the right. Rec. ENT consult to consider mechanical fix to her nose issues. Thought not to be bad enough to need repair by patient recall. She desires a 2nd opinion. Problems with chronic congestion getting worse over time. Colds tend to cause complications with sinuses and prolonged symptoms. Ibs; some intermittent constipation. We discussed current bowel regimen and made plans to add more if needed. Oa: seeing more knee pain. Currently her back pain is better and she is not needing her cane or walker for extended walking. Weather related exacerbation of knee pain at present. Discussed nsaid use. She has had some stomach complaints on aleve in the past.   Conservative with nsaids at present due to stomach ulceration in April 2018. Consider injection trial if willing. Gerd: some recent heartburn recurrent. She was noting some recurrent esophageal pain with swallowing, and some episodes of choking recently.   She had prior dilation of esophagus in [employer] 8/2016, and again 11/2016. Repeat EGD 4/2/18 with antral gastric ulcer . Repeated dilation at that time. repeat dilation 11/19 through Dr. Chery Moore in [facility]. She continues on omeprazole 20mg/day. Acute on chronic back pain. Neuropathy symptoms in feet by description, also more problematic acutely. Stiff with poor rom. Known moderate spinal canal stenosis at L3-4, L4-5 from 2016 MRI. Numbness in both feet endorsed at present. neurontin use once a day to occasional twice a day when she has sharp pains. Seems to be helping. Back pain improved enough that she is no longer using a walker or cane. Hypothyroid: dosage increased to 100mcg/day in august 2021. Rec. Follow up with updated labs. Hyperlipidemia:  Discussed rec. For treatment given diabetes diagnosis. Discussed side effects and precautions. Updating labs.

## 2022-02-07 NOTE — TELEPHONE ENCOUNTER
Jose Luis Soliz called requesting a refill of the below medication which has been pended for you:     [place] from [place] reviewed. Gabapentin 300 mg last filled 12/22/21 #90/30 days.    Requested Prescriptions     Pending Prescriptions Disp Refills    gabapentin (NEURONTIN) 300 MG capsule [Pharmacy Med Name: GABAPENTIN 300 MG CAPSULE] 90 capsule 0     Sig: take 1 capsule by mouth three times a day       Last Appointment Date: 11/17/2021  Next Appointment Date: 2/7/2022    Allergies   Allergen Reactions    Latex Rash    Aleve [Naproxen]      Stomach problems    Amoxicillin      Other reaction(s): mucositis    Aspirin Other (See Comments)     Dizziness and tears her stomach up    Dye [Iodides]      IV DYE    Influenza Vaccines     Reglan [Metoclopramide] Other (See Comments)     Dystonic reaction, involuntary movements

## 2022-02-14 ENCOUNTER — TELEPHONE (OUTPATIENT)
Dept: FAMILY MEDICINE CLINIC | Age: 77
End: 2022-02-14

## 2022-02-14 NOTE — TELEPHONE ENCOUNTER
Pt stopped by and would like a letter by dr Chau Hinds to state if she is eligible for a lift bed and states her lift chair is broken also at this time. To call when letter is done. She also asked about her one touch monitor and  it was sent to [facility].

## 2022-02-14 NOTE — TELEPHONE ENCOUNTER
Spoke with patient-unsure what is wrong with lift chair of if chair can be repaired. Advised patient to call [facility] where she purchased the chair from and have chair looked at to determine what is wrong with it before a new script will be given for a chair and or hospital bed.

## 2022-02-23 ENCOUNTER — OFFICE VISIT (OUTPATIENT)
Dept: OPTOMETRY | Age: 77
End: 2022-02-23
Payer: MEDICAID

## 2022-02-23 DIAGNOSIS — H40.1132 PRIMARY OPEN ANGLE GLAUCOMA (POAG) OF BOTH EYES, MODERATE STAGE: Primary | ICD-10-CM

## 2022-02-23 DIAGNOSIS — H04.123 DRY EYE SYNDROME OF BOTH EYES: ICD-10-CM

## 2022-02-23 DIAGNOSIS — H10.13 ALLERGIC CONJUNCTIVITIS OF BOTH EYES: ICD-10-CM

## 2022-02-23 DIAGNOSIS — Z96.1 PSEUDOPHAKIA OF BOTH EYES: ICD-10-CM

## 2022-02-23 PROCEDURE — G8399 PT W/DXA RESULTS DOCUMENT: HCPCS | Performed by: OPTOMETRIST

## 2022-02-23 PROCEDURE — 4040F PNEUMOC VAC/ADMIN/RCVD: CPT | Performed by: OPTOMETRIST

## 2022-02-23 PROCEDURE — G8484 FLU IMMUNIZE NO ADMIN: HCPCS | Performed by: OPTOMETRIST

## 2022-02-23 PROCEDURE — 1123F ACP DISCUSS/DSCN MKR DOCD: CPT | Performed by: OPTOMETRIST

## 2022-02-23 PROCEDURE — G8427 DOCREV CUR MEDS BY ELIG CLIN: HCPCS | Performed by: OPTOMETRIST

## 2022-02-23 PROCEDURE — 1090F PRES/ABSN URINE INCON ASSESS: CPT | Performed by: OPTOMETRIST

## 2022-02-23 PROCEDURE — G8417 CALC BMI ABV UP PARAM F/U: HCPCS | Performed by: OPTOMETRIST

## 2022-02-23 PROCEDURE — 99211 OFF/OP EST MAY X REQ PHY/QHP: CPT

## 2022-02-23 PROCEDURE — 99214 OFFICE O/P EST MOD 30 MIN: CPT | Performed by: OPTOMETRIST

## 2022-02-23 PROCEDURE — 1036F TOBACCO NON-USER: CPT | Performed by: OPTOMETRIST

## 2022-02-23 RX ORDER — TIMOLOL MALEATE 5 MG/ML
SOLUTION/ DROPS OPHTHALMIC
COMMUNITY
Start: 2022-02-20 | End: 2022-09-26

## 2022-02-23 RX ORDER — KETOTIFEN FUMARATE 0.35 MG/ML
1 SOLUTION/ DROPS OPHTHALMIC 2 TIMES DAILY
Qty: 1 ML | Refills: 3 | Status: SHIPPED | OUTPATIENT
Start: 2022-02-23 | End: 2022-03-05

## 2022-02-23 ASSESSMENT — REFRACTION_WEARINGRX
OS_AXIS: 089
OD_SPHERE: PLANO
OD_CYLINDER: -2.75
OS_ADD: +2.75
SPECS_TYPE: BIFOCAL
OS_CYLINDER: -1.25
OD_ADD: +2.75
OD_AXIS: 083
OS_SPHERE: -0.25

## 2022-02-23 ASSESSMENT — VISUAL ACUITY
OS_CC: 20/60
CORRECTION_TYPE: GLASSES
METHOD: SNELLEN - LINEAR

## 2022-02-23 ASSESSMENT — ENCOUNTER SYMPTOMS
RESPIRATORY NEGATIVE: 0
EYES NEGATIVE: 0
ALLERGIC/IMMUNOLOGIC NEGATIVE: 0
GASTROINTESTINAL NEGATIVE: 0

## 2022-02-23 ASSESSMENT — SLIT LAMP EXAM - LIDS
COMMENTS: NORMAL
COMMENTS: NORMAL

## 2022-02-23 ASSESSMENT — TONOMETRY
IOP_METHOD: NON-CONTACT AIR PUFF
OS_IOP_MMHG: 12
OD_IOP_MMHG: 13

## 2022-02-23 ASSESSMENT — PACHYMETRY
OS_CT(UM): 518
OD_CT(UM): 510

## 2022-02-23 NOTE — PATIENT INSTRUCTIONS
Continue timolol 2x per day in both eyes    And continue preservative free artificial tears every 2 hours

## 2022-02-23 NOTE — PROGRESS NOTES
Meka Loredo presents today for   Chief Complaint   Patient presents with    1 Month Follow-Up   . HPI     1 month IOP check  Started Timolol ou BID ; last drop was this morning. Current Outpatient Medications   Medication Sig Dispense Refill    timolol (TIMOPTIC) 0.5 % ophthalmic solution       ketotifen (ZADITOR) 0.025 % ophthalmic solution Place 1 drop into both eyes 2 times daily for 10 days 1 mL 3    gabapentin (NEURONTIN) 300 MG capsule take 1 capsule by mouth three times a day 90 capsule 0    glucose monitoring (FREESTYLE [place]) kit 1 kit by Does not apply route 2 times daily 1 kit 0    omeprazole (PRILOSEC) 20 MG delayed release capsule 1 po  capsule 1    Misc. Devices (ROLLER [place]ALK[place]) MISC 1 each by Does not apply route daily 1 each 0    levothyroxine (SYNTHROID) 100 MCG tablet 1 po daily 90 tablet 3    blood glucose monitor strips Test one  time a day & as needed for symptoms of irregular blood glucose. Dispense sufficient amount for indicated testing frequency plus additional to accommodate PRN testing needs. 100 strip 3    Lancets MISC 1 each by Does not apply route daily 100 each 5    fluticasone (FLONASE) 50 MCG/ACT nasal spray instill 2 sprays into each nostril once daily 16 g 5    polyethyl glycol-propyl glycol 0.4-0.3 % (SYSTANE) 0.4-0.3 % ophthalmic solution Place 1 drop into both eyes as needed for Dry Eyes 1 Bottle 5    diclofenac sodium (VOLTAREN) 1 % GEL Apply 2 g topically 4 times daily 5 Tube 5    blood glucose test strips (ONE TOUCH ULTRA TEST) strip use 1 TEST STRIP to TEST BLOOD SUGAR twice a day 100 strip 3    loratadine (CLARITIN) 10 MG tablet take 1 tablet by mouth once daily 30 tablet 11    SOFT TOUCH LANCETS MISC 1 Device by Does not apply route 2 times daily 200 each 3     No current facility-administered medications for this visit.          Family History   Problem Relation Age of Onset    Heart Disease Mother     Heart Attack Mother    Ray Arthritis Mother     Other Maternal Grandmother         (gout)    Allergies Daughter     Allergies Daughter     Other Brother         ( at age 21 secondary to auto accident).  Cataracts Neg Hx     Diabetes Neg Hx     Glaucoma Neg Hx      Social History     Socioeconomic History    Marital status: Single     Spouse name: None    Number of children: None    Years of education: None    Highest education level: None   Occupational History    None   Tobacco Use    Smoking status: Never Smoker    Smokeless tobacco: Never Used    Tobacco comment: Never smoked. [facility], 2016. Vaping Use    Vaping Use: Never used   Substance and Sexual Activity    Alcohol use: No     Alcohol/week: 0.0 standard drinks    Drug use: No    Sexual activity: None   Other Topics Concern    None   Social History Narrative    None     Social Determinants of Health     Financial Resource Strain:     Difficulty of Paying Living Expenses: Not on file   Food Insecurity:     Worried About Running Out of Food in the Last Year: Not on file    [place] of Food in the Last Year: Not on file   Transportation Needs:     Lack of Transportation (Medical): Not on file    Lack of Transportation (Non-Medical):  Not on file   Physical Activity:     Days of Exercise per Week: Not on file    Minutes of Exercise per Session: Not on file   Stress:     Feeling of Stress : Not on file   Social Connections:     Frequency of Communication with Friends and Family: Not on file    Frequency of Social Gatherings with Friends and Family: Not on file    Attends [employer] Services: Not on file    Active Member of Clubs or Organizations: Not on file    Attends Club or Organization Meetings: Not on file    Marital Status: Not on file   Intimate Partner Violence:     Fear of Current or Ex-Partner: Not on file    Emotionally Abused: Not on file    Physically Abused: Not on file    Sexually Abused: Not on file   Housing Stability:     Unable to Pay for Housing in the Last Year: Not on file    Number of Places Lived in the Last Year: Not on file    Unstable Housing in the Last Year: Not on file     Past Medical History:   Diagnosis Date    Allergic conjunctivitis     (stable)    Chronic sinusitis     Constipation     Dizzy spells     Dry eye syndrome     Edentulous     (does not wear dentures)    Essential hypertension 7/6/2016    Fracture of lateral malleolus     (avulsion fracture at the tip of the lateral malleolus - right ankle).     Hearing loss     Hypothyroidism     Interstitial cystitis     Irritable bowel syndrome     Keratitis     (secondary to dry eye syndrome)    Parotid gland enlargement     stasis, consider sjorgens    Pseudophakos     (bilateral)    Seasonal rhinitis     Stress incontinence     Type II or unspecified type diabetes mellitus without mention of complication, not stated as uncontrolled     Urethral stenosis     Xerostomia        ROS     Negative for: Constitutional, Gastrointestinal, Neurological, Skin, Genitourinary, Musculoskeletal, HENT, Endocrine, Cardiovascular, Eyes, Respiratory, Psychiatric, Allergic/Imm, Heme/Lymph          Main Ophthalmology Exam     External Exam       Right Left    External Normal Normal          Slit Lamp Exam       Right Left    Lids/Lashes Normal Normal    Conjunctiva/Sclera 1+ Injection 1+ Injection    Cornea dry eye  dry eye     Anterior Chamber Deep and quiet Deep and quiet    Iris Round and reactive Round and reactive    Lens Posterior chamber intraocular lens Posterior chamber intraocular lens    Vitreous Normal Normal          Fundus Exam       Right Left    Disc Normal Normal    C/D Ratio 0.7 .7    Macula Normal Normal    Vessels Normal Normal    Periphery Normal Normal             <div id=\"MAIN_EXAM_REVIEWED\"></div>      Tonometry     Tonometry (Non-contact air puff, 1:14 PM)       Right Left    Pressure 13 12          Tonometry #2 (Tonopen, 1:19 PM)       Right Left    Pressure 13 13          Tonometry Comments    IOP.7             11.6  CH:  9.9          11.2  WS: 8.4          6.1                 Not recorded       Not recorded         Visual Acuity (Snellen - Linear)       Right Left    Dist cc 20/70 20/60    Correction: Glasses          Not recorded       Neuro/Psych     Neuro/Psych     Oriented x3: Yes    Mood/Affect: Normal              Not recorded           Ophthalmology Exam     Wearing Rx       Sphere Cylinder Axis Add    Right [place] -2.75 083 +2.75    Left -0.25 -1.25 089 +2.75    Age: 1yr    Type: Bifocal              Not recorded              No orders of the defined types were placed in this encounter. IMPRESSION:  1. Primary open angle glaucoma (POAG) of both eyes, moderate stage    2. Pseudophakia of both eyes    3. Dry eye syndrome of both eyes    4. Allergic conjunctivitis of both eyes        PLAN:    Please continue Timolol glaucoma eyedrops as prescribed. Counseled patient that the glaucomas are a group of eye diseases with the potential for severe and irreversible vision loss. Counseled patient regarding the importance of compliance with therapy and follow-up. Counseled patient regarding glaucoma therapy with topical medications and the potential side effects and adverse effects of topical medications. The patient was counseled that glaucoma is a progressive optic neuropathy that, if left untreated, may progress to irreversible blindness. The patient was counseled that the only demonstrated treatment for glaucoma is to lower the pressure in the eye to a target eye pressure which may decrease the rate at which the progressive optic neuropathy leads to irreversible blindness. 3.  Continue artificial tears every 2 hours PF   4.   Use zaditor as prescribed today for itching up to 2x per day  [facility]       Patient Instructions   Continue timolol 2x per day in both eyes    And continue preservative free artificial tears every 2 hours      Return in about 6 months (around 8/23/2022) for iop, oct onh .

## 2022-03-09 ENCOUNTER — OFFICE VISIT (OUTPATIENT)
Dept: OTOLARYNGOLOGY | Age: 77
End: 2022-03-09
Payer: MEDICARE

## 2022-03-09 VITALS
BODY MASS INDEX: 27.66 KG/M2 | HEIGHT: 65 IN | WEIGHT: 166 LBS | HEART RATE: 72 BPM | RESPIRATION RATE: 14 BRPM | SYSTOLIC BLOOD PRESSURE: 120 MMHG | DIASTOLIC BLOOD PRESSURE: 78 MMHG

## 2022-03-09 DIAGNOSIS — J30.1 SEASONAL ALLERGIC RHINITIS DUE TO POLLEN: ICD-10-CM

## 2022-03-09 DIAGNOSIS — R09.81 NASAL CONGESTION: ICD-10-CM

## 2022-03-09 DIAGNOSIS — J30.81 ALLERGIC RHINITIS DUE TO ANIMAL HAIR AND DANDER: ICD-10-CM

## 2022-03-09 DIAGNOSIS — J34.2 DEVIATED SEPTUM: Primary | ICD-10-CM

## 2022-03-09 PROCEDURE — 99214 OFFICE O/P EST MOD 30 MIN: CPT | Performed by: OTOLARYNGOLOGY

## 2022-03-09 PROCEDURE — 31231 NASAL ENDOSCOPY DX: CPT | Performed by: OTOLARYNGOLOGY

## 2022-03-09 PROCEDURE — 99212 OFFICE O/P EST SF 10 MIN: CPT | Performed by: OTOLARYNGOLOGY

## 2022-03-09 RX ORDER — AZELASTINE 1 MG/ML
2 SPRAY, METERED NASAL 2 TIMES DAILY
COMMUNITY

## 2022-03-09 RX ORDER — FLUTICASONE PROPIONATE 50 MCG
2 SPRAY, SUSPENSION (ML) NASAL DAILY
Qty: 48 G | Refills: 5 | Status: SHIPPED | OUTPATIENT
Start: 2022-03-09

## 2022-03-09 NOTE — PROGRESS NOTES
3/9/2022 1:27 PM MARCELLE Cancino (:  1945) is a 64 y.o. female,New patient, here for evaluation of the following chief complaint(s):  Nasal Congestion      ASSESSMENT/PLAN:  1. Deviated septum    2. Nasal congestion    3. Seasonal allergic rhinitis due to pollen    4. Allergic rhinitis due to animal hair and dander      1. Deviated septum  2. Nasal congestion  3. Seasonal allergic rhinitis due to pollen  4. Allergic rhinitis due to animal hair and dander    Continue Astelin nasal spray twice daily  Add Flonase nasal spray twice daily  Consider restarting an antihistamine or Singulair  Right deviated septum, we discussed that nasal sprays will only do so much to improve the congestion when she has structural abnormalities like this significantly restricted airflow on the right side. Only surgery will adequately address this but she has no interest in pursuing surgery. Follow-up in 1 month to reassess symptoms  Will defer nonsurgical management back to primary care    No follow-ups on file. SUBJECTIVE/OBJECTIVE:  HPI   68yo woman with a many year history of nasal congestion. She is established with an allergist, Christopher Dupree, who diagnosed her with allergies to cat, mold, pollen, possible dog. She does have a dog. She also has multiple house plants inside that she has been told she should switch to synthetic plants. She endorses a nasal trauma in childhood when she fell off monkey bars and hit her nose and suffered a nasal bone fracture. She has been on Claritin and Flonase in the past but was taken off of these medications. She states she is also tried a nasal saline irrigation but was not able to use it well. She has been on Astelin nasal spray, twice daily, for about a year with some improvement in her congestion. CT sinus 7/3/2019:   SINUSES: The visualized paranasal sinuses and mastoid air cells demonstrate   no acute abnormality.        Past Medical History:   Diagnosis Date    Allergic conjunctivitis     (stable)    Chronic sinusitis     Constipation     Dizzy spells     Dry eye syndrome     Edentulous     (does not wear dentures)    Essential hypertension 7/6/2016    Fracture of lateral malleolus     (avulsion fracture at the tip of the lateral malleolus - right ankle).  Hearing loss     Hypothyroidism     Interstitial cystitis     Irritable bowel syndrome     Keratitis     (secondary to dry eye syndrome)    Parotid gland enlargement     stasis, consider sjorgens    Pseudophakos     (bilateral)    Seasonal rhinitis     Stress incontinence     Type II or unspecified type diabetes mellitus without mention of complication, not stated as uncontrolled     Urethral stenosis     Xerostomia      Past Surgical History:   Procedure Laterality Date    APPENDECTOMY  07/24/1986    CATARACT REMOVAL WITH IMPLANT Right 09/02/2003    (Dr. Roxanne Dorsey)    CATARACT REMOVAL WITH IMPLANT Left 08/12/2003    (Dr. Roxanne Dorsey)    CHOLECYSTECTOMY  07/24/1986    COLONOSCOPY  09/08/2014    normal    CYSTOURETHROSCOPY/URETHRAL DILATION  02/08/2012    multiple    EYE SURGERY Bilateral 12/13/2018    Bilateral Transscleral cyclophotocoagulation G6 laser (micropulse) performed by Mir Herman MD at [address]  06/02/1977    (Dr. Ijeoma Rachel)    UPPER GASTROINTESTINAL ENDOSCOPY  06/04/2015    gastritis, biopsy x 2    UPPER GASTROINTESTINAL ENDOSCOPY  04/02/2018    Dr. Chery Moore   antral gastric ulcer. esophagus re-dilated at the end of procedure.  UPPER GASTROINTESTINAL ENDOSCOPY  07/28/2021    [facility] EGD Dr. Maryjane Larsen History     Tobacco History     Smoking Status  Never Smoker    Smokeless Tobacco Use  Never Used    Tobacco Comment  Never smoked. Lelo Newman [facility], 7/14/2016.           Alcohol History     Alcohol Use Status  No          Drug Use     Drug Use Status  No          Sexual Activity     Sexually Active  Not Asked              Family History Problem Relation Age of Onset    Heart Disease Mother     Heart Attack Mother     Arthritis Mother     Other Maternal Grandmother         (gout)    Allergies Daughter     Allergies Daughter     Other Brother         ( at age 21 secondary to auto accident).  Cataracts Neg Hx     Diabetes Neg Hx     Glaucoma Neg Hx      Current Outpatient Medications   Medication Instructions    azelastine (ASTELIN) 0.1 % nasal spray 2 sprays, Nasal, 2 TIMES DAILY, Use in each nostril as directed    blood glucose monitor strips Test one  time a day & as needed for symptoms of irregular blood glucose. Dispense sufficient amount for indicated testing frequency plus additional to accommodate PRN testing needs.  blood glucose test strips (ONE TOUCH ULTRA TEST) strip use 1 TEST STRIP to TEST BLOOD SUGAR twice a day    diclofenac sodium (VOLTAREN) 2 g, Topical, 4 TIMES DAILY    fluticasone (FLONASE) 50 MCG/ACT nasal spray 2 sprays, Each Nostril, DAILY    gabapentin (NEURONTIN) 300 MG capsule take 1 capsule by mouth three times a day    glucose monitoring (FREESTYLE [place]ED[place]) kit 1 kit, Does not apply, 2 TIMES DAILY    Lancets MISC 1 each, Does not apply, DAILY    levothyroxine (SYNTHROID) 100 MCG tablet 1 po daily    loratadine (CLARITIN) 10 MG tablet take 1 tablet by mouth once daily    Misc. Devices (ROLLER WALKER) MISC 1 each, Does not apply, DAILY    omeprazole (PRILOSEC) 20 MG delayed release capsule 1 po BID    polyethyl glycol-propyl glycol 0.4-0.3 % (SYSTANE) 0.4-0.3 % ophthalmic solution 1 drop, Both Eyes, PRN    SOFT TOUCH LANCETS MISC 1 Device, Does not apply, 2 TIMES DAILY    timolol (TIMOPTIC) 0.5 % ophthalmic solution No dose, route, or frequency recorded.      Allergies   Allergen Reactions    Latex Rash    Aleve [Naproxen]      Stomach problems    Amoxicillin      Other reaction(s): mucositis    Aspirin Other (See Comments)     Dizziness and tears her stomach up    Dye [Iodides]      IV unknown

## 2022-03-16 ENCOUNTER — TELEPHONE (OUTPATIENT)
Dept: PODIATRY | Age: 77
End: 2022-03-16

## 2022-03-16 ENCOUNTER — TELEPHONE (OUTPATIENT)
Dept: FAMILY MEDICINE CLINIC | Age: 77
End: 2022-03-16
Payer: MEDICARE

## 2022-03-16 DIAGNOSIS — H90.5 HEARING LOSS, CENTRAL: ICD-10-CM

## 2022-03-16 DIAGNOSIS — E11.9 TYPE 2 DIABETES MELLITUS WITHOUT COMPLICATION, UNSPECIFIED WHETHER LONG TERM INSULIN USE (HCC): Primary | ICD-10-CM

## 2022-03-16 DIAGNOSIS — I10 ESSENTIAL HYPERTENSION, MALIGNANT: ICD-10-CM

## 2022-03-16 DIAGNOSIS — M15.9 GENERALIZED OSTEOARTHROSIS, INVOLVING MULTIPLE SITES: ICD-10-CM

## 2022-03-16 DIAGNOSIS — Z51.89: ICD-10-CM

## 2022-03-16 DIAGNOSIS — K21.9 GASTROESOPHAGEAL REFLUX DISEASE, UNSPECIFIED WHETHER ESOPHAGITIS PRESENT: ICD-10-CM

## 2022-03-16 PROCEDURE — G0179 MD RECERTIFICATION HHA PT: HCPCS | Performed by: FAMILY MEDICINE

## 2022-03-16 NOTE — TELEPHONE ENCOUNTER
Home health plan of care reviewed and certification completed on 03/16/22 on patient for service dates 03/16/22-05/14/22. Medications reviewed and verified. Physician time spent on activities to coordinate services, documenting medical decision making, reviewing of reports, treatment plans and test results is 20 minutes.

## 2022-03-24 ENCOUNTER — TELEPHONE (OUTPATIENT)
Dept: FAMILY MEDICINE CLINIC | Age: 77
End: 2022-03-24

## 2022-03-24 NOTE — TELEPHONE ENCOUNTER
FYI    Patient calling asking if pcp received the form that he is suppose to fill out for her to get food. Advised patient that writer will send note back and they will call her when its done. Verbalized understanding.

## 2022-03-30 DIAGNOSIS — G62.9 NEUROPATHY: ICD-10-CM

## 2022-03-30 RX ORDER — GABAPENTIN 300 MG/1
CAPSULE ORAL
Qty: 90 CAPSULE | Refills: 0 | Status: SHIPPED | OUTPATIENT
Start: 2022-03-30 | End: 2022-04-27

## 2022-04-05 ENCOUNTER — TELEPHONE (OUTPATIENT)
Dept: PODIATRY | Age: 77
End: 2022-04-05

## 2022-04-05 NOTE — TELEPHONE ENCOUNTER
Patient no showed appointment with Dr. Ellsworth Highland today at 2:45 pm. Gregorio Bernard called patient and rescheduled to 4/14/2022 at 3:00 pm.

## 2022-04-14 ENCOUNTER — OFFICE VISIT (OUTPATIENT)
Dept: PODIATRY | Age: 77
End: 2022-04-14
Payer: MEDICARE

## 2022-04-14 VITALS
BODY MASS INDEX: 27.66 KG/M2 | SYSTOLIC BLOOD PRESSURE: 122 MMHG | WEIGHT: 166 LBS | HEART RATE: 82 BPM | HEIGHT: 65 IN | DIASTOLIC BLOOD PRESSURE: 80 MMHG

## 2022-04-14 DIAGNOSIS — E11.49 DM (DIABETES MELLITUS), TYPE 2 WITH NEUROLOGICAL COMPLICATIONS (HCC): Primary | ICD-10-CM

## 2022-04-14 DIAGNOSIS — B35.1 DERMATOPHYTOSIS OF NAIL: ICD-10-CM

## 2022-04-14 PROCEDURE — 11720 DEBRIDE NAIL 1-5: CPT | Performed by: PODIATRIST

## 2022-04-14 PROCEDURE — 99999 PR OFFICE/OUTPT VISIT,PROCEDURE ONLY: CPT | Performed by: PODIATRIST

## 2022-04-14 NOTE — PROGRESS NOTES
Foot Care Worksheet  PCP: Harshil Mcbride MD  Last visit: 2/7/22    Nail description:  Thick , Yellow , Crumbly , Marked limitation of ambulation     Pain resulting from thickened and dystrophy of nail plate No    Nails involved  Right   1  (T5-T9)  Left     5  (TA-T4)    Q7 1 Class A Finding - Non traumatic amputation of foot No    Q8 2 Class B Findings - Absent DP pulse No, Absent PT pulse No, Advanced tropic changes (3 required) Yes    Decrease hair growth Yes, Nail changes/thickening Yes, Pigmented changes/discoloration Yes, Skin texture (thin, shiny) No, Skin color (rubor/redness) No    Q9 1 Class B and 2 Class C Findings  Claudication No, Temperature change Yes, Paresthesia Yes, Burning No, Edema Yes

## 2022-04-14 NOTE — PROGRESS NOTES
Subjective:  Jagdeep Goncalves is a 64 y.o. female who presents to the office today for routine DM foot care. .  Currently denies F/C/N/V. Allergies   Allergen Reactions    Latex Rash    Aleve [Naproxen]      Stomach problems    Amoxicillin      Other reaction(s): mucositis    Aspirin Other (See Comments)     Dizziness and tears her stomach up    Dye [Iodides]      IV DYE    Influenza Vaccines     Reglan [Metoclopramide] Other (See Comments)     Dystonic reaction, involuntary movements       Past Medical History:   Diagnosis Date    Allergic conjunctivitis     (stable)    Chronic sinusitis     Constipation     Dizzy spells     Dry eye syndrome     Edentulous     (does not wear dentures)    Essential hypertension 7/6/2016    Fracture of lateral malleolus     (avulsion fracture at the tip of the lateral malleolus - right ankle).  Hearing loss     Hypothyroidism     Interstitial cystitis     Irritable bowel syndrome     Keratitis     (secondary to dry eye syndrome)    Parotid gland enlargement     stasis, consider sjorgens    Pseudophakos     (bilateral)    Seasonal rhinitis     Stress incontinence     Type II or unspecified type diabetes mellitus without mention of complication, not stated as uncontrolled     Urethral stenosis     Xerostomia        Prior to Admission medications    Medication Sig Start Date End Date Taking?  Authorizing Provider   gabapentin (NEURONTIN) 300 MG capsule take 1 capsule by mouth three times a day 3/30/22 4/30/22 Yes Alina Burgses MD   azelastine (ASTELIN) 0.1 % nasal spray 2 sprays by Nasal route 2 times daily Use in each nostril as directed    Yes Historical Provider, MD   fluticasone (FLONASE) 50 MCG/ACT nasal spray 2 sprays by Each Nostril route daily 3/9/22  Yes Fatuma Mai MD   timolol (TIMOPTIC) 0.5 % ophthalmic solution  2/20/22  Yes Historical Provider, MD   glucose monitoring (FREESTYLE FREEDOM) kit 1 kit by Does not apply route 2 times daily 2/7/22  Yes Papo Ray MD   omeprazole (PRILOSEC) 20 MG delayed release capsule 1 po BID 2/7/22  Yes Papo Ray MD   Mis. Devices (ROLLER [place]) MISC 1 each by Does not apply route daily 11/22/21  Yes Papo Ray MD   levothyroxine (SYNTHROID) 100 MCG tablet 1 po daily 10/6/21  Yes Papo Ray MD   blood glucose monitor strips Test one  time a day & as needed for symptoms of irregular blood glucose. Dispense sufficient amount for indicated testing frequency plus additional to accommodate PRN testing needs.  8/24/21  Yes Papo Ray MD   Lancets MISC 1 each by Does not apply route daily 8/24/21  Yes Papo Ray MD   polyethyl glycol-propyl glycol 0.4-0.3 % (SYSTANE) 0.4-0.3 % ophthalmic solution Place 1 drop into both eyes as needed for Dry Eyes 6/22/21  Yes Apolonia Pacheco, GILBERTO   diclofenac sodium (VOLTAREN) 1 % GEL Apply 2 g topically 4 times daily 10/30/20  Yes TAY Joe - CNP   blood glucose test strips (ONE TOUCH ULTRA TEST) strip use 1 TEST STRIP to TEST BLOOD SUGAR twice a day 3/18/20  Yes Papo Ray MD   loratadine (CLARITIN) 10 MG tablet take 1 tablet by mouth once daily 10/1/18  Yes Papo Ray MD   SOFT TOUCH LANCETS MISC 1 Device by Does not apply route 2 times daily 8/16/16  Yes Papo Ray MD       Past Surgical History:   Procedure Laterality Date    APPENDECTOMY  07/24/1986    CATARACT REMOVAL WITH IMPLANT Right 09/02/2003    (Dr. Aysha Hoskins)    CATARACT REMOVAL WITH IMPLANT Left 08/12/2003    (Dr. Aysha Hoskins)    CHOLECYSTECTOMY  07/24/1986    COLONOSCOPY  09/08/2014    normal    CYSTOURETHROSCOPY/URETHRAL DILATION  02/08/2012    multiple    EYE SURGERY Bilateral 12/13/2018    Bilateral Transscleral cyclophotocoagulation G6 laser (micropulse) performed by Vandana Lugo MD at [address]  06/02/1977    (Dr. Ricardo Lopez)   [facility] ENDOSCOPY  06/04/2015    gastritis, biopsy x 2    UPPER GASTROINTESTINAL ENDOSCOPY  04/02/2018 Dr. Batsheva Fish   antral gastric ulcer. esophagus re-dilated at the end of procedure.  UPPER GASTROINTESTINAL ENDOSCOPY  2021    [facility] EGD Dr. Tere Ellison History   Problem Relation Age of Onset    Heart Disease Mother     Heart Attack Mother     Arthritis Mother     Other Maternal Grandmother         (gout)    Allergies Daughter     Allergies Daughter     Other Brother         ( at age 21 secondary to auto accident).  Cataracts Neg Hx     Diabetes Neg Hx     Glaucoma Neg Hx        Social History     Tobacco Use    Smoking status: Never Smoker    Smokeless tobacco: Never Used    Tobacco comment: Never smoked. Katie Sid [facility], 2016. Substance Use Topics    Alcohol use: No     Alcohol/week: 0.0 standard drinks       Review of Systems: All 12 systems reviewed and pertinent positives noted above. Lower Extremity Physical Examination:     Vitals:   Vitals:    22 1511   BP: 122/80   Pulse: 82     General: AAO x 3 in NAD. Vascular: DP and PT pulses palpable 2/4, bilateral.  CFT <3 seconds, bilateral.  Hair growth present to the level of the digits, bilateral.  Edema present, bilateral.  Varicosities present, bilateral. Erythema absent, bilateral. Distal Rubor absent bilateral.  Temperature within normal limits bilateral. Hyperpigmentation present bilateral. Atrophic skin yes. Neurological: Sensation Impaired to light touch to level of digits, bilateral.  Protective sensation intact  10/10 sites via 5.07/10g [place] Monofilament, bilateral.  negative Tinel's, bilateral.  negative Valleix sign, bilateral.  Vibratory abnormal  bilateral.  Reflexes Decreased bilateral.  Paresthesias positive. Dysthesias positive.   Sharp/dull abnormal  bilateral.  Musculoskeletal: Muscle strength 5/5, bilateral.  Pain absent upon palpation bilateral. Normal medial longitudinal arch, bilateral.  Ankle ROM decresed,bilateral.  1st MPJ ROM within normal limits, bilateral.  Dorsally contracted digits absent. No other foot deformities. Integument: Warm, dry, supple, bilateral.  Open lesion absent, bilateral.  Interdigital maceration absent to web spaces bilateral.   Nails left 5 and right 1 thickened, dystrophic and crumbly, discolored with subungual debris. .  Fissures absent, bilateral. Hyperkeratotic tissue is absent. Asessment: Patient is a 64 y.o. female with:    Diagnosis Orders   1. DM (diabetes mellitus), type 2 with neurological complications ([facility] [address]     2. Dermatophytosis of nail         Plan: Patient examined and evaluated. Current condition and treatment options discussed in detail. DM foot ed and exam  All nails as mentioned above debrided in length and thickness. Patient advised OTC methods for treatment of the mycotic nails. Patient will follow up in 10 weeks. Contact office with any questions/problems/concerns.

## 2022-04-18 ENCOUNTER — TELEPHONE (OUTPATIENT)
Dept: FAMILY MEDICINE CLINIC | Age: 77
End: 2022-04-18

## 2022-04-18 NOTE — TELEPHONE ENCOUNTER
Spoke with patient regarding missed visit 4/18/22. Patient rescheduled for 4/27/22. Letter mailed regarding no show.

## 2022-04-27 ENCOUNTER — OFFICE VISIT (OUTPATIENT)
Dept: FAMILY MEDICINE CLINIC | Age: 77
End: 2022-04-27
Payer: MEDICARE

## 2022-04-27 VITALS
HEIGHT: 65 IN | DIASTOLIC BLOOD PRESSURE: 68 MMHG | HEART RATE: 60 BPM | WEIGHT: 162 LBS | BODY MASS INDEX: 26.99 KG/M2 | SYSTOLIC BLOOD PRESSURE: 126 MMHG | OXYGEN SATURATION: 92 %

## 2022-04-27 DIAGNOSIS — E11.65 UNCONTROLLED TYPE 2 DIABETES MELLITUS WITH HYPERGLYCEMIA (HCC): ICD-10-CM

## 2022-04-27 DIAGNOSIS — E11.9 TYPE 2 DIABETES MELLITUS WITHOUT COMPLICATION, UNSPECIFIED WHETHER LONG TERM INSULIN USE (HCC): ICD-10-CM

## 2022-04-27 DIAGNOSIS — G62.9 NEUROPATHY: Primary | ICD-10-CM

## 2022-04-27 PROCEDURE — 99214 OFFICE O/P EST MOD 30 MIN: CPT | Performed by: FAMILY MEDICINE

## 2022-04-27 PROCEDURE — 1123F ACP DISCUSS/DSCN MKR DOCD: CPT | Performed by: FAMILY MEDICINE

## 2022-04-27 PROCEDURE — G8417 CALC BMI ABV UP PARAM F/U: HCPCS | Performed by: FAMILY MEDICINE

## 2022-04-27 PROCEDURE — 1090F PRES/ABSN URINE INCON ASSESS: CPT | Performed by: FAMILY MEDICINE

## 2022-04-27 PROCEDURE — 99213 OFFICE O/P EST LOW 20 MIN: CPT | Performed by: FAMILY MEDICINE

## 2022-04-27 PROCEDURE — G8399 PT W/DXA RESULTS DOCUMENT: HCPCS | Performed by: FAMILY MEDICINE

## 2022-04-27 PROCEDURE — G8427 DOCREV CUR MEDS BY ELIG CLIN: HCPCS | Performed by: FAMILY MEDICINE

## 2022-04-27 PROCEDURE — 4040F PNEUMOC VAC/ADMIN/RCVD: CPT | Performed by: FAMILY MEDICINE

## 2022-04-27 PROCEDURE — 1036F TOBACCO NON-USER: CPT | Performed by: FAMILY MEDICINE

## 2022-04-27 RX ORDER — GABAPENTIN 400 MG/1
400 CAPSULE ORAL 3 TIMES DAILY
Qty: 270 CAPSULE | Refills: 3 | Status: SHIPPED | OUTPATIENT
Start: 2022-04-27 | End: 2023-04-22

## 2022-04-27 RX ORDER — BLOOD-GLUCOSE METER
1 KIT MISCELLANEOUS 2 TIMES DAILY
Qty: 1 KIT | Refills: 0 | Status: SHIPPED | OUTPATIENT
Start: 2022-04-27

## 2022-04-27 ASSESSMENT — PATIENT HEALTH QUESTIONNAIRE - PHQ9
SUM OF ALL RESPONSES TO PHQ QUESTIONS 1-9: 0
2. FEELING DOWN, DEPRESSED OR HOPELESS: 0
SUM OF ALL RESPONSES TO PHQ QUESTIONS 1-9: 0
SUM OF ALL RESPONSES TO PHQ9 QUESTIONS 1 & 2: 0
1. LITTLE INTEREST OR PLEASURE IN DOING THINGS: 0

## 2022-04-27 ASSESSMENT — ENCOUNTER SYMPTOMS
EYES NEGATIVE: 1
ALLERGIC/IMMUNOLOGIC NEGATIVE: 1
SINUS PRESSURE: 0
BACK PAIN: 1
GASTROINTESTINAL NEGATIVE: 1
RESPIRATORY NEGATIVE: 1

## 2022-04-27 NOTE — PROGRESS NOTES
Subjective:      Patient ID: Rosa Myers is a 64 y.o. female. HPI   Acute visit for right wrist pain. Chronic pain , having numbness from the finger tips to the shoulder by report. Chronic peripheral neuropathy of both feet. She reports cts in the past.  Currently the whole  Hand feels numb. Feeling numbness and pain from anterior right shoulder to the wrist and hand. She tends to describe it as pain and numbness of the right hand radiating up to the shoulder. Shooting pains, followed by numbness by report. She reports at times the right leg is involved, feeling pain and numbness in both right sided extremities at the same. Time. Not really having arthralgias in the wrist by clarification. Stiff neck. Not really sore. Stabbing pain into the right arm or up the arm. Some bs readings 200-300. Past Medical History:   Diagnosis Date    Allergic conjunctivitis     (stable)    Chronic sinusitis     Constipation     Dizzy spells     Dry eye syndrome     Edentulous     (does not wear dentures)    Essential hypertension 7/6/2016    Fracture of lateral malleolus     (avulsion fracture at the tip of the lateral malleolus - right ankle).     Hearing loss     Hypothyroidism     Interstitial cystitis     Irritable bowel syndrome     Keratitis     (secondary to dry eye syndrome)    Parotid gland enlargement     stasis, consider sjorgens    Pseudophakos     (bilateral)    Seasonal rhinitis     Stress incontinence     Type II or unspecified type diabetes mellitus without mention of complication, not stated as uncontrolled     Urethral stenosis     Xerostomia      Past Surgical History:   Procedure Laterality Date    APPENDECTOMY  07/24/1986    CATARACT REMOVAL WITH IMPLANT Right 09/02/2003    (Dr. Angelina Jaramillo)    CATARACT REMOVAL WITH IMPLANT Left 08/12/2003    (Dr. Angelina Jaramillo)   [address]  07/24/1986    COLONOSCOPY  09/08/2014    normal    CYSTOURETHROSCOPY/URETHRAL DILATION  02/08/2012    multiple    EYE SURGERY Bilateral 12/13/2018    Bilateral Transscleral cyclophotocoagulation G6 laser (micropulse) performed by Jeff Sunshine MD at [address]  06/02/1977    (Dr. Shane Chowdary)    UPPER GASTROINTESTINAL ENDOSCOPY  06/04/2015    gastritis, biopsy x 2    UPPER GASTROINTESTINAL ENDOSCOPY  04/02/2018    Dr. Patsy Fleming   antral gastric ulcer. esophagus re-dilated at the end of procedure.  UPPER GASTROINTESTINAL ENDOSCOPY  07/28/2021    [facility] EGD Dr. Leena Khan     Current Outpatient Medications   Medication Sig Dispense Refill    gabapentin (NEURONTIN) 300 MG capsule take 1 capsule by mouth three times a day 90 capsule 0    azelastine (ASTELIN) 0.1 % nasal spray 2 sprays by Nasal route 2 times daily Use in each nostril as directed       fluticasone (FLONASE) 50 MCG/ACT nasal spray 2 sprays by Each Nostril route daily 48 g 5    timolol (TIMOPTIC) 0.5 % ophthalmic solution       glucose monitoring (FREESTYLE FREEDOM) kit 1 kit by Does not apply route 2 times daily 1 kit 0    omeprazole (PRILOSEC) 20 MG delayed release capsule 1 po  capsule 1    levothyroxine (SYNTHROID) 100 MCG tablet 1 po daily 90 tablet 3    blood glucose monitor strips Test one  time a day & as needed for symptoms of irregular blood glucose. Dispense sufficient amount for indicated testing frequency plus additional to accommodate PRN testing needs.  100 strip 3    Lancets MISC 1 each by Does not apply route daily 100 each 5    polyethyl glycol-propyl glycol 0.4-0.3 % (SYSTANE) 0.4-0.3 % ophthalmic solution Place 1 drop into both eyes as needed for Dry Eyes 1 Bottle 5    diclofenac sodium (VOLTAREN) 1 % GEL Apply 2 g topically 4 times daily 5 Tube 5    blood glucose test strips (ONE TOUCH ULTRA TEST) strip use 1 TEST STRIP to TEST BLOOD SUGAR twice a day 100 strip 3    loratadine (CLARITIN) 10 MG tablet take 1 tablet by mouth once daily 30 tablet 11    SOFT TOUCH LANCETS MISC 1 Device by Does not apply route 2 times daily 200 each 3     No current facility-administered medications for this visit. Allergies   Allergen Reactions    Latex Rash    Aleve [Naproxen]      Stomach problems    Amoxicillin      Other reaction(s): mucositis    Aspirin Other (See Comments)     Dizziness and tears her stomach up    Dye [Iodides]      IV DYE    Influenza Vaccines     Reglan [Metoclopramide] Other (See Comments)     Dystonic reaction, involuntary movements       Review of Systems   Constitutional: Negative. HENT: Negative. Negative for sinus pressure and sneezing. Trouble swallowing: thick sputum. Eyes: Negative. Respiratory: Negative. Cardiovascular: Negative. Gastrointestinal: Negative. Endocrine: Negative. Genitourinary: Negative. Musculoskeletal: Positive for arthralgias (rigth wrist), back pain (back better at present. ), gait problem (not using cane or walker any more) and myalgias (foot cramps described). Skin: Negative. Negative for rash. Allergic/Immunologic: Negative. Neurological: Numbness: around the right wrist and palmar hand. some symptoms up the arm at night involving more of the arm. Hematological: Negative. Psychiatric/Behavioral: Negative. Objective:   Physical Exam  Constitutional:       General: She is not in acute distress. Appearance: She is well-developed. HENT:      Head: Normocephalic and atraumatic. Right Ear: Tympanic membrane and external ear normal.      Left Ear: Tympanic membrane and external ear normal.      Nose: Septal deviation (to the right), mucosal edema and congestion present. No rhinorrhea. Mouth/Throat:      Pharynx: No oropharyngeal exudate. Eyes:      General: No scleral icterus. Conjunctiva/sclera: Conjunctivae normal.   Neck:      Thyroid: No thyromegaly. Cardiovascular:      Rate and Rhythm: Normal rate and regular rhythm.       Heart sounds: Normal heart sounds. No murmur heard. Pulmonary:      Effort: Pulmonary effort is normal. No respiratory distress. Breath sounds: Normal breath sounds. No wheezing. Abdominal:      General: Bowel sounds are normal. There is no distension. Palpations: Abdomen is soft. Tenderness: There is no abdominal tenderness. There is no rebound. Musculoskeletal:         General: No tenderness. Normal range of motion. Cervical back: Neck supple. Skin:     General: Skin is warm and dry. Findings: No erythema or rash. Neurological:      Mental Status: She is alert and oriented to person, place, and time. Sensory: Sensory deficit (intermittent right arm and right leg,.  comes and goes. ) present. Motor: No weakness. Psychiatric:         Behavior: Behavior normal.         Thought Content: Thought content normal.         Judgment: Judgment normal.       /68 (Site: Right Upper Arm, Position: Sitting, Cuff Size: Large Adult)   Pulse 60   Ht 5' 5\" (1.651 m)   Wt 162 lb (73.5 kg)   LMP  (LMP Unknown)   SpO2 92%   BMI 26.96 kg/m²     Assessment:      Encounter Diagnosis   Name Primary?  Neuropathy Yes     Cryptic/painful paresthesias involving right arm , hand, and wrist.  Hard to narrow down a source. Intermittent, hits her like a shock or spasm. Entire arm and wrist.  Sometimes involves numbness /pain of right leg as well. Hard to narrow down a pattern that makes sense neurologically. Currently glove distribution and the wrist most consistently. Opting to try and increase gabapentin further. She perceives some benefit. Increasing to 400mg tid. Can consult neurology if progressing. Plan:      As above.     Wilfrido Connors MD

## 2022-05-04 ENCOUNTER — TELEPHONE (OUTPATIENT)
Dept: OTOLARYNGOLOGY | Age: 77
End: 2022-05-04

## 2022-05-18 ENCOUNTER — TELEPHONE (OUTPATIENT)
Dept: FAMILY MEDICINE CLINIC | Age: 77
End: 2022-05-18
Payer: MEDICARE

## 2022-05-18 DIAGNOSIS — H90.3 SENSORINEURAL HEARING LOSS (SNHL) OF BOTH EARS: ICD-10-CM

## 2022-05-18 DIAGNOSIS — K30 DELAYED GASTRIC EMPTYING: ICD-10-CM

## 2022-05-18 DIAGNOSIS — I10 ESSENTIAL HYPERTENSION: Primary | ICD-10-CM

## 2022-05-18 DIAGNOSIS — Z51.89 ENCOUNTER FOR VOCATIONAL THERAPY: ICD-10-CM

## 2022-05-18 DIAGNOSIS — E03.9 HYPOTHYROIDISM, UNSPECIFIED TYPE: ICD-10-CM

## 2022-05-18 DIAGNOSIS — M43.10 ACQUIRED SPONDYLOLISTHESIS: ICD-10-CM

## 2022-05-18 DIAGNOSIS — H02.889 MEIBOMIAN GLAND DYSFUNCTION (MGD): ICD-10-CM

## 2022-05-18 DIAGNOSIS — E11.9 TYPE 2 DIABETES MELLITUS WITHOUT COMPLICATION, WITHOUT LONG-TERM CURRENT USE OF INSULIN (HCC): ICD-10-CM

## 2022-05-18 DIAGNOSIS — E78.00 HIGH CHOLESTEROL: ICD-10-CM

## 2022-05-18 DIAGNOSIS — K21.9 GASTROESOPHAGEAL REFLUX DISEASE, UNSPECIFIED WHETHER ESOPHAGITIS PRESENT: ICD-10-CM

## 2022-05-18 DIAGNOSIS — M15.9 GENERALIZED OSTEOARTHROSIS, INVOLVING MULTIPLE SITES: ICD-10-CM

## 2022-05-18 DIAGNOSIS — K85.90 ACUTE PANCREATITIS, UNSPECIFIED COMPLICATION STATUS, UNSPECIFIED PANCREATITIS TYPE: ICD-10-CM

## 2022-05-18 PROCEDURE — G0179 MD RECERTIFICATION HHA PT: HCPCS | Performed by: FAMILY MEDICINE

## 2022-05-18 NOTE — TELEPHONE ENCOUNTER
Home health plan of care reviewed and certification completed on 05/18/22 on patient for service dates 05/15/22-07/13/22. Medications reviewed and verified. Physician time spent on activities to coordinate services, documenting medical decision making, reviewing of reports, treatment plans and test results is 20 minutes.

## 2022-06-08 ENCOUNTER — OFFICE VISIT (OUTPATIENT)
Dept: OTOLARYNGOLOGY | Age: 77
End: 2022-06-08
Payer: MEDICARE

## 2022-06-08 VITALS
HEIGHT: 65 IN | BODY MASS INDEX: 26.99 KG/M2 | RESPIRATION RATE: 18 BRPM | WEIGHT: 162 LBS | DIASTOLIC BLOOD PRESSURE: 74 MMHG | SYSTOLIC BLOOD PRESSURE: 122 MMHG | OXYGEN SATURATION: 98 % | HEART RATE: 55 BPM

## 2022-06-08 DIAGNOSIS — J34.2 DEVIATED SEPTUM: Primary | ICD-10-CM

## 2022-06-08 DIAGNOSIS — R09.81 NASAL CONGESTION: ICD-10-CM

## 2022-06-08 PROCEDURE — 99213 OFFICE O/P EST LOW 20 MIN: CPT

## 2022-06-08 PROCEDURE — G8399 PT W/DXA RESULTS DOCUMENT: HCPCS | Performed by: OTOLARYNGOLOGY

## 2022-06-08 PROCEDURE — 1123F ACP DISCUSS/DSCN MKR DOCD: CPT | Performed by: OTOLARYNGOLOGY

## 2022-06-08 PROCEDURE — G8417 CALC BMI ABV UP PARAM F/U: HCPCS | Performed by: OTOLARYNGOLOGY

## 2022-06-08 PROCEDURE — 1090F PRES/ABSN URINE INCON ASSESS: CPT | Performed by: OTOLARYNGOLOGY

## 2022-06-08 PROCEDURE — 1036F TOBACCO NON-USER: CPT | Performed by: OTOLARYNGOLOGY

## 2022-06-08 PROCEDURE — 99213 OFFICE O/P EST LOW 20 MIN: CPT | Performed by: OTOLARYNGOLOGY

## 2022-06-08 PROCEDURE — G8427 DOCREV CUR MEDS BY ELIG CLIN: HCPCS | Performed by: OTOLARYNGOLOGY

## 2022-06-08 RX ORDER — FEXOFENADINE HCL 180 MG/1
180 TABLET ORAL DAILY
Qty: 30 TABLET | Refills: 0 | Status: SHIPPED | OUTPATIENT
Start: 2022-06-08 | End: 2022-07-08

## 2022-06-08 ASSESSMENT — ENCOUNTER SYMPTOMS: SINUS COMPLAINT: 1

## 2022-06-08 NOTE — PROGRESS NOTES
2022 1:27 PM MARCELLE Cruz (:  1945) is a 64 y.o. female,New patient, here for evaluation of the following chief complaint(s):  Sinus Problem (1 mo fu Flonase and cont. astelin)      ASSESSMENT/PLAN:  1. Deviated septum    2. Nasal congestion      1. Deviated septum  2. Nasal congestion    Continue Astelin nasal spray twice daily  Continue Flonase nasal spray twice daily  Restart Allegra  Consider restarting Singulair  Right deviated septum, we discussed that nasal sprays will only do so much to improve the congestion when she has structural abnormalities like this significantly restricted airflow on the right side. Only surgery will adequately address this but she has no interest in pursuing surgery. Follow-up in 3 months  Will defer nonsurgical management back to primary care    No follow-ups on file. SUBJECTIVE/OBJECTIVE:  HPI   68yo woman with a many year history of nasal congestion. She is established with an allergist, Pratik Goyal, who diagnosed her with allergies to cat, mold, pollen, possible dog. She does have a dog. She also has multiple house plants inside that she has been told she should switch to synthetic plants. She endorses a nasal trauma in childhood when she fell off monkey bars and hit her nose and suffered a nasal bone fracture. She has been on Claritin and Flonase in the past but was taken off of these medications. She states she is also tried a nasal saline irrigation but was not able to use it well. She has been on Astelin nasal spray, twice daily, for about a year with some improvement in her congestion. CT sinus 7/3/2019:   SINUSES: The visualized paranasal sinuses and mastoid air cells demonstrate   no acute abnormality. Today she follows up about 1 month later. Overall she is doing well. She is using the Flonase and the Astelin. She is sometimes using the nasal saline spray when she is feeling a bit dry.   When she runs the fan and the air conditioner her nasal congestion and rhinitis are worse. Past Medical History:   Diagnosis Date    Allergic conjunctivitis     (stable)    Chronic sinusitis     Constipation     Dizzy spells     Dry eye syndrome     Edentulous     (does not wear dentures)    Essential hypertension 7/6/2016    Fracture of lateral malleolus     (avulsion fracture at the tip of the lateral malleolus - right ankle).  Hearing loss     Hypothyroidism     Interstitial cystitis     Irritable bowel syndrome     Keratitis     (secondary to dry eye syndrome)    Parotid gland enlargement     stasis, consider sjorgens    Pseudophakos     (bilateral)    Seasonal rhinitis     Stress incontinence     Type II or unspecified type diabetes mellitus without mention of complication, not stated as uncontrolled     Urethral stenosis     Xerostomia      Past Surgical History:   Procedure Laterality Date    APPENDECTOMY  07/24/1986    CATARACT REMOVAL WITH IMPLANT Right 09/02/2003    (Dr. Beto Jones)    CATARACT REMOVAL WITH IMPLANT Left 08/12/2003    (Dr. Beto Jones)    CHOLECYSTECTOMY  07/24/1986    COLONOSCOPY  09/08/2014    normal    CYSTOURETHROSCOPY/URETHRAL DILATION  02/08/2012    multiple    EYE SURGERY Bilateral 12/13/2018    Bilateral Transscleral cyclophotocoagulation G6 laser (micropulse) performed by Angelina Pyle MD at [address]  06/02/1977    (Dr. Radha Castro)    UPPER GASTROINTESTINAL ENDOSCOPY  06/04/2015    gastritis, biopsy x 2    UPPER GASTROINTESTINAL ENDOSCOPY  04/02/2018    Dr. Bryanna Phillips   antral gastric ulcer. esophagus re-dilated at the end of procedure.  UPPER GASTROINTESTINAL ENDOSCOPY  07/28/2021    [facility] EGD Dr. Chua Pea History     Tobacco History     Smoking Status  Never Smoker    Smokeless Tobacco Use  Never Used    Tobacco Comment  Never smoked. Monroe Diaz Rcp, 7/14/2016.           Alcohol History     Alcohol Use Status  No          Drug Use     Drug Use Status  No          Sexual Activity     Sexually Active  Not Asked              Family History   Problem Relation Age of Onset    Heart Disease Mother     Heart Attack Mother     Arthritis Mother     Other Maternal Grandmother         (gout)    Allergies Daughter     Allergies Daughter     Other Brother         ( at age 21 secondary to auto accident).  Cataracts Neg Hx     Diabetes Neg Hx     Glaucoma Neg Hx      Current Outpatient Medications   Medication Instructions    azelastine (ASTELIN) 0.1 % nasal spray 2 sprays, Nasal, 2 TIMES DAILY, Use in each nostril as directed    blood glucose monitor strips Test one  time a day & as needed for symptoms of irregular blood glucose. Dispense sufficient amount for indicated testing frequency plus additional to accommodate PRN testing needs.  blood glucose test strips (ONE TOUCH ULTRA TEST) strip use 1 TEST STRIP to TEST BLOOD SUGAR twice a day    diclofenac sodium (VOLTAREN) 2 g, Topical, 4 TIMES DAILY    fexofenadine (ALLEGRA) 180 mg, Oral, DAILY    fluticasone (FLONASE) 50 MCG/ACT nasal spray 2 sprays, Each Nostril, DAILY    gabapentin (NEURONTIN) 400 mg, Oral, 3 TIMES DAILY    glucose monitoring (FREESTYLE FREEDOM) kit 1 kit, Does not apply, 2 TIMES DAILY    Lancets MISC 1 each, Does not apply, DAILY    levothyroxine (SYNTHROID) 100 MCG tablet 1 po daily    loratadine (CLARITIN) 10 MG tablet take 1 tablet by mouth once daily    omeprazole (PRILOSEC) 20 MG delayed release capsule 1 po BID    polyethyl glycol-propyl glycol 0.4-0.3 % (SYSTANE) 0.4-0.3 % ophthalmic solution 1 drop, Both Eyes, PRN    SOFT TOUCH LANCETS MISC 1 Device, Does not apply, 2 TIMES DAILY    timolol (TIMOPTIC) 0.5 % ophthalmic solution No dose, route, or frequency recorded.      Allergies   Allergen Reactions    Latex Rash    Aleve [Naproxen]      Stomach problems    Amoxicillin      Other reaction(s): mucositis    Aspirin Other (See Comments)     Dizziness and tears her stomach up    Dye [Iodides]      IV DYE    Influenza Vaccines     Reglan [Metoclopramide] Other (See Comments)     Dystonic reaction, involuntary movements       ENT ROS: positive for - nasal congestion    General: The patient is found to be alert and normally responsive female. Hearing: grossly normal, wears hearing aids  Voice: Clear   Skin: The skin has normal colour and turgor. Face: The facial contour is symmetric at rest and with movement. Ears: The pinnae have normal contours. Eye: The ocular movements are full and symmetric, the conjunctiva is unremarkable; sclera are anicteric, pupillary response is symmetric. No nystagmus is found. Nose:   The external nasal contour is normal  The nasal mucosa has a normal appearance, some excess nasal mucus  The nasal septum is deviated to the right. The turbinates have a normal appearance  The nares are patent without evidence of polyposis   Oral cavity:   Edentulous, mathew palatini, the oral mucosa is without lesions;  the tongue is symmetric with full mobility and is without fasciculation. The soft palate is symmetric. The oropharynx is unremarkable. Neck: The neck has a normal contour; no masses are found on palpation    Previous:  Fiberoptic examination of the upper airway using scope was conducted after first applying topical phenylephrine (1%) and lidocaine (4%) to the bilateral nasal cavity by spray. The endoscope was inserted and advanced through the bilateral side of the nose examining the mucosa and nasal structures. Right:  Inferior turbinate normal, unable to visualize middle meatus due to deviated septum, no polyps or purulence, excess mucus  Left:  Inferior turbinate normal, middle meatus clear, no polyps or purulence, excess mucus  Nasopharynx clear, ET patent, adenoid small. Septum deviated to the right. An electronic signature was used to authenticate this note.     --Abdirashid Macias MD     6/9/2022 1:27 PM EST

## 2022-06-23 ENCOUNTER — OFFICE VISIT (OUTPATIENT)
Dept: PODIATRY | Age: 77
End: 2022-06-23
Payer: MEDICARE

## 2022-06-23 ENCOUNTER — TELEPHONE (OUTPATIENT)
Dept: OPTOMETRY | Age: 77
End: 2022-06-23

## 2022-06-23 VITALS
HEIGHT: 65 IN | WEIGHT: 159 LBS | BODY MASS INDEX: 26.49 KG/M2 | DIASTOLIC BLOOD PRESSURE: 80 MMHG | SYSTOLIC BLOOD PRESSURE: 122 MMHG | HEART RATE: 67 BPM

## 2022-06-23 DIAGNOSIS — E11.49 DM (DIABETES MELLITUS), TYPE 2 WITH NEUROLOGICAL COMPLICATIONS (HCC): Primary | ICD-10-CM

## 2022-06-23 DIAGNOSIS — B35.1 DERMATOPHYTOSIS OF NAIL: ICD-10-CM

## 2022-06-23 PROCEDURE — 99999 PR OFFICE/OUTPT VISIT,PROCEDURE ONLY: CPT | Performed by: PODIATRIST

## 2022-06-23 PROCEDURE — 11720 DEBRIDE NAIL 1-5: CPT | Performed by: PODIATRIST

## 2022-06-23 NOTE — PROGRESS NOTES
Subjective:  Kristine Payne is a 64 y.o. female who presents to the office today for routine DM foot care. .  Currently denies F/C/N/V. Allergies   Allergen Reactions    Latex Rash    Aleve [Naproxen]      Stomach problems    Amoxicillin      Other reaction(s): mucositis    Aspirin Other (See Comments)     Dizziness and tears her stomach up    Dye [Iodides]      IV DYE    Influenza Vaccines     Reglan [Metoclopramide] Other (See Comments)     Dystonic reaction, involuntary movements       Past Medical History:   Diagnosis Date    Allergic conjunctivitis     (stable)    Chronic sinusitis     Constipation     Dizzy spells     Dry eye syndrome     Edentulous     (does not wear dentures)    Essential hypertension 7/6/2016    Fracture of lateral malleolus     (avulsion fracture at the tip of the lateral malleolus - right ankle).  Hearing loss     Hypothyroidism     Interstitial cystitis     Irritable bowel syndrome     Keratitis     (secondary to dry eye syndrome)    Parotid gland enlargement     stasis, consider sjorgens    Pseudophakos     (bilateral)    Seasonal rhinitis     Stress incontinence     Type II or unspecified type diabetes mellitus without mention of complication, not stated as uncontrolled     Urethral stenosis     Xerostomia        Prior to Admission medications    Medication Sig Start Date End Date Taking? Authorizing Provider   fexofenadine (ALLEGRA) 180 MG tablet Take 1 tablet by mouth daily 6/8/22 7/8/22 Yes Searcy Galeazzi, MD   gabapentin (NEURONTIN) 400 MG capsule Take 1 capsule by mouth 3 times daily for 360 days.  4/27/22 4/22/23 Yes Adelina Jacob MD   glucose monitoring (FREESTYLE FREEDOM) kit 1 kit by Does not apply route 2 times daily 4/27/22  Yes Adelina Jacob MD   azelastine (ASTELIN) 0.1 % nasal spray 2 sprays by Nasal route 2 times daily Use in each nostril as directed    Yes Historical Provider, MD   fluticasone (FLONASE) 50 MCG/ACT nasal spray 2 sprays by Each Nostril route daily 3/9/22  Yes Kennedy Wang MD   timolol (TIMOPTIC) 0.5 % ophthalmic solution  2/20/22  Yes Historical Provider, MD   omeprazole (PRILOSEC) 20 MG delayed release capsule 1 po BID 2/7/22  Yes Seng Amanda MD   levothyroxine (SYNTHROID) 100 MCG tablet 1 po daily 10/6/21  Yes Seng Amanda MD   blood glucose monitor strips Test one  time a day & as needed for symptoms of irregular blood glucose. Dispense sufficient amount for indicated testing frequency plus additional to accommodate PRN testing needs.  8/24/21  Yes Seng Amanda MD   Lancets MISC 1 each by Does not apply route daily 8/24/21  Yes Seng Amanda MD   polyethyl glycol-propyl glycol 0.4-0.3 % (SYSTANE) 0.4-0.3 % ophthalmic solution Place 1 drop into both eyes as needed for Dry Eyes 6/22/21  Yes Apolonia Lopez, GILBERTO   diclofenac sodium (VOLTAREN) 1 % GEL Apply 2 g topically 4 times daily 10/30/20  Yes TAY yAala - CNP   blood glucose test strips (ONE TOUCH ULTRA TEST) strip use 1 TEST STRIP to TEST BLOOD SUGAR twice a day 3/18/20  Yes Seng Amanda MD   loratadine (CLARITIN) 10 MG tablet take 1 tablet by mouth once daily 10/1/18  Yes Seng Amanda MD   SOFT TOUCH LANCETS MISC 1 Device by Does not apply route 2 times daily 8/16/16  Yes Seng Amanda MD       Past Surgical History:   Procedure Laterality Date    APPENDECTOMY  07/24/1986    CATARACT REMOVAL WITH IMPLANT Right 09/02/2003    (Dr. Angelina Jaramillo)    CATARACT REMOVAL WITH IMPLANT Left 08/12/2003    (Dr. Angelina Jaramillo)    CHOLECYSTECTOMY  07/24/1986    COLONOSCOPY  09/08/2014    normal    CYSTOURETHROSCOPY/URETHRAL DILATION  02/08/2012    multiple    EYE SURGERY Bilateral 12/13/2018    Bilateral Transscleral cyclophotocoagulation G6 laser (micropulse) performed by Orlando Birmingham MD at [address]  06/02/1977    (Dr. Chepe Montesinos)   [facility] ENDOSCOPY  06/04/2015    gastritis, biopsy x 2    UPPER GASTROINTESTINAL ENDOSCOPY 2018    Dr. Susannah Tenorio   antral gastric ulcer. esophagus re-dilated at the end of procedure.  UPPER GASTROINTESTINAL ENDOSCOPY  2021    [facility] EGD Dr. Tawny Bonilla History   Problem Relation Age of Onset    Heart Disease Mother     Heart Attack Mother     Arthritis Mother     Other Maternal Grandmother         (gout)    Allergies Daughter     Allergies Daughter     Other Brother         ( at age 21 secondary to auto accident).  Cataracts Neg Hx     Diabetes Neg Hx     Glaucoma Neg Hx        Social History     Tobacco Use    Smoking status: Never Smoker    Smokeless tobacco: Never Used    Tobacco comment: Never smoked. Pedro Pablo Bains Rcp, 2016. Substance Use Topics    Alcohol use: No     Alcohol/week: 0.0 standard drinks       Review of Systems: All 12 systems reviewed and pertinent positives noted above. Lower Extremity Physical Examination:     Vitals:   Vitals:    22 1534   BP: 122/80   Pulse: 67     General: AAO x 3 in NAD. Vascular: DP and PT pulses palpable 2/4, bilateral.  CFT <3 seconds, bilateral.  Hair growth present to the level of the digits, bilateral.  Edema present, bilateral.  Varicosities present, bilateral. Erythema absent, bilateral. Distal Rubor absent bilateral.  Temperature within normal limits bilateral. Hyperpigmentation present bilateral. Atrophic skin yes. Neurological: Sensation Impaired to light touch to level of digits, bilateral.  Protective sensation intact  10/10 sites via 5.07/10g [place]-Christi Monofilament, bilateral.  negative Tinel's, bilateral.  negative Valleix sign, bilateral.  Vibratory abnormal  bilateral.  Reflexes Decreased bilateral.  Paresthesias positive. Dysthesias positive.   Sharp/dull abnormal  bilateral.  Musculoskeletal: Muscle strength 5/5, bilateral.  Pain absent upon palpation bilateral. Normal medial longitudinal arch, bilateral.  Ankle ROM decresed,bilateral.  1st MPJ ROM within normal limits, bilateral.  Dorsally contracted digits absent. No other foot deformities. Integument: Warm, dry, supple, bilateral.  Open lesion absent, bilateral.  Interdigital maceration absent to web spaces bilateral.   Nails left 5 and right 1 thickened, dystrophic and crumbly, discolored with subungual debris. .  Fissures absent, bilateral. Hyperkeratotic tissue is absent. Asessment: Patient is a 64 y.o. female with:    Diagnosis Orders   1. DM (diabetes mellitus), type 2 with neurological complications ([facility] [address]     2. Dermatophytosis of nail         Plan: Patient examined and evaluated. Current condition and treatment options discussed in detail. DM foot ed and exam  All nails as mentioned above debrided in length and thickness. Patient advised OTC methods for treatment of the mycotic nails. Patient will follow up in 10 weeks. Contact office with any questions/problems/concerns.

## 2022-06-23 NOTE — PROGRESS NOTES
Foot Care Worksheet  PCP: Adelina Jacob MD  Last visit:4/27/22    Nail description:  Thick , Yellow , Crumbly , Marked limitation of ambulation     Pain resulting from thickened and dystrophy of nail plate No    Nails involved  Right   1  (T5-T9)  Left     5  (TA-T4)    Q7 1 Class A Finding - Non traumatic amputation of foot No    Q8 2 Class B Findings - Absent DP pulse No, Absent PT pulse No, Advanced tropic changes (3 required) Yes    Decrease hair growth Yes, Nail changes/thickening Yes, Pigmented changes/discoloration Yes, Skin texture (thin, shiny) No, Skin color (rubor/redness) No    Q9 1 Class B and 2 Class C Findings  Claudication No, Temperature change Yes, Paresthesia Yes, Burning No, Edema Yes

## 2022-06-23 NOTE — TELEPHONE ENCOUNTER
Patient stopped at window asking if she could take her Timolol 3 times a day instead of 2 times, she feels she needs it. Stated her eyes are blurry and having dry eyes. She is using artificial tears regularly and is still having problems. Patient has an upcoming appointment on 8/24/22, and last seen  on 2/16/22. How would you like to proceed?

## 2022-06-28 NOTE — TELEPHONE ENCOUNTER
Call patient and advise 2x per day is the maximum.   She should use preservative free artificial tears/lubricant 4x per day but not within 15 minutes of the timolol

## 2022-06-30 ENCOUNTER — OFFICE VISIT (OUTPATIENT)
Dept: FAMILY MEDICINE CLINIC | Age: 77
End: 2022-06-30
Payer: MEDICARE

## 2022-06-30 VITALS
WEIGHT: 163 LBS | HEART RATE: 68 BPM | HEIGHT: 62 IN | DIASTOLIC BLOOD PRESSURE: 72 MMHG | SYSTOLIC BLOOD PRESSURE: 124 MMHG | BODY MASS INDEX: 30 KG/M2

## 2022-06-30 DIAGNOSIS — M18.11 OSTEOARTHRITIS OF THUMB, RIGHT: Primary | ICD-10-CM

## 2022-06-30 DIAGNOSIS — R20.2 PARESTHESIA OF FINGER: ICD-10-CM

## 2022-06-30 PROCEDURE — 99213 OFFICE O/P EST LOW 20 MIN: CPT | Performed by: FAMILY MEDICINE

## 2022-06-30 PROCEDURE — G8399 PT W/DXA RESULTS DOCUMENT: HCPCS | Performed by: FAMILY MEDICINE

## 2022-06-30 PROCEDURE — G8417 CALC BMI ABV UP PARAM F/U: HCPCS | Performed by: FAMILY MEDICINE

## 2022-06-30 PROCEDURE — 1090F PRES/ABSN URINE INCON ASSESS: CPT | Performed by: FAMILY MEDICINE

## 2022-06-30 PROCEDURE — 1036F TOBACCO NON-USER: CPT | Performed by: FAMILY MEDICINE

## 2022-06-30 PROCEDURE — 1123F ACP DISCUSS/DSCN MKR DOCD: CPT | Performed by: FAMILY MEDICINE

## 2022-06-30 PROCEDURE — G8427 DOCREV CUR MEDS BY ELIG CLIN: HCPCS | Performed by: FAMILY MEDICINE

## 2022-06-30 ASSESSMENT — ENCOUNTER SYMPTOMS
RESPIRATORY NEGATIVE: 1
EYES NEGATIVE: 1
ALLERGIC/IMMUNOLOGIC NEGATIVE: 1
GASTROINTESTINAL NEGATIVE: 1

## 2022-06-30 NOTE — TELEPHONE ENCOUNTER
Called patient to go over  advisement. No answer. Left message for patient to call our office with any questions or concerns.

## 2022-06-30 NOTE — PROGRESS NOTES
Subjective:      Patient ID: Liberty Pisano is a 64 y.o. female. HPI  Acute visit for right hand pain. Doing some sewing, also tapping her phone with games she plays. More painful thumb on the right, mostly cmc and mcp joints. aspercream seems to help but not relieving it. Has tried a brace with thumb spica by description, seemed to make it worse. With sharp/severe pain , she manipulates the thumb, rubbing and patting it that seems to help with the pain and numbness. She describes numbness in fingers 2-5, coming and going, painful at times. Gabapentin doesn't seem to be helping at present with the numbness/pain in the fingers. Past Medical History:   Diagnosis Date    Allergic conjunctivitis     (stable)    Chronic sinusitis     Constipation     Dizzy spells     Dry eye syndrome     Edentulous     (does not wear dentures)    Essential hypertension 7/6/2016    Fracture of lateral malleolus     (avulsion fracture at the tip of the lateral malleolus - right ankle).     Hearing loss     Hypothyroidism     Interstitial cystitis     Irritable bowel syndrome     Keratitis     (secondary to dry eye syndrome)    Parotid gland enlargement     stasis, consider sjorgens    Pseudophakos     (bilateral)    Seasonal rhinitis     Stress incontinence     Type II or unspecified type diabetes mellitus without mention of complication, not stated as uncontrolled     Urethral stenosis     Xerostomia      Past Surgical History:   Procedure Laterality Date    APPENDECTOMY  07/24/1986    CATARACT REMOVAL WITH IMPLANT Right 09/02/2003    (Dr. Mathew Frey)    CATARACT REMOVAL WITH IMPLANT Left 08/12/2003    (Dr. Mathew Frey)    CHOLECYSTECTOMY  07/24/1986    COLONOSCOPY  09/08/2014    normal    CYSTOURETHROSCOPY/URETHRAL DILATION  02/08/2012    multiple    EYE SURGERY Bilateral 12/13/2018    Bilateral Transscleral cyclophotocoagulation G6 laser (micropulse) performed by Nelly Trent MD at [address] [place] [address]  TUBAL LIGATION  06/02/1977    (Dr. Severo Treadwell)    UPPER GASTROINTESTINAL ENDOSCOPY  06/04/2015    gastritis, biopsy x 2    UPPER GASTROINTESTINAL ENDOSCOPY  04/02/2018    Dr. Vilma Melendez   antral gastric ulcer. esophagus re-dilated at the end of procedure.  UPPER GASTROINTESTINAL ENDOSCOPY  07/28/2021    [facility] EGD Dr. Rolly Georges     Current Outpatient Medications   Medication Sig Dispense Refill    fexofenadine (ALLEGRA) 180 MG tablet Take 1 tablet by mouth daily 30 tablet 0    azelastine (ASTELIN) 0.1 % nasal spray 2 sprays by Nasal route 2 times daily Use in each nostril as directed       fluticasone (FLONASE) 50 MCG/ACT nasal spray 2 sprays by Each Nostril route daily 48 g 5    timolol (TIMOPTIC) 0.5 % ophthalmic solution       omeprazole (PRILOSEC) 20 MG delayed release capsule 1 po  capsule 1    levothyroxine (SYNTHROID) 100 MCG tablet 1 po daily 90 tablet 3    blood glucose monitor strips Test one  time a day & as needed for symptoms of irregular blood glucose. Dispense sufficient amount for indicated testing frequency plus additional to accommodate PRN testing needs. 100 strip 3    polyethyl glycol-propyl glycol 0.4-0.3 % (SYSTANE) 0.4-0.3 % ophthalmic solution Place 1 drop into both eyes as needed for Dry Eyes 1 Bottle 5    diclofenac sodium (VOLTAREN) 1 % GEL Apply 2 g topically 4 times daily 5 Tube 5    blood glucose test strips (ONE TOUCH ULTRA TEST) strip use 1 TEST STRIP to TEST BLOOD SUGAR twice a day 100 strip 3    loratadine (CLARITIN) 10 MG tablet take 1 tablet by mouth once daily 30 tablet 11    SOFT TOUCH LANCETS MISC 1 Device by Does not apply route 2 times daily 200 each 3    gabapentin (NEURONTIN) 400 MG capsule Take 1 capsule by mouth 3 times daily for 360 days.  270 capsule 3    glucose monitoring (FREESTYLE FREEDOM) kit 1 kit by Does not apply route 2 times daily 1 kit 0    Lancets MISC 1 each by Does not apply route daily 100 each 5     No current facility-administered medications for this visit. Allergies   Allergen Reactions    Latex Rash    Aleve [Naproxen]      Stomach problems    Amoxicillin      Other reaction(s): mucositis    Aspirin Other (See Comments)     Dizziness and tears her stomach up    Dye [Iodides]      IV DYE    Influenza Vaccines     Reglan [Metoclopramide] Other (See Comments)     Dystonic reaction, involuntary movements         Review of Systems   Constitutional: Negative. HENT: Negative. Eyes: Negative. Respiratory: Negative. Cardiovascular: Negative. Gastrointestinal: Negative. Endocrine: Negative. Genitourinary: Negative. Musculoskeletal: Positive for arthralgias (right thumb). Skin: Negative. Allergic/Immunologic: Negative. Neurological: Positive for numbness (fingers 2-5 off and on). Hematological: Negative. Psychiatric/Behavioral: Negative. Objective:   Physical Exam  HENT:      Head: Normocephalic. Cardiovascular:      Rate and Rhythm: Normal rate. Pulses: Normal pulses. Pulmonary:      Effort: Pulmonary effort is normal.   Musculoskeletal:      Right hand: Tenderness and bony tenderness (thumb cmc, mcp joints.  ) present. No swelling or deformity. Decreased range of motion. Decreased sensation. Cervical back: Normal range of motion. Comments: Numbness described in atypical distribution of fingers 2--5. Skin:     General: Skin is warm. Neurological:      General: No focal deficit present. Mental Status: She is alert. Psychiatric:         Mood and Affect: Mood normal.         Thought Content: Thought content normal.       /72 (Site: Right Upper Arm, Position: Sitting, Cuff Size: Large Adult)   Pulse 68   Ht 5' 2\" (1.575 m)   Wt 163 lb (73.9 kg)   LMP  (LMP Unknown)   BMI 29.81 kg/m²     Assessment:      Encounter Diagnoses   Name Primary?     Osteoarthritis of thumb, right Yes    Paresthesia of finger            Plan:      Mixture of arthritis in the thumb and CTS symptoms of intermittent numbness. Becoming more painful and more persistent. Work related issues in the past, eventually leading to her halfway. Plan ortho consult.            Medina Milligan MD

## 2022-07-20 DIAGNOSIS — M79.644 PAIN OF FINGER OF RIGHT HAND: Primary | ICD-10-CM

## 2022-07-21 ENCOUNTER — TELEPHONE (OUTPATIENT)
Dept: FAMILY MEDICINE CLINIC | Age: 77
End: 2022-07-21
Payer: MEDICARE

## 2022-07-21 DIAGNOSIS — K21.9 GASTROESOPHAGEAL REFLUX DISEASE, UNSPECIFIED WHETHER ESOPHAGITIS PRESENT: ICD-10-CM

## 2022-07-21 DIAGNOSIS — Z51.89 ENCOUNTER FOR VOCATIONAL THERAPY: ICD-10-CM

## 2022-07-21 DIAGNOSIS — M15.9 GENERALIZED OSTEOARTHROSIS, INVOLVING MULTIPLE SITES: ICD-10-CM

## 2022-07-21 DIAGNOSIS — E11.9 TYPE 2 DIABETES MELLITUS WITHOUT COMPLICATION, UNSPECIFIED WHETHER LONG TERM INSULIN USE (HCC): ICD-10-CM

## 2022-07-21 DIAGNOSIS — H90.5 CENTRAL HEARING LOSS: ICD-10-CM

## 2022-07-21 DIAGNOSIS — I10 ESSENTIAL HYPERTENSION: Primary | ICD-10-CM

## 2022-07-21 PROCEDURE — G0179 MD RECERTIFICATION HHA PT: HCPCS | Performed by: FAMILY MEDICINE

## 2022-07-21 NOTE — TELEPHONE ENCOUNTER
Home health plan of care reviewed and certification completed on 07/21/22 on patient for service dates 07/14/22-09/11/22. Medications reviewed and verified.   Physician time spent on activities to coordinate services, documenting medical decision making, reviewing of reports, treatment plans and test results is 20 minutes

## 2022-07-27 ENCOUNTER — HOSPITAL ENCOUNTER (OUTPATIENT)
Dept: GENERAL RADIOLOGY | Age: 77
Discharge: HOME | End: 2022-07-29
Payer: MEDICARE

## 2022-07-27 ENCOUNTER — OFFICE VISIT (OUTPATIENT)
Dept: ORTHOPEDIC SURGERY | Age: 77
End: 2022-07-27
Payer: MEDICARE

## 2022-07-27 VITALS
WEIGHT: 163 LBS | OXYGEN SATURATION: 92 % | BODY MASS INDEX: 30 KG/M2 | SYSTOLIC BLOOD PRESSURE: 118 MMHG | HEIGHT: 62 IN | HEART RATE: 63 BPM | DIASTOLIC BLOOD PRESSURE: 66 MMHG

## 2022-07-27 DIAGNOSIS — M79.644 PAIN OF FINGER OF RIGHT HAND: Primary | ICD-10-CM

## 2022-07-27 DIAGNOSIS — M79.644 PAIN OF FINGER OF RIGHT HAND: ICD-10-CM

## 2022-07-27 DIAGNOSIS — M65.311 TRIGGER FINGER OF RIGHT THUMB: ICD-10-CM

## 2022-07-27 PROCEDURE — G8399 PT W/DXA RESULTS DOCUMENT: HCPCS | Performed by: ORTHOPAEDIC SURGERY

## 2022-07-27 PROCEDURE — 20550 NJX 1 TENDON SHEATH/LIGAMENT: CPT | Performed by: ORTHOPAEDIC SURGERY

## 2022-07-27 PROCEDURE — 99203 OFFICE O/P NEW LOW 30 MIN: CPT | Performed by: ORTHOPAEDIC SURGERY

## 2022-07-27 PROCEDURE — 1090F PRES/ABSN URINE INCON ASSESS: CPT | Performed by: ORTHOPAEDIC SURGERY

## 2022-07-27 PROCEDURE — 20600 DRAIN/INJ JOINT/BURSA W/O US: CPT | Performed by: ORTHOPAEDIC SURGERY

## 2022-07-27 PROCEDURE — G8427 DOCREV CUR MEDS BY ELIG CLIN: HCPCS | Performed by: ORTHOPAEDIC SURGERY

## 2022-07-27 PROCEDURE — 73130 X-RAY EXAM OF HAND: CPT

## 2022-07-27 PROCEDURE — G8417 CALC BMI ABV UP PARAM F/U: HCPCS | Performed by: ORTHOPAEDIC SURGERY

## 2022-07-27 PROCEDURE — 99214 OFFICE O/P EST MOD 30 MIN: CPT | Performed by: ORTHOPAEDIC SURGERY

## 2022-07-27 PROCEDURE — 1123F ACP DISCUSS/DSCN MKR DOCD: CPT | Performed by: ORTHOPAEDIC SURGERY

## 2022-07-27 PROCEDURE — 1036F TOBACCO NON-USER: CPT | Performed by: ORTHOPAEDIC SURGERY

## 2022-07-28 PROCEDURE — PBSHW PBB SHADOW CHARGE: Performed by: ORTHOPAEDIC SURGERY

## 2022-07-28 RX ORDER — TRIAMCINOLONE ACETONIDE 40 MG/ML
40 INJECTION, SUSPENSION INTRA-ARTICULAR; INTRAMUSCULAR ONCE
Status: COMPLETED | OUTPATIENT
Start: 2022-07-28 | End: 2022-07-28

## 2022-07-28 RX ORDER — LIDOCAINE HYDROCHLORIDE 10 MG/ML
1 INJECTION, SOLUTION INFILTRATION; PERINEURAL ONCE
Status: COMPLETED | OUTPATIENT
Start: 2022-07-28 | End: 2022-07-28

## 2022-07-28 RX ADMIN — LIDOCAINE HYDROCHLORIDE 1 ML: 10 INJECTION, SOLUTION INFILTRATION; PERINEURAL at 14:34

## 2022-07-28 RX ADMIN — TRIAMCINOLONE ACETONIDE 40 MG: 200 INJECTION, SUSPENSION INTRA-ARTICULAR; INTRAMUSCULAR at 14:36

## 2022-07-28 NOTE — PROGRESS NOTES
History and Physical    Patient's Name/Date of Birth: Giacomo Thorpe / 1945, 64 y.o. yo    Date: July 28, 2022     PCP: Ari Romero MD     History of Present Illness: This 68-year-old right-hand-dominant female presents with a 2-month history of pain localized to her right thumb MP joint with no known history of injury or trauma. Patient reports that she has had pain to her thumb region with associated locking. She described the pain at a level of 10. On review she also notes that she has had numbness to all of her fingers over the last 2 months with associated nocturnal symptoms. Pertinent to her past medical history is that she does not have any known history of diabetes. She has used a brace in the past which does not provide her with any relief. Family history negative    Past Medical History:   Diagnosis Date    Allergic conjunctivitis     (stable)    Chronic sinusitis     Constipation     Dizzy spells     Dry eye syndrome     Edentulous     (does not wear dentures)    Essential hypertension 7/6/2016    Fracture of lateral malleolus     (avulsion fracture at the tip of the lateral malleolus - right ankle).     Hearing loss     Hypothyroidism     Interstitial cystitis     Irritable bowel syndrome     Keratitis     (secondary to dry eye syndrome)    Parotid gland enlargement     stasis, consider sjorgens    Pseudophakos     (bilateral)    Seasonal rhinitis     Stress incontinence     Type II or unspecified type diabetes mellitus without mention of complication, not stated as uncontrolled     Urethral stenosis     Xerostomia       Past Surgical History:   Procedure Laterality Date    APPENDECTOMY  07/24/1986    CATARACT REMOVAL WITH IMPLANT Right 09/02/2003    (Dr. Deisy Giron)    CATARACT REMOVAL WITH IMPLANT Left 08/12/2003    (Dr. Deisy Giron)    CHOLECYSTECTOMY  07/24/1986    COLONOSCOPY  09/08/2014    normal    CYSTOURETHROSCOPY/URETHRAL DILATION  02/08/2012    multiple    EYE SURGERY Bilateral 12/13/2018    Bilateral Transscleral cyclophotocoagulation G6 laser (micropulse) performed by Mihir Catalan MD at [facility]  06/02/1977    (Dr. Jessica Valderrama)    UPPER GASTROINTESTINAL ENDOSCOPY  06/04/2015    gastritis, biopsy x 2    UPPER GASTROINTESTINAL ENDOSCOPY  04/02/2018    Dr. Edinson Pineda   antral gastric ulcer. esophagus re-dilated at the end of procedure. UPPER GASTROINTESTINAL ENDOSCOPY  07/28/2021    [facility] EGD Dr. Duncan Caraballo: Latex, Aleve [naproxen], Amoxicillin, Aspirin, Dye [iodides], Influenza vaccines, and Reglan [metoclopramide]     Current Outpatient Medications   Medication Sig Dispense Refill    gabapentin (NEURONTIN) 400 MG capsule Take 1 capsule by mouth 3 times daily for 360 days. 270 capsule 3    glucose monitoring (FREESTYLE FREEDOM) kit 1 kit by Does not apply route 2 times daily 1 kit 0    azelastine (ASTELIN) 0.1 % nasal spray 2 sprays by Nasal route 2 times daily Use in each nostril as directed       fluticasone (FLONASE) 50 MCG/ACT nasal spray 2 sprays by Each Nostril route daily 48 g 5    timolol (TIMOPTIC) 0.5 % ophthalmic solution       omeprazole (PRILOSEC) 20 MG delayed release capsule 1 po  capsule 1    levothyroxine (SYNTHROID) 100 MCG tablet 1 po daily 90 tablet 3    blood glucose monitor strips Test one  time a day & as needed for symptoms of irregular blood glucose. Dispense sufficient amount for indicated testing frequency plus additional to accommodate PRN testing needs.  100 strip 3    Lancets MISC 1 each by Does not apply route daily 100 each 5    polyethyl glycol-propyl glycol 0.4-0.3 % (SYSTANE) 0.4-0.3 % ophthalmic solution Place 1 drop into both eyes as needed for Dry Eyes 1 Bottle 5    blood glucose test strips (ONE TOUCH ULTRA TEST) strip use 1 TEST STRIP to TEST BLOOD SUGAR twice a day 100 strip 3    SOFT TOUCH LANCETS MISC 1 Device by Does not apply route 2 times daily 200 each 3    diclofenac sodium (VOLTAREN) 1 % GEL Apply 2 g topically 4 times daily (Patient not taking: Reported on 7/27/2022) 5 Tube 5    loratadine (CLARITIN) 10 MG tablet take 1 tablet by mouth once daily (Patient not taking: Reported on 7/27/2022) 30 tablet 11     Current Facility-Administered Medications   Medication Dose Route Frequency Provider Last Rate Last Admin    lidocaine 1 % injection 1 mL  1 mL IntraDERmal Once Sherryle Look, MD        triamcinolone acetonide (KENALOG-40) injection 40 mg  40 mg Intra-artICUlar Once Sherryle Look, MD           Social History     Tobacco Use    Smoking status: Never    Smokeless tobacco: Never    Tobacco comments:     Never smoked. [facility], 7/14/2016. Substance Use Topics    Alcohol use: No     Alcohol/week: 0.0 standard drinks        Review of Systems:  Negative  Other than stated above in the HPI is negative      Physical exam:     General appearance: no acute distress  Head: NAD  Neck: supple  Lungs: No audible wheezing, respiratory rate normal  Heart: Perfusion to all extremeties  Extremities: Examination of her right hand revealed no swelling or atrophy. She was able to make a complete fist.  She was tender to A1 pulley of the right thumb and there were signs of mechanical locking with passive flexion of the digit. She had decreased sensation to the median nerve distribution and she did demonstrate a positive thumb compression test at the carpal tunnel level. Patient had radiographs taken which were reviewed which did not demonstrate any acute or chronic pathology. Assessment:  Patient presents with sign supportive of a right trigger thumb and right carpal tunnel syndrome. Plan:  Treatment options were reviewed with the patient she wished to undergo a trial of an injection to the right thumb tendon sheath. She wished not to proceed with surgical treatment for the right carpal tunnel at this present time.     Procedure: Under sterile technique a solution of 1 cc Xylocaine 1% / 1 cc Kenalog 10 mg/ML was injected to the tendon sheath of the right thumb.     Patient will be reassessed again in 6 weeks to review her functional status      Procedures    TN ARTHROCENTESIS ASPIR&/INJ SMALL JT/BURSA W/O [address] [place] [address] MD Valorie,MD 7/28/2022 at 1:19 PM

## 2022-08-03 ENCOUNTER — HOSPITAL ENCOUNTER (OUTPATIENT)
Dept: LAB | Age: 77
Discharge: HOME | End: 2022-08-03
Payer: MEDICARE

## 2022-08-03 ENCOUNTER — OFFICE VISIT (OUTPATIENT)
Dept: OPTOMETRY | Age: 77
End: 2022-08-03
Payer: MEDICARE

## 2022-08-03 VITALS — SYSTOLIC BLOOD PRESSURE: 126 MMHG | DIASTOLIC BLOOD PRESSURE: 69 MMHG

## 2022-08-03 DIAGNOSIS — I10 ESSENTIAL HYPERTENSION: ICD-10-CM

## 2022-08-03 DIAGNOSIS — H02.889 MEIBOMIAN GLAND DYSFUNCTION (MGD): ICD-10-CM

## 2022-08-03 DIAGNOSIS — H16.121 FILAMENTARY KERATITIS OF RIGHT EYE: ICD-10-CM

## 2022-08-03 DIAGNOSIS — H40.1132 PRIMARY OPEN ANGLE GLAUCOMA (POAG) OF BOTH EYES, MODERATE STAGE: Primary | ICD-10-CM

## 2022-08-03 DIAGNOSIS — E11.9 TYPE 2 DIABETES MELLITUS WITHOUT COMPLICATION, WITHOUT LONG-TERM CURRENT USE OF INSULIN (HCC): ICD-10-CM

## 2022-08-03 DIAGNOSIS — I10 ESSENTIAL HYPERTENSION, MALIGNANT: ICD-10-CM

## 2022-08-03 DIAGNOSIS — E03.9 HYPOTHYROIDISM, UNSPECIFIED TYPE: ICD-10-CM

## 2022-08-03 DIAGNOSIS — H04.123 DRY EYE SYNDROME OF BOTH EYES: ICD-10-CM

## 2022-08-03 DIAGNOSIS — E11.65 UNCONTROLLED TYPE 2 DIABETES MELLITUS WITH HYPERGLYCEMIA (HCC): ICD-10-CM

## 2022-08-03 DIAGNOSIS — E78.00 HIGH CHOLESTEROL: ICD-10-CM

## 2022-08-03 LAB
ABSOLUTE EOS #: 0.15 K/UL (ref 0–0.44)
ABSOLUTE IMMATURE GRANULOCYTE: 0.04 K/UL (ref 0–0.3)
ABSOLUTE LYMPH #: 2.59 K/UL (ref 1.1–3.7)
ABSOLUTE MONO #: 0.71 K/UL (ref 0.1–1.2)
ALBUMIN SERPL-MCNC: 4.4 G/DL (ref 3.5–5.2)
ALBUMIN/GLOBULIN RATIO: 1.4 (ref 1–2.5)
ALP BLD-CCNC: 84 U/L (ref 35–104)
ALT SERPL-CCNC: 13 U/L (ref 5–33)
ANION GAP SERPL CALCULATED.3IONS-SCNC: 9 MMOL/L (ref 9–17)
AST SERPL-CCNC: 21 U/L
BASOPHILS # BLD: 1 % (ref 0–2)
BASOPHILS ABSOLUTE: 0.06 K/UL (ref 0–0.2)
BILIRUB SERPL-MCNC: 0.51 MG/DL (ref 0.3–1.2)
BUN BLDV-MCNC: 14 MG/DL (ref 8–23)
BUN/CREAT BLD: 17 (ref 9–20)
CALCIUM SERPL-MCNC: 10.3 MG/DL (ref 8.6–10.4)
CHLORIDE BLD-SCNC: 100 MMOL/L (ref 98–107)
CHOLESTEROL/HDL RATIO: 2.6
CHOLESTEROL: 223 MG/DL
CO2: 31 MMOL/L (ref 20–31)
CREAT SERPL-MCNC: 0.84 MG/DL (ref 0.5–0.9)
EOSINOPHILS RELATIVE PERCENT: 2 % (ref 1–4)
ESTIMATED AVERAGE GLUCOSE: 166 MG/DL
GFR AFRICAN AMERICAN: >60 ML/MIN
GFR NON-AFRICAN AMERICAN: >60 ML/MIN
GFR SERPL CREATININE-BSD FRML MDRD: ABNORMAL ML/MIN/{1.73_M2}
GLUCOSE BLD-MCNC: 145 MG/DL (ref 70–99)
HBA1C MFR BLD: 7.4 % (ref 4–6)
HCT VFR BLD CALC: 44.3 % (ref 36.3–47.1)
HDLC SERPL-MCNC: 87 MG/DL
HEMOGLOBIN: 13.9 G/DL (ref 11.9–15.1)
IMMATURE GRANULOCYTES: 1 %
LDL CHOLESTEROL: 115 MG/DL (ref 0–130)
LYMPHOCYTES # BLD: 37 % (ref 24–43)
MCH RBC QN AUTO: 29.4 PG (ref 25.2–33.5)
MCHC RBC AUTO-ENTMCNC: 31.4 G/DL (ref 25.2–33.5)
MCV RBC AUTO: 93.7 FL (ref 82.6–102.9)
MONOCYTES # BLD: 10 % (ref 3–12)
NRBC AUTOMATED: 0 PER 100 WBC
PDW BLD-RTO: 14.7 % (ref 11.8–14.4)
PLATELET # BLD: 299 K/UL (ref 138–453)
PMV BLD AUTO: 9.9 FL (ref 8.1–13.5)
POTASSIUM SERPL-SCNC: 4.2 MMOL/L (ref 3.7–5.3)
RBC # BLD: 4.73 M/UL (ref 3.95–5.11)
RBC # BLD: ABNORMAL 10*6/UL
SEG NEUTROPHILS: 49 % (ref 36–65)
SEGMENTED NEUTROPHILS ABSOLUTE COUNT: 3.48 K/UL (ref 1.5–8.1)
SODIUM BLD-SCNC: 140 MMOL/L (ref 135–144)
THYROXINE, FREE: 0.55 NG/DL (ref 0.93–1.7)
TOTAL PROTEIN: 7.6 G/DL (ref 6.4–8.3)
TRIGL SERPL-MCNC: 103 MG/DL
TSH SERPL DL<=0.05 MIU/L-ACNC: 15.85 UIU/ML (ref 0.3–5)
WBC # BLD: 7 K/UL (ref 3.5–11.3)

## 2022-08-03 PROCEDURE — 99214 OFFICE O/P EST MOD 30 MIN: CPT | Performed by: OPTOMETRIST

## 2022-08-03 PROCEDURE — 1123F ACP DISCUSS/DSCN MKR DOCD: CPT | Performed by: OPTOMETRIST

## 2022-08-03 PROCEDURE — 83036 HEMOGLOBIN GLYCOSYLATED A1C: CPT

## 2022-08-03 PROCEDURE — 80061 LIPID PANEL: CPT

## 2022-08-03 PROCEDURE — 1036F TOBACCO NON-USER: CPT | Performed by: OPTOMETRIST

## 2022-08-03 PROCEDURE — 85025 COMPLETE CBC W/AUTO DIFF WBC: CPT

## 2022-08-03 PROCEDURE — 84443 ASSAY THYROID STIM HORMONE: CPT

## 2022-08-03 PROCEDURE — G8417 CALC BMI ABV UP PARAM F/U: HCPCS | Performed by: OPTOMETRIST

## 2022-08-03 PROCEDURE — 1090F PRES/ABSN URINE INCON ASSESS: CPT | Performed by: OPTOMETRIST

## 2022-08-03 PROCEDURE — 36415 COLL VENOUS BLD VENIPUNCTURE: CPT

## 2022-08-03 PROCEDURE — 84439 ASSAY OF FREE THYROXINE: CPT

## 2022-08-03 PROCEDURE — G8427 DOCREV CUR MEDS BY ELIG CLIN: HCPCS | Performed by: OPTOMETRIST

## 2022-08-03 PROCEDURE — G8399 PT W/DXA RESULTS DOCUMENT: HCPCS | Performed by: OPTOMETRIST

## 2022-08-03 PROCEDURE — 92133 CPTRZD OPH DX IMG PST SGM ON: CPT | Performed by: OPTOMETRIST

## 2022-08-03 PROCEDURE — 80053 COMPREHEN METABOLIC PANEL: CPT

## 2022-08-03 ASSESSMENT — PACHYMETRY
OD_CT(UM): 510
OS_CT(UM): 518

## 2022-08-03 ASSESSMENT — TONOMETRY
OD_IOP_MMHG: 11
OS_IOP_MMHG: 11
IOP_METHOD: TONOPEN

## 2022-08-03 ASSESSMENT — VISUAL ACUITY
OS_CC: 20/100
OD_CC+: -2
CORRECTION_TYPE: GLASSES
METHOD: SNELLEN - LINEAR

## 2022-08-03 NOTE — PATIENT INSTRUCTIONS
Recommend Biotrue hydration boost PF drops 4x per day but not within 15 minutes of timolol glaucoma drops

## 2022-08-03 NOTE — PROGRESS NOTES
Melissa Charles presents today for   Chief Complaint   Patient presents with    Glaucoma   . HPI       Glaucoma              Laterality: both eyes              Comments    6 month IOP check / OCT on  Timolol ou bid , pf art tears every 2 hours, zaditor 2x is what was prescribed. She is using Timolol and pf art tears but not sure about the allergy drops   Last HVF: 12/2021  Vison is blurry and eyes are always dry and irritated. Has switched to pataday ; using some aritificial tears;  doesn't know the brand   Also has concerns about ears and would like to get set up here with our doctor so she can keep things here  Told her we would help with that                   Current Outpatient Medications   Medication Sig Dispense Refill    gabapentin (NEURONTIN) 400 MG capsule Take 1 capsule by mouth 3 times daily for 360 days. 270 capsule 3    glucose monitoring (FREESTYLE FREEDOM) kit 1 kit by Does not apply route 2 times daily 1 kit 0    azelastine (ASTELIN) 0.1 % nasal spray 2 sprays by Nasal route 2 times daily Use in each nostril as directed       fluticasone (FLONASE) 50 MCG/ACT nasal spray 2 sprays by Each Nostril route daily 48 g 5    timolol (TIMOPTIC) 0.5 % ophthalmic solution       omeprazole (PRILOSEC) 20 MG delayed release capsule 1 po  capsule 1    levothyroxine (SYNTHROID) 100 MCG tablet 1 po daily 90 tablet 3    blood glucose monitor strips Test one  time a day & as needed for symptoms of irregular blood glucose. Dispense sufficient amount for indicated testing frequency plus additional to accommodate PRN testing needs.  100 strip 3    Lancets MISC 1 each by Does not apply route daily 100 each 5    polyethyl glycol-propyl glycol 0.4-0.3 % (SYSTANE) 0.4-0.3 % ophthalmic solution Place 1 drop into both eyes as needed for Dry Eyes 1 Bottle 5    diclofenac sodium (VOLTAREN) 1 % GEL Apply 2 g topically 4 times daily (Patient not taking: Reported on 7/27/2022) 5 Tube 5    blood glucose test strips (ONE TOUCH ULTRA TEST) strip use 1 TEST STRIP to TEST BLOOD SUGAR twice a day 100 strip 3    loratadine (CLARITIN) 10 MG tablet take 1 tablet by mouth once daily (Patient not taking: Reported on 2022) 30 tablet 11    SOFT TOUCH LANCETS MISC 1 Device by Does not apply route 2 times daily 200 each 3     No current facility-administered medications for this visit. Family History   Problem Relation Age of Onset    Heart Disease Mother     Heart Attack Mother     Arthritis Mother     Other Maternal Grandmother         (gout)    Allergies Daughter     Allergies Daughter     Other Brother         ( at age 21 secondary to auto accident). Cataracts Neg Hx     Diabetes Neg Hx     Glaucoma Neg Hx      Social History     Socioeconomic History    Marital status: Single     Spouse name: None    Number of children: None    Years of education: None    Highest education level: None   Tobacco Use    Smoking status: Never    Smokeless tobacco: Never    Tobacco comments:     Never smoked. [facility], 2016. Vaping Use    Vaping Use: Never used   Substance and Sexual Activity    Alcohol use: No     Alcohol/week: 0.0 standard drinks    Drug use: No     Past Medical History:   Diagnosis Date    Allergic conjunctivitis     (stable)    Chronic sinusitis     Constipation     Dizzy spells     Dry eye syndrome     Edentulous     (does not wear dentures)    Essential hypertension 2016    Fracture of lateral malleolus     (avulsion fracture at the tip of the lateral malleolus - right ankle).     Hearing loss     Hypothyroidism     Interstitial cystitis     Irritable bowel syndrome     Keratitis     (secondary to dry eye syndrome)    Parotid gland enlargement     stasis, consider sjorgens    Pseudophakos     (bilateral)    Seasonal rhinitis     Stress incontinence     Type II or unspecified type diabetes mellitus without mention of complication, not stated as uncontrolled     Urethral stenosis     Xerostomia Not recorded        Tonometry       Tonometry (Tonopen, 9:05 AM)         Right Left    Pressure 11 11                  Not recorded       Not recorded         Visual Acuity (Snellen - Linear)         Right Left    Dist cc 20/40 -2 20/100      Correction: Glasses            Pupils       Pupils         Pupils    Right PERRL    Left PERRL                  Neuro/Psych       Neuro/Psych       Oriented x3: Yes    Mood/Affect: Normal                  Not recorded           Not recorded       Not recorded              Orders Placed This Encounter   Procedures    Posterior Segment Optic Nerve OCT - OU - Both Eyes         IMPRESSION:  1. Primary open angle glaucoma (POAG) of both eyes, moderate stage    2. Dry eye syndrome of both eyes    3. Meibomian gland dysfunction (MGD)    4. Filamentary keratitis of right eye        PLAN:    Please continue Timolol glaucoma eyedrops as prescribed. Counseled patient that the glaucomas are a group of eye diseases with the potential for severe and irreversible vision loss. Counseled patient regarding the importance of compliance with therapy and follow-up. Counseled patient regarding glaucoma therapy with topical medications and the potential side effects and adverse effects of topical medications. The patient was counseled that glaucoma is a progressive optic neuropathy that, if left untreated, may progress to irreversible blindness. The patient was counseled that the only demonstrated treatment for glaucoma is to lower the pressure in the eye to a target eye pressure which may decrease the rate at which the progressive optic neuropathy leads to irreversible blindness. 2.3.  4.continue artificial tears Preservative Free (4x per day) but not within 15 minutes of the timolol       Patient Instructions   Recommend Biotrue hydration boost PF drops 4x per day but not within 15 minutes of timolol glaucoma drops    Return in about 6 months (around 2/3/2023) for VF, oct onh gcc,

## 2022-08-10 ENCOUNTER — OFFICE VISIT (OUTPATIENT)
Dept: FAMILY MEDICINE CLINIC | Age: 77
End: 2022-08-10
Payer: MEDICARE

## 2022-08-10 VITALS
SYSTOLIC BLOOD PRESSURE: 124 MMHG | WEIGHT: 166 LBS | DIASTOLIC BLOOD PRESSURE: 70 MMHG | HEIGHT: 65 IN | HEART RATE: 72 BPM | BODY MASS INDEX: 27.66 KG/M2

## 2022-08-10 DIAGNOSIS — K21.9 GASTROESOPHAGEAL REFLUX DISEASE, UNSPECIFIED WHETHER ESOPHAGITIS PRESENT: ICD-10-CM

## 2022-08-10 DIAGNOSIS — K30 DELAYED GASTRIC EMPTYING: ICD-10-CM

## 2022-08-10 DIAGNOSIS — M15.9 GENERALIZED OSTEOARTHROSIS, INVOLVING MULTIPLE SITES: ICD-10-CM

## 2022-08-10 DIAGNOSIS — E78.00 HIGH CHOLESTEROL: ICD-10-CM

## 2022-08-10 DIAGNOSIS — K11.7 XEROSTOMIA: ICD-10-CM

## 2022-08-10 DIAGNOSIS — J31.0 CHRONIC RHINITIS: ICD-10-CM

## 2022-08-10 DIAGNOSIS — M54.41 CHRONIC BILATERAL LOW BACK PAIN WITH BILATERAL SCIATICA: ICD-10-CM

## 2022-08-10 DIAGNOSIS — M54.42 CHRONIC BILATERAL LOW BACK PAIN WITH BILATERAL SCIATICA: ICD-10-CM

## 2022-08-10 DIAGNOSIS — G89.29 CHRONIC BILATERAL LOW BACK PAIN WITH BILATERAL SCIATICA: ICD-10-CM

## 2022-08-10 DIAGNOSIS — I10 ESSENTIAL HYPERTENSION: ICD-10-CM

## 2022-08-10 DIAGNOSIS — E03.9 HYPOTHYROIDISM, UNSPECIFIED TYPE: ICD-10-CM

## 2022-08-10 DIAGNOSIS — E11.9 TYPE 2 DIABETES MELLITUS WITHOUT COMPLICATION, UNSPECIFIED WHETHER LONG TERM INSULIN USE (HCC): Primary | ICD-10-CM

## 2022-08-10 DIAGNOSIS — K58.1 IRRITABLE BOWEL SYNDROME WITH CONSTIPATION: ICD-10-CM

## 2022-08-10 PROCEDURE — 99213 OFFICE O/P EST LOW 20 MIN: CPT | Performed by: FAMILY MEDICINE

## 2022-08-10 PROCEDURE — G8427 DOCREV CUR MEDS BY ELIG CLIN: HCPCS | Performed by: FAMILY MEDICINE

## 2022-08-10 PROCEDURE — 1090F PRES/ABSN URINE INCON ASSESS: CPT | Performed by: FAMILY MEDICINE

## 2022-08-10 PROCEDURE — 99214 OFFICE O/P EST MOD 30 MIN: CPT | Performed by: FAMILY MEDICINE

## 2022-08-10 PROCEDURE — 3051F HG A1C>EQUAL 7.0%<8.0%: CPT | Performed by: FAMILY MEDICINE

## 2022-08-10 PROCEDURE — G8417 CALC BMI ABV UP PARAM F/U: HCPCS | Performed by: FAMILY MEDICINE

## 2022-08-10 PROCEDURE — 1036F TOBACCO NON-USER: CPT | Performed by: FAMILY MEDICINE

## 2022-08-10 PROCEDURE — G8399 PT W/DXA RESULTS DOCUMENT: HCPCS | Performed by: FAMILY MEDICINE

## 2022-08-10 PROCEDURE — 1123F ACP DISCUSS/DSCN MKR DOCD: CPT | Performed by: FAMILY MEDICINE

## 2022-08-10 RX ORDER — LEVOTHYROXINE SODIUM 0.12 MG/1
TABLET ORAL
Qty: 30 TABLET | Refills: 5 | Status: SHIPPED | OUTPATIENT
Start: 2022-08-10

## 2022-08-10 ASSESSMENT — PATIENT HEALTH QUESTIONNAIRE - PHQ9
2. FEELING DOWN, DEPRESSED OR HOPELESS: 0
SUM OF ALL RESPONSES TO PHQ QUESTIONS 1-9: 0
SUM OF ALL RESPONSES TO PHQ QUESTIONS 1-9: 0
1. LITTLE INTEREST OR PLEASURE IN DOING THINGS: 0
SUM OF ALL RESPONSES TO PHQ9 QUESTIONS 1 & 2: 0
SUM OF ALL RESPONSES TO PHQ QUESTIONS 1-9: 0
SUM OF ALL RESPONSES TO PHQ QUESTIONS 1-9: 0

## 2022-08-10 ASSESSMENT — ENCOUNTER SYMPTOMS
TROUBLE SWALLOWING: 1
ALLERGIC/IMMUNOLOGIC NEGATIVE: 1
RESPIRATORY NEGATIVE: 1
SINUS PRESSURE: 0
BACK PAIN: 0
EYES NEGATIVE: 1
GASTROINTESTINAL NEGATIVE: 1

## 2022-08-10 NOTE — PATIENT INSTRUCTIONS
Hospital Outpatient Visit on 08/03/2022   Component Date Value Ref Range Status    Thyroxine, Free 08/03/2022 0.55 (A) 0.93 - 1.70 ng/dL Final    TSH 08/03/2022 15.85 (A) 0.30 - 5.00 uIU/mL Final    WBC 08/03/2022 7.0  3.5 - 11.3 k/uL Final    RBC 08/03/2022 4.73  3.95 - 5.11 m/uL Final    Hemoglobin 08/03/2022 13.9  11.9 - 15.1 g/dL Final    Hematocrit 08/03/2022 44.3  36.3 - 47.1 % Final    MCV 08/03/2022 93.7  82.6 - 102.9 fL Final    MCH 08/03/2022 29.4  25.2 - 33.5 pg Final    MCHC 08/03/2022 31.4  25.2 - 33.5 g/dL Final    RDW 08/03/2022 14.7 (A) 11.8 - 14.4 % Final    Platelets 49/11/8633 299  138 - 453 k/uL Final    MPV 08/03/2022 9.9  8.1 - 13.5 fL Final    NRBC Automated 08/03/2022 0.0  0.0 per 100 WBC Final    Seg Neutrophils 08/03/2022 49  36 - 65 % Final    Lymphocytes 08/03/2022 37  24 - 43 % Final    Monocytes 08/03/2022 10  3 - 12 % Final    Eosinophils % 08/03/2022 2  1 - 4 % Final    Basophils 08/03/2022 1  0 - 2 % Final    Immature Granulocytes 08/03/2022 1 (A) 0 % Final    Segs Absolute 08/03/2022 3.48  1.50 - 8.10 k/uL Final    Absolute Lymph # 08/03/2022 2.59  1.10 - 3.70 k/uL Final    Absolute Mono # 08/03/2022 0.71  0.10 - 1.20 k/uL Final    Absolute Eos # 08/03/2022 0.15  0.00 - 0.44 k/uL Final    Basophils Absolute 08/03/2022 0.06  0.00 - 0.20 k/uL Final    Absolute Immature Granulocyte 08/03/2022 0.04  0.00 - 0.30 k/uL Final    RBC Morphology 08/03/2022 ANISOCYTOSIS PRESENT   Final    Hemoglobin A1C 08/03/2022 7.4 (A) 4.0 - 6.0 % Final    Estimated Avg Glucose 08/03/2022 166  mg/dL Final    Comment: The ADA and AACC recommend providing the estimated average glucose result to permit better   patient understanding of their HBA1c result.       Glucose 08/03/2022 145 (A) 70 - 99 mg/dL Final    BUN 08/03/2022 14  8 - 23 mg/dL Final    Creatinine 08/03/2022 0.84  0.50 - 0.90 mg/dL Final    Bun/Cre Ratio 08/03/2022 17  9 - 20 Final    Calcium 08/03/2022 10.3  8.6 - 10.4 mg/dL Final    Sodium 08/03/2022 140  135 - 144 mmol/L Final    Potassium 08/03/2022 4.2  3.7 - 5.3 mmol/L Final    Chloride 08/03/2022 100  98 - 107 mmol/L Final    CO2 08/03/2022 31  20 - 31 mmol/L Final    Anion Gap 08/03/2022 9  9 - 17 mmol/L Final    Alkaline Phosphatase 08/03/2022 84  35 - 104 U/L Final    ALT 08/03/2022 13  5 - 33 U/L Final    AST 08/03/2022 21  <32 U/L Final    Total Bilirubin 08/03/2022 0.51  0.3 - 1.2 mg/dL Final    Total Protein 08/03/2022 7.6  6.4 - 8.3 g/dL Final    Albumin 08/03/2022 4.4  3.5 - 5.2 g/dL Final    Albumin/Globulin Ratio 08/03/2022 1.4  1.0 - 2.5 Final    GFR Non- 08/03/2022 >60  >60 mL/min Final    GFR  08/03/2022 >60  >60 mL/min Final    GFR Comment 08/03/2022        Final    Comment: Average GFR for 79or more years old:   76 mL/min/1.73sq m  Chronic Kidney Disease:   <60 mL/min/1.73sq m  Kidney failure:   <15 mL/min/1.73sq m              eGFR calculated using average adult body mass. Additional eGFR calculator available at:        [employer]            Cholesterol 08/03/2022 223 (A) <200 mg/dL Final    Comment:    Cholesterol Guidelines:      <200  Desirable   200-240  Borderline      >240  Undesirable         HDL 08/03/2022 87  >40 mg/dL Final    Comment:    HDL Guidelines:    <40     Undesirable   40-59    Borderline    >59     Desirable         LDL Cholesterol 08/03/2022 115  0 - 130 mg/dL Final    Comment:    LDL Guidelines:     <100    Desirable   100-129   Near to/above Desirable   130-159   Borderline      >159   Undesirable     Direct (measured) LDL and calculated LDL are not interchangeable tests. Chol/HDL Ratio 08/03/2022 2.6  <5 Final            Triglycerides 08/03/2022 103  <150 mg/dL Final    Comment:    Triglyceride Guidelines:     <150   Desirable   150-199  Borderline   200-499  High     >499   Very high   Based on AHA Guidelines for fasting triglyceride, October 2012.

## 2022-08-10 NOTE — PROGRESS NOTES
Subjective:      Patient ID: Jose Martin Natarajan is a 76 y.o. female. Routine follow up on chronic medical conditions, refills, and review of updated labs. I have reviewed the patient's medical history in detail and updated the computerized patient record. Doing ok at present. bs control seems fair. Rare elevation by report. Neuropathy pain seems worse in the feet over the interval.  Creeping up the legs. Lower back/sacral area discomfort better. Seems stable over the interval.     Knee arthritis evident ongoing. She notices recent weather changes have made the knees feel worse. Compliant with medications at present. Past Medical History:   Diagnosis Date    Allergic conjunctivitis     (stable)    Chronic sinusitis     Constipation     Dizzy spells     Dry eye syndrome     Edentulous     (does not wear dentures)    Essential hypertension 7/6/2016    Fracture of lateral malleolus     (avulsion fracture at the tip of the lateral malleolus - right ankle).     Hearing loss     Hypothyroidism     Interstitial cystitis     Irritable bowel syndrome     Keratitis     (secondary to dry eye syndrome)    Parotid gland enlargement     stasis, consider sjorgens    Pseudophakos     (bilateral)    Seasonal rhinitis     Stress incontinence     Type II or unspecified type diabetes mellitus without mention of complication, not stated as uncontrolled     Urethral stenosis     Xerostomia      Past Surgical History:   Procedure Laterality Date    APPENDECTOMY  07/24/1986    CATARACT REMOVAL WITH IMPLANT Right 09/02/2003    (Dr. Filipe Haynes)    CATARACT REMOVAL WITH IMPLANT Left 08/12/2003    (Dr. Filipe Haynes)    CHOLECYSTECTOMY  07/24/1986    COLONOSCOPY  09/08/2014    normal    CYSTOURETHROSCOPY/URETHRAL DILATION  02/08/2012    multiple    EYE SURGERY Bilateral 12/13/2018    Bilateral Transscleral cyclophotocoagulation G6 laser (micropulse) performed by Fazal Egan MD at [address]  06[birth year]1977 (Dr. Sylvia Marie)    UPPER GASTROINTESTINAL ENDOSCOPY  06/04/2015    gastritis, biopsy x 2    UPPER GASTROINTESTINAL ENDOSCOPY  04/02/2018    Dr. Mel Escobar   antral gastric ulcer. esophagus re-dilated at the end of procedure. UPPER GASTROINTESTINAL ENDOSCOPY  07/28/2021    [facility] EGD Dr. Jamil Duty     Current Outpatient Medications   Medication Sig Dispense Refill    gabapentin (NEURONTIN) 400 MG capsule Take 1 capsule by mouth 3 times daily for 360 days. 270 capsule 3    glucose monitoring (FREESTYLE FREEDOM) kit 1 kit by Does not apply route 2 times daily 1 kit 0    azelastine (ASTELIN) 0.1 % nasal spray 2 sprays by Nasal route 2 times daily Use in each nostril as directed       fluticasone (FLONASE) 50 MCG/ACT nasal spray 2 sprays by Each Nostril route daily 48 g 5    timolol (TIMOPTIC) 0.5 % ophthalmic solution       omeprazole (PRILOSEC) 20 MG delayed release capsule 1 po  capsule 1    levothyroxine (SYNTHROID) 100 MCG tablet 1 po daily 90 tablet 3    blood glucose monitor strips Test one  time a day & as needed for symptoms of irregular blood glucose. Dispense sufficient amount for indicated testing frequency plus additional to accommodate PRN testing needs. 100 strip 3    Lancets MISC 1 each by Does not apply route daily 100 each 5    polyethyl glycol-propyl glycol 0.4-0.3 % (SYSTANE) 0.4-0.3 % ophthalmic solution Place 1 drop into both eyes as needed for Dry Eyes 1 Bottle 5    blood glucose test strips (ONE TOUCH ULTRA TEST) strip use 1 TEST STRIP to TEST BLOOD SUGAR twice a day 100 strip 3    SOFT TOUCH LANCETS MISC 1 Device by Does not apply route 2 times daily 200 each 3     No current facility-administered medications for this visit.      Allergies   Allergen Reactions    Latex Rash    Aleve [Naproxen]      Stomach problems    Amoxicillin      Other reaction(s): mucositis    Aspirin Other (See Comments)     Dizziness and tears her stomach up    Dye [Iodides]      IV DYE    Influenza Vaccines     Reglan [Metoclopramide] Other (See Comments)     Dystonic reaction, involuntary movements     . Diabetes  Hypoglycemia symptoms include dizziness. Other  Associated symptoms include arthralgias (knees, hands), myalgias (foot cramps described) and numbness (around the right wrist and palmar hand. some symptoms up the arm at night involving more of the arm. worsening neuropathy pain in the feet progressing. ). Pertinent negatives include no rash. Hypertension    Rash   Review of Systems   Constitutional: Negative. HENT:  Positive for trouble swallowing (thick sputum). Negative for sinus pressure and sneezing. Eyes: Negative. Respiratory: Negative. Cardiovascular: Negative. Gastrointestinal: Negative. Endocrine: Negative. Genitourinary: Negative. Musculoskeletal:  Positive for arthralgias (knees, hands) and myalgias (foot cramps described). Negative for back pain (back better at present. ) and gait problem (not using cane or walker any more). Skin: Negative. Negative for rash. Allergic/Immunologic: Negative. Neurological:  Positive for dizziness and numbness (around the right wrist and palmar hand. some symptoms up the arm at night involving more of the arm. worsening neuropathy pain in the feet progressing.). Hematological: Negative. Psychiatric/Behavioral: Negative. Objective:   Physical Exam  HENT:      Head: Normocephalic. Cardiovascular:      Rate and Rhythm: Normal rate. Pulses: Normal pulses. Pulmonary:      Effort: Pulmonary effort is normal.   Musculoskeletal:      Right hand: Tenderness and bony tenderness (thumb cmc, mcp joints.  ) present. No swelling or deformity. Decreased range of motion. Decreased sensation. Cervical back: Normal range of motion. Comments: Numbness described in atypical distribution of fingers 2--5. Skin:     General: Skin is warm. Neurological:      General: No focal deficit present. Mental Status: She is alert. Sensory: Sensory deficit (both feet with numbness and burning paresthesias, getting worse) present. Psychiatric:         Mood and Affect: Mood normal.         Thought Content: Thought content normal.     /70 (Site: Right Upper Arm, Position: Sitting, Cuff Size: Large Adult)   Pulse 72   Ht 5' 5\" (1.651 m)   Wt 166 lb (75.3 kg)   LMP  (LMP Unknown)   BMI 27.62 kg/m²    No visits with results within 1 Day(s) from this visit.    Latest known visit with results is:   Hospital Outpatient Visit on 08/03/2022   Component Date Value Ref Range Status    Thyroxine, Free 08/03/2022 0.55 (A) 0.93 - 1.70 ng/dL Final    TSH 08/03/2022 15.85 (A) 0.30 - 5.00 uIU/mL Final    WBC 08/03/2022 7.0  3.5 - 11.3 k/uL Final    RBC 08/03/2022 4.73  3.95 - 5.11 m/uL Final    Hemoglobin 08/03/2022 13.9  11.9 - 15.1 g/dL Final    Hematocrit 08/03/2022 44.3  36.3 - 47.1 % Final    MCV 08/03/2022 93.7  82.6 - 102.9 fL Final    MCH 08/03/2022 29.4  25.2 - 33.5 pg Final    MCHC 08/03/2022 31.4  25.2 - 33.5 g/dL Final    RDW 08/03/2022 14.7 (A) 11.8 - 14.4 % Final    Platelets 50/53/9742 299  138 - 453 k/uL Final    MPV 08/03/2022 9.9  8.1 - 13.5 fL Final    NRBC Automated 08/03/2022 0.0  0.0 per 100 WBC Final    Seg Neutrophils 08/03/2022 49  36 - 65 % Final    Lymphocytes 08/03/2022 37  24 - 43 % Final    Monocytes 08/03/2022 10  3 - 12 % Final    Eosinophils % 08/03/2022 2  1 - 4 % Final    Basophils 08/03/2022 1  0 - 2 % Final    Immature Granulocytes 08/03/2022 1 (A) 0 % Final    Segs Absolute 08/03/2022 3.48  1.50 - 8.10 k/uL Final    Absolute Lymph # 08/03/2022 2.59  1.10 - 3.70 k/uL Final    Absolute Mono # 08/03/2022 0.71  0.10 - 1.20 k/uL Final    Absolute Eos # 08/03/2022 0.15  0.00 - 0.44 k/uL Final    Basophils Absolute 08/03/2022 0.06  0.00 - 0.20 k/uL Final    Absolute Immature Granulocyte 08/03/2022 0.04  0.00 - 0.30 k/uL Final    RBC Morphology 08/03/2022 ANISOCYTOSIS PRESENT   Final Hemoglobin A1C 08/03/2022 7.4 (A) 4.0 - 6.0 % Final    Estimated Avg Glucose 08/03/2022 166  mg/dL Final    Glucose 08/03/2022 145 (A) 70 - 99 mg/dL Final    BUN 08/03/2022 14  8 - 23 mg/dL Final    Creatinine 08/03/2022 0.84  0.50 - 0.90 mg/dL Final    Bun/Cre Ratio 08/03/2022 17  9 - 20 Final    Calcium 08/03/2022 10.3  8.6 - 10.4 mg/dL Final    Sodium 08/03/2022 140  135 - 144 mmol/L Final    Potassium 08/03/2022 4.2  3.7 - 5.3 mmol/L Final    Chloride 08/03/2022 100  98 - 107 mmol/L Final    CO2 08/03/2022 31  20 - 31 mmol/L Final    Anion Gap 08/03/2022 9  9 - 17 mmol/L Final    Alkaline Phosphatase 08/03/2022 84  35 - 104 U/L Final    ALT 08/03/2022 13  5 - 33 U/L Final    AST 08/03/2022 21  <32 U/L Final    Total Bilirubin 08/03/2022 0.51  0.3 - 1.2 mg/dL Final    Total Protein 08/03/2022 7.6  6.4 - 8.3 g/dL Final    Albumin 08/03/2022 4.4  3.5 - 5.2 g/dL Final    Albumin/Globulin Ratio 08/03/2022 1.4  1.0 - 2.5 Final    GFR Non- 08/03/2022 >60  >60 mL/min Final    GFR  08/03/2022 >60  >60 mL/min Final    GFR Comment 08/03/2022        Final    Cholesterol 08/03/2022 223 (A) <200 mg/dL Final    HDL 08/03/2022 87  >40 mg/dL Final    LDL Cholesterol 08/03/2022 115  0 - 130 mg/dL Final    Chol/HDL Ratio 08/03/2022 2.6  <5 Final    Triglycerides 08/03/2022 103  <150 mg/dL Final         Assessment:       Encounter Diagnoses   Name Primary? Type 2 diabetes mellitus without complication, unspecified whether long term insulin use (HCC) Yes    Essential hypertension     Delayed gastric emptying     Xerostomia     Chronic rhinitis     Irritable bowel syndrome with constipation     Generalized osteoarthrosis, involving multiple sites     Gastroesophageal reflux disease, unspecified whether esophagitis present     Chronic bilateral low back pain with bilateral sciatica     Hypothyroidism, unspecified type                  Plan:         diabetes: doing well by home checks.   Stable a1c at 7.4%.  Cont. Current metformin bid dosing. Updating eye exam.  Updating labs at present. Htn: improved at present. She stopped her low dose of lisinopril long term at present. Declining to use at present. Delayed gastric emptying. Currently, some post prandial bloating and increased bowel sounds by report. Not really painful. No vomiting. EGD 4/2/18: antral ulceration found. Esophagus re-dilated at that time. She has prn gastroenterology consult if symptoms worsen. EGD 11/ 18/19. Minimal gastritis without ulceration. Single dilation with dilator. Some evidence for thick secretions and laryngitis. She continues on omeprazole. No gastritis or gerd concerns at present. Parotid enlargement/exrostomia; still with dry mouth concerns. Not using artificial saliva, she tried and did not like it. Nothing visible as far as enlargement. She feels fullness at times. Using hard candy (sugar free) to help with dryness. Some chronic rhinitis and sinusitis issues ongoing. She describes intermittent sneezing and congestions despite daily antihistamine and flonase use. Mild allergy symptoms at present. Concerns for sjogrens in the past.   She has somewhat severe septal deviation and likely has a lot of mechanical obstruction, especially on the right. Rec. ENT consult to consider mechanical fix to her nose issues. Thought not to be bad enough to need repair by patient recall. She desires a 2nd opinion. Problems with chronic congestion getting worse over time. Colds tend to cause complications with sinuses and prolonged symptoms. Ibs; some intermittent constipation. Getting better per patient. Stir lynch vegetables, watermelon helps she tends to increase water intake and eat foods that help. Managing with diet at present. We discussed current bowel regimen and made plans to add more if needed. Oa: seeing more knee pain.   Currently her back pain is better and she is not needing her cane or walker for extended walking. Weather related exacerbation of knee pain at present. Discussed nsaid use. She has had some stomach complaints on aleve in the past.   Conservative with nsaids at present due to stomach ulceration in April 2018. Consider injection trial if willing. Gerd: some recent heartburn recurrent. She was noting some recurrent esophageal pain with swallowing, and some episodes of choking recently. She had prior dilation [facility] esophagus [facility] [facility], and again 11/2016. Repeat EGD 4/2/18 with antral gastric ulcer . Repeated dilation at that time. repeat dilation 11/19 through Dr. Anisha Mao [facility]. She continues on omeprazole 20mg/day. No current symptoms of concern. chronic back pain. Neuropathy symptoms in feet by description, also more problematic acutely. Stiff with poor rom. Known moderate spinal canal stenosis at L3-4, L4-5 from 2016 MRI. Numbness in both feet endorsed at present. neurontin use once a day to occasional twice a day when she has sharp pains. Seems to be helping. Back pain improved enough that she is no longer using a walker or cane. Hypothyroid: dosage increased to 100mcg/day in august 2021. Currently still low. On direct questioning, she admits to not being totally compliant. Likely off the med several weeks. Willing restart 100mcg/day and recheck tsh/t4    Hyperlipidemia:  Discussed rec. For treatment given diabetes diagnosis. Discussed side effects and precautions. Updating labs.   Ldl at 115

## 2022-08-24 DIAGNOSIS — E11.65 UNCONTROLLED TYPE 2 DIABETES MELLITUS WITH HYPERGLYCEMIA (HCC): ICD-10-CM

## 2022-08-24 RX ORDER — GLUCOSAMINE HCL/CHONDROITIN SU 500-400 MG
CAPSULE ORAL
Qty: 100 STRIP | Refills: 3 | Status: SHIPPED | OUTPATIENT
Start: 2022-08-24

## 2022-08-24 NOTE — TELEPHONE ENCOUNTER
Tana Toth called requesting a refill of the below medication which has been pended for you:     Requested Prescriptions     Pending Prescriptions Disp Refills    blood glucose monitor strips 100 strip 3     Sig: Test one  time a day       Last Appointment Date: 8/10/2022  Next Appointment Date: 2/13/2023    Allergies   Allergen Reactions    Latex Rash    Aleve [Naproxen]      Stomach problems    Amoxicillin      Other reaction(s): mucositis    Aspirin Other (See Comments)     Dizziness and tears her stomach up    Dye [Iodides]      IV DYE    Influenza Vaccines     Reglan [Metoclopramide] Other (See Comments)     Dystonic reaction, involuntary movements

## 2022-09-01 ENCOUNTER — OFFICE VISIT (OUTPATIENT)
Dept: PODIATRY | Age: 77
End: 2022-09-01
Payer: MEDICARE

## 2022-09-01 VITALS
DIASTOLIC BLOOD PRESSURE: 70 MMHG | RESPIRATION RATE: 20 BRPM | BODY MASS INDEX: 27.59 KG/M2 | WEIGHT: 165.8 LBS | HEART RATE: 72 BPM | SYSTOLIC BLOOD PRESSURE: 126 MMHG

## 2022-09-01 DIAGNOSIS — B35.1 DERMATOPHYTOSIS OF NAIL: ICD-10-CM

## 2022-09-01 DIAGNOSIS — E11.49 DM (DIABETES MELLITUS), TYPE 2 WITH NEUROLOGICAL COMPLICATIONS (HCC): Primary | ICD-10-CM

## 2022-09-01 PROCEDURE — 11720 DEBRIDE NAIL 1-5: CPT | Performed by: PODIATRIST

## 2022-09-01 PROCEDURE — 99999 PR OFFICE/OUTPT VISIT,PROCEDURE ONLY: CPT | Performed by: PODIATRIST

## 2022-09-01 NOTE — PROGRESS NOTES
Foot Care Worksheet  PCP: Katie Mathew MD  Last visit: 08 / 10 / 2022    Nail description: Thick , Yellow , Crumbly , Marked limitation of ambulation     Pain resulting from thickened and dystrophy of nail plate No    Nails involved  Right   1  (T5-T9)  Left     5  (TA-T4)    Q7 1 Class A Finding - Non traumatic amputation of foot No    Q8 2 Class B Findings - Absent DP pulse No, Absent PT pulse No, Advanced tropic changes (3 required) Yes    Decrease hair growth Yes, Nail changes/thickening Yes, Pigmented changes/discoloration Yes, Skin texture (thin, shiny) No, Skin color (rubor/redness) No    Q9 1 Class B and 2 Class C Findings  Claudication No, Temperature change Yes, Paresthesia Yes, Burning No, Edema Yes      Subjective:  Raquel Hernandez is a 76 y.o. female who presents to the office today for routine DM foot care. .  Currently denies F/C/N/V. Allergies   Allergen Reactions    Latex Rash    Aleve [Naproxen]      Stomach problems    Amoxicillin      Other reaction(s): mucositis    Aspirin Other (See Comments)     Dizziness and tears her stomach up    Dye [Iodides]      IV DYE    Influenza Vaccines     Reglan [Metoclopramide] Other (See Comments)     Dystonic reaction, involuntary movements       Past Medical History:   Diagnosis Date    Allergic conjunctivitis     (stable)    Chronic sinusitis     Constipation     Dizzy spells     Dry eye syndrome     Edentulous     (does not wear dentures)    Essential hypertension 7/6/2016    Fracture of lateral malleolus     (avulsion fracture at the tip of the lateral malleolus - right ankle).     Hearing loss     Hypothyroidism     Interstitial cystitis     Irritable bowel syndrome     Keratitis     (secondary to dry eye syndrome)    Parotid gland enlargement     stasis, consider sjorgens    Pseudophakos     (bilateral)    Seasonal rhinitis     Stress incontinence     Type II or unspecified type diabetes mellitus without mention of complication, not stated as uncontrolled     Urethral stenosis     Xerostomia        Prior to Admission medications    Medication Sig Start Date End Date Taking? Authorizing Provider   blood glucose monitor strips Test one  time a day 8/24/22  Yes Brijesh Diop MD   levothyroxine (SYNTHROID) 125 MCG tablet 1 po daily 8/10/22  Yes Brijesh Diop MD   gabapentin (NEURONTIN) 400 MG capsule Take 1 capsule by mouth 3 times daily for 360 days.  4/27/22 4/22/23 Yes Brijesh Diop MD   glucose monitoring (FREESTYLE FREEDOM) kit 1 kit by Does not apply route 2 times daily 4/27/22  Yes Brijesh Diop MD   azelastine (ASTELIN) 0.1 % nasal spray 2 sprays by Nasal route 2 times daily Use in each nostril as directed    Yes Historical Provider, MD   fluticasone (FLONASE) 50 MCG/ACT nasal spray 2 sprays by Each Nostril route daily 3/9/22  Yes Nathanael Garcia MD   timolol (TIMOPTIC) 0.5 % ophthalmic solution  2/20/22  Yes Historical Provider, MD   omeprazole (PRILOSEC) 20 MG delayed release capsule 1 po BID 2/7/22  Yes Brijesh Diop MD   Lancets MISC 1 each by Does not apply route daily 8/24/21  Yes Brijesh Diop MD   polyethyl glycol-propyl glycol 0.4-0.3 % (SYSTANE) 0.4-0.3 % ophthalmic solution Place 1 drop into both eyes as needed for Dry Eyes 6/22/21  Yes Apolonia Robertson, OD   blood glucose test strips (ONE TOUCH ULTRA TEST) strip use 1 TEST STRIP to TEST BLOOD SUGAR twice a day 3/18/20  Yes Brijesh Diop MD   SOFT TOUCH LANCETS MISC 1 Device by Does not apply route 2 times daily 8/16/16  Yes Brijesh Diop MD       Past Surgical History:   Procedure Laterality Date    APPENDECTOMY  07/24/1986    CATARACT REMOVAL WITH IMPLANT Right 09/02/2003    (Dr. Rafiq Block)    CATARACT REMOVAL WITH IMPLANT Left 08/12/2003    (Dr. Rafiq Block)    CHOLECYSTECTOMY  07/24/1986    COLONOSCOPY  09/08/2014    normal    CYSTOURETHROSCOPY/URETHRAL DILATION  02/08/2012    multiple    EYE SURGERY Bilateral 12/13/2018    Bilateral Transscleral cyclophotocoagulation G6 laser bilateral.  Musculoskeletal: Muscle strength 5/5, bilateral.  Pain absent upon palpation bilateral. Normal medial longitudinal arch, bilateral.  Ankle ROM decresed,bilateral.  1st MPJ ROM within normal limits, bilateral.  Dorsally contracted digits absent. No other foot deformities. Integument: Warm, dry, supple, bilateral.  Open lesion absent, bilateral.  Interdigital maceration absent to web spaces bilateral.   Nails left 5 and right 1 thickened, dystrophic and crumbly, discolored with subungual debris. .  Fissures absent, bilateral. Hyperkeratotic tissue is absent. Asessment: Patient is a 76 y.o. female with:    Diagnosis Orders   1. DM (diabetes mellitus), type 2 with neurological complications ([facility] [address]        2. Dermatophytosis of nail            Plan: Patient examined and evaluated. Current condition and treatment options discussed in detail. DM foot ed and exam  All nails as mentioned above debrided in length and thickness. Patient advised OTC methods for treatment of the mycotic nails. Patient will follow up in 10 weeks. Contact office with any questions/problems/concerns.

## 2022-09-09 RX ORDER — OMEPRAZOLE 20 MG/1
CAPSULE, DELAYED RELEASE ORAL
Qty: 180 CAPSULE | Refills: 1 | Status: SHIPPED | OUTPATIENT
Start: 2022-09-09

## 2022-09-09 NOTE — TELEPHONE ENCOUNTER
Braden Andrews called requesting a refill of the below medication which has been pended for you:     Requested Prescriptions     Pending Prescriptions Disp Refills    omeprazole (PRILOSEC) 20 MG delayed release capsule [Pharmacy Med Name: OMEPRAZOLE DR 20 MG CAPSULE] 180 capsule 1     Sig: take 1 capsule by mouth twice a day       Last Appointment Date: 8/10/2022  Next Appointment Date: 2/13/2023    Allergies   Allergen Reactions    Latex Rash    Aleve [Naproxen]      Stomach problems    Amoxicillin      Other reaction(s): mucositis    Aspirin Other (See Comments)     Dizziness and tears her stomach up    Dye [Iodides]      IV DYE    Influenza Vaccines     Reglan [Metoclopramide] Other (See Comments)     Dystonic reaction, involuntary movements

## 2022-09-16 NOTE — TELEPHONE ENCOUNTER
LM for patient to confirm what dosage she is taking. Per last office note 8/10/22, medication was changed to 125mcg. Patient also has lab work to complete.

## 2022-09-19 RX ORDER — LEVOTHYROXINE SODIUM 0.1 MG/1
TABLET ORAL
Qty: 90 TABLET | Refills: 3 | Status: SHIPPED | OUTPATIENT
Start: 2022-09-19

## 2022-09-26 RX ORDER — TIMOLOL MALEATE 5 MG/ML
SOLUTION/ DROPS OPHTHALMIC
Qty: 5 ML | Refills: 5 | Status: SHIPPED | OUTPATIENT
Start: 2022-09-26

## 2022-11-15 ENCOUNTER — OFFICE VISIT (OUTPATIENT)
Dept: OTOLARYNGOLOGY | Age: 77
End: 2022-11-15
Payer: MEDICARE

## 2022-11-15 ENCOUNTER — OFFICE VISIT (OUTPATIENT)
Dept: PODIATRY | Age: 77
End: 2022-11-15
Payer: MEDICARE

## 2022-11-15 VITALS
SYSTOLIC BLOOD PRESSURE: 122 MMHG | HEART RATE: 88 BPM | RESPIRATION RATE: 20 BRPM | DIASTOLIC BLOOD PRESSURE: 64 MMHG | WEIGHT: 165 LBS | BODY MASS INDEX: 27.46 KG/M2

## 2022-11-15 VITALS
WEIGHT: 165 LBS | DIASTOLIC BLOOD PRESSURE: 64 MMHG | OXYGEN SATURATION: 94 % | HEART RATE: 86 BPM | HEIGHT: 65 IN | RESPIRATION RATE: 16 BRPM | BODY MASS INDEX: 27.49 KG/M2 | SYSTOLIC BLOOD PRESSURE: 122 MMHG

## 2022-11-15 DIAGNOSIS — J30.1 SEASONAL ALLERGIC RHINITIS DUE TO POLLEN: ICD-10-CM

## 2022-11-15 DIAGNOSIS — B35.1 DERMATOPHYTOSIS OF NAIL: ICD-10-CM

## 2022-11-15 DIAGNOSIS — E11.49 DM (DIABETES MELLITUS), TYPE 2 WITH NEUROLOGICAL COMPLICATIONS (HCC): Primary | ICD-10-CM

## 2022-11-15 DIAGNOSIS — R09.81 NASAL CONGESTION: ICD-10-CM

## 2022-11-15 DIAGNOSIS — J34.2 DEVIATED SEPTUM: Primary | ICD-10-CM

## 2022-11-15 PROCEDURE — 99999 PR OFFICE/OUTPT VISIT,PROCEDURE ONLY: CPT | Performed by: PODIATRIST

## 2022-11-15 PROCEDURE — 3074F SYST BP LT 130 MM HG: CPT | Performed by: OTOLARYNGOLOGY

## 2022-11-15 PROCEDURE — 99212 OFFICE O/P EST SF 10 MIN: CPT | Performed by: OTOLARYNGOLOGY

## 2022-11-15 PROCEDURE — 1036F TOBACCO NON-USER: CPT | Performed by: OTOLARYNGOLOGY

## 2022-11-15 PROCEDURE — G8399 PT W/DXA RESULTS DOCUMENT: HCPCS | Performed by: OTOLARYNGOLOGY

## 2022-11-15 PROCEDURE — 1123F ACP DISCUSS/DSCN MKR DOCD: CPT | Performed by: OTOLARYNGOLOGY

## 2022-11-15 PROCEDURE — 3078F DIAST BP <80 MM HG: CPT | Performed by: OTOLARYNGOLOGY

## 2022-11-15 PROCEDURE — 11720 DEBRIDE NAIL 1-5: CPT | Performed by: PODIATRIST

## 2022-11-15 PROCEDURE — 1090F PRES/ABSN URINE INCON ASSESS: CPT | Performed by: OTOLARYNGOLOGY

## 2022-11-15 PROCEDURE — G8417 CALC BMI ABV UP PARAM F/U: HCPCS | Performed by: OTOLARYNGOLOGY

## 2022-11-15 PROCEDURE — G8427 DOCREV CUR MEDS BY ELIG CLIN: HCPCS | Performed by: OTOLARYNGOLOGY

## 2022-11-15 PROCEDURE — G8484 FLU IMMUNIZE NO ADMIN: HCPCS | Performed by: OTOLARYNGOLOGY

## 2022-11-15 PROCEDURE — 99214 OFFICE O/P EST MOD 30 MIN: CPT | Performed by: OTOLARYNGOLOGY

## 2022-11-15 RX ORDER — MONTELUKAST SODIUM 10 MG/1
10 TABLET ORAL DAILY
Qty: 30 TABLET | Refills: 3 | Status: SHIPPED | OUTPATIENT
Start: 2022-11-15

## 2022-11-15 RX ORDER — FEXOFENADINE HCL 180 MG/1
180 TABLET ORAL DAILY
Qty: 30 TABLET | Refills: 3 | Status: SHIPPED | OUTPATIENT
Start: 2022-11-15 | End: 2022-12-15

## 2022-11-15 ASSESSMENT — ENCOUNTER SYMPTOMS: SINUS COMPLAINT: 1

## 2022-11-15 NOTE — PROGRESS NOTES
Foot Care Worksheet  PCP: Jerry Madrid MD  Last visit: 08 / 10 / 2022    Nail description: Thick , Yellow , Crumbly , Marked limitation of ambulation     Pain resulting from thickened and dystrophy of nail plate No    Nails involved  Right   1  (T5-T9)  Left     5  (TA-T4)    Q7 1 Class A Finding - Non traumatic amputation of foot No    Q8 2 Class B Findings - Absent DP pulse No, Absent PT pulse No, Advanced tropic changes (3 required) Yes    Decrease hair growth Yes, Nail changes/thickening Yes, Pigmented changes/discoloration Yes, Skin texture (thin, shiny) No, Skin color (rubor/redness) No    Q9 1 Class B and 2 Class C Findings  Claudication No, Temperature change Yes, Paresthesia Yes, Burning No, Edema Yes      Subjective:  Deidre Finch is a 76 y.o. female who presents to the office today for routine DM foot care. .  Currently denies F/C/N/V. Allergies   Allergen Reactions    Latex Rash    Aleve [Naproxen]      Stomach problems    Amoxicillin      Other reaction(s): mucositis    Aspirin Other (See Comments)     Dizziness and tears her stomach up    Dye [Iodides]      IV DYE    Influenza Vaccines     Reglan [Metoclopramide] Other (See Comments)     Dystonic reaction, involuntary movements       Past Medical History:   Diagnosis Date    Allergic conjunctivitis     (stable)    Chronic sinusitis     Constipation     Dizzy spells     Dry eye syndrome     Edentulous     (does not wear dentures)    Essential hypertension 7/6/2016    Fracture of lateral malleolus     (avulsion fracture at the tip of the lateral malleolus - right ankle).     Hearing loss     Hypothyroidism     Interstitial cystitis     Irritable bowel syndrome     Keratitis     (secondary to dry eye syndrome)    Parotid gland enlargement     stasis, consider sjorgens    Pseudophakos     (bilateral)    Seasonal rhinitis     Stress incontinence     Type II or unspecified type diabetes mellitus without mention of complication, not stated as uncontrolled     Urethral stenosis     Xerostomia        Prior to Admission medications    Medication Sig Start Date End Date Taking? Authorizing Provider   fexofenadine (ALLEGRA) 180 MG tablet Take 1 tablet by mouth daily 11/15/22 12/15/22 Yes Breezy Gallagher MD   montelukast (SINGULAIR) 10 MG tablet Take 1 tablet by mouth daily 11/15/22  Yes Breezy Gallagher MD   timolol (TIMOPTIC) 0.5 % ophthalmic solution instill 1 drop into both eyes twice a day 9/26/22  Yes Apolonia Kelley, OD   levothyroxine (SYNTHROID) 100 MCG tablet take 1 tablet by mouth once daily 9/19/22  Yes Everette Williamson MD   omeprazole (PRILOSEC) 20 MG delayed release capsule take 1 capsule by mouth twice a day 9/9/22  Yes Everette Williamson MD   blood glucose monitor strips Test one  time a day 8/24/22  Yes Everette Williamson MD   levothyroxine (SYNTHROID) 125 MCG tablet 1 po daily 8/10/22  Yes Everette Williamson MD   gabapentin (NEURONTIN) 400 MG capsule Take 1 capsule by mouth 3 times daily for 360 days.  4/27/22 4/22/23 Yes Everette Williamson MD   glucose monitoring (FREESTYLE FREEDOM) kit 1 kit by Does not apply route 2 times daily 4/27/22  Yes Everette Williamson MD   azelastine (ASTELIN) 0.1 % nasal spray 2 sprays by Nasal route 2 times daily Use in each nostril as directed    Yes Historical Provider, MD   fluticasone (FLONASE) 50 MCG/ACT nasal spray 2 sprays by Each Nostril route daily 3/9/22  Yes Breezy Gallagher MD   Lancets MISC 1 each by Does not apply route daily 8/24/21  Yes Everette Williamson MD   polyethyl glycol-propyl glycol 0.4-0.3 % (SYSTANE) 0.4-0.3 % ophthalmic solution Place 1 drop into both eyes as needed for Dry Eyes 6/22/21  Yes Apolonia Kelley, OD   blood glucose test strips (ONE TOUCH ULTRA TEST) strip use 1 TEST STRIP to TEST BLOOD SUGAR twice a day 3/18/20  Yes Everette Williamson MD   SOFT TOUCH LANCETS MISC 1 Device by Does not apply route 2 times daily 8/16/16  Yes Everette Williamson MD       Past Surgical History:   Procedure Laterality Date    APPENDECTOMY 1986    CATARACT REMOVAL WITH IMPLANT Right 2003    (Dr. Shannan Garcias)    CATARACT REMOVAL WITH IMPLANT Left 2003    (Dr. Shannan Garcias)    [facility]  1986    COLONOSCOPY  2014    normal    CYSTOURETHROSCOPY/URETHRAL DILATION  2012    multiple    EYE SURGERY Bilateral 2018    Bilateral Transscleral cyclophotocoagulation G6 laser (micropulse) performed by Andre Gibson MD at [facility]  1977    (Dr. Corky Hampton)    UPPER GASTROINTESTINAL ENDOSCOPY  2015    gastritis, biopsy x 2    UPPER GASTROINTESTINAL ENDOSCOPY  2018    Dr. Dionisio Landry   antral gastric ulcer. esophagus re-dilated at the end of procedure. UPPER GASTROINTESTINAL ENDOSCOPY  2021    [facility] EGD Dr. Gary Quinonez History   Problem Relation Age of Onset    Heart Disease Mother     Heart Attack Mother     Arthritis Mother     Other Maternal Grandmother         (gout)    Allergies Daughter     Allergies Daughter     Other Brother         ( at age 21 secondary to auto accident). Cataracts Neg Hx     Diabetes Neg Hx     Glaucoma Neg Hx        Social History     Tobacco Use    Smoking status: Never    Smokeless tobacco: Never    Tobacco comments:     Never smoked. Alexsandra Bonilla p, 2016. Substance Use Topics    Alcohol use: No     Alcohol/week: 0.0 standard drinks       Review of Systems: All 12 systems reviewed and pertinent positives noted above. Lower Extremity Physical Examination:     Vitals:   Vitals:    11/15/22 1417   BP: 122/64   Pulse: 88   Resp: 20     General: AAO x 3 in NAD.      Vascular: DP and PT pulses palpable 2/4, bilateral.  CFT <3 seconds, bilateral.  Hair growth present to the level of the digits, bilateral.  Edema present, bilateral.  Varicosities present, bilateral. Erythema absent, bilateral. Distal Rubor absent bilateral.  Temperature within normal limits bilateral. Hyperpigmentation present bilateral. Atrophic skin yes.  Neurological: Sensation Impaired to light touch to level of digits, bilateral.  Protective sensation intact  10/10 sites via 5.07/10g [place]Christi Monofilament, bilateral.  negative Tinel's, bilateral.  negative Valleix sign, bilateral.  Vibratory abnormal  bilateral.  Reflexes Decreased bilateral.  Paresthesias positive. Dysthesias positive. Sharp/dull abnormal  bilateral.  Musculoskeletal: Muscle strength 5/5, bilateral.  Pain absent upon palpation bilateral. Normal medial longitudinal arch, bilateral.  Ankle ROM decresed,bilateral.  1st MPJ ROM within normal limits, bilateral.  Dorsally contracted digits absent. No other foot deformities. Integument: Warm, dry, supple, bilateral.  Open lesion absent, bilateral.  Interdigital maceration absent to web spaces bilateral.   Nails left 5 and right 1 thickened, dystrophic and crumbly, discolored with subungual debris. .  Fissures absent, bilateral. Hyperkeratotic tissue is absent. Asessment: Patient is a 76 y.o. female with:    Diagnosis Orders   1. DM (diabetes mellitus), type 2 with neurological complications ([facility] [address]        2. Dermatophytosis of nail            Plan: Patient examined and evaluated. Current condition and treatment options discussed in detail. DM foot ed and exam  All nails as mentioned above debrided in length and thickness. Patient advised OTC methods for treatment of the mycotic nails. Patient will follow up in 10 weeks. Contact office with any questions/problems/concerns.

## 2022-11-15 NOTE — PROGRESS NOTES
11/15/2022 1:27 PM MARCELLE Braxton (:  1945) is a 76 y.o. female,New patient, here for evaluation of the following chief complaint(s):  Sinus Problem (Fu after allegra)      ASSESSMENT/PLAN:  1. Deviated septum    2. Nasal congestion    3. Seasonal allergic rhinitis due to pollen      1. Deviated septum  2. Nasal congestion  3. Seasonal allergic rhinitis due to pollen    Continue Astelin nasal spray twice daily  Continue Flonase nasal spray twice daily  Restart Allegra  Restart Singulair  Right deviated septum, we discussed that nasal sprays will only do so much to improve the congestion when she has structural abnormalities like this significantly restricted airflow on the right side. Only surgery will adequately address this but she has no interest in pursuing surgery. Follow-up in 6 weeks or so  Consider adding NeilMed sinus rinse    No follow-ups on file. SUBJECTIVE/OBJECTIVE:  Sinus Problem     77yo woman with a many year history of nasal congestion. She is established with an allergist, [facility], who diagnosed her with allergies to cat, mold, pollen, possible dog. She does have a dog. She also has multiple house plants inside that she has been told she should switch to synthetic plants. She endorses a nasal trauma in childhood when she fell off monkey bars and hit her nose and suffered a nasal bone fracture. She has been on Claritin and Flonase in the past but was taken off of these medications. She states she is also tried a nasal saline irrigation but was not able to use it well. She has been on Astelin nasal spray, twice daily, for about a year with some improvement in her congestion. CT sinus 7/3/2019:   SINUSES: The visualized paranasal sinuses and mastoid air cells demonstrate   no acute abnormality. She followed up about 1 month later. Overall she is doing well. She is using the Flonase and the Astelin.   She is sometimes using the nasal saline spray when she is feeling a bit dry. When she runs the fan and the air conditioner her nasal congestion and rhinitis are worse. Today she follows up about 5 months later. Is noticing worsening allergy symptoms. We discussed restarting her Allegra last time but she is only doing the Astelin and the Flonase. Past Medical History:   Diagnosis Date    Allergic conjunctivitis     (stable)    Chronic sinusitis     Constipation     Dizzy spells     Dry eye syndrome     Edentulous     (does not wear dentures)    Essential hypertension 7/6/2016    Fracture of lateral malleolus     (avulsion fracture at the tip of the lateral malleolus - right ankle). Hearing loss     Hypothyroidism     Interstitial cystitis     Irritable bowel syndrome     Keratitis     (secondary to dry eye syndrome)    Parotid gland enlargement     stasis, consider sjorgens    Pseudophakos     (bilateral)    Seasonal rhinitis     Stress incontinence     Type II or unspecified type diabetes mellitus without mention of complication, not stated as uncontrolled     Urethral stenosis     Xerostomia      Past Surgical History:   Procedure Laterality Date    APPENDECTOMY  07/24/1986    CATARACT REMOVAL WITH IMPLANT Right 09/02/2003    (Dr. Lemuel Fitzpatrick)    CATARACT REMOVAL WITH IMPLANT Left 08/12/2003    (Dr. Lemuel Fitzpatrick)    CHOLECYSTECTOMY  07/24/1986    COLONOSCOPY  09/08/2014    normal    CYSTOURETHROSCOPY/URETHRAL DILATION  02/08/2012    multiple    EYE SURGERY Bilateral 12/13/2018    Bilateral Transscleral cyclophotocoagulation G6 laser (micropulse) performed by Dorita Alcazar MD at [facility]  06/02/1977    (Dr. Kym Fernandez)    UPPER GASTROINTESTINAL ENDOSCOPY  06/04/2015    gastritis, biopsy x 2    UPPER GASTROINTESTINAL ENDOSCOPY  04/02/2018    Dr. Dyana Apodaca   antral gastric ulcer. esophagus re-dilated at the end of procedure.       UPPER GASTROINTESTINAL ENDOSCOPY  07/28/2021    [facility] EGD Dr. Bisi Bentley History       Tobacco History Smoking Status  Never Smoker      Smokeless Tobacco Use  Never Used      Tobacco Comment  Never smoked. María Santiago [facility], 2016. Alcohol History       Alcohol Use Status  No              Drug Use       Drug Use Status  No              Sexual Activity       Sexually Active  Not Asked                  Family History   Problem Relation Age of Onset    Heart Disease Mother     Heart Attack Mother     Arthritis Mother     Other Maternal Grandmother         (gout)    Allergies Daughter     Allergies Daughter     Other Brother         ( at age 21 secondary to auto accident).     Cataracts Neg Hx     Diabetes Neg Hx     Glaucoma Neg Hx      Current Outpatient Medications   Medication Instructions    azelastine (ASTELIN) 0.1 % nasal spray 2 sprays, Nasal, 2 TIMES DAILY, Use in each nostril as directed    blood glucose monitor strips Test one  time a day    blood glucose test strips (ONE TOUCH ULTRA TEST) strip use 1 TEST STRIP to TEST BLOOD SUGAR twice a day    fexofenadine (ALLEGRA) 180 mg, Oral, DAILY    fluticasone (FLONASE) 50 MCG/ACT nasal spray 2 sprays, Each Nostril, DAILY    gabapentin (NEURONTIN) 400 mg, Oral, 3 TIMES DAILY    glucose monitoring (FREESTYLE FREEDOM) kit 1 kit, Does not apply, 2 TIMES DAILY    Lancets MISC 1 each, Does not apply, DAILY    levothyroxine (SYNTHROID) 100 MCG tablet take 1 tablet by mouth once daily    levothyroxine (SYNTHROID) 125 MCG tablet 1 po daily    montelukast (SINGULAIR) 10 mg, Oral, DAILY    omeprazole (PRILOSEC) 20 MG delayed release capsule take 1 capsule by mouth twice a day    polyethyl glycol-propyl glycol 0.4-0.3 % (SYSTANE) 0.4-0.3 % ophthalmic solution 1 drop, Both Eyes, PRN    SOFT TOUCH LANCETS MISC 1 Device, Does not apply, 2 TIMES DAILY    timolol (TIMOPTIC) 0.5 % ophthalmic solution instill 1 drop into both eyes twice a day     Allergies   Allergen Reactions    Latex Rash    Aleve [Naproxen]      Stomach problems    Amoxicillin      Other reaction(s): mucositis    Aspirin Other (See Comments)     Dizziness and tears her stomach up    Dye [Iodides]      IV DYE    Influenza Vaccines     Reglan [Metoclopramide] Other (See Comments)     Dystonic reaction, involuntary movements       ENT ROS: positive for - nasal congestion    General: The patient is found to be alert and normally responsive female. Hearing: grossly normal, wears hearing aids  Voice: Clear   Skin: The skin has normal colour and turgor. Face: The facial contour is symmetric at rest and with movement. Ears: The pinnae have normal contours. Eye: The ocular movements are full and symmetric, the conjunctiva is unremarkable; sclera are anicteric, pupillary response is symmetric. No nystagmus is found. Nose:   The external nasal contour is normal  The nasal mucosa has a normal appearance, some excess nasal mucus  The nasal septum is deviated to the right. The turbinates have a normal appearance  The nares are patent without evidence of polyposis   Oral cavity:   Edentulous, mathew palatini, the oral mucosa is without lesions;  the tongue is symmetric with full mobility and is without fasciculation. The soft palate is symmetric. The oropharynx is unremarkable. Neck: The neck has a normal contour; no masses are found on palpation    Previous:  Fiberoptic examination of the upper airway using scope was conducted after first applying topical phenylephrine (1%) and lidocaine (4%) to the bilateral nasal cavity by spray. The endoscope was inserted and advanced through the bilateral side of the nose examining the mucosa and nasal structures. Right:  Inferior turbinate normal, unable to visualize middle meatus due to deviated septum, no polyps or purulence, excess mucus  Left:  Inferior turbinate normal, middle meatus clear, no polyps or purulence, excess mucus  Nasopharynx clear, ET patent, adenoid small. Septum deviated to the right.        An electronic signature was used to authenticate this note.     --Isaac Meng MD     11/15/2022 1:27 PM EST

## 2022-11-18 ENCOUNTER — TELEPHONE (OUTPATIENT)
Dept: FAMILY MEDICINE CLINIC | Age: 77
End: 2022-11-18

## 2022-11-18 DIAGNOSIS — R56.9 SEIZURES (HCC): Primary | ICD-10-CM

## 2022-11-21 NOTE — TELEPHONE ENCOUNTER
Spoke to patient and she declines coming in any sooner than next follow up appointment which is 12/20/22. Patient declines OV, UC, or ER. States she is doing fine and will call if she needs anything,   Patient is agreeable to see neurology.  Referral Placed

## 2022-11-21 NOTE — TELEPHONE ENCOUNTER
Per VO from Dr. Jay Loredo, set up with neurology if having seizures.    LM for patient to return call

## 2022-11-29 ENCOUNTER — TELEPHONE (OUTPATIENT)
Dept: FAMILY MEDICINE CLINIC | Age: 77
End: 2022-11-29

## 2022-11-29 NOTE — TELEPHONE ENCOUNTER
Patient called with a Fax number of [phone] and a phone number of [phone] for her pharmacy. Patient was not able to give a name of it.

## 2022-12-02 ENCOUNTER — TELEPHONE (OUTPATIENT)
Dept: OTOLARYNGOLOGY | Age: 77
End: 2022-12-02

## 2022-12-02 NOTE — TELEPHONE ENCOUNTER
Patient called and was in for appointment on 11/15 and was prescribed allegra. Patient states she cannot take this anymore because it is making her more dry to where she can't breathe. Please advise. Patients phone number is [phone].

## 2022-12-02 NOTE — TELEPHONE ENCOUNTER
I spoke to [facility] and she said the Dahiana Kirkland made it hard for her to talk because it dried her out so much that she quit taking it. She also mentioned that every fall she gets sinus infection for which she is given z-pack I believe from Dr. Sumanth Grubbs or urgent care  possibly. I ask if she had any yellow or green drainage from her nose and she said no that she is not blowing anything out. I let her know that [facility] Randall will not be her until Monday and she said she was going to call Sumanth Randall to see about  a z pack. I explained to her that many people have been seen in urgent care recently with upper respiratory infections and she might think about seeing urgent care to be evaluated.   She stated she will call Kuld[facility] Randall office first.

## 2022-12-19 ENCOUNTER — OFFICE VISIT (OUTPATIENT)
Dept: OTOLARYNGOLOGY | Age: 77
End: 2022-12-19
Payer: MEDICARE

## 2022-12-19 VITALS
OXYGEN SATURATION: 98 % | RESPIRATION RATE: 18 BRPM | SYSTOLIC BLOOD PRESSURE: 120 MMHG | WEIGHT: 165 LBS | DIASTOLIC BLOOD PRESSURE: 80 MMHG | HEART RATE: 84 BPM | HEIGHT: 65 IN | BODY MASS INDEX: 27.49 KG/M2

## 2022-12-19 DIAGNOSIS — H91.93 BILATERAL HEARING LOSS, UNSPECIFIED HEARING LOSS TYPE: Primary | ICD-10-CM

## 2022-12-19 PROCEDURE — 99214 OFFICE O/P EST MOD 30 MIN: CPT | Performed by: OTOLARYNGOLOGY

## 2022-12-19 PROCEDURE — G8427 DOCREV CUR MEDS BY ELIG CLIN: HCPCS | Performed by: OTOLARYNGOLOGY

## 2022-12-19 PROCEDURE — 3074F SYST BP LT 130 MM HG: CPT | Performed by: OTOLARYNGOLOGY

## 2022-12-19 PROCEDURE — G8417 CALC BMI ABV UP PARAM F/U: HCPCS | Performed by: OTOLARYNGOLOGY

## 2022-12-19 PROCEDURE — 3078F DIAST BP <80 MM HG: CPT | Performed by: OTOLARYNGOLOGY

## 2022-12-19 PROCEDURE — 1123F ACP DISCUSS/DSCN MKR DOCD: CPT | Performed by: OTOLARYNGOLOGY

## 2022-12-19 PROCEDURE — 99213 OFFICE O/P EST LOW 20 MIN: CPT | Performed by: OTOLARYNGOLOGY

## 2022-12-19 PROCEDURE — 1036F TOBACCO NON-USER: CPT | Performed by: OTOLARYNGOLOGY

## 2022-12-19 PROCEDURE — G8484 FLU IMMUNIZE NO ADMIN: HCPCS | Performed by: OTOLARYNGOLOGY

## 2022-12-19 PROCEDURE — 1090F PRES/ABSN URINE INCON ASSESS: CPT | Performed by: OTOLARYNGOLOGY

## 2022-12-19 PROCEDURE — G8399 PT W/DXA RESULTS DOCUMENT: HCPCS | Performed by: OTOLARYNGOLOGY

## 2022-12-19 ASSESSMENT — ENCOUNTER SYMPTOMS: SINUS COMPLAINT: 1

## 2022-12-19 NOTE — PROGRESS NOTES
None 2022 1:27 PM MARCELLE Watts (:  1945) is a 76 y.o. female,New patient, here for evaluation of the following chief complaint(s):  Cerumen Impaction (Believes needs ears cleaned wears hearing aids)      ASSESSMENT/PLAN:  1. Bilateral hearing loss, unspecified hearing loss type      1. Bilateral hearing loss, unspecified hearing loss type  -     External Referral To Audiology  Healthy ear exam today. No cerumen impaction. Unfortunately we do not know her history of hearing loss or hearing status without a comprehensive audiogram.  Audiogram   Will call with results     No follow-ups on file. SUBJECTIVE/OBJECTIVE:  Sinus Problem     77yo woman with a many year history of nasal congestion. She is established with an allergist, Joanne Menon, who diagnosed her with allergies to cat, mold, pollen, possible dog. She does have a dog. She also has multiple house plants inside that she has been told she should switch to synthetic plants. She endorses a nasal trauma in childhood when she fell off monkey bars and hit her nose and suffered a nasal bone fracture. She has been on Claritin and Flonase in the past but was taken off of these medications. She states she is also tried a nasal saline irrigation but was not able to use it well. She has been on Astelin nasal spray, twice daily, for about a year with some improvement in her congestion. CT sinus 7/3/2019:   SINUSES: The visualized paranasal sinuses and mastoid air cells demonstrate   no acute abnormality. She followed up about 1 month later. Overall she is doing well. She is using the Flonase and the Astelin. She is sometimes using the nasal saline spray when she is feeling a bit dry. When she runs the fan and the air conditioner her nasal congestion and rhinitis are worse. Followed up about 5 months later. Is noticing worsening allergy symptoms.   We discussed restarting her Allegra last time but she is only doing the Astelin and the Flonase. Today she comes in today with concerns about her ears. She went to [facility] about 5 6 months ago and was prescribed hearing aids. Our office was not involved in this process. She follows up today because she is struggled with feeling like her hearing aids are not working. She was recently over to [facility] who said she failed a hearing test and potentially one of the hearing aids is not working appropriately. They have been cleaned out and she is been back to [facility] multiple times without any improvement. She was told that maybe her ears need to be flushed out. Past Medical History:   Diagnosis Date    Allergic conjunctivitis     (stable)    Chronic sinusitis     Constipation     Dizzy spells     Dry eye syndrome     Edentulous     (does not wear dentures)    Essential hypertension 7/6/2016    Fracture of lateral malleolus     (avulsion fracture at the tip of the lateral malleolus - right ankle).     Hearing loss     Hypothyroidism     Interstitial cystitis     Irritable bowel syndrome     Keratitis     (secondary to dry eye syndrome)    Parotid gland enlargement     stasis, consider sjorgens    Pseudophakos     (bilateral)    Seasonal rhinitis     Stress incontinence     Type II or unspecified type diabetes mellitus without mention of complication, not stated as uncontrolled     Urethral stenosis     Xerostomia      Past Surgical History:   Procedure Laterality Date    APPENDECTOMY  07/24/1986    CATARACT REMOVAL WITH IMPLANT Right 09/02/2003    (Dr. Tavia Martinez)    CATARACT REMOVAL WITH IMPLANT Left 08/12/2003    (Dr. Tavia Martinez)    CHOLECYSTECTOMY  07/24/1986    COLONOSCOPY  09/08/2014    normal    CYSTOURETHROSCOPY/URETHRAL DILATION  02/08/2012    multiple    EYE SURGERY Bilateral 12/13/2018    Bilateral Transscleral cyclophotocoagulation G6 laser (micropulse) performed by Nadia March MD at [facility]  06/02/1977    (Dr. Tyrel Brenner) UPPER GASTROINTESTINAL ENDOSCOPY  2015    gastritis, biopsy x 2    UPPER GASTROINTESTINAL ENDOSCOPY  2018    Dr. Glen Michaels   antral gastric ulcer. esophagus re-dilated at the end of procedure. UPPER GASTROINTESTINAL ENDOSCOPY  2021    [facility] EGD Dr. Toño Fernandez History       Tobacco History       Smoking Status  Never Smoker      Smokeless Tobacco Use  Never Used      Tobacco Comment  Never smoked. Medhat Le[facility], 2016. Alcohol History       Alcohol Use Status  No              Drug Use       Drug Use Status  No              Sexual Activity       Sexually Active  Not Asked                  Family History   Problem Relation Age of Onset    Heart Disease Mother     Heart Attack Mother     Arthritis Mother     Other Maternal Grandmother         (gout)    Allergies Daughter     Allergies Daughter     Other Brother         ( at age 21 secondary to auto accident).     Cataracts Neg Hx     Diabetes Neg Hx     Glaucoma Neg Hx      Current Outpatient Medications   Medication Instructions    azelastine (ASTELIN) 0.1 % nasal spray 2 sprays, Nasal, 2 TIMES DAILY, Use in each nostril as directed    blood glucose monitor strips Test one  time a day    blood glucose test strips (ONE TOUCH ULTRA TEST) strip use 1 TEST STRIP to TEST BLOOD SUGAR twice a day    fluticasone (FLONASE) 50 MCG/ACT nasal spray 2 sprays, Each Nostril, DAILY    gabapentin (NEURONTIN) 400 mg, Oral, 3 TIMES DAILY    glucose monitoring (FREESTYLE FREEDOM) kit 1 kit, Does not apply, 2 TIMES DAILY    Lancets MISC 1 each, Does not apply, DAILY    levothyroxine (SYNTHROID) 100 MCG tablet take 1 tablet by mouth once daily    levothyroxine (SYNTHROID) 125 MCG tablet 1 po daily    montelukast (SINGULAIR) 10 mg, Oral, DAILY    omeprazole (PRILOSEC) 20 MG delayed release capsule take 1 capsule by mouth twice a day    polyethyl glycol-propyl glycol 0.4-0.3 % (SYSTANE) 0.4-0.3 % ophthalmic solution 1 drop, Both Eyes, PRN    SOFT TOUCH LANCETS MISC 1 Device, Does not apply, 2 TIMES DAILY    timolol (TIMOPTIC) 0.5 % ophthalmic solution instill 1 drop into both eyes twice a day     Allergies   Allergen Reactions    Latex Rash    Aleve [Naproxen]      Stomach problems    Amoxicillin      Other reaction(s): mucositis    Aspirin Other (See Comments)     Dizziness and tears her stomach up    Dye [Iodides]      IV DYE    Influenza Vaccines     Reglan [Metoclopramide] Other (See Comments)     Dystonic reaction, involuntary movements       ENT ROS: positive for - nasal congestion    General: The patient is found to be alert and normally responsive female. Hearing: grossly normal, wears hearing aids  Voice: Clear   Skin: The skin has normal colour and turgor. AS: EAC clear, TM intact, no erythema, retraction, effusion  AD: EAC clear, TM intact, no erythema, retraction, effusion  Face: The facial contour is symmetric at rest and with movement. Ears: The pinnae have normal contours. Eye: The ocular movements are full and symmetric, the conjunctiva is unremarkable; sclera are anicteric, pupillary response is symmetric. No nystagmus is found. Nose:   The external nasal contour is normal  The nasal mucosa has a normal appearance, some excess nasal mucus  The nasal septum is deviated to the right. The turbinates have a normal appearance  The nares are patent without evidence of polyposis   Oral cavity:   Edentulous, amthew palatini, the oral mucosa is without lesions;  the tongue is symmetric with full mobility and is without fasciculation. The soft palate is symmetric. The oropharynx is unremarkable. Neck: The neck has a normal contour; no masses are found on palpation    Previous:  Fiberoptic examination of the upper airway using scope was conducted after first applying topical phenylephrine (1%) and lidocaine (4%) to the bilateral nasal cavity by spray.   The endoscope was inserted and advanced through the bilateral side of the nose examining the mucosa and nasal structures. Right:  Inferior turbinate normal, unable to visualize middle meatus due to deviated septum, no polyps or purulence, excess mucus  Left:  Inferior turbinate normal, middle meatus clear, no polyps or purulence, excess mucus  Nasopharynx clear, ET patent, adenoid small. Septum deviated to the right. An electronic signature was used to authenticate this note.     --Óscar Turner MD     12/19/2022 1:27 PM EST

## 2022-12-20 ENCOUNTER — OFFICE VISIT (OUTPATIENT)
Dept: FAMILY MEDICINE CLINIC | Age: 77
End: 2022-12-20
Payer: MEDICARE

## 2022-12-20 VITALS — WEIGHT: 164 LBS | HEIGHT: 65 IN | BODY MASS INDEX: 27.32 KG/M2

## 2022-12-20 DIAGNOSIS — E11.9 TYPE 2 DIABETES MELLITUS WITHOUT COMPLICATION, UNSPECIFIED WHETHER LONG TERM INSULIN USE (HCC): Primary | ICD-10-CM

## 2022-12-20 DIAGNOSIS — G62.9 NEUROPATHY: ICD-10-CM

## 2022-12-20 PROCEDURE — 3051F HG A1C>EQUAL 7.0%<8.0%: CPT | Performed by: FAMILY MEDICINE

## 2022-12-20 PROCEDURE — 99213 OFFICE O/P EST LOW 20 MIN: CPT | Performed by: FAMILY MEDICINE

## 2022-12-20 PROCEDURE — 99214 OFFICE O/P EST MOD 30 MIN: CPT | Performed by: FAMILY MEDICINE

## 2022-12-20 PROCEDURE — G8427 DOCREV CUR MEDS BY ELIG CLIN: HCPCS | Performed by: FAMILY MEDICINE

## 2022-12-20 PROCEDURE — 1123F ACP DISCUSS/DSCN MKR DOCD: CPT | Performed by: FAMILY MEDICINE

## 2022-12-20 PROCEDURE — 1036F TOBACCO NON-USER: CPT | Performed by: FAMILY MEDICINE

## 2022-12-20 PROCEDURE — G8399 PT W/DXA RESULTS DOCUMENT: HCPCS | Performed by: FAMILY MEDICINE

## 2022-12-20 PROCEDURE — G8417 CALC BMI ABV UP PARAM F/U: HCPCS | Performed by: FAMILY MEDICINE

## 2022-12-20 PROCEDURE — 1090F PRES/ABSN URINE INCON ASSESS: CPT | Performed by: FAMILY MEDICINE

## 2022-12-20 PROCEDURE — G8484 FLU IMMUNIZE NO ADMIN: HCPCS | Performed by: FAMILY MEDICINE

## 2022-12-20 SDOH — ECONOMIC STABILITY: FOOD INSECURITY: WITHIN THE PAST 12 MONTHS, THE FOOD YOU BOUGHT JUST DIDN'T LAST AND YOU DIDN'T HAVE MONEY TO GET MORE.: NEVER TRUE

## 2022-12-20 SDOH — ECONOMIC STABILITY: FOOD INSECURITY: WITHIN THE PAST 12 MONTHS, YOU WORRIED THAT YOUR FOOD WOULD RUN OUT BEFORE YOU GOT MONEY TO BUY MORE.: NEVER TRUE

## 2022-12-20 ASSESSMENT — ENCOUNTER SYMPTOMS
EYES NEGATIVE: 1
RESPIRATORY NEGATIVE: 1
GASTROINTESTINAL NEGATIVE: 1

## 2022-12-20 ASSESSMENT — SOCIAL DETERMINANTS OF HEALTH (SDOH): HOW HARD IS IT FOR YOU TO PAY FOR THE VERY BASICS LIKE FOOD, HOUSING, MEDICAL CARE, AND HEATING?: NOT HARD AT ALL

## 2022-12-20 NOTE — PROGRESS NOTES
Subjective:      Patient ID: Hoang Rose is a 76 y.o. female. HPI  acute visit for bs concerns. Right foot with numbness and altered sensation from mid foot down. Great toe more painful then the others at times. Recurrent great nail loss. She feels fungal infection leading to repeated toenail loss. Blood sugars 60 -200. Last a1c at 7.4% august 2022. She has gone back and forth on the metformin. Stopped it and saw higher readings, restarted once a day with some improvement. Past Medical History:   Diagnosis Date    Allergic conjunctivitis     (stable)    Chronic sinusitis     Constipation     Dizzy spells     Dry eye syndrome     Edentulous     (does not wear dentures)    Essential hypertension 7/6/2016    Fracture of lateral malleolus     (avulsion fracture at the tip of the lateral malleolus - right ankle).     Hearing loss     Hypothyroidism     Interstitial cystitis     Irritable bowel syndrome     Keratitis     (secondary to dry eye syndrome)    Parotid gland enlargement     stasis, consider sjorgens    Pseudophakos     (bilateral)    Seasonal rhinitis     Stress incontinence     Type II or unspecified type diabetes mellitus without mention of complication, not stated as uncontrolled     Urethral stenosis     Xerostomia      Past Surgical History:   Procedure Laterality Date    APPENDECTOMY  07/24/1986    CATARACT REMOVAL WITH IMPLANT Right 09/02/2003    (Dr. Shannan Garcias)    CATARACT REMOVAL WITH IMPLANT Left 08/12/2003    (Dr. Shannan Garcias)    CHOLECYSTECTOMY  07/24/1986    COLONOSCOPY  09/08/2014    normal    CYSTOURETHROSCOPY/URETHRAL DILATION  02/08/2012    multiple    EYE SURGERY Bilateral 12/13/2018    Bilateral Transscleral cyclophotocoagulation G6 laser (micropulse) performed by Andre Gibson MD at [facility]  06/02/1977    (Dr. Corky Hampton)    UPPER GASTROINTESTINAL ENDOSCOPY  06/04/2015    gastritis, biopsy x 2    UPPER GASTROINTESTINAL ENDOSCOPY  04/02/2018     [facility]   antral gastric ulcer. esophagus re-dilated at the end of procedure. UPPER GASTROINTESTINAL ENDOSCOPY  07/28/2021    [facility] EGD Dr. Jerrica Oliver     Current Outpatient Medications   Medication Sig Dispense Refill    metFORMIN (GLUCOPHAGE) 500 MG tablet Take 500 mg by mouth 2 times daily (with meals)      montelukast (SINGULAIR) 10 MG tablet Take 1 tablet by mouth daily 30 tablet 3    timolol (TIMOPTIC) 0.5 % ophthalmic solution instill 1 drop into both eyes twice a day 5 mL 5    levothyroxine (SYNTHROID) 100 MCG tablet take 1 tablet by mouth once daily 90 tablet 3    omeprazole (PRILOSEC) 20 MG delayed release capsule take 1 capsule by mouth twice a day 180 capsule 1    blood glucose monitor strips Test one  time a day 100 strip 3    gabapentin (NEURONTIN) 400 MG capsule Take 1 capsule by mouth 3 times daily for 360 days. 270 capsule 3    azelastine (ASTELIN) 0.1 % nasal spray 2 sprays by Nasal route 2 times daily Use in each nostril as directed       fluticasone (FLONASE) 50 MCG/ACT nasal spray 2 sprays by Each Nostril route daily 48 g 5    polyethyl glycol-propyl glycol 0.4-0.3 % (SYSTANE) 0.4-0.3 % ophthalmic solution Place 1 drop into both eyes as needed for Dry Eyes 1 Bottle 5    blood glucose test strips (ONE TOUCH ULTRA TEST) strip use 1 TEST STRIP to TEST BLOOD SUGAR twice a day 100 strip 3    glucose monitoring (FREESTYLE FREEDOM) kit 1 kit by Does not apply route 2 times daily 1 kit 0    Lancets MISC 1 each by Does not apply route daily 100 each 5    SOFT TOUCH LANCETS MISC 1 Device by Does not apply route 2 times daily 200 each 3     No current facility-administered medications for this visit.      Allergies   Allergen Reactions    Latex Rash    Aleve [Naproxen]      Stomach problems    Amoxicillin      Other reaction(s): mucositis    Aspirin Other (See Comments)     Dizziness and tears her stomach up    Dye [Iodides]      IV DYE    Influenza Vaccines     Reglan [Metoclopramide] Other (See Comments)     Dystonic reaction, involuntary movements            Review of Systems   Constitutional: Negative. HENT: Negative. Eyes: Negative. Respiratory: Negative. Cardiovascular: Negative. Gastrointestinal: Negative. Genitourinary: Negative. Musculoskeletal:  Positive for arthralgias, myalgias and neck stiffness. Skin: Negative. Neurological: Negative. Psychiatric/Behavioral: Negative. Objective:   Physical Exam  HENT:      Head: Normocephalic. Cardiovascular:      Rate and Rhythm: Normal rate. Pulses: Normal pulses. Pulmonary:      Effort: Pulmonary effort is normal.   Musculoskeletal:      Cervical back: Normal range of motion. Comments: Numbness described in atypical distribution of fingers 2--5. Skin:     General: Skin is warm. Neurological:      General: No focal deficit present. Mental Status: She is alert. Sensory: Sensory deficit (both feet with numbness and burning paresthesias, getting worse) present. Psychiatric:         Mood and Affect: Mood normal.         Thought Content: Thought content normal.     Ht 5' 5\" (1.651 m)   Wt 164 lb (74.4 kg)   LMP  (LMP Unknown)   BMI 27.29 kg/m²     Assessment:      Encounter Diagnoses   Name Primary? Type 2 diabetes mellitus without complication, unspecified whether long term insulin use (HCC) Yes    Neuropathy            Plan:      Diabetes under fair control at last check . She has concerns for hypoglycemia potential, reminder that her current medication should not lead to low bs readings. Rec. Better compliance. Recheck as scheduled. She would like to review carb. Counting and eating plans with diabetic educator. She feels she needs a review. Neuropathy pain in the feet. Mid foot down to toes. Great toe more painful off and on.  Rec.cont. gabapentin. Offered increase in dose for increasing, painful paresthesias.            Mary Parsons MD

## 2023-01-10 ENCOUNTER — TELEPHONE (OUTPATIENT)
Dept: FAMILY MEDICINE CLINIC | Age: 78
End: 2023-01-10

## 2023-01-10 DIAGNOSIS — G62.9 NEUROPATHY: ICD-10-CM

## 2023-01-10 RX ORDER — GABAPENTIN 400 MG/1
400 CAPSULE ORAL 3 TIMES DAILY
Qty: 270 CAPSULE | Refills: 3 | Status: CANCELLED | OUTPATIENT
Start: 2023-01-10 | End: 2024-01-05

## 2023-01-10 NOTE — TELEPHONE ENCOUNTER
Message left on Funmilayo's voice mail to return my call-wanting to make sure she wants to switch pharmacies

## 2023-01-10 NOTE — TELEPHONE ENCOUNTER
Pharmacy called needing clarification on the Gabapentin. Needs escribed. The Omeprazole had no history. Is it a current medication for this patient. The Levothroxine needs a sig or directions. The Carafate directions need clarified.   Pharmacy asked for call back at [phone]

## 2023-01-10 NOTE — TELEPHONE ENCOUNTER
Writer called. This is [facility]. At [place] [id number]. [address]  [address] [place] [address]. Fax Number is [phone]. This pharmacy is not able to fax anything to this office at this time. Did say they made contact with the patient on 11/22/2022 by the insurance company.

## 2023-01-12 RX ORDER — OMEPRAZOLE 20 MG/1
CAPSULE, DELAYED RELEASE ORAL
Qty: 180 CAPSULE | Refills: 1 | Status: SHIPPED | OUTPATIENT
Start: 2023-01-12

## 2023-01-12 RX ORDER — SUCRALFATE 1 G/1
TABLET ORAL
Qty: 90 TABLET | Refills: 1 | Status: SHIPPED | OUTPATIENT
Start: 2023-01-12

## 2023-01-12 RX ORDER — LEVOTHYROXINE SODIUM 0.1 MG/1
TABLET ORAL
Qty: 90 TABLET | Refills: 1 | Status: SHIPPED | OUTPATIENT
Start: 2023-01-12

## 2023-01-12 RX ORDER — GABAPENTIN 400 MG/1
400 CAPSULE ORAL 3 TIMES DAILY
Qty: 270 CAPSULE | Refills: 0 | Status: SHIPPED | OUTPATIENT
Start: 2023-01-12 | End: 2023-04-12

## 2023-01-12 NOTE — TELEPHONE ENCOUNTER
Spoke to Funmilayo-she verified she was switching pharmaccies.  Meds pended for refill   [place] reviewed for Gabapentin   10/21/22 #270/90 days

## 2023-01-24 ENCOUNTER — OFFICE VISIT (OUTPATIENT)
Dept: PODIATRY | Age: 78
End: 2023-01-24
Payer: MEDICARE

## 2023-01-24 ENCOUNTER — OFFICE VISIT (OUTPATIENT)
Dept: OTOLARYNGOLOGY | Age: 78
End: 2023-01-24
Payer: MEDICARE

## 2023-01-24 VITALS
BODY MASS INDEX: 27.32 KG/M2 | OXYGEN SATURATION: 95 % | SYSTOLIC BLOOD PRESSURE: 120 MMHG | HEIGHT: 65 IN | DIASTOLIC BLOOD PRESSURE: 78 MMHG | RESPIRATION RATE: 16 BRPM | HEART RATE: 77 BPM | WEIGHT: 164 LBS

## 2023-01-24 VITALS
RESPIRATION RATE: 20 BRPM | WEIGHT: 164 LBS | HEART RATE: 76 BPM | SYSTOLIC BLOOD PRESSURE: 120 MMHG | BODY MASS INDEX: 27.29 KG/M2 | DIASTOLIC BLOOD PRESSURE: 78 MMHG

## 2023-01-24 DIAGNOSIS — H90.3 SENSORINEURAL HEARING LOSS (SNHL) OF BOTH EARS: Primary | ICD-10-CM

## 2023-01-24 DIAGNOSIS — B35.1 DERMATOPHYTOSIS OF NAIL: ICD-10-CM

## 2023-01-24 DIAGNOSIS — E11.49 DM (DIABETES MELLITUS), TYPE 2 WITH NEUROLOGICAL COMPLICATIONS (HCC): Primary | ICD-10-CM

## 2023-01-24 PROCEDURE — G8484 FLU IMMUNIZE NO ADMIN: HCPCS | Performed by: OTOLARYNGOLOGY

## 2023-01-24 PROCEDURE — G8399 PT W/DXA RESULTS DOCUMENT: HCPCS | Performed by: OTOLARYNGOLOGY

## 2023-01-24 PROCEDURE — 1036F TOBACCO NON-USER: CPT | Performed by: OTOLARYNGOLOGY

## 2023-01-24 PROCEDURE — 3074F SYST BP LT 130 MM HG: CPT | Performed by: OTOLARYNGOLOGY

## 2023-01-24 PROCEDURE — 11720 DEBRIDE NAIL 1-5: CPT | Performed by: PODIATRIST

## 2023-01-24 PROCEDURE — 1123F ACP DISCUSS/DSCN MKR DOCD: CPT | Performed by: OTOLARYNGOLOGY

## 2023-01-24 PROCEDURE — 99213 OFFICE O/P EST LOW 20 MIN: CPT | Performed by: OTOLARYNGOLOGY

## 2023-01-24 PROCEDURE — 99999 PR OFFICE/OUTPT VISIT,PROCEDURE ONLY: CPT | Performed by: PODIATRIST

## 2023-01-24 PROCEDURE — 3078F DIAST BP <80 MM HG: CPT | Performed by: OTOLARYNGOLOGY

## 2023-01-24 PROCEDURE — 1090F PRES/ABSN URINE INCON ASSESS: CPT | Performed by: OTOLARYNGOLOGY

## 2023-01-24 PROCEDURE — 99212 OFFICE O/P EST SF 10 MIN: CPT

## 2023-01-24 PROCEDURE — G8417 CALC BMI ABV UP PARAM F/U: HCPCS | Performed by: OTOLARYNGOLOGY

## 2023-01-24 PROCEDURE — G8427 DOCREV CUR MEDS BY ELIG CLIN: HCPCS | Performed by: OTOLARYNGOLOGY

## 2023-01-24 NOTE — PROGRESS NOTES
Foot Care Worksheet  PCP: Yovana Quiros MD  Last visit: 12 / 20 / 2022    Nail description: Thick , Yellow , Crumbly , Marked limitation of ambulation     Pain resulting from thickened and dystrophy of nail plate No    Nails involved  Right   1  (T5-T9)  Left     5  (TA-T4)    Q7 1 Class A Finding - Non traumatic amputation of foot No    Q8 2 Class B Findings - Absent DP pulse No, Absent PT pulse No, Advanced tropic changes (3 required) Yes    Decrease hair growth Yes, Nail changes/thickening Yes, Pigmented changes/discoloration Yes, Skin texture (thin, shiny) No, Skin color (rubor/redness) No    Q9 1 Class B and 2 Class C Findings  Claudication No, Temperature change Yes, Paresthesia Yes, Burning No, Edema Yes    Subjective:  Randy  is a 76 y.o. female who presents to the office today for routine DM foot care. .  Currently denies F/C/N/V. Allergies   Allergen Reactions    Latex Rash    Aleve [Naproxen]      Stomach problems    Amoxicillin      Other reaction(s): mucositis    Aspirin Other (See Comments)     Dizziness and tears her stomach up    Dye [Iodides]      IV DYE    Influenza Vaccines     Reglan [Metoclopramide] Other (See Comments)     Dystonic reaction, involuntary movements       Past Medical History:   Diagnosis Date    Allergic conjunctivitis     (stable)    Chronic sinusitis     Constipation     Dizzy spells     Dry eye syndrome     Edentulous     (does not wear dentures)    Essential hypertension 7/6/2016    Fracture of lateral malleolus     (avulsion fracture at the tip of the lateral malleolus - right ankle).     Hearing loss     Hypothyroidism     Interstitial cystitis     Irritable bowel syndrome     Keratitis     (secondary to dry eye syndrome)    Parotid gland enlargement     stasis, consider sjorgens    Pseudophakos     (bilateral)    Seasonal rhinitis     Stress incontinence     Type II or unspecified type diabetes mellitus without mention of complication, not stated as uncontrolled     Urethral stenosis     Xerostomia        Prior to Admission medications    Medication Sig Start Date End Date Taking? Authorizing Provider   gabapentin (NEURONTIN) 400 MG capsule Take 1 capsule by mouth 3 times daily for 90 days.  1/12/23 4/12/23 Yes Cassia Arita MD   omeprazole (PRILOSEC) 20 MG delayed release capsule take 1 capsule by mouth twice a day 1/12/23  Yes Cassia Arita MD   levothyroxine (SYNTHROID) 100 MCG tablet take 1 tablet by mouth once daily 1/12/23  Yes Cassia Arita MD   sucralfate (CARAFATE) 1 GM tablet take 1 tablet by mouth once daily 1/12/23  Yes Cassia Arita MD   metFORMIN (GLUCOPHAGE) 500 MG tablet Take 500 mg by mouth 2 times daily (with meals)   Yes Historical Provider, MD   montelukast (SINGULAIR) 10 MG tablet Take 1 tablet by mouth daily 11/15/22  Yes Jamila Sharpe MD   timolol (TIMOPTIC) 0.5 % ophthalmic solution instill 1 drop into both eyes twice a day 9/26/22  Yes Apolonia Hall, OD   blood glucose monitor strips Test one  time a day 8/24/22  Yes Cassia Arita MD   glucose monitoring (FREESTYLE FREEDOM) kit 1 kit by Does not apply route 2 times daily 4/27/22  Yes Cassia Arita MD   azelastine (ASTELIN) 0.1 % nasal spray 2 sprays by Nasal route 2 times daily Use in each nostril as directed    Yes Historical Provider, MD   fluticasone (FLONASE) 50 MCG/ACT nasal spray 2 sprays by Each Nostril route daily 3/9/22  Yes Jamila Sharpe MD   Lancets MISC 1 each by Does not apply route daily 8/24/21  Yes Cassia Arita MD   polyethyl glycol-propyl glycol 0.4-0.3 % (SYSTANE) 0.4-0.3 % ophthalmic solution Place 1 drop into both eyes as needed for Dry Eyes 6/22/21  Yes Apolonia Hall, OD   blood glucose test strips (ONE TOUCH ULTRA TEST) strip use 1 TEST STRIP to TEST BLOOD SUGAR twice a day 3/18/20  Yes Cassia Arita MD   SOFT TOUCH LANCETS MISC 1 Device by Does not apply route 2 times daily 8/16/16  Yes Cassia Arita MD       Past Surgical History:   Procedure Laterality Date    APPENDECTOMY  1986    CATARACT REMOVAL WITH IMPLANT Right 2003    (Dr. Florencio Weaver)    CATARACT REMOVAL WITH IMPLANT Left 2003    (Dr. Florencio Weaver)    [facility]  1986    COLONOSCOPY  2014    normal    CYSTOURETHROSCOPY/URETHRAL DILATION  2012    multiple    EYE SURGERY Bilateral 2018    Bilateral Transscleral cyclophotocoagulation G6 laser (micropulse) performed by Gloria Rousseau MD at [facility]  1977    (Dr. Sinai Titus)    UPPER GASTROINTESTINAL ENDOSCOPY  2015    gastritis, biopsy x 2    UPPER GASTROINTESTINAL ENDOSCOPY  2018    Dr. Malcolm Qiu   antral gastric ulcer. esophagus re-dilated at the end of procedure. UPPER GASTROINTESTINAL ENDOSCOPY  2021    [facility] EGD Dr. Gilbert Murdock History   Problem Relation Age of Onset    Heart Disease Mother     Heart Attack Mother     Arthritis Mother     Other Maternal Grandmother         (gout)    Allergies Daughter     Allergies Daughter     Other Brother         ( at age 21 secondary to auto accident). Cataracts Neg Hx     Diabetes Neg Hx     Glaucoma Neg Hx        Social History     Tobacco Use    Smoking status: Never    Smokeless tobacco: Never    Tobacco comments:     Never smoked. Dorcas Alex [facility], 2016. Substance Use Topics    Alcohol use: No     Alcohol/week: 0.0 standard drinks       Review of Systems: All 12 systems reviewed and pertinent positives noted above. Lower Extremity Physical Examination:     Vitals:   Vitals:    23 1421   BP: 120/78   Pulse: 76   Resp: 20     General: AAO x 3 in NAD.      Vascular: DP and PT pulses palpable 2/4, bilateral.  CFT <3 seconds, bilateral.  Hair growth present to the level of the digits, bilateral.  Edema present, bilateral.  Varicosities present, bilateral. Erythema absent, bilateral. Distal Rubor absent bilateral.  Temperature within normal limits bilateral. Hyperpigmentation present bilateral. Atrophic skin yes. Neurological: Sensation Impaired to light touch to level of digits, bilateral.  Protective sensation intact  10/10 sites via 5.07/10g [place] Monofilament, bilateral.  negative Tinel's, bilateral.  negative Valleix sign, bilateral.  Vibratory abnormal  bilateral.  Reflexes Decreased bilateral.  Paresthesias positive. Dysthesias positive. Sharp/dull abnormal  bilateral.  Musculoskeletal: Muscle strength 5/5, bilateral.  Pain absent upon palpation bilateral. Normal medial longitudinal arch, bilateral.  Ankle ROM decresed,bilateral.  1st MPJ ROM within normal limits, bilateral.  Dorsally contracted digits absent. No other foot deformities. Integument: Warm, dry, supple, bilateral.  Open lesion absent, bilateral.  Interdigital maceration absent to web spaces bilateral.   Nails left 5 and right 1 thickened, dystrophic and crumbly, discolored with subungual debris. .  Fissures absent, bilateral. Hyperkeratotic tissue is absent. Asessment: Patient is a 76 y.o. female with:    Diagnosis Orders   1. DM (diabetes mellitus), type 2 with neurological complications ([facility] [address]        2. Dermatophytosis of nail            Plan: Patient examined and evaluated. Current condition and treatment options discussed in detail. DM foot ed and exam  All nails as mentioned above debrided in length and thickness. Patient advised OTC methods for treatment of the mycotic nails. Patient will follow up in 10 weeks. Contact office with any questions/problems/concerns.

## 2023-01-25 ASSESSMENT — ENCOUNTER SYMPTOMS: SINUS COMPLAINT: 1

## 2023-01-25 NOTE — PROGRESS NOTES
None 2023 1:27 PM MARCELLE Ortega (:  1945) is a 76 y.o. female,New patient, here for evaluation of the following chief complaint(s):  Hearing Problem (Fu audiogram) and Sinus Problem (Fu allegra and astelin)      ASSESSMENT/PLAN:  1. Sensorineural hearing loss (SNHL) of both ears      1. Sensorineural hearing loss (SNHL) of both ears  Healthy ear exam today. No cerumen impaction. Continue hearing aids    No follow-ups on file. SUBJECTIVE/OBJECTIVE:  Sinus Problem     77yo woman with a many year history of nasal congestion. She is established with an allergist, Lenin Guerra, who diagnosed her with allergies to cat, mold, pollen, possible dog. She does have a dog. She also has multiple house plants inside that she has been told she should switch to synthetic plants. She endorses a nasal trauma in childhood when she fell off monkey bars and hit her nose and suffered a nasal bone fracture. She has been on Claritin and Flonase in the past but was taken off of these medications. She states she is also tried a nasal saline irrigation but was not able to use it well. She has been on Astelin nasal spray, twice daily, for about a year with some improvement in her congestion. CT sinus 7/3/2019:   SINUSES: The visualized paranasal sinuses and mastoid air cells demonstrate   no acute abnormality. She followed up about 1 month later. Overall she is doing well. She is using the Flonase and the Astelin. She is sometimes using the nasal saline spray when she is feeling a bit dry. When she runs the fan and the air conditioner her nasal congestion and rhinitis are worse. Followed up about 5 months later. Is noticing worsening allergy symptoms. We discussed restarting her Allegra last time but she is only doing the Astelin and the Flonase. We last saw her with concerns about her ears. She went to [facility] about 5 6 months ago and was prescribed hearing aids.   Our office was not involved in this process. She follows up today because she is struggled with feeling like her hearing aids are not working. She was recently over to [facility] who said she failed a hearing test and potentially one of the hearing aids is not working appropriately. They have been cleaned out and she is been back to [facility] multiple times without any improvement. She was told that maybe her ears need to be flushed out. Today she is here for a routine ear check. Has recently had an audiogram.  She is getting her right hearing aid repaired    Audiogram 1/5/23:  AU moderate to moderately severe sensorineural hearing loss  Tympanometry type A AU  Word recognition score 88% on the right, 72% on the left    Past Medical History:   Diagnosis Date    Allergic conjunctivitis     (stable)    Chronic sinusitis     Constipation     Dizzy spells     Dry eye syndrome     Edentulous     (does not wear dentures)    Essential hypertension 7/6/2016    Fracture of lateral malleolus     (avulsion fracture at the tip of the lateral malleolus - right ankle).     Hearing loss     Hypothyroidism     Interstitial cystitis     Irritable bowel syndrome     Keratitis     (secondary to dry eye syndrome)    Parotid gland enlargement     stasis, consider sjorgens    Pseudophakos     (bilateral)    Seasonal rhinitis     Stress incontinence     Type II or unspecified type diabetes mellitus without mention of complication, not stated as uncontrolled     Urethral stenosis     Xerostomia      Past Surgical History:   Procedure Laterality Date    APPENDECTOMY  07/24/1986    CATARACT REMOVAL WITH IMPLANT Right 09/02/2003    (Dr. Mason Shen)    CATARACT REMOVAL WITH IMPLANT Left 08/12/2003    (Dr. Mason Shen)    CHOLECYSTECTOMY  07/24/1986    COLONOSCOPY  09/08/2014    normal    CYSTOURETHROSCOPY/URETHRAL DILATION  02/08/2012    multiple    EYE SURGERY Bilateral 12/13/2018    Bilateral Transscleral cyclophotocoagulation G6 laser (micropulse) performed by Karlo Crane MD at [facility]3]  1977    (Dr. Ashlyn Murillo)    UPPER GASTROINTESTINAL ENDOSCOPY  2015    gastritis, biopsy x 2    UPPER GASTROINTESTINAL ENDOSCOPY  2018    Dr. Addison Yañez   antral gastric ulcer. esophagus re-dilated at the end of procedure. UPPER GASTROINTESTINAL ENDOSCOPY  2021    [facility] EGD Dr. Coby Hernandez History       Tobacco History       Smoking Status  Never Smoker      Smokeless Tobacco Use  Never Used      Tobacco Comment  Never smoked. [facility], 2016. Alcohol History       Alcohol Use Status  No              Drug Use       Drug Use Status  No              Sexual Activity       Sexually Active  Not Asked                  Family History   Problem Relation Age of Onset    Heart Disease Mother     Heart Attack Mother     Arthritis Mother     Other Maternal Grandmother         (gout)    Allergies Daughter     Allergies Daughter     Other Brother         ( at age 21 secondary to auto accident).     Cataracts Neg Hx     Diabetes Neg Hx     Glaucoma Neg Hx      Current Outpatient Medications   Medication Instructions    azelastine (ASTELIN) 0.1 % nasal spray 2 sprays, Nasal, 2 TIMES DAILY, Use in each nostril as directed    blood glucose monitor strips Test one  time a day    blood glucose test strips (ONE TOUCH ULTRA TEST) strip use 1 TEST STRIP to TEST BLOOD SUGAR twice a day    fluticasone (FLONASE) 50 MCG/ACT nasal spray 2 sprays, Each Nostril, DAILY    gabapentin (NEURONTIN) 400 mg, Oral, 3 TIMES DAILY    glucose monitoring (FREESTYLE FREEDOM) kit 1 kit, Does not apply, 2 TIMES DAILY    Lancets MISC 1 each, Does not apply, DAILY    levothyroxine (SYNTHROID) 100 MCG tablet take 1 tablet by mouth once daily    metFORMIN (GLUCOPHAGE) 500 mg, Oral, 2 TIMES DAILY WITH MEALS    montelukast (SINGULAIR) 10 mg, Oral, DAILY    omeprazole (PRILOSEC) 20 MG delayed release capsule take 1 capsule by mouth twice a day    polyethyl glycol-propyl glycol 0.4-0.3 % (SYSTANE) 0.4-0.3 % ophthalmic solution 1 drop, Both Eyes, PRN    SOFT TOUCH LANCETS MISC 1 Device, Does not apply, 2 TIMES DAILY    sucralfate (CARAFATE) 1 GM tablet take 1 tablet by mouth once daily    timolol (TIMOPTIC) 0.5 % ophthalmic solution instill 1 drop into both eyes twice a day     Allergies   Allergen Reactions    Latex Rash    Aleve [Naproxen]      Stomach problems    Amoxicillin      Other reaction(s): mucositis    Aspirin Other (See Comments)     Dizziness and tears her stomach up    Dye [Iodides]      IV DYE    Influenza Vaccines     Reglan [Metoclopramide] Other (See Comments)     Dystonic reaction, involuntary movements       ENT ROS: positive for - nasal congestion    General: The patient is found to be alert and normally responsive female. Hearing: grossly normal, wears hearing aids  Voice: Clear   Skin: The skin has normal colour and turgor. AS: EAC clear, TM intact, no erythema, retraction, effusion  AD: EAC clear, TM intact, no erythema, retraction, effusion  Face: The facial contour is symmetric at rest and with movement. Ears: The pinnae have normal contours. Eye: The ocular movements are full and symmetric, the conjunctiva is unremarkable; sclera are anicteric, pupillary response is symmetric. No nystagmus is found. Nose:   The external nasal contour is normal  The nasal mucosa has a normal appearance, some excess nasal mucus  The nasal septum is deviated to the right. The turbinates have a normal appearance  The nares are patent without evidence of polyposis   Oral cavity:   Edentulous, mathew palatini, the oral mucosa is without lesions;  the tongue is symmetric with full mobility and is without fasciculation. The soft palate is symmetric. The oropharynx is unremarkable.   Neck: The neck has a normal contour; no masses are found on palpation    Previous:  Fiberoptic examination of the upper airway using scope was conducted after first applying topical phenylephrine (1%) and lidocaine (4%) to the bilateral nasal cavity by spray. The endoscope was inserted and advanced through the bilateral side of the nose examining the mucosa and nasal structures. Right:  Inferior turbinate normal, unable to visualize middle meatus due to deviated septum, no polyps or purulence, excess mucus  Left:  Inferior turbinate normal, middle meatus clear, no polyps or purulence, excess mucus  Nasopharynx clear, ET patent, adenoid small. Septum deviated to the right. An electronic signature was used to authenticate this note.     --Mari Aragon MD     1/25/2023 1:27 PM EST

## 2023-01-26 ENCOUNTER — OFFICE VISIT (OUTPATIENT)
Dept: DIABETES SERVICES | Age: 78
End: 2023-01-26
Payer: MEDICARE

## 2023-01-26 VITALS
RESPIRATION RATE: 16 BRPM | SYSTOLIC BLOOD PRESSURE: 104 MMHG | BODY MASS INDEX: 27.16 KG/M2 | WEIGHT: 163 LBS | DIASTOLIC BLOOD PRESSURE: 62 MMHG | HEIGHT: 65 IN | HEART RATE: 80 BPM

## 2023-01-26 DIAGNOSIS — E11.42 TYPE 2 DIABETES MELLITUS WITH DIABETIC POLYNEUROPATHY, WITHOUT LONG-TERM CURRENT USE OF INSULIN (HCC): Primary | ICD-10-CM

## 2023-01-26 DIAGNOSIS — E78.00 HIGH CHOLESTEROL: ICD-10-CM

## 2023-01-26 DIAGNOSIS — I10 ESSENTIAL HYPERTENSION: ICD-10-CM

## 2023-01-26 PROBLEM — E11.40 DIABETIC NEUROPATHY (HCC): Status: RESOLVED | Noted: 2020-08-04 | Resolved: 2023-01-26

## 2023-01-26 PROBLEM — E11.9 DIABETES TYPE 2, NO OCULAR INVOLVEMENT (HCC): Status: RESOLVED | Noted: 2018-08-06 | Resolved: 2023-01-26

## 2023-01-26 PROCEDURE — 3078F DIAST BP <80 MM HG: CPT | Performed by: NURSE PRACTITIONER

## 2023-01-26 PROCEDURE — G8427 DOCREV CUR MEDS BY ELIG CLIN: HCPCS | Performed by: NURSE PRACTITIONER

## 2023-01-26 PROCEDURE — 1036F TOBACCO NON-USER: CPT | Performed by: NURSE PRACTITIONER

## 2023-01-26 PROCEDURE — 99214 OFFICE O/P EST MOD 30 MIN: CPT | Performed by: NURSE PRACTITIONER

## 2023-01-26 PROCEDURE — G8417 CALC BMI ABV UP PARAM F/U: HCPCS | Performed by: NURSE PRACTITIONER

## 2023-01-26 PROCEDURE — G8484 FLU IMMUNIZE NO ADMIN: HCPCS | Performed by: NURSE PRACTITIONER

## 2023-01-26 PROCEDURE — 1090F PRES/ABSN URINE INCON ASSESS: CPT | Performed by: NURSE PRACTITIONER

## 2023-01-26 PROCEDURE — G8399 PT W/DXA RESULTS DOCUMENT: HCPCS | Performed by: NURSE PRACTITIONER

## 2023-01-26 PROCEDURE — 1123F ACP DISCUSS/DSCN MKR DOCD: CPT | Performed by: NURSE PRACTITIONER

## 2023-01-26 PROCEDURE — 99204 OFFICE O/P NEW MOD 45 MIN: CPT | Performed by: NURSE PRACTITIONER

## 2023-01-26 PROCEDURE — 3074F SYST BP LT 130 MM HG: CPT | Performed by: NURSE PRACTITIONER

## 2023-01-26 ASSESSMENT — ENCOUNTER SYMPTOMS
SHORTNESS OF BREATH: 0
DIARRHEA: 0
RESPIRATORY NEGATIVE: 1
ABDOMINAL PAIN: 0

## 2023-01-26 NOTE — PROGRESS NOTES
[address], [address]  [facility] [address] [phone]        HISTORY:    Jaci Orellana presents today for evaluation and management of:  Chief Complaint   Patient presents with    Diabetes    New Patient       Patient Care Team:  Abeba Bishop MD as PCP - Virginia Armstrong MD as PCP - [facility] Empaneled Provider  TAY Medel - CNP as Advanced Practice Nurse (Nurse Practitioner)      Interval History:    Past DM Medications   none    Current Diabetic Medications  Metformin 500 mg bid     DKA episodes: 0      01/26/23   Jaci Orellana is a 76 y.o. female patient who presents to clinic today for her diabetes. she has a history of HTN, PANCREATITIS, hypothyroidism, hyperlipidemia  which contributes to her diabetes. At previous visit diabetes counseling was provided. she denies any current signs or symptoms of hyper/hypoglycemia. she states they are taking their medications as prescribed without any adverse effects. Diet: cut back on processed foods  Exercise: none  BS testing: fasting range: 140, patient tests 1 time(s) per day  Hypoglycemia: No  Hypoglycemia as needed treatment: snack  Issues:   Diabetic foot exam up-to-date: Yes  Diabetic retinal exam up-to-date: Yes    Diabetes complications:peripheral neuropathy      High cholesterol-   Watches diet and exercise. Hypertension-   Watches diet and exercise. Denies any chest pain, dizziness or edema. Obesity- Working on weight loss. Past Medical History:   Diagnosis Date    Allergic conjunctivitis     (stable)    Chronic sinusitis     Constipation     Dizzy spells     Dry eye syndrome     Edentulous     (does not wear dentures)    Essential hypertension 7/6/2016    Fracture of lateral malleolus     (avulsion fracture at the tip of the lateral malleolus - right ankle).     Hearing loss     Hypothyroidism Interstitial cystitis     Irritable bowel syndrome     Keratitis     (secondary to dry eye syndrome)    Parotid gland enlargement     stasis, consider sjorgens    Pseudophakos     (bilateral)    Seasonal rhinitis     Stress incontinence     Type II or unspecified type diabetes mellitus without mention of complication, not stated as uncontrolled     Urethral stenosis     Xerostomia      Family History   Problem Relation Age of Onset    Heart Disease Mother     Heart Attack Mother     Arthritis Mother     Other Maternal Grandmother         (gout)    Allergies Daughter     Allergies Daughter     Other Brother         ( at age 21 secondary to auto accident). Cataracts Neg Hx     Diabetes Neg Hx     Glaucoma Neg Hx      Social History     Tobacco Use    Smoking status: Never    Smokeless tobacco: Never    Tobacco comments:     Never smoked. [facility] p, 2016. Vaping Use    Vaping Use: Never used   Substance Use Topics    Alcohol use: No     Alcohol/week: 0.0 standard drinks    Drug use: No     Allergies   Allergen Reactions    Latex Rash    Aleve [Naproxen]      Stomach problems    Amoxicillin      Other reaction(s): mucositis    Aspirin Other (See Comments)     Dizziness and tears her stomach up    Dye [Iodides]      IV DYE    Influenza Vaccines     Reglan [Metoclopramide] Other (See Comments)     Dystonic reaction, involuntary movements       MEDICATIONS:  Current Outpatient Medications   Medication Sig Dispense Refill    gabapentin (NEURONTIN) 400 MG capsule Take 1 capsule by mouth 3 times daily for 90 days.  270 capsule 0    omeprazole (PRILOSEC) 20 MG delayed release capsule take 1 capsule by mouth twice a day 180 capsule 1    levothyroxine (SYNTHROID) 100 MCG tablet take 1 tablet by mouth once daily 90 tablet 1    metFORMIN (GLUCOPHAGE) 500 MG tablet Take 500 mg by mouth 2 times daily (with meals)      montelukast (SINGULAIR) 10 MG tablet Take 1 tablet by mouth daily 30 tablet 3    azelastine (ASTELIN) 0.1 % nasal spray 2 sprays by Nasal route 2 times daily Use in each nostril as directed       fluticasone (FLONASE) 50 MCG/ACT nasal spray 2 sprays by Each Nostril route daily 48 g 5    sucralfate (CARAFATE) 1 GM tablet take 1 tablet by mouth once daily (Patient not taking: Reported on 1/26/2023) 90 tablet 1    timolol (TIMOPTIC) 0.5 % ophthalmic solution instill 1 drop into both eyes twice a day 5 mL 5    blood glucose monitor strips Test one  time a day 100 strip 3    glucose monitoring (FREESTYLE FREEDOM) kit 1 kit by Does not apply route 2 times daily 1 kit 0    Lancets MISC 1 each by Does not apply route daily 100 each 5    polyethyl glycol-propyl glycol 0.4-0.3 % (SYSTANE) 0.4-0.3 % ophthalmic solution Place 1 drop into both eyes as needed for Dry Eyes 1 Bottle 5    blood glucose test strips (ONE TOUCH ULTRA TEST) strip use 1 TEST STRIP to TEST BLOOD SUGAR twice a day 100 strip 3    SOFT TOUCH LANCETS MISC 1 Device by Does not apply route 2 times daily 200 each 3     No current facility-administered medications for this visit. Review ofSymptoms:  Review of Systems   Constitutional:  Positive for fatigue. Negative for unexpected weight change. Eyes:  Negative for visual disturbance. Respiratory: Negative. Negative for shortness of breath. Cardiovascular:  Negative for chest pain and leg swelling. Gastrointestinal:  Negative for abdominal pain and diarrhea. Endocrine: Negative for polydipsia, polyphagia and polyuria. Genitourinary: Negative. Musculoskeletal: Negative. Skin:  Negative for rash and wound. Neurological:  Negative for dizziness, tremors, seizures and headaches. Psychiatric/Behavioral: Negative. Negative for confusion and decreased concentration. Theremainder of a complete 14-point review of systems is negative.        Vital Signs: /62 (Site: Right Upper Arm, Position: Sitting, Cuff Size: Medium Adult)   Pulse 80   Resp 16   Ht 5' 5\" (1.651 m)   Wt 163 lb (73.9 kg)   LMP  (LMP Unknown)   BMI 27.12 kg/m²      Wt Readings from Last 3 Encounters:   01/26/23 163 lb (73.9 kg)   01/24/23 164 lb (74.4 kg)   01/24/23 164 lb (74.4 kg)     Body mass index is 27.12 kg/m². LABS:  Hemoglobin A1C   Date Value Ref Range Status   08/03/2022 7.4 (H) 4.0 - 6.0 % Final   08/11/2021 7.4 (H) 4.0 - 6.0 % Final     Lab Results   Component Value Date    LABMICR CANNOT BE CALCULATED 02/25/2020     Lab Results   Component Value Date     08/03/2022    K 4.2 08/03/2022     08/03/2022    CO2 31 08/03/2022    BUN 14 08/03/2022    CREATININE 0.84 08/03/2022    GLUCOSE 145 (H) 08/03/2022    CALCIUM 10.3 08/03/2022    PROT 7.6 08/03/2022    LABALBU 4.4 08/03/2022    BILITOT 0.51 08/03/2022    ALKPHOS 84 08/03/2022    AST 21 08/03/2022    ALT 13 08/03/2022    LABGLOM >60 08/03/2022    GFRAA >60 08/03/2022    GLOB 3.7 07/14/2016     Lab Results   Component Value Date    CHOL 223 (H) 08/03/2022    CHOL 236 (H) 08/11/2021    CHOL 234 (H) 02/02/2021     Lab Results   Component Value Date    TRIG 103 08/03/2022    TRIG 196 (H) 08/11/2021    TRIG 139 02/02/2021     Lab Results   Component Value Date    HDL 87 08/03/2022    HDL 65 08/11/2021    HDL 80 02/02/2021     Lab Results   Component Value Date    LDLCHOLESTEROL 115 08/03/2022    LDLCHOLESTEROL 132 (H) 08/11/2021    LDLCHOLESTEROL 126 02/02/2021     Lab Results   Component Value Date    VLDL NOT REPORTED (H) 08/11/2021    VLDL NOT REPORTED 02/02/2021    VLDL NOT REPORTED (H) 02/25/2020     Lab Results   Component Value Date    CHOLHDLRATIO 2.6 08/03/2022    CHOLHDLRATIO 3.6 08/11/2021    CHOLHDLRATIO 2.9 02/02/2021           Physical Exam  Constitutional:       Appearance: She is well-developed. Eyes:      Pupils: Pupils are equal, round, and reactive to light. Neck:      Thyroid: No thyroid mass, thyromegaly or thyroid tenderness. Cardiovascular:      Rate and Rhythm: Normal rate and regular rhythm.       Heart sounds: Normal heart sounds. Pulmonary:      Effort: Pulmonary effort is normal.      Breath sounds: Normal breath sounds. Abdominal:      General: Bowel sounds are normal.      Palpations: Abdomen is soft. Skin:     General: Skin is warm and dry. Comments: Negative for open/nonhealing wounds. Negative for lipohypertrophy. Neurological:      Mental Status: She is alert and oriented to person, place, and time. ASSESSMENT/PLAN:     Diagnosis Orders   1. Type 2 diabetes mellitus with diabetic polyneuropathy, without long-term current use of insulin ([facility] [address]        2. High cholesterol        3. Essential hypertension          No orders of the defined types were placed in this encounter. No orders of the defined types were placed in this encounter. Requested Prescriptions      No prescriptions requested or ordered in this encounter       1. Type 2 diabetes mellitus with diabetic polyneuropathy, without long-term current use of insulin ([facility])  - Unstable  HbA1C goal is less than 7%. - Fasting blood glucose goal is 70-120mg/dl and postprandial blood sugar goal is less than 180 mg/dl. - Labs reviewed including most recent A1c, UACR and kidney function. Repeat labs due in 2 weeks.    -We discussed in great detail dietary modifications they can make to better improve their blood sugars. -follow up diabetes education completed, all questions answered. -reviewed carb counting. She will document bs and carbs. Discussed signs and symptoms of hyper/hypoglycemia and how to treat. Encouraged 150 minutes of physical activity per week. Follow a low carbohydrate diet. Encouraged at least 7 hours of sleep. The patient was informed of the goals of diabetes management. This can only be accomplished by watching their diet and exercise levels. We certainly use medicines to help attain these goals. The consequences of not controlling blood sugars were discussed.  These include blindness, heart disease, stroke, kidney disease, and possibly need for dialysis. They were told to be careful with their foot care as diabetics often have nerve damage, infections and risk for limb amutations . They also need a dilated eye exam yearly. We discussed the issues of diet, exercise, medication, complication avoidance, reviewed the signs and symptoms of diabetes, hypoglycemic episodes, significance of HbA1C.       2. High cholesterol  stable, lipid panel reviewed, continue current medications. Diet and exercise.     -she has discussed statin use with pcp   The 10-year ASCVD risk score (Pranav LANDAVERDE, et al., 2019) is: 40.1%    Values used to calculate the score:      Age: 76 years      Sex: Female      Is Non- : No      Diabetic: Yes      Tobacco smoker: No      Systolic Blood Pressure: 594 mmHg      Is BP treated: Yes      HDL Cholesterol: 87 mg/dL      Total Cholesterol: 223 mg/dL      3. Essential hypertension   stable, continue current medications. Diet and exercise Seek emergent care if chest pain develops. -declined ACE for pcp on 8/10/22          Answered all patient questions. Agrees to follow plan of care and to follow up in 3 months, sooner if needed. Call office if unexplained blood sugars less than 70 occur or above 400. Call office or access MyChart with any further questions or concerns. Be sure to bring glucometer/food log at next appointment. Total time spent reviewing chart, labs, counseling patient and documenting on the date of the encounter: 30 min.       Electronically signed by TAY Chester CNP on 1/26/2023 at 1:35 PM      (Please note that portions of this note were completed with a voice-recognition program. Efforts were made to edit the dictation but occasionally words are mis-transcribed.)

## 2023-02-13 RX ORDER — MONTELUKAST SODIUM 10 MG/1
10 TABLET ORAL DAILY
Qty: 30 TABLET | Refills: 5 | Status: SHIPPED | OUTPATIENT
Start: 2023-02-13

## 2023-02-14 ENCOUNTER — OFFICE VISIT (OUTPATIENT)
Dept: FAMILY MEDICINE CLINIC | Age: 78
End: 2023-02-14
Payer: MEDICARE

## 2023-02-14 ENCOUNTER — HOSPITAL ENCOUNTER (OUTPATIENT)
Age: 78
Discharge: HOME | End: 2023-02-14
Payer: MEDICARE

## 2023-02-14 VITALS
SYSTOLIC BLOOD PRESSURE: 110 MMHG | HEART RATE: 68 BPM | HEIGHT: 65 IN | DIASTOLIC BLOOD PRESSURE: 68 MMHG | WEIGHT: 165 LBS | BODY MASS INDEX: 27.49 KG/M2

## 2023-02-14 DIAGNOSIS — G89.29 CHRONIC BILATERAL LOW BACK PAIN WITH BILATERAL SCIATICA: ICD-10-CM

## 2023-02-14 DIAGNOSIS — K11.7 XEROSTOMIA: ICD-10-CM

## 2023-02-14 DIAGNOSIS — I10 ESSENTIAL HYPERTENSION: ICD-10-CM

## 2023-02-14 DIAGNOSIS — M15.9 GENERALIZED OSTEOARTHROSIS, INVOLVING MULTIPLE SITES: ICD-10-CM

## 2023-02-14 DIAGNOSIS — J31.0 CHRONIC RHINITIS: ICD-10-CM

## 2023-02-14 DIAGNOSIS — K30 DELAYED GASTRIC EMPTYING: ICD-10-CM

## 2023-02-14 DIAGNOSIS — E03.9 HYPOTHYROIDISM, UNSPECIFIED TYPE: ICD-10-CM

## 2023-02-14 DIAGNOSIS — M54.42 CHRONIC BILATERAL LOW BACK PAIN WITH BILATERAL SCIATICA: ICD-10-CM

## 2023-02-14 DIAGNOSIS — K21.9 GASTROESOPHAGEAL REFLUX DISEASE, UNSPECIFIED WHETHER ESOPHAGITIS PRESENT: ICD-10-CM

## 2023-02-14 DIAGNOSIS — M54.41 CHRONIC BILATERAL LOW BACK PAIN WITH BILATERAL SCIATICA: ICD-10-CM

## 2023-02-14 DIAGNOSIS — E11.9 TYPE 2 DIABETES MELLITUS WITHOUT COMPLICATION, UNSPECIFIED WHETHER LONG TERM INSULIN USE (HCC): Primary | ICD-10-CM

## 2023-02-14 DIAGNOSIS — E78.00 HIGH CHOLESTEROL: ICD-10-CM

## 2023-02-14 DIAGNOSIS — K58.1 IRRITABLE BOWEL SYNDROME WITH CONSTIPATION: ICD-10-CM

## 2023-02-14 LAB
T4 FREE SERPL-MCNC: 0.97 NG/DL (ref 0.93–1.7)
TSH SERPL-ACNC: 2.42 UIU/ML (ref 0.3–5)

## 2023-02-14 PROCEDURE — 84443 ASSAY THYROID STIM HORMONE: CPT

## 2023-02-14 PROCEDURE — 3074F SYST BP LT 130 MM HG: CPT | Performed by: FAMILY MEDICINE

## 2023-02-14 PROCEDURE — G8427 DOCREV CUR MEDS BY ELIG CLIN: HCPCS | Performed by: FAMILY MEDICINE

## 2023-02-14 PROCEDURE — 99214 OFFICE O/P EST MOD 30 MIN: CPT | Performed by: FAMILY MEDICINE

## 2023-02-14 PROCEDURE — 1090F PRES/ABSN URINE INCON ASSESS: CPT | Performed by: FAMILY MEDICINE

## 2023-02-14 PROCEDURE — 99213 OFFICE O/P EST LOW 20 MIN: CPT | Performed by: FAMILY MEDICINE

## 2023-02-14 PROCEDURE — 36415 COLL VENOUS BLD VENIPUNCTURE: CPT

## 2023-02-14 PROCEDURE — G8399 PT W/DXA RESULTS DOCUMENT: HCPCS | Performed by: FAMILY MEDICINE

## 2023-02-14 PROCEDURE — G8417 CALC BMI ABV UP PARAM F/U: HCPCS | Performed by: FAMILY MEDICINE

## 2023-02-14 PROCEDURE — 3078F DIAST BP <80 MM HG: CPT | Performed by: FAMILY MEDICINE

## 2023-02-14 PROCEDURE — G8484 FLU IMMUNIZE NO ADMIN: HCPCS | Performed by: FAMILY MEDICINE

## 2023-02-14 PROCEDURE — 84439 ASSAY OF FREE THYROXINE: CPT

## 2023-02-14 PROCEDURE — 1123F ACP DISCUSS/DSCN MKR DOCD: CPT | Performed by: FAMILY MEDICINE

## 2023-02-14 PROCEDURE — 1036F TOBACCO NON-USER: CPT | Performed by: FAMILY MEDICINE

## 2023-02-14 RX ORDER — LEVOTHYROXINE SODIUM 112 UG/1
TABLET ORAL
Qty: 90 TABLET | Refills: 3 | Status: SHIPPED | OUTPATIENT
Start: 2023-02-14

## 2023-02-14 SDOH — ECONOMIC STABILITY: FOOD INSECURITY: WITHIN THE PAST 12 MONTHS, THE FOOD YOU BOUGHT JUST DIDN'T LAST AND YOU DIDN'T HAVE MONEY TO GET MORE.: NEVER TRUE

## 2023-02-14 SDOH — ECONOMIC STABILITY: HOUSING INSECURITY
IN THE LAST 12 MONTHS, WAS THERE A TIME WHEN YOU DID NOT HAVE A STEADY PLACE TO SLEEP OR SLEPT IN A SHELTER (INCLUDING NOW)?: NO

## 2023-02-14 SDOH — ECONOMIC STABILITY: INCOME INSECURITY: HOW HARD IS IT FOR YOU TO PAY FOR THE VERY BASICS LIKE FOOD, HOUSING, MEDICAL CARE, AND HEATING?: NOT HARD AT ALL

## 2023-02-14 SDOH — ECONOMIC STABILITY: FOOD INSECURITY: WITHIN THE PAST 12 MONTHS, YOU WORRIED THAT YOUR FOOD WOULD RUN OUT BEFORE YOU GOT MONEY TO BUY MORE.: NEVER TRUE

## 2023-02-14 ASSESSMENT — ENCOUNTER SYMPTOMS
GASTROINTESTINAL NEGATIVE: 1
BACK PAIN: 0
SINUS PRESSURE: 0
ALLERGIC/IMMUNOLOGIC NEGATIVE: 1
RESPIRATORY NEGATIVE: 1
EYES NEGATIVE: 1
TROUBLE SWALLOWING: 1

## 2023-02-14 ASSESSMENT — PATIENT HEALTH QUESTIONNAIRE - PHQ9
SUM OF ALL RESPONSES TO PHQ9 QUESTIONS 1 & 2: 0
1. LITTLE INTEREST OR PLEASURE IN DOING THINGS: 0
SUM OF ALL RESPONSES TO PHQ QUESTIONS 1-9: 0
2. FEELING DOWN, DEPRESSED OR HOPELESS: 0
SUM OF ALL RESPONSES TO PHQ QUESTIONS 1-9: 0

## 2023-02-14 NOTE — PROGRESS NOTES
Subjective:      Patient ID: Funmilayo Cee is a 77 y.o. female.   Routine follow up on chronic medical conditions, refills, and review of updated labs.  I have reviewed the patient's medical history in detail and updated the computerized patient record.   Doing ok at present.  bs control seems ok.   Rare elevation by report.  No low concerns.  Neuropathy pain seems worse in the feet over the interval.  Creeping up the legs.   Lower back/sacral area discomfort better. Seems stable over the interval.     Knee arthritis evident ongoing.   She notices recent weather changes have made the knees feel worse.    Compliant with medications at present.     No allergy concerns   No bowel or bladder concerns.  Denies constipation, using \"oat chewies\"  Past Medical History:   Diagnosis Date    Allergic conjunctivitis     (stable)    Chronic sinusitis     Constipation     Dizzy spells     Dry eye syndrome     Edentulous     (does not wear dentures)    Essential hypertension 7/6/2016    Fracture of lateral malleolus     (avulsion fracture at the tip of the lateral malleolus - right ankle).    Hearing loss     Hypothyroidism     Interstitial cystitis     Irritable bowel syndrome     Keratitis     (secondary to dry eye syndrome)    Parotid gland enlargement     stasis, consider sjorgens    Pseudophakos     (bilateral)    Seasonal rhinitis     Stress incontinence     Type II or unspecified type diabetes mellitus without mention of complication, not stated as uncontrolled     Urethral stenosis     Xerostomia      Past Surgical History:   Procedure Laterality Date    APPENDECTOMY  07/24/1986    CATARACT REMOVAL WITH IMPLANT Right 09/02/2003    (Dr. Camejo)    CATARACT REMOVAL WITH IMPLANT Left 08/12/2003    (Dr. Camejo)    CHOLECYSTECTOMY  07/24/1986    COLONOSCOPY  09/08/2014    normal    CYSTOURETHROSCOPY/URETHRAL DILATION  02/08/2012    multiple    EYE SURGERY Bilateral 12/13/2018    Bilateral Transscleral  cyclophotocoagulation G6 laser (micropulse) performed by Mimi Calabrese MD at [facility]  06/02/1977    (Dr. Garrison Zamora)    UPPER GASTROINTESTINAL ENDOSCOPY  06/04/2015    gastritis, biopsy x 2    UPPER GASTROINTESTINAL ENDOSCOPY  04/02/2018    Dr. Oumou Mishra   antral gastric ulcer. esophagus re-dilated at the end of procedure. UPPER GASTROINTESTINAL ENDOSCOPY  07/28/2021    [facility] EGD Dr. Ania Valdivia     Current Outpatient Medications   Medication Sig Dispense Refill    montelukast (SINGULAIR) 10 MG tablet Take 1 tablet by mouth daily 30 tablet 5    gabapentin (NEURONTIN) 400 MG capsule Take 1 capsule by mouth 3 times daily for 90 days. 270 capsule 0    omeprazole (PRILOSEC) 20 MG delayed release capsule take 1 capsule by mouth twice a day 180 capsule 1    levothyroxine (SYNTHROID) 100 MCG tablet take 1 tablet by mouth once daily 90 tablet 1    metFORMIN (GLUCOPHAGE) 500 MG tablet Take 500 mg by mouth 2 times daily (with meals)      timolol (TIMOPTIC) 0.5 % ophthalmic solution instill 1 drop into both eyes twice a day 5 mL 5    blood glucose monitor strips Test one  time a day 100 strip 3    glucose monitoring (FREESTYLE FREEDOM) kit 1 kit by Does not apply route 2 times daily 1 kit 0    azelastine (ASTELIN) 0.1 % nasal spray 2 sprays by Nasal route 2 times daily Use in each nostril as directed       fluticasone (FLONASE) 50 MCG/ACT nasal spray 2 sprays by Each Nostril route daily 48 g 5    Lancets MISC 1 each by Does not apply route daily 100 each 5    polyethyl glycol-propyl glycol 0.4-0.3 % (SYSTANE) 0.4-0.3 % ophthalmic solution Place 1 drop into both eyes as needed for Dry Eyes 1 Bottle 5    blood glucose test strips (ONE TOUCH ULTRA TEST) strip use 1 TEST STRIP to TEST BLOOD SUGAR twice a day 100 strip 3    SOFT TOUCH LANCETS MISC 1 Device by Does not apply route 2 times daily 200 each 3     No current facility-administered medications for this visit.      Allergies   Allergen Reactions Latex Rash    Aleve [Naproxen]      Stomach problems    Amoxicillin      Other reaction(s): mucositis    Aspirin Other (See Comments)     Dizziness and tears her stomach up    Dye [Iodides]      IV DYE    Influenza Vaccines     Reglan [Metoclopramide] Other (See Comments)     Dystonic reaction, involuntary movements     . Diabetes  Hypoglycemia symptoms include dizziness. Other  Associated symptoms include arthralgias (knees, hands), myalgias (foot cramps described) and numbness (around the right wrist and palmar hand. some symptoms up the arm at night involving more of the arm. worsening neuropathy pain in the feet progressing. ). Pertinent negatives include no rash. Hypertension    Rash   Review of Systems   Constitutional: Negative. HENT:  Positive for trouble swallowing (thick sputum). Negative for sinus pressure and sneezing. Eyes: Negative. Respiratory: Negative. Cardiovascular: Negative. Gastrointestinal: Negative. Endocrine: Negative. Genitourinary: Negative. Musculoskeletal:  Positive for arthralgias (knees, hands) and myalgias (foot cramps described). Negative for back pain (back better at present. ) and gait problem (not using cane or walker any more). Skin: Negative. Negative for rash. Allergic/Immunologic: Negative. Neurological:  Positive for dizziness and numbness (around the right wrist and palmar hand. some symptoms up the arm at night involving more of the arm. worsening neuropathy pain in the feet progressing.). Hematological: Negative. Psychiatric/Behavioral: Negative. Objective:   Physical Exam  HENT:      Head: Normocephalic. Cardiovascular:      Rate and Rhythm: Normal rate. Pulses: Normal pulses. Pulmonary:      Effort: Pulmonary effort is normal.   Musculoskeletal:      Right hand: Tenderness and bony tenderness (thumb cmc, mcp joints.  ) present. No swelling or deformity. Decreased range of motion. Decreased sensation. Cervical back: Normal range of motion. Comments: Numbness described in atypical distribution of fingers 2--5. Skin:     General: Skin is warm. Neurological:      General: No focal deficit present. Mental Status: She is alert. Sensory: Sensory deficit (both feet with numbness and burning paresthesias, getting worse) present. Psychiatric:         Mood and Affect: Mood normal.         Thought Content: Thought content normal.     /68 (Site: Right Upper Arm, Position: Sitting, Cuff Size: Large Adult)   Pulse 68   Ht 5' 5\" (1.651 m)   Wt 165 lb (74.8 kg)   LMP  (LMP Unknown)   BMI 27.46 kg/m²        Assessment:       Encounter Diagnoses   Name Primary? Type 2 diabetes mellitus without complication, unspecified whether long term insulin use (HCC) Yes    Essential hypertension     Delayed gastric emptying     Xerostomia     Chronic rhinitis     Irritable bowel syndrome with constipation     Generalized osteoarthrosis, involving multiple sites     Gastroesophageal reflux disease, unspecified whether esophagitis present     Hypothyroidism, unspecified type     Chronic bilateral low back pain with bilateral sciatica     High cholesterol                    Plan:         diabetes: doing well by home checks. Stable a1c at 7.4%. Cont. Current metformin bid dosing. Updating eye exam.  Updating labs at present. Htn: improved at present. She stopped her low dose of lisinopril long term at present. Declining to use at present. Delayed gastric emptying. Currently, some post prandial bloating and increased bowel sounds by report. Not really painful. No vomiting. EGD 4/2/18: antral ulceration found. Esophagus re-dilated at that time. She has prn gastroenterology consult if symptoms worsen. EGD 11/ 18/19. Minimal gastritis without ulceration. Single dilation with dilator. Some evidence for thick secretions and laryngitis. She continues on omeprazole.   No gastritis or gerd concerns at present. Parotid enlargement/exrostomia; still with dry mouth concerns. Not using artificial saliva, she tried and did not like it. Nothing visible as far as enlargement. She feels fullness at times. Using hard candy (sugar free) to help with dryness. Some chronic rhinitis and sinusitis issues ongoing. She describes intermittent sneezing and congestions despite daily antihistamine and flonase use. Mild allergy symptoms at present. Concerns for sjogrens in the past.   She has somewhat severe septal deviation and likely has a lot of mechanical obstruction, especially on the right. Rec. ENT consult to consider mechanical fix to her nose issues. Thought not to be bad enough to need repair by patient recall. She desires a 2nd opinion. Problems with chronic congestion getting worse over time. Colds tend to cause complications with sinuses and prolonged symptoms. Ibs; some intermittent constipation. Getting better per patient. Stir lynch vegetables, watermelon helps she tends to increase water intake and eat foods that help. Managing with diet at present. We discussed current bowel regimen and made plans to add more if needed. Using oat chewies by report which has helped with constipation. Oa: seeing more knee pain. Currently her back pain is better and she is not needing her cane or walker for extended walking. Weather related exacerbation of knee pain at present. Discussed nsaid use. She has had some stomach complaints on aleve in the past.   Conservative with nsaids at present due to stomach ulceration in April 2018. Consider injection trial if willing. Gerd: some recent heartburn recurrent. She was noting some recurrent esophageal pain with swallowing, and some episodes of choking recently. [facility] and again [facility]. Repeat EGD 4/2/18 with antral gastric ulcer . Repeated dilation at that time. repeat dilation 11/19 through Dr. Andre Gonzalez [facility]. She continues on omeprazole 20mg/day. No current symptoms of concern. chronic back pain. Neuropathy symptoms in feet by description, also more problematic acutely. Stiff with poor rom. Known moderate spinal canal stenosis at L3-4, L4-5 from 2016 MRI. Numbness in both feet endorsed at present. neurontin use once a day to occasional twice a day when she has sharp pains. Seems to be helping. Back pain improved enough that she is no longer using a walker or cane. Hypothyroid: dosage increased to 100mcg/day in august 2021. Still low at last check, compliance issues revealed. Decided to cont. Current dosing with better compliance. Awaiting updated labs. Dose increased to 112mcg/day today. Hyperlipidemia:  Discussed rec. For treatment given diabetes diagnosis. Discussed side effects and precautions. Updating labs. Ldl at 115, awaiting updates.

## 2023-02-16 ENCOUNTER — TELEMEDICINE (OUTPATIENT)
Dept: FAMILY MEDICINE CLINIC | Age: 78
End: 2023-02-16
Payer: MEDICARE

## 2023-02-16 DIAGNOSIS — Z00.00 MEDICARE ANNUAL WELLNESS VISIT, SUBSEQUENT: Primary | ICD-10-CM

## 2023-02-16 PROCEDURE — 1123F ACP DISCUSS/DSCN MKR DOCD: CPT | Performed by: FAMILY MEDICINE

## 2023-02-16 PROCEDURE — G0439 PPPS, SUBSEQ VISIT: HCPCS | Performed by: FAMILY MEDICINE

## 2023-02-16 PROCEDURE — G8484 FLU IMMUNIZE NO ADMIN: HCPCS | Performed by: FAMILY MEDICINE

## 2023-02-16 ASSESSMENT — LIFESTYLE VARIABLES
HOW MANY STANDARD DRINKS CONTAINING ALCOHOL DO YOU HAVE ON A TYPICAL DAY: PATIENT DOES NOT DRINK
HOW OFTEN DO YOU HAVE A DRINK CONTAINING ALCOHOL: NEVER

## 2023-02-16 ASSESSMENT — PATIENT HEALTH QUESTIONNAIRE - PHQ9
1. LITTLE INTEREST OR PLEASURE IN DOING THINGS: 0
SUM OF ALL RESPONSES TO PHQ QUESTIONS 1-9: 0
2. FEELING DOWN, DEPRESSED OR HOPELESS: 0
SUM OF ALL RESPONSES TO PHQ QUESTIONS 1-9: 0
SUM OF ALL RESPONSES TO PHQ9 QUESTIONS 1 & 2: 0
SUM OF ALL RESPONSES TO PHQ QUESTIONS 1-9: 0
SUM OF ALL RESPONSES TO PHQ QUESTIONS 1-9: 0

## 2023-02-16 NOTE — PATIENT INSTRUCTIONS
Personalized Preventive Plan for Jeevan Blind - 2/16/2023  Medicare offers a range of preventive health benefits. Some of the tests and screenings are paid in full while other may be subject to a deductible, co-insurance, and/or copay. Some of these benefits include a comprehensive review of your medical history including lifestyle, illnesses that may run in your family, and various assessments and screenings as appropriate. After reviewing your medical record and screening and assessments performed today your provider may have ordered immunizations, labs, imaging, and/or referrals for you. A list of these orders (if applicable) as well as your Preventive Care list are included within your After Visit Summary for your review. Other Preventive Recommendations:    A preventive eye exam performed by an eye specialist is recommended every 1-2 years to screen for glaucoma; cataracts, macular degeneration, and other eye disorders. A preventive dental visit is recommended every 6 months. Try to get at least 150 minutes of exercise per week or 10,000 steps per day on a pedometer . Order or download the FREE \"Exercise & Physical Activity: Your Everyday Guide\" from [employer]. Call 5-973.764.4428 or search [employer] online. You need 1430-8531 mg of calcium and 1169-4076 IU of vitamin D per day. It is possible to meet your calcium requirement with diet alone, but a vitamin D supplement is usually necessary to meet this goal.  When exposed to the sun, use a sunscreen that protects against both UVA and UVB radiation with an SPF of 30 or greater. Reapply every 2 to 3 hours or after sweating, drying off with a towel, or swimming. Always wear a seat belt when traveling in a car. Always wear a helmet when riding a bicycle or motorcycle.

## 2023-02-16 NOTE — PROGRESS NOTES
Medicare Annual Wellness Visit    Patti Vargas is here for Medicare AW    Assessment & Plan    Recommendations for Preventive Services Due: see orders and patient instructions/AVS.  Recommended screening schedule for the next 5-10 years is provided to the patient in written form: see Patient Instructions/AVS.     No follow-ups on file. Subjective       Patient's complete Health Risk Assessment and screening values have been reviewed and are found in Flowsheets. The following problems were reviewed today and where indicated follow up appointments were made and/or referrals ordered. Positive Risk Factor Screenings with Interventions:                 Weight and Activity:  Physical Activity: Inactive    Days of Exercise per Week: 0 days    Minutes of Exercise per Session: 0 min     On average, how many days per week do you engage in moderate to strenuous exercise (like a brisk walk)?: 0 days  Have you lost any weight without trying in the past 3 months?: No         Inactivity Interventions:  Patient declined any further interventions or treatment          ADL's:   Patient reports needing help with:  Select all that apply: (!) Transportation (Pt does not drive, her Buddhism takes her)    Interventions:  Patient declined any further interventions or treatment                    Objective      Patient-Reported Vitals  Patient-Reported Weight: 165 lbs  Patient-Reported Height: 5'5\"            Allergies   Allergen Reactions    Latex Rash    Aleve [Naproxen]      Stomach problems    Amoxicillin      Other reaction(s): mucositis    Aspirin Other (See Comments)     Dizziness and tears her stomach up    Dye [Iodides]      IV DYE    Influenza Vaccines     Reglan [Metoclopramide] Other (See Comments)     Dystonic reaction, involuntary movements     Prior to Visit Medications    Medication Sig Taking?  Authorizing Provider   levothyroxine (SYNTHROID) 112 MCG tablet take 1 tablet by mouth once daily  Morro Wooten MD montelukast (SINGULAIR) 10 MG tablet Take 1 tablet by mouth daily  Jyothi Pratt MD   gabapentin (NEURONTIN) 400 MG capsule Take 1 capsule by mouth 3 times daily for 90 days. Jyothi Pratt MD   omeprazole (PRILOSEC) 20 MG delayed release capsule take 1 capsule by mouth twice a day  Jyothi Pratt MD   metFORMIN (GLUCOPHAGE) 500 MG tablet Take 500 mg by mouth 2 times daily (with meals)  Historical Provider, MD   timolol (TIMOPTIC) 0.5 % ophthalmic solution instill 1 drop into both eyes twice a day  Apolonia Ni, OD   blood glucose monitor strips Test one  time a day  Jyothi Pratt MD   glucose monitoring (FREESTYLE FREEDOM) kit 1 kit by Does not apply route 2 times daily  Jyothi Pratt MD   azelastine (ASTELIN) 0.1 % nasal spray 2 sprays by Nasal route 2 times daily Use in each nostril as directed   Historical Provider, MD   fluticasone (FLONASE) 50 MCG/ACT nasal spray 2 sprays by Each Nostril route daily  Johanna Holley MD   Lancets MISC 1 each by Does not apply route daily  Jyothi Pratt MD   polyethyl glycol-propyl glycol 0.4-0.3 % (SYSTANE) 0.4-0.3 % ophthalmic solution Place 1 drop into both eyes as needed for Dry Eyes  Amy Sarahann Cockayne, OD   blood glucose test strips (ONE TOUCH ULTRA TEST) strip use 1 TEST STRIP to TEST BLOOD SUGAR twice a day  Jyothi Pratt MD   SOFT TOUCH LANCETS MISC 1 Device by Does not apply route 2 times daily  Jyothi Pratt MD       [facility] (Including outside providers/suppliers regularly involved in providing care):   Patient Care Team:  Jyothi Pratt MD as PCP - General  Jyothi Pratt MD as PCP - Empaneled Provider  [address] [place] [address], APRN - CNP as Advanced Practice Nurse (Nurse Practitioner)     Reviewed and updated this visit:  Tobacco  Allergies  Meds  Med Hx  Surg Hx  Soc Hx  Fam Hx        I, Toby Jimenez RN, 2/16/2023, performed the documented evaluation under the direct supervision of the attending physician.   Cheri [place], was evaluated through a synchronous (real-time) audio encounter. The patient (or guardian if applicable) is aware that this is a billable service, which includes applicable co-pays. This Virtual Visit was conducted with patient's (and/or legal guardian's) consent. The visit was conducted pursuant to the emergency declaration under the [address] [place] [address], [address] [place] [address] and the [employer] General Act. Patient identification was verified, and a caregiver was present when appropriate. The patient was located at Home: [phone]  Provider was located at [facility] (Appt Dept): [address] [place], [place] [id number] [facility] Waldron RN   This encounter was performed under myMaria Guadalupe MDs, direct supervision, 2/16/2023.   Electronically signed by Maria Guadalupe Sarmiento MD on 2/20/2023 at 1:17 PM

## 2023-03-23 DIAGNOSIS — G62.9 NEUROPATHY: ICD-10-CM

## 2023-03-23 RX ORDER — GABAPENTIN 400 MG/1
CAPSULE ORAL
Qty: 270 CAPSULE | Refills: 0 | OUTPATIENT
Start: 2023-03-23

## 2023-03-24 ENCOUNTER — TELEPHONE (OUTPATIENT)
Dept: FAMILY MEDICINE CLINIC | Age: 78
End: 2023-03-24

## 2023-03-24 NOTE — TELEPHONE ENCOUNTER
Pt requesting refills of the following meds: levothyroxine, omeprazole, and gabapentin. Nurse informed pt these meds were sent in previously to [facility]. Levothyroxine 2/14/23  Omeprazole 1/12/23  Gabapentin 1/12/23 which will be due to fill 4/12/2023. Pt was not understanding that these were previously filled and she just needed to request a new fill from mail order. Pt wants called back from [facility] on Mon.

## 2023-03-27 RX ORDER — MONTELUKAST SODIUM 10 MG/1
10 TABLET ORAL DAILY
Qty: 90 TABLET | Refills: 1 | Status: SHIPPED | OUTPATIENT
Start: 2023-03-27

## 2023-03-27 RX ORDER — LEVOTHYROXINE SODIUM 112 UG/1
TABLET ORAL
Qty: 90 TABLET | Refills: 1 | Status: SHIPPED | OUTPATIENT
Start: 2023-03-27

## 2023-03-27 RX ORDER — OMEPRAZOLE 20 MG/1
CAPSULE, DELAYED RELEASE ORAL
Qty: 180 CAPSULE | Refills: 1 | Status: SHIPPED | OUTPATIENT
Start: 2023-03-27

## 2023-04-04 ENCOUNTER — OFFICE VISIT (OUTPATIENT)
Dept: PODIATRY | Age: 78
End: 2023-04-04
Payer: MEDICARE

## 2023-04-04 VITALS
BODY MASS INDEX: 27.32 KG/M2 | DIASTOLIC BLOOD PRESSURE: 72 MMHG | SYSTOLIC BLOOD PRESSURE: 120 MMHG | HEART RATE: 82 BPM | HEIGHT: 65 IN | WEIGHT: 164 LBS

## 2023-04-04 DIAGNOSIS — E11.49 DM (DIABETES MELLITUS), TYPE 2 WITH NEUROLOGICAL COMPLICATIONS (HCC): Primary | ICD-10-CM

## 2023-04-04 DIAGNOSIS — M21.6X2 EQUINUS DEFORMITY OF BOTH FEET: ICD-10-CM

## 2023-04-04 DIAGNOSIS — B35.1 DERMATOPHYTOSIS OF NAIL: ICD-10-CM

## 2023-04-04 DIAGNOSIS — M21.6X1 EQUINUS DEFORMITY OF BOTH FEET: ICD-10-CM

## 2023-04-04 PROCEDURE — 3078F DIAST BP <80 MM HG: CPT | Performed by: PODIATRIST

## 2023-04-04 PROCEDURE — 1090F PRES/ABSN URINE INCON ASSESS: CPT | Performed by: PODIATRIST

## 2023-04-04 PROCEDURE — 11720 DEBRIDE NAIL 1-5: CPT | Performed by: PODIATRIST

## 2023-04-04 PROCEDURE — 1123F ACP DISCUSS/DSCN MKR DOCD: CPT | Performed by: PODIATRIST

## 2023-04-04 PROCEDURE — 3074F SYST BP LT 130 MM HG: CPT | Performed by: PODIATRIST

## 2023-04-04 PROCEDURE — 99213 OFFICE O/P EST LOW 20 MIN: CPT | Performed by: PODIATRIST

## 2023-04-04 PROCEDURE — G8399 PT W/DXA RESULTS DOCUMENT: HCPCS | Performed by: PODIATRIST

## 2023-04-04 PROCEDURE — 1036F TOBACCO NON-USER: CPT | Performed by: PODIATRIST

## 2023-04-04 PROCEDURE — G8427 DOCREV CUR MEDS BY ELIG CLIN: HCPCS | Performed by: PODIATRIST

## 2023-04-04 PROCEDURE — G8417 CALC BMI ABV UP PARAM F/U: HCPCS | Performed by: PODIATRIST

## 2023-04-04 RX ORDER — SUCRALFATE 1 G/1
TABLET ORAL
COMMUNITY
Start: 2023-03-28

## 2023-04-04 NOTE — PROGRESS NOTES
Rubor absent bilateral.  Temperature within normal limits bilateral. Hyperpigmentation present bilateral. Atrophic skin yes. Neurological: Sensation Impaired to light touch to level of digits, bilateral.  Protective sensation intact  10/10 sites via 5.07/10g Smock-Christi Monofilament, bilateral.  negative Tinel's, bilateral.  negative Valleix sign, bilateral.  Vibratory abnormal  bilateral.  Reflexes Decreased bilateral.  Paresthesias positive. Dysthesias positive. Sharp/dull abnormal  bilateral.  Musculoskeletal: Muscle strength 5/5, bilateral.  Pain present upon palpation generalized forefoot b/l. Normal medial longitudinal arch, bilateral.  Ankle ROM decresed,bilateral.  1st MPJ ROM within normal limits, bilateral.  Dorsally contracted digits absent. No other foot deformities. Integument: Warm, dry, supple, bilateral.  Open lesion absent, bilateral.  Interdigital maceration absent to web spaces bilateral.   Nails left 5 and right 1 thickened, dystrophic and crumbly, discolored with subungual debris. .  Fissures absent, bilateral. Hyperkeratotic tissue is absent. Asessment: Patient is a 76 y.o. female with:    Diagnosis Orders   1. DM (diabetes mellitus), type 2 with neurological complications ([facility] [address]        2. Dermatophytosis of nail        3. Equinus deformity of both feet              Plan: Patient examined and evaluated. Current condition and treatment options discussed in detail. Orders Placed This Encounter   Medications    Diabetic Shoe MISC     Sig: by Does not apply route Dispense DM shoe and insoles  DM (diabetes mellitus), type 2 with neurological complications   Dermatophytosis of nail  Equinus deformity of both feet     Dispense:  1 each     Refill:  0   Patient will be active to tolerance. Patient educated activity modification. Patient will use over-the-counter anti-inflammatory as needed. Patient is low level medical decision making.   Patient has acute uncomplicated condition

## 2023-04-17 DIAGNOSIS — G62.9 NEUROPATHY: ICD-10-CM

## 2023-04-17 RX ORDER — GABAPENTIN 400 MG/1
CAPSULE ORAL
Qty: 90 CAPSULE | Refills: 0 | Status: SHIPPED | OUTPATIENT
Start: 2023-04-17 | End: 2023-05-17

## 2023-04-19 RX ORDER — SUCRALFATE 1 G/1
1 TABLET ORAL 4 TIMES DAILY
Qty: 120 TABLET | Refills: 4 | Status: SHIPPED | OUTPATIENT
Start: 2023-04-19

## 2023-04-28 DIAGNOSIS — G62.9 NEUROPATHY: ICD-10-CM

## 2023-04-28 RX ORDER — GABAPENTIN 400 MG/1
CAPSULE ORAL
Qty: 90 CAPSULE | Refills: 11 | OUTPATIENT
Start: 2023-04-28 | End: 2023-05-28

## 2023-05-04 ENCOUNTER — HOSPITAL ENCOUNTER (EMERGENCY)
Age: 78
Discharge: HOME | End: 2023-05-05
Attending: EMERGENCY MEDICINE
Payer: MEDICARE

## 2023-05-04 ENCOUNTER — APPOINTMENT (OUTPATIENT)
Dept: CT IMAGING | Age: 78
End: 2023-05-04
Payer: MEDICARE

## 2023-05-04 DIAGNOSIS — R42 VERTIGO: Primary | ICD-10-CM

## 2023-05-04 PROCEDURE — 70450 CT HEAD/BRAIN W/O DYE: CPT

## 2023-05-04 PROCEDURE — 99284 EMERGENCY DEPT VISIT MOD MDM: CPT

## 2023-05-04 PROCEDURE — 6370000000 HC RX 637 (ALT 250 FOR IP): Performed by: EMERGENCY MEDICINE

## 2023-05-04 RX ORDER — MECLIZINE HCL 12.5 MG/1
25 TABLET ORAL ONCE
Status: COMPLETED | OUTPATIENT
Start: 2023-05-04 | End: 2023-05-04

## 2023-05-04 RX ADMIN — MECLIZINE 25 MG: 12.5 TABLET ORAL at 22:45

## 2023-05-04 ASSESSMENT — PAIN - FUNCTIONAL ASSESSMENT: PAIN_FUNCTIONAL_ASSESSMENT: NONE - DENIES PAIN

## 2023-05-05 VITALS
HEART RATE: 60 BPM | OXYGEN SATURATION: 96 % | SYSTOLIC BLOOD PRESSURE: 130 MMHG | RESPIRATION RATE: 20 BRPM | DIASTOLIC BLOOD PRESSURE: 62 MMHG | WEIGHT: 158 LBS | TEMPERATURE: 97.3 F | HEIGHT: 65 IN | BODY MASS INDEX: 26.33 KG/M2

## 2023-05-05 RX ORDER — MECLIZINE HYDROCHLORIDE 25 MG/1
25 TABLET ORAL 3 TIMES DAILY
Qty: 15 TABLET | Refills: 0 | Status: SHIPPED | OUTPATIENT
Start: 2023-05-05 | End: 2023-05-15

## 2023-05-05 NOTE — ED PROVIDER NOTES
AM        CONTINUE these medications which have NOT CHANGED    Details   sucralfate (CARAFATE) 1 GM tablet Take 1 tablet by mouth 4 times daily, Disp-120 tablet, R-4Normal      gabapentin (NEURONTIN) 400 MG capsule TAKE ONE CAPSULE BY MOUTH THREE TIMES DAILY @ 9AM-3PM-9PM, Disp-90 capsule, R-0Normal      Diabetic Shoe MISC Starting Tue 4/4/2023, Disp-1 each, R-0, PrintDispense DM shoe and insoles DM (diabetes mellitus), type 2 with neurological complications  Dermatophytosis of nail Equinus deformity of both feet      metFORMIN (GLUCOPHAGE) 500 MG tablet Take 1 tablet by mouth 2 times daily (with meals), Disp-180 tablet, R-1Normal      omeprazole (PRILOSEC) 20 MG delayed release capsule take 1 capsule by mouth twice a day, Disp-180 capsule, R-1Normal      montelukast (SINGULAIR) 10 MG tablet Take 1 tablet by mouth daily, Disp-90 tablet, R-1Normal      levothyroxine (SYNTHROID) 112 MCG tablet take 1 tablet by mouth once daily, Disp-90 tablet, R-1Normal      timolol (TIMOPTIC) 0.5 % ophthalmic solution instill 1 drop into both eyes twice a day, Disp-5 mL, R-5Normal      !! blood glucose monitor strips Test one  time a day, Disp-100 strip, R-3, Normal      glucose monitoring (FREESTYLE FREEDOM) kit 2 TIMES DAILY Starting Wed 4/27/2022, Disp-1 kit, R-0, Normal      azelastine (ASTELIN) 0.1 % nasal spray 2 sprays by Nasal route 2 times daily Use in each nostril as directedHistorical Med      fluticasone (FLONASE) 50 MCG/ACT nasal spray 2 sprays by Each Nostril route daily, Disp-48 g, R-5Normal      !! Lancets MISC DAILY Starting Tue 8/24/2021, Disp-100 each, R-5, Normal      polyethyl glycol-propyl glycol 0.4-0.3 % (SYSTANE) 0.4-0.3 % ophthalmic solution Place 1 drop into both eyes as needed for Dry Eyes, Disp-1 Bottle, R-5Normal      !! blood glucose test strips (ONE TOUCH ULTRA TEST) strip use 1 TEST STRIP to TEST BLOOD SUGAR twice a day, Disp-100 strip, R-3Normal      !!  SOFT TOUCH LANCETS MISC 2 TIMES DAILY Starting

## 2023-05-08 RX ORDER — FLUTICASONE PROPIONATE 50 MCG
2 SPRAY, SUSPENSION (ML) NASAL DAILY
Qty: 48 G | Refills: 5 | Status: SHIPPED | OUTPATIENT
Start: 2023-05-08

## 2023-05-08 NOTE — TELEPHONE ENCOUNTER
Previously prescribed by Dr. Efra Victoria. Will you fill?       Diallo Finch called requesting a refill of the below medication which has been pended for you:     Requested Prescriptions     Pending Prescriptions Disp Refills    fluticasone (FLONASE) 50 MCG/ACT nasal spray 48 g 5     Si sprays by Each Nostril route daily       Last Appointment Date: 2023  Next Appointment Date: 2023    Allergies   Allergen Reactions    Latex Rash    Aleve [Naproxen]      Stomach problems    Amoxicillin      Other reaction(s): mucositis    Aspirin Other (See Comments)     Dizziness and tears her stomach up    Dye [Iodides]      IV DYE    Influenza Vaccines     Reglan [Metoclopramide] Other (See Comments)     Dystonic reaction, involuntary movements

## 2023-05-09 ENCOUNTER — FOLLOWUP TELEPHONE ENCOUNTER (OUTPATIENT)
Dept: INPATIENT UNIT | Age: 78
End: 2023-05-09

## 2023-05-19 DIAGNOSIS — G62.9 NEUROPATHY: ICD-10-CM

## 2023-05-19 RX ORDER — GABAPENTIN 400 MG/1
CAPSULE ORAL
Qty: 90 CAPSULE | Refills: 0 | Status: SHIPPED | OUTPATIENT
Start: 2023-05-20 | End: 2023-06-19

## 2023-05-31 ENCOUNTER — TELEPHONE (OUTPATIENT)
Dept: FAMILY MEDICINE CLINIC | Age: 78
End: 2023-05-31

## 2023-05-31 NOTE — TELEPHONE ENCOUNTER
Mari Uribe from [facility] would like a call from nurse discussing a few concerns. [facility] number is [phone] ext [phone].

## 2023-05-31 NOTE — TELEPHONE ENCOUNTER
Spoke with [facility]. Pt is due for appt for face to face for home health services. Pt was advised to contact our office to schedule.

## 2023-06-06 DIAGNOSIS — E11.65 UNCONTROLLED TYPE 2 DIABETES MELLITUS WITH HYPERGLYCEMIA (HCC): ICD-10-CM

## 2023-06-06 RX ORDER — GLUCOSAMINE HCL/CHONDROITIN SU 500-400 MG
CAPSULE ORAL
Qty: 100 STRIP | Refills: 3 | Status: SHIPPED | OUTPATIENT
Start: 2023-06-06

## 2023-06-06 NOTE — TELEPHONE ENCOUNTER
Blanquita Parra called requesting a refill of the below medication which has been pended for you:     Requested Prescriptions     Pending Prescriptions Disp Refills    blood glucose monitor strips 100 strip 3     Sig: Test one  time a day       Last Appointment Date: 2/16/2023  Next Appointment Date: 8/14/2023    Allergies   Allergen Reactions    Latex Rash    Aleve [Naproxen]      Stomach problems    Amoxicillin      Other reaction(s): mucositis    Aspirin Other (See Comments)     Dizziness and tears her stomach up    Dye [Iodides]      IV DYE    Influenza Vaccines     Reglan [Metoclopramide] Other (See Comments)     Dystonic reaction, involuntary movements

## 2023-06-16 ENCOUNTER — TELEPHONE (OUTPATIENT)
Dept: FAMILY MEDICINE CLINIC | Age: 78
End: 2023-06-16

## 2023-06-16 NOTE — TELEPHONE ENCOUNTER
Patient has not been able to take the Carafate pills as she has been choking on them. She has stopped this medication for over a week and has not had any stomach issues. She is still taking the Omeprazole. Advised patient if she is not having any stomach distress she can stay off medication and is stomach issues return we can send in Carafate solution. Patient states understanding.

## 2023-06-19 DIAGNOSIS — G62.9 NEUROPATHY: ICD-10-CM

## 2023-06-19 RX ORDER — GABAPENTIN 400 MG/1
CAPSULE ORAL
Qty: 90 CAPSULE | Refills: 0 | OUTPATIENT
Start: 2023-06-19 | End: 2023-09-17

## 2023-06-29 ENCOUNTER — OFFICE VISIT (OUTPATIENT)
Dept: PODIATRY | Age: 78
End: 2023-06-29
Payer: MEDICARE

## 2023-06-29 VITALS
WEIGHT: 155.2 LBS | DIASTOLIC BLOOD PRESSURE: 72 MMHG | RESPIRATION RATE: 20 BRPM | BODY MASS INDEX: 25.83 KG/M2 | SYSTOLIC BLOOD PRESSURE: 124 MMHG | HEART RATE: 64 BPM

## 2023-06-29 DIAGNOSIS — E11.49 DM (DIABETES MELLITUS), TYPE 2 WITH NEUROLOGICAL COMPLICATIONS (HCC): Primary | ICD-10-CM

## 2023-06-29 DIAGNOSIS — B35.1 DERMATOPHYTOSIS OF NAIL: ICD-10-CM

## 2023-06-29 PROCEDURE — 11720 DEBRIDE NAIL 1-5: CPT | Performed by: PODIATRIST

## 2023-06-29 PROCEDURE — 99999 PR OFFICE/OUTPT VISIT,PROCEDURE ONLY: CPT | Performed by: PODIATRIST

## 2023-07-19 DIAGNOSIS — G62.9 NEUROPATHY: ICD-10-CM

## 2023-07-20 RX ORDER — GABAPENTIN 400 MG/1
CAPSULE ORAL
Qty: 90 CAPSULE | Refills: 0 | Status: SHIPPED | OUTPATIENT
Start: 2023-07-20 | End: 2023-10-18

## 2023-07-28 DIAGNOSIS — G62.9 NEUROPATHY: ICD-10-CM

## 2023-07-28 RX ORDER — GABAPENTIN 400 MG/1
CAPSULE ORAL
Qty: 90 CAPSULE | Refills: 11 | OUTPATIENT
Start: 2023-07-28

## 2023-08-07 DIAGNOSIS — G62.9 NEUROPATHY: ICD-10-CM

## 2023-08-07 RX ORDER — GABAPENTIN 400 MG/1
CAPSULE ORAL
Qty: 90 CAPSULE | Refills: 0 | OUTPATIENT
Start: 2023-08-07 | End: 2023-11-05

## 2023-08-07 NOTE — TELEPHONE ENCOUNTER
Per dispense report, last filled 7/24/23 for 30 days. This was at [facility]. They should not need new script.

## 2023-08-07 NOTE — TELEPHONE ENCOUNTER
----- Message from Kip Master sent at 8/4/2023 11:59 AM EDT -----  Subject: Refill Request    QUESTIONS  Name of Medication? gabapentin (NEURONTIN) 400 MG capsule  Patient-reported dosage and instructions? 400 mg Unknown dosage daily   How many days do you have left? 0  Preferred Pharmacy? [facility] [place]  Pharmacy phone number (if available)? [phone]  Additional Information for Provider? Apolinar Knott called in from [facility]   about the Rx to be sent back over to pharmacy for approval  number for   pharmacy approval #[id number]  ---------------------------------------------------------------------------  --------------  CALL BACK INFO  What is the best way for the office to contact you? Do not leave any   message, patient will call back for answer  Preferred Call Back Phone Number? [phone]  ---------------------------------------------------------------------------  --------------  SCRIPT ANSWERS  Relationship to Patient? Covered Entity  Covered Entity Type? Pharmacy? Representative Name?  Apolinar Knott

## 2023-08-09 ENCOUNTER — TELEPHONE (OUTPATIENT)
Dept: FAMILY MEDICINE CLINIC | Age: 78
End: 2023-08-09

## 2023-08-09 NOTE — TELEPHONE ENCOUNTER
Writer spoke with pt. Pt states she is no longer taking her gabapentin, as it wasn't doing anything for her. She states she is currently taking an OTC nerve medication called NeuropAway that she has noticed a great improvement from. She just wanted to make the office aware. Pt is scheduled for f/u 8/31/23, can discuss further then.

## 2023-08-18 DIAGNOSIS — G62.9 NEUROPATHY: ICD-10-CM

## 2023-08-18 RX ORDER — GABAPENTIN 400 MG/1
CAPSULE ORAL
Qty: 90 CAPSULE | Refills: 0 | Status: SHIPPED | OUTPATIENT
Start: 2023-08-23 | End: 2023-09-22

## 2023-08-18 NOTE — TELEPHONE ENCOUNTER
Per [facility], last filled 7/24/2023, #90/30 days  Due to fill 8/23/2023  Due for CSA    Jeff Raygoza called requesting a refill of the below medication which has been pended for you:     Requested Prescriptions     Pending Prescriptions Disp Refills    gabapentin (NEURONTIN) 400 MG capsule [Pharmacy Med Name: gabapentin 400 mg capsule] 90 capsule 0     Sig: TAKE ONE CAPSULE BY MOUTH THREE TIMES DAILY @ 9AM-3PM-9PM       Last Appointment Date: 2/16/2023  Next Appointment Date: 8/31/2023    Allergies   Allergen Reactions    Latex Rash    Aleve [Naproxen]      Stomach problems    Amoxicillin      Other reaction(s): mucositis    Aspirin Other (See Comments)     Dizziness and tears her stomach up    Dye [Iodides]      IV DYE    Influenza Vaccines     Reglan [Metoclopramide] Other (See Comments)     Dystonic reaction, involuntary movements

## 2023-08-23 RX ORDER — LEVOTHYROXINE SODIUM 112 UG/1
TABLET ORAL
Qty: 90 TABLET | Refills: 1 | Status: SHIPPED | OUTPATIENT
Start: 2023-08-23

## 2023-08-23 NOTE — TELEPHONE ENCOUNTER
Gautam Menendez called requesting a refill of the below medication which has been pended for you:     Requested Prescriptions     Pending Prescriptions Disp Refills    levothyroxine (SYNTHROID) 112 MCG tablet [Pharmacy Med Name: levothyroxine 112 mcg tablet] 90 tablet 1     Sig: TAKE ONE TABLET BY MOUTH DAILY AT 7AM       Last Appointment Date: 2/16/2023  Next Appointment Date: 8/31/2023    Allergies   Allergen Reactions    Latex Rash    Aleve [Naproxen]      Stomach problems    Amoxicillin      Other reaction(s): mucositis    Aspirin Other (See Comments)     Dizziness and tears her stomach up    Dye [Iodides]      IV DYE    Influenza Vaccines     Reglan [Metoclopramide] Other (See Comments)     Dystonic reaction, involuntary movements

## 2023-08-25 DIAGNOSIS — G62.9 NEUROPATHY: ICD-10-CM

## 2023-08-25 RX ORDER — OMEPRAZOLE 20 MG/1
CAPSULE, DELAYED RELEASE ORAL
Qty: 180 CAPSULE | Refills: 11 | OUTPATIENT
Start: 2023-08-25

## 2023-08-25 RX ORDER — GABAPENTIN 400 MG/1
CAPSULE ORAL
Qty: 90 CAPSULE | Refills: 11 | Status: SHIPPED | OUTPATIENT
Start: 2023-08-25 | End: 2023-09-24

## 2023-08-25 NOTE — TELEPHONE ENCOUNTER
Steve Arellano called requesting a refill of the below medication which has been pended for you:     Requested Prescriptions     Pending Prescriptions Disp Refills    gabapentin (NEURONTIN) 400 MG capsule [Pharmacy Med Name: gabapentin 400 mg capsule] 90 capsule 11     Sig: TAKE ONE CAPSULE BY MOUTH THREE TIMES DAILY @ 9AM-3PM-9PM       Last Appointment Date: 2/16/2023  Next Appointment Date: 8/31/2023    Allergies   Allergen Reactions    Latex Rash    Aleve [Naproxen]      Stomach problems    Amoxicillin      Other reaction(s): mucositis    Aspirin Other (See Comments)     Dizziness and tears her stomach up    Dye [Iodides]      IV DYE    Influenza Vaccines     Reglan [Metoclopramide] Other (See Comments)     Dystonic reaction, involuntary movements

## 2023-08-31 ENCOUNTER — HOSPITAL ENCOUNTER (OUTPATIENT)
Age: 78
Discharge: HOME | End: 2023-08-31
Payer: MEDICARE

## 2023-08-31 ENCOUNTER — OFFICE VISIT (OUTPATIENT)
Dept: FAMILY MEDICINE CLINIC | Age: 78
End: 2023-08-31
Payer: MEDICARE

## 2023-08-31 VITALS
WEIGHT: 152.4 LBS | HEART RATE: 84 BPM | BODY MASS INDEX: 25.39 KG/M2 | DIASTOLIC BLOOD PRESSURE: 64 MMHG | SYSTOLIC BLOOD PRESSURE: 124 MMHG | HEIGHT: 65 IN | OXYGEN SATURATION: 98 %

## 2023-08-31 DIAGNOSIS — J31.0 CHRONIC RHINITIS: ICD-10-CM

## 2023-08-31 DIAGNOSIS — K58.1 IRRITABLE BOWEL SYNDROME WITH CONSTIPATION: ICD-10-CM

## 2023-08-31 DIAGNOSIS — E11.65 UNCONTROLLED TYPE 2 DIABETES MELLITUS WITH HYPERGLYCEMIA (HCC): Primary | ICD-10-CM

## 2023-08-31 DIAGNOSIS — E11.9 TYPE 2 DIABETES MELLITUS WITHOUT COMPLICATION, UNSPECIFIED WHETHER LONG TERM INSULIN USE (HCC): ICD-10-CM

## 2023-08-31 DIAGNOSIS — K30 DELAYED GASTRIC EMPTYING: ICD-10-CM

## 2023-08-31 DIAGNOSIS — E03.9 HYPOTHYROIDISM, UNSPECIFIED TYPE: ICD-10-CM

## 2023-08-31 DIAGNOSIS — M54.42 CHRONIC BILATERAL LOW BACK PAIN WITH BILATERAL SCIATICA: ICD-10-CM

## 2023-08-31 DIAGNOSIS — G56.02 CARPAL TUNNEL SYNDROME OF LEFT WRIST: ICD-10-CM

## 2023-08-31 DIAGNOSIS — K21.9 GASTROESOPHAGEAL REFLUX DISEASE, UNSPECIFIED WHETHER ESOPHAGITIS PRESENT: ICD-10-CM

## 2023-08-31 DIAGNOSIS — I10 ESSENTIAL HYPERTENSION: ICD-10-CM

## 2023-08-31 DIAGNOSIS — E78.00 HIGH CHOLESTEROL: ICD-10-CM

## 2023-08-31 DIAGNOSIS — G89.29 CHRONIC BILATERAL LOW BACK PAIN WITH BILATERAL SCIATICA: ICD-10-CM

## 2023-08-31 DIAGNOSIS — M15.9 GENERALIZED OSTEOARTHROSIS, INVOLVING MULTIPLE SITES: ICD-10-CM

## 2023-08-31 DIAGNOSIS — M54.41 CHRONIC BILATERAL LOW BACK PAIN WITH BILATERAL SCIATICA: ICD-10-CM

## 2023-08-31 DIAGNOSIS — K11.7 XEROSTOMIA: ICD-10-CM

## 2023-08-31 LAB
ALBUMIN SERPL-MCNC: 4.6 G/DL (ref 3.5–5.2)
ALBUMIN/GLOB SERPL: 1.5 {RATIO} (ref 1–2.5)
ALP SERPL-CCNC: 126 U/L (ref 35–104)
ALT SERPL-CCNC: 22 U/L (ref 5–33)
ANION GAP SERPL CALCULATED.3IONS-SCNC: 10 MMOL/L (ref 9–17)
AST SERPL-CCNC: 20 U/L
BASOPHILS # BLD: 0.05 K/UL (ref 0–0.2)
BASOPHILS NFR BLD: 1 % (ref 0–2)
BILIRUB SERPL-MCNC: 0.5 MG/DL (ref 0.3–1.2)
BUN SERPL-MCNC: 22 MG/DL (ref 8–23)
BUN/CREAT SERPL: 28 (ref 9–20)
CALCIUM SERPL-MCNC: 10.9 MG/DL (ref 8.6–10.4)
CHLORIDE SERPL-SCNC: 99 MMOL/L (ref 98–107)
CHOLEST SERPL-MCNC: 239 MG/DL
CHOLESTEROL/HDL RATIO: 3.3
CO2 SERPL-SCNC: 30 MMOL/L (ref 20–31)
CREAT SERPL-MCNC: 0.8 MG/DL (ref 0.5–0.9)
EOSINOPHIL # BLD: 0.08 K/UL (ref 0–0.44)
EOSINOPHILS RELATIVE PERCENT: 1 % (ref 1–4)
ERYTHROCYTE [DISTWIDTH] IN BLOOD BY AUTOMATED COUNT: 13.7 % (ref 11.8–14.4)
GFR SERPL CREATININE-BSD FRML MDRD: >60 ML/MIN/1.73M2
GLUCOSE SERPL-MCNC: 180 MG/DL (ref 70–99)
HCT VFR BLD AUTO: 44.5 % (ref 36.3–47.1)
HDLC SERPL-MCNC: 72 MG/DL
HGB BLD-MCNC: 14 G/DL (ref 11.9–15.1)
IMM GRANULOCYTES # BLD AUTO: <0.03 K/UL (ref 0–0.3)
IMM GRANULOCYTES NFR BLD: 0 %
LDLC SERPL CALC-MCNC: 125 MG/DL (ref 0–130)
LYMPHOCYTES NFR BLD: 2.66 K/UL (ref 1.1–3.7)
LYMPHOCYTES RELATIVE PERCENT: 38 % (ref 24–43)
MCH RBC QN AUTO: 28.6 PG (ref 25.2–33.5)
MCHC RBC AUTO-ENTMCNC: 31.5 G/DL (ref 25.2–33.5)
MCV RBC AUTO: 91 FL (ref 82.6–102.9)
MONOCYTES NFR BLD: 0.67 K/UL (ref 0.1–1.2)
MONOCYTES NFR BLD: 10 % (ref 3–12)
NEUTROPHILS NFR BLD: 50 % (ref 36–65)
NEUTS SEG NFR BLD: 3.53 K/UL (ref 1.5–8.1)
NRBC BLD-RTO: 0 PER 100 WBC
PLATELET # BLD AUTO: 322 K/UL (ref 138–453)
PMV BLD AUTO: 10.4 FL (ref 8.1–13.5)
POTASSIUM SERPL-SCNC: 4 MMOL/L (ref 3.7–5.3)
PROT SERPL-MCNC: 7.6 G/DL (ref 6.4–8.3)
RBC # BLD AUTO: 4.89 M/UL (ref 3.95–5.11)
SODIUM SERPL-SCNC: 139 MMOL/L (ref 135–144)
T4 FREE SERPL-MCNC: 1.2 NG/DL (ref 0.9–1.7)
TRIGL SERPL-MCNC: 212 MG/DL
TSH SERPL DL<=0.05 MIU/L-ACNC: 0.11 UIU/ML (ref 0.3–5)
WBC OTHER # BLD: 7 K/UL (ref 3.5–11.3)

## 2023-08-31 PROCEDURE — 1036F TOBACCO NON-USER: CPT | Performed by: FAMILY MEDICINE

## 2023-08-31 PROCEDURE — G8399 PT W/DXA RESULTS DOCUMENT: HCPCS | Performed by: FAMILY MEDICINE

## 2023-08-31 PROCEDURE — 1090F PRES/ABSN URINE INCON ASSESS: CPT | Performed by: FAMILY MEDICINE

## 2023-08-31 PROCEDURE — 84439 ASSAY OF FREE THYROXINE: CPT

## 2023-08-31 PROCEDURE — 84443 ASSAY THYROID STIM HORMONE: CPT

## 2023-08-31 PROCEDURE — 3074F SYST BP LT 130 MM HG: CPT | Performed by: FAMILY MEDICINE

## 2023-08-31 PROCEDURE — 99214 OFFICE O/P EST MOD 30 MIN: CPT | Performed by: FAMILY MEDICINE

## 2023-08-31 PROCEDURE — 3078F DIAST BP <80 MM HG: CPT | Performed by: FAMILY MEDICINE

## 2023-08-31 PROCEDURE — G8417 CALC BMI ABV UP PARAM F/U: HCPCS | Performed by: FAMILY MEDICINE

## 2023-08-31 PROCEDURE — 85025 COMPLETE CBC W/AUTO DIFF WBC: CPT

## 2023-08-31 PROCEDURE — 83036 HEMOGLOBIN GLYCOSYLATED A1C: CPT

## 2023-08-31 PROCEDURE — 36415 COLL VENOUS BLD VENIPUNCTURE: CPT

## 2023-08-31 PROCEDURE — 1123F ACP DISCUSS/DSCN MKR DOCD: CPT | Performed by: FAMILY MEDICINE

## 2023-08-31 PROCEDURE — 99213 OFFICE O/P EST LOW 20 MIN: CPT | Performed by: FAMILY MEDICINE

## 2023-08-31 PROCEDURE — 80053 COMPREHEN METABOLIC PANEL: CPT

## 2023-08-31 PROCEDURE — G8427 DOCREV CUR MEDS BY ELIG CLIN: HCPCS | Performed by: FAMILY MEDICINE

## 2023-08-31 PROCEDURE — 80061 LIPID PANEL: CPT

## 2023-08-31 RX ORDER — MELOXICAM 15 MG/1
15 TABLET ORAL DAILY
Qty: 30 TABLET | Refills: 3 | Status: SHIPPED | OUTPATIENT
Start: 2023-08-31

## 2023-08-31 ASSESSMENT — ENCOUNTER SYMPTOMS
TROUBLE SWALLOWING: 1
SINUS PRESSURE: 0
RESPIRATORY NEGATIVE: 1
EYES NEGATIVE: 1
ALLERGIC/IMMUNOLOGIC NEGATIVE: 1
GASTROINTESTINAL NEGATIVE: 1
BACK PAIN: 0

## 2023-09-01 DIAGNOSIS — M25.532 LEFT WRIST PAIN: Primary | ICD-10-CM

## 2023-09-01 LAB
EST. AVERAGE GLUCOSE BLD GHB EST-MCNC: 166 MG/DL
HBA1C MFR BLD: 7.4 % (ref 4–6)

## 2023-09-12 ENCOUNTER — OFFICE VISIT (OUTPATIENT)
Dept: ORTHOPEDIC SURGERY | Age: 78
End: 2023-09-12
Attending: ORTHOPAEDIC SURGERY
Payer: MEDICARE

## 2023-09-12 ENCOUNTER — HOSPITAL ENCOUNTER (OUTPATIENT)
Dept: GENERAL RADIOLOGY | Age: 78
Discharge: HOME | End: 2023-09-14
Attending: ORTHOPAEDIC SURGERY
Payer: MEDICARE

## 2023-09-12 VITALS
HEART RATE: 91 BPM | BODY MASS INDEX: 25.33 KG/M2 | DIASTOLIC BLOOD PRESSURE: 72 MMHG | SYSTOLIC BLOOD PRESSURE: 138 MMHG | WEIGHT: 152 LBS | HEIGHT: 65 IN

## 2023-09-12 DIAGNOSIS — M25.532 LEFT WRIST PAIN: ICD-10-CM

## 2023-09-12 DIAGNOSIS — G62.9 NEUROPATHY: ICD-10-CM

## 2023-09-12 DIAGNOSIS — R20.0 BILATERAL HAND NUMBNESS: Primary | ICD-10-CM

## 2023-09-12 PROCEDURE — 3075F SYST BP GE 130 - 139MM HG: CPT | Performed by: PHYSICIAN ASSISTANT

## 2023-09-12 PROCEDURE — 3078F DIAST BP <80 MM HG: CPT | Performed by: PHYSICIAN ASSISTANT

## 2023-09-12 PROCEDURE — 1036F TOBACCO NON-USER: CPT | Performed by: PHYSICIAN ASSISTANT

## 2023-09-12 PROCEDURE — 73110 X-RAY EXAM OF WRIST: CPT

## 2023-09-12 PROCEDURE — 99214 OFFICE O/P EST MOD 30 MIN: CPT | Performed by: PHYSICIAN ASSISTANT

## 2023-09-12 PROCEDURE — 1090F PRES/ABSN URINE INCON ASSESS: CPT | Performed by: PHYSICIAN ASSISTANT

## 2023-09-12 PROCEDURE — G8417 CALC BMI ABV UP PARAM F/U: HCPCS | Performed by: PHYSICIAN ASSISTANT

## 2023-09-12 PROCEDURE — 1123F ACP DISCUSS/DSCN MKR DOCD: CPT | Performed by: PHYSICIAN ASSISTANT

## 2023-09-12 PROCEDURE — G8399 PT W/DXA RESULTS DOCUMENT: HCPCS | Performed by: PHYSICIAN ASSISTANT

## 2023-09-12 PROCEDURE — G8427 DOCREV CUR MEDS BY ELIG CLIN: HCPCS | Performed by: PHYSICIAN ASSISTANT

## 2023-09-12 PROCEDURE — 99203 OFFICE O/P NEW LOW 30 MIN: CPT | Performed by: PHYSICIAN ASSISTANT

## 2023-09-12 RX ORDER — GABAPENTIN 400 MG/1
CAPSULE ORAL
Qty: 90 CAPSULE | Refills: 11 | OUTPATIENT
Start: 2023-09-12 | End: 2023-10-12

## 2023-09-12 NOTE — PROGRESS NOTES
Orthopaedic Progress Note      CHIEF COMPLAINT: Bilateral hand pain and numbness    HISTORY OF PRESENT ILLNESS:       Ms. Guera Ohara  is a 78 y.o. female  who presents with a long history of bilateral hand pain and numbness. She states that she was seen by her primary care physician sent here for possible carpal tunnel syndrome. She denies having ever had a an EMG study checking for carpal tunnel syndrome. Pain is sharp localized the hands rating up to her forearms although up to her shoulders on both sides. Left is worse than right. States that the whole hand goes numb. She is here today for initial orthopedic evaluation        Past Medical History:    Past Medical History:   Diagnosis Date    Allergic conjunctivitis     (stable)    Chronic sinusitis     Constipation     Dizzy spells     Dry eye syndrome     Edentulous     (does not wear dentures)    Essential hypertension 7/6/2016    Fracture of lateral malleolus     (avulsion fracture at the tip of the lateral malleolus - right ankle).     Hearing loss     Hypothyroidism     Interstitial cystitis     Irritable bowel syndrome     Keratitis     (secondary to dry eye syndrome)    Parotid gland enlargement     stasis, consider sjorgens    Pseudophakos     (bilateral)    Seasonal rhinitis     Stress incontinence     Type II or unspecified type diabetes mellitus without mention of complication, not stated as uncontrolled     Urethral stenosis     Xerostomia        Past Surgical History:    Past Surgical History:   Procedure Laterality Date    APPENDECTOMY  07/24/1986    CATARACT REMOVAL WITH IMPLANT Right 09/02/2003    (Dr. Niyah Vail)    CATARACT REMOVAL WITH IMPLANT Left 08/12/2003    (Dr. Niyah Vail)    CHOLECYSTECTOMY  07/24/1986    COLONOSCOPY  09/08/2014    normal    CYSTOURETHROSCOPY/URETHRAL DILATION  02/08/2012    multiple    EYE SURGERY Bilateral 12/13/2018    Bilateral Transscleral cyclophotocoagulation G6 laser (micropulse) performed by

## 2023-09-14 ENCOUNTER — OFFICE VISIT (OUTPATIENT)
Dept: PODIATRY | Age: 78
End: 2023-09-14
Payer: MEDICARE

## 2023-09-14 VITALS
HEART RATE: 88 BPM | BODY MASS INDEX: 25.49 KG/M2 | HEIGHT: 65 IN | DIASTOLIC BLOOD PRESSURE: 80 MMHG | SYSTOLIC BLOOD PRESSURE: 128 MMHG | WEIGHT: 153 LBS

## 2023-09-14 DIAGNOSIS — B35.1 DERMATOPHYTOSIS OF NAIL: ICD-10-CM

## 2023-09-14 DIAGNOSIS — E11.49 DM (DIABETES MELLITUS), TYPE 2 WITH NEUROLOGICAL COMPLICATIONS (HCC): Primary | ICD-10-CM

## 2023-09-14 PROCEDURE — 11720 DEBRIDE NAIL 1-5: CPT | Performed by: PODIATRIST

## 2023-09-14 NOTE — PROGRESS NOTES
Foot Care Worksheet  PCP: Antonio Cespedes MD  Last visit: 8/31/23    Nail description: Thick , Yellow , Crumbly , Marked limitation of ambulation     Pain resulting from thickened and dystrophy of nail plate No    Nails involved  Right   1  (T5-T9)  Left     5  (TA-T4)    Q7 1 Class A Finding - Non traumatic amputation of foot No    Q8 2 Class B Findings - Absent DP pulse No, Absent PT pulse No, Advanced tropic changes (3 required) Yes    Decrease hair growth Yes, Nail changes/thickening Yes, Pigmented changes/discoloration Yes, Skin texture (thin, shiny) No, Skin color (rubor/redness) No    Q9 1 Class B and 2 Class C Findings  Claudication No, Temperature change Yes, Paresthesia Yes, Burning No, Edema Yes    Subjective:  Shreya Padilla is a 78 y.o. female who presents to the office today for  DM foot care. .  Currently denies F/C/N/V. Allergies   Allergen Reactions    Latex Rash    Aleve [Naproxen]      Stomach problems    Amoxicillin      Other reaction(s): mucositis    Aspirin Other (See Comments)     Dizziness and tears her stomach up    Dye [Iodides]      IV DYE    Influenza Vaccines     Reglan [Metoclopramide] Other (See Comments)     Dystonic reaction, involuntary movements       Past Medical History:   Diagnosis Date    Allergic conjunctivitis     (stable)    Chronic sinusitis     Constipation     Dizzy spells     Dry eye syndrome     Edentulous     (does not wear dentures)    Essential hypertension 7/6/2016    Fracture of lateral malleolus     (avulsion fracture at the tip of the lateral malleolus - right ankle).     Hearing loss     Hypothyroidism     Interstitial cystitis     Irritable bowel syndrome     Keratitis     (secondary to dry eye syndrome)    Parotid gland enlargement     stasis, consider sjorgens    Pseudophakos     (bilateral)    Seasonal rhinitis     Stress incontinence     Type II or unspecified type diabetes mellitus without mention of complication, not stated as uncontrolled

## 2023-09-18 RX ORDER — OMEPRAZOLE 20 MG/1
CAPSULE, DELAYED RELEASE ORAL
Qty: 180 CAPSULE | Refills: 1 | Status: SHIPPED | OUTPATIENT
Start: 2023-09-18

## 2023-09-26 ENCOUNTER — HOSPITAL ENCOUNTER (OUTPATIENT)
Dept: NEUROLOGY | Age: 78
Discharge: HOME | End: 2023-09-26
Payer: MEDICARE

## 2023-09-26 DIAGNOSIS — R20.0 BILATERAL HAND NUMBNESS: ICD-10-CM

## 2023-09-26 PROCEDURE — 95912 NRV CNDJ TEST 11-12 STUDIES: CPT

## 2023-09-26 PROCEDURE — 95886 MUSC TEST DONE W/N TEST COMP: CPT

## 2023-09-26 NOTE — PROCEDURES
[address]                 [address] [place], [place] [address]                        EMG/NERVE CONDUCTION STUDIES REPORT        PATIENT NAME: Malia Mesa                 :        1945  MED REC NO:   [id number]                             ROOM:  ACCOUNT NO:   [de-identified]                           ADMIT DATE: 2023      PROVIDER:     Tejas Garay MD    DATE OF EM2023    REFERRING PROVIDER:  Jeronimo Mallory PA-C      TECHNICAL SUMMARY:  The nature, purpose, goals, expectations and process  involved in the procedures of nerve conduction studies and needle  electromyography were reviewed, discussed, explained and verbal consent  was obtained from the patient. The patient's questions were answered  with reference to the above processes and procedures. There were no significant technical difficulties encountered during this  study and nerve conduction studies were performed under temperature  monitoring. CLINICAL DATA:      The patient is 78years old with numbness, tingling,  paresthesias, pain, weakness involving both upper extremities for 10  years. The patient is a known diabetic for last 5 years. The patient  feels her symptoms are getting progressively worse in the last 3 years. The purpose of the study is to evaluate for mononeuropathy,  radiculopathy, peripheral polyneuropathy. SUMMARY:      The sensory nerve action potentials of the right and left  radial nerves are within normal limits bilaterally. Sensory nerve action potentials of the right and left median nerve was  not recordable. Ulnar sensory nerve action potentials appear fairly  within normal limits. Mixed nerve palmar potentials of the median nerve was not recordable. Ulnar palmar potentials appear fairly within normal limits.     Compound muscle action potentials of the right and left median nerve  shows normal amplitude, significantly prolonged

## 2023-09-26 NOTE — PROGRESS NOTES
EMG/NCS Bilateral    upper Completed    PCP: Zaida Orr MD    Ordering: Camelia Bradford PA-C    Interpreting Physician: Miller Natarajan.  Sundar Neurologist    Technician: Kai West RCP

## 2023-10-03 ENCOUNTER — TELEPHONE (OUTPATIENT)
Dept: ORTHOPEDIC SURGERY | Age: 78
End: 2023-10-03

## 2023-10-16 ENCOUNTER — NURSE ONLY (OUTPATIENT)
Dept: ORTHOPEDIC SURGERY | Age: 78
End: 2023-10-16
Payer: MEDICARE

## 2023-10-16 DIAGNOSIS — R20.0 BILATERAL HAND NUMBNESS: Primary | ICD-10-CM

## 2023-10-16 PROCEDURE — 99215 OFFICE O/P EST HI 40 MIN: CPT

## 2023-10-16 NOTE — PROGRESS NOTES
Patient scheduled for a left carpal tunnel release  Consent signed and reviewed  Patient having local anesthesia  Surgery scheduled for 10/24/23

## 2023-10-24 ENCOUNTER — HOSPITAL ENCOUNTER (OUTPATIENT)
Age: 78
Setting detail: OUTPATIENT SURGERY
Discharge: HOME | End: 2023-10-24
Attending: ORTHOPAEDIC SURGERY | Admitting: ORTHOPAEDIC SURGERY
Payer: MEDICARE

## 2023-10-24 VITALS
TEMPERATURE: 98 F | RESPIRATION RATE: 16 BRPM | DIASTOLIC BLOOD PRESSURE: 65 MMHG | BODY MASS INDEX: 24.99 KG/M2 | WEIGHT: 150 LBS | SYSTOLIC BLOOD PRESSURE: 139 MMHG | HEART RATE: 65 BPM | HEIGHT: 65 IN | OXYGEN SATURATION: 92 %

## 2023-10-24 DIAGNOSIS — Z98.890 S/P CARPAL TUNNEL RELEASE: Primary | ICD-10-CM

## 2023-10-24 LAB — GLUCOSE BLD-MCNC: 143 MG/DL (ref 65–105)

## 2023-10-24 PROCEDURE — 2709999900 HC NON-CHARGEABLE SUPPLY: Performed by: ORTHOPAEDIC SURGERY

## 2023-10-24 PROCEDURE — 3600000002 HC SURGERY LEVEL 2 BASE: Performed by: ORTHOPAEDIC SURGERY

## 2023-10-24 PROCEDURE — 7100000010 HC PHASE II RECOVERY - FIRST 15 MIN: Performed by: ORTHOPAEDIC SURGERY

## 2023-10-24 PROCEDURE — 82947 ASSAY GLUCOSE BLOOD QUANT: CPT

## 2023-10-24 PROCEDURE — 2500000003 HC RX 250 WO HCPCS: Performed by: ORTHOPAEDIC SURGERY

## 2023-10-24 PROCEDURE — 3600000012 HC SURGERY LEVEL 2 ADDTL 15MIN: Performed by: ORTHOPAEDIC SURGERY

## 2023-10-24 PROCEDURE — 7100000011 HC PHASE II RECOVERY - ADDTL 15 MIN: Performed by: ORTHOPAEDIC SURGERY

## 2023-10-24 RX ORDER — HYDROCODONE BITARTRATE AND ACETAMINOPHEN 5; 325 MG/1; MG/1
1 TABLET ORAL EVERY 6 HOURS PRN
Qty: 20 TABLET | Refills: 0 | Status: SHIPPED | OUTPATIENT
Start: 2023-10-24 | End: 2023-10-29

## 2023-10-24 ASSESSMENT — PAIN SCALES - GENERAL
PAINLEVEL_OUTOF10: 0
PAINLEVEL_OUTOF10: 6

## 2023-10-24 ASSESSMENT — PAIN DESCRIPTION - LOCATION: LOCATION: HAND

## 2023-10-24 ASSESSMENT — PAIN DESCRIPTION - DESCRIPTORS
DESCRIPTORS: PRESSURE
DESCRIPTORS: ACHING;NUMBNESS;TIGHTNESS

## 2023-10-24 ASSESSMENT — PAIN DESCRIPTION - PAIN TYPE: TYPE: SURGICAL PAIN

## 2023-10-24 ASSESSMENT — PAIN - FUNCTIONAL ASSESSMENT: PAIN_FUNCTIONAL_ASSESSMENT: 0-10

## 2023-10-24 ASSESSMENT — PAIN DESCRIPTION - ORIENTATION: ORIENTATION: LEFT

## 2023-10-24 NOTE — H&P
History and Physical        CHIEF COMPLAINT:  Bilateral hand pain and numbness    HISTORY OF PRESENT ILLNESS:       Ms. Sunshine Valderrama  is a 78 y.o. female  who presents with a long history of bilateral hand pain and numbness. She states that she was seen by her primary care physician sent here for possible carpal tunnel syndrome. She denies having ever had a an EMG study checking for carpal tunnel syndrome. Pain is sharp localized the hands rating up to her forearms although up to her shoulders on both sides. Left is worse than right. States that the whole hand goes numb. She is here today for initial orthopedic evaluation. + BUE EMG. Past Medical History:    Past Medical History:   Diagnosis Date    Allergic conjunctivitis     (stable)    Chronic sinusitis     Constipation     Dizzy spells     Dry eye syndrome     Edentulous     (does not wear dentures)    Essential hypertension 7/6/2016    Fracture of lateral malleolus     (avulsion fracture at the tip of the lateral malleolus - right ankle).     Hearing loss     Hypothyroidism     Interstitial cystitis     Irritable bowel syndrome     Keratitis     (secondary to dry eye syndrome)    Parotid gland enlargement     stasis, consider sjorgens    Pseudophakos     (bilateral)    Seasonal rhinitis     Stress incontinence     Type II or unspecified type diabetes mellitus without mention of complication, not stated as uncontrolled     Urethral stenosis     Xerostomia        Past Surgical History:    Past Surgical History:   Procedure Laterality Date    APPENDECTOMY  07/24/1986    CATARACT REMOVAL WITH IMPLANT Right 09/02/2003    (Dr. Ottoniel De La Rosa)    CATARACT REMOVAL WITH IMPLANT Left 08/12/2003    (Dr. Ottoniel De La Rosa)    CHOLECYSTECTOMY  07/24/1986    COLONOSCOPY  09/08/2014    normal    CYSTOURETHROSCOPY/URETHRAL DILATION  02/08/2012    multiple    EYE SURGERY Bilateral 12/13/2018    Bilateral Transscleral cyclophotocoagulation G6 laser (micropulse) performed by [facility]

## 2023-10-24 NOTE — OP NOTE
Operative Note      Patient: Vika Spence  YOB: 1945  MRN: [id number]    Date of Procedure: 10/24/2023    Pre-Op Diagnosis Codes:     * Left carpal tunnel syndrome [G56.02]    Post-Op Diagnosis: Same       Procedure: Left carpal tunnel release    Surgeon(s):  Mauro Noel MD    Assistant:   * No surgical staff found *    Anesthesia: Local    Estimated Blood Loss (mL): Minimal    Complications: None    Specimens:   * No specimens in log *    Implants:  * No implants in log *      Drains: * No LDAs found *    Findings: confirmed        Detailed Description of Procedure:        INDICATIONS: Vika Spence is a 78y.o.-year-old who has had wrist pain and numbness in the Left  hand for quite some time. There is EMG proven carpal tunnel syndrome. Night splinting and other non-operative care has failed. We have discussed a carpal tunnel release and patient has elected to proceed. NARRATIVE SUMMARY: The patient taken to the operative suite after receiving an injection of buffered lidocaine in the pre-op holding area. The Left  upper extremity was then prepped and draped in normal sterile fashion with a non-sterile tourniquet. Timeout was taken. Consent was confirmed. Additional local anesthetic was injected. Tourniquet was inflated to 250 mmHg. I made a 2.5 cm incision over the transverse carpal ligament with skin knife, followed by electrocautery, very superficially. Dissection continued down to the transverse carpal ligament. It was divided along with palmaris brevis. A complete release was performed proximally and distally with Metzenbaum scissors. There was no recurrent motor branch noted within the field. Wounds were irrigated, closed with nylon, Dermabond and dry dressings. The patient was then returned to the recovery room in good condition. POSTOPERATIVE PLAN: Weightbearing as tolerated. Activities as tolerated.  See the patient back in 2-3 weeks for suture removal.

## 2023-10-24 NOTE — DISCHARGE INSTRUCTIONS
[facility]  Dr. Madeline Espinoza MD       DIET INSTRUCTIONS:  Diet as tolerated. Start with a light diet and progress to your normal diet as you feel like eating. Increase Fluid/Water intake, eat foods high in fiber, fruits and vegetables to help to prevent constipation. ACTIVITY INSTRUCTIONS:  After receiving anesthesia, you can be drowsy. Do not drive or operate hazardous machinery for 24 hours. Rest today. Increase activity as tolerated. Up as tolerated at least 3-4 times per day. 2    WOUND/DRESSING INSTRUCTIONS:  Always ensure you wash your hands before and after caring for the wound/dressing. Keep dressing clean and dry. Change dressing as needed and replace with dry dressing. Can wash around surgical incision but don't get surgical incision/stitches/staples wet. Do not submerge surgical incision in water. Do not place antibiotic ointment, lotion, creams on surgical incision. Smoking impairs adequate wound healing. If smoking , consider quitting. You may apply ice to surgery incision to help to prevent swelling. WEIGHTBEARING STATUS:    Weightbearing as tolerated surgical extremity       MEDICATION INSTRUCTIONS:  Take pain medication as prescribed. See orders regarding resuming your home medications and any new medications. Continue blood thinner as ordered by your physician. FOLLOW-UP CARE:  If a follow-up appointment was not made for you at discharge, call [phone] [facility] office) or [phone] [facility] office) to schedule an appointment for 2-3 weeks. Call Dr Styles Needle office with any concerns. Signs of infection such as fever, chills, redness, pus, or bad smelling discharge from incision site. Excessive bleeding, swelling, increasing pain, or discharge around incision site. The stitches or staples come apart at the incision site. Cough shortness of breath, or chest pain. Severe nausea or vomiting.      Pain that you cannot

## 2023-10-30 ENCOUNTER — TELEPHONE (OUTPATIENT)
Dept: ORTHOPEDIC SURGERY | Age: 78
End: 2023-10-30

## 2023-10-30 DIAGNOSIS — G89.18 POST-OPERATIVE PAIN: Primary | ICD-10-CM

## 2023-10-31 RX ORDER — HYDROCODONE BITARTRATE AND ACETAMINOPHEN 5; 325 MG/1; MG/1
1 TABLET ORAL EVERY 8 HOURS PRN
Qty: 15 TABLET | Refills: 0 | Status: SHIPPED | OUTPATIENT
Start: 2023-10-31 | End: 2023-11-05

## 2023-11-07 ENCOUNTER — OFFICE VISIT (OUTPATIENT)
Dept: ORTHOPEDIC SURGERY | Age: 78
End: 2023-11-07
Payer: MEDICARE

## 2023-11-07 VITALS
WEIGHT: 150 LBS | BODY MASS INDEX: 24.99 KG/M2 | HEART RATE: 72 BPM | SYSTOLIC BLOOD PRESSURE: 138 MMHG | HEIGHT: 65 IN | DIASTOLIC BLOOD PRESSURE: 74 MMHG

## 2023-11-07 DIAGNOSIS — Z98.890 S/P CARPAL TUNNEL RELEASE: Primary | ICD-10-CM

## 2023-11-07 PROCEDURE — 99213 OFFICE O/P EST LOW 20 MIN: CPT | Performed by: PHYSICIAN ASSISTANT

## 2023-11-07 PROCEDURE — 99024 POSTOP FOLLOW-UP VISIT: CPT | Performed by: PHYSICIAN ASSISTANT

## 2023-11-14 ENCOUNTER — HOSPITAL ENCOUNTER (OUTPATIENT)
Dept: OCCUPATIONAL THERAPY | Age: 78
Setting detail: THERAPIES SERIES
Discharge: HOME | End: 2023-11-14
Attending: ORTHOPAEDIC SURGERY
Payer: MEDICARE

## 2023-11-14 DIAGNOSIS — G56.01 CARPAL TUNNEL SYNDROME, RIGHT: ICD-10-CM

## 2023-11-14 DIAGNOSIS — Z98.890 S/P CARPAL TUNNEL RELEASE: Primary | ICD-10-CM

## 2023-11-14 PROCEDURE — 97140 MANUAL THERAPY 1/> REGIONS: CPT | Performed by: OCCUPATIONAL THERAPIST

## 2023-11-14 PROCEDURE — 97166 OT EVAL MOD COMPLEX 45 MIN: CPT | Performed by: OCCUPATIONAL THERAPIST

## 2023-11-14 PROCEDURE — 97110 THERAPEUTIC EXERCISES: CPT | Performed by: OCCUPATIONAL THERAPIST

## 2023-11-14 ASSESSMENT — PAIN DESCRIPTION - ORIENTATION: ORIENTATION: LEFT

## 2023-11-14 ASSESSMENT — 9 HOLE PEG TEST
TESTTIME_SECONDS: 60
TESTTIME_SECONDS: 28
TEST_RESULT: IMPAIRED
TEST_RESULT: IMPAIRED

## 2023-11-14 ASSESSMENT — PAIN DESCRIPTION - LOCATION: LOCATION: HAND;WRIST

## 2023-11-14 ASSESSMENT — PAIN SCALES - GENERAL: PAINLEVEL_OUTOF10: 2

## 2023-11-14 NOTE — FLOWSHEET NOTE
60/80    Plan:   [] Continue per plan of care [] Alter current plan (see comments)  [x] Plan of care initiated [] Hold pending MD visit [] Discharge    Plan for Next Session:      Electronically signed by:  Alex Kumar OT

## 2023-11-14 NOTE — PLAN OF CARE
Occupational Therapy    [x] [place]  Phone: [phone]  Fax: [phone]      [] [place]  Phone: [phone]  Fax: [phone]       To:  Celia Gray      Patient: Marlo Hall  : 1945  MRN: [id number]  Evaluation Date: 2023      Diagnosis Information:  Diagnosis: S/P L Carpal tunnel release   OT Insurance Information: [facility] MEDICARE     Occupational Therapy Certification/Re-Certification Form  Dear Radha Rivero  The following patient has been evaluated for occupational therapy services and for therapy to continue, insurance requires physician review of the treatment plan. Please review the attached evaluation and/or summary of the patient's plan of care, and verify that you agree therapy should continue by signing the attached document and sending it back to our office.     Plan of Care/Treatment to date:  23-23  [x] Therapeutic Exercise   [] Modalities:  [x] Therapeutic Activity    [x] Ultrasound  [] Electrical Stimulation   [x] Activities of Daily Living    [x] Paraffin   [x] Kinesiotaping  [x] Neuromuscular Re-education   [x] Iontophoresis [x] Coldpack/hotpack   [x] Instruction in HEP     [x] Orthotics/splint []   [x] Manual Therapy       [] Aquatic Therapy            Frequency/Duration:  # Days per week: [] 1 day # Weeks: [] 1 week [] 5 weeks      [x] 2 days   [] 2 weeks [x] 6 weeks     [] 3 days   [] 3 weeks [] 7 weeks     [] 4 days   [] 4 weeks [] 8 weeks  Goals:  Short Term Goals  Time Frame for Short Term Goals: 3 weeks  Short Term Goal 1: Patient education adaptive equipment and technique for increased ease I/ADLs  Short Term Goal 2: Patient to demonstrate decreased fluid volume LUE circumferential measurements: wrist, vieyra, MCP  > .5cm for improved AROM hand  Short Term Goal 3: Orthotics as needed BUE for improved positioning B hands/wrists   Long Term Goals  Time Frame for Long Term Goals : 6 weeks  Long Term Goal 1: Patient to demonstrate increased AROM LUE: wrist flexion >

## 2023-11-17 ENCOUNTER — HOSPITAL ENCOUNTER (OUTPATIENT)
Dept: OCCUPATIONAL THERAPY | Age: 78
Setting detail: THERAPIES SERIES
Discharge: HOME | End: 2023-11-17
Attending: ORTHOPAEDIC SURGERY
Payer: MEDICARE

## 2023-11-17 PROCEDURE — 97140 MANUAL THERAPY 1/> REGIONS: CPT

## 2023-11-17 PROCEDURE — 97110 THERAPEUTIC EXERCISES: CPT

## 2023-11-17 NOTE — FLOWSHEET NOTE
[facility]    [x] [place]  Phone: [phone]  Fax: [phone]      [] [place]  Phone: [phone]  Fax: [phone]    Occupational Therapy Daily Treatment Note  Date:  2023    Patient Name:  Treasure Ceballos    :  1945  MRN: [id number]  Restrictions/Precautions:      Medical/Treatment Diagnosis Information:   Diagnosis: S/P L Carpal tunnel release   OT Insurance Information: [facility] MEDICARE  Insurance/Certification information:OT Insurance Information: [id number]  Physician Information:  Rowan Thapa of care signed (Y/N):  n    Visit# / total visits:      Pain level: 0/10 at rest, 4/10 with use, 6/10 with gentle PROM     Progress Note: []  Yes  [x]  No  Next due by: Visit #10      Date of evaluation/re-evaluation: 23-23    Time In: 130   Time Out: 217    Subjective:     Patient rec'd in waiting room, pleasant 78 y.o. female with s/p L carpal tunnel release. Patient reports compliance with edema control HEP for L hand.     Objective/Assessment:   1:1 manual therapy and therapeutic exercise to facilitate improved ROM and strength for ease in I/ADL tasks  Manual lymph drainage L hand/wrist/forearm for edema reduction  Minimal debridement to scarring L palm  Issued A/AROM ex's for HEP (see flowsheet)    Exercises:   Exercise/Equipment Resistance/Repetitions Other comments   Gentle AAROM:  L Wrist flexion/extesnion     Hold 10s              5x HEP   Gentle AAROM:   L hand   Loose composite fist     Hold 10s             5x HEP   Prayer Stretch Hold 10s             5x HEP   Opposition 10x HEP                                                      Therapeutic Exercise  [x] Provided verbal/tactile cueing for activities related to strengthening, flexibility, endurance, ROM. (28810)  Neuro  Re-Ed  [] Provided verbal/tactile cueing for activities related to improving balance, coordination, kinesthetic sense, posture, motor skill,

## 2023-11-18 NOTE — TELEPHONE ENCOUNTER
Piotr Aguila called requesting a refill of the below medication which has been pended for you:     Requested Prescriptions     Pending Prescriptions Disp Refills    metFORMIN (GLUCOPHAGE) 500 MG tablet [Pharmacy Med Name: metformin 500 mg tablet] 180 tablet 1     Sig: TAKE ONE TABLET BY MOUTH TWICE DAILY WITH MEALS @ 9AM & 5PM    omeprazole (PRILOSEC) 20 MG delayed release capsule [Pharmacy Med Name: omeprazole 20 mg capsule,delayed release] 180 capsule 1     Sig: TAKE ONE CAPSULE BY MOUTH TWICE DAILY @ 9AM & 5PM       Last Appointment Date: 8/31/2023  Next Appointment Date: 2/29/2024    Allergies   Allergen Reactions    Latex Rash    Aleve [Naproxen]      Stomach problems    Amoxicillin      Other reaction(s): mucositis    Aspirin Other (See Comments)     Dizziness and tears her stomach up    Dye [Iodides]      IV DYE    Influenza Vaccines     Reglan [Metoclopramide] Other (See Comments)     Dystonic reaction, involuntary movements
No

## 2023-11-20 ENCOUNTER — HOSPITAL ENCOUNTER (OUTPATIENT)
Dept: OCCUPATIONAL THERAPY | Age: 78
Setting detail: THERAPIES SERIES
Discharge: HOME | End: 2023-11-20
Attending: ORTHOPAEDIC SURGERY
Payer: MEDICARE

## 2023-11-20 PROCEDURE — 97140 MANUAL THERAPY 1/> REGIONS: CPT

## 2023-11-20 PROCEDURE — 97110 THERAPEUTIC EXERCISES: CPT

## 2023-11-22 ENCOUNTER — HOSPITAL ENCOUNTER (OUTPATIENT)
Dept: OCCUPATIONAL THERAPY | Age: 78
Setting detail: THERAPIES SERIES
Discharge: HOME | End: 2023-11-22
Attending: ORTHOPAEDIC SURGERY
Payer: MEDICARE

## 2023-11-22 PROCEDURE — 97110 THERAPEUTIC EXERCISES: CPT | Performed by: OCCUPATIONAL THERAPIST

## 2023-11-22 PROCEDURE — 97035 APP MDLTY 1+ULTRASOUND EA 15: CPT | Performed by: OCCUPATIONAL THERAPIST

## 2023-11-22 PROCEDURE — 97140 MANUAL THERAPY 1/> REGIONS: CPT | Performed by: OCCUPATIONAL THERAPIST

## 2023-11-22 NOTE — FLOWSHEET NOTE
[facility]    [x] [place]  Phone: [phone]  Fax: [phone]      [] [place]  Phone: [phone]  Fax: [phone]    Occupational Therapy Daily Treatment Note  Date:  2023    Patient Name:  Chidi Grewal    :  1945  MRN: [id number]  Restrictions/Precautions:      Medical/Treatment Diagnosis Information:   Diagnosis: S/P L Carpal tunnel release   OT Insurance Information: [facility] MEDICARE  Insurance/Certification information:OT Insurance Information: [id number]  Physician Information:  Sherly Parikh of care signed (Y/N):  y    Visit# / total visits:      Pain level: 2-3/10 at rest, 4/10 with use, 7-8/10 with gentle PROM     Progress Note: []  Yes  [x]  No  Next due by: Visit #10      Date of evaluation/re-evaluation: 23-23    Time In: 80   Time Out: 1025    Subjective:     Patient rec'd in waiting room, pleasant 78 y.o. female with s/p L carpal tunnel release. Patient reports having some soreness throughout L hand this date. Objective/Assessment:   1:1 manual therapy and therapeutic exercise to facilitate improved ROM and strength for ease in I/ADL tasks. [address] [place] [address] 5min L hand  US . 08 cm2 3.0 mhz 8 min  Manual lymph drainage L hand/wrist/forearm for edema reduction  Gentle scar massage applied L palm using dycem with good- tolerance  Patient education initiated for adaptive equipment and technique using dycem to open containers and for scar massage, dycem issued to patient for use at home. Reviewed HEP with corrections for technique. Patient feeling dizzy when attempting to stand and leave, stated she had not eaten anything yet today (diabetic); patient provided with some snacks and water and told to wait until her dizziness had passed before she attempted to stand and leave.     Exercises:   Exercise/Equipment Resistance/Repetitions Other comments   Gentle AAROM:  L Wrist flexion/extesnion     Hold 10s              5x

## 2023-11-27 ENCOUNTER — HOSPITAL ENCOUNTER (OUTPATIENT)
Dept: OCCUPATIONAL THERAPY | Age: 78
Setting detail: THERAPIES SERIES
Discharge: HOME | End: 2023-11-27
Attending: ORTHOPAEDIC SURGERY
Payer: MEDICARE

## 2023-11-27 PROCEDURE — 97110 THERAPEUTIC EXERCISES: CPT | Performed by: OCCUPATIONAL THERAPIST

## 2023-11-27 PROCEDURE — 97140 MANUAL THERAPY 1/> REGIONS: CPT | Performed by: OCCUPATIONAL THERAPIST

## 2023-11-27 PROCEDURE — 97035 APP MDLTY 1+ULTRASOUND EA 15: CPT | Performed by: OCCUPATIONAL THERAPIST

## 2023-11-30 ENCOUNTER — OFFICE VISIT (OUTPATIENT)
Dept: PODIATRY | Age: 78
End: 2023-11-30
Payer: MEDICARE

## 2023-11-30 ENCOUNTER — APPOINTMENT (OUTPATIENT)
Dept: OCCUPATIONAL THERAPY | Age: 78
End: 2023-11-30
Attending: ORTHOPAEDIC SURGERY
Payer: MEDICARE

## 2023-11-30 VITALS
BODY MASS INDEX: 25.66 KG/M2 | WEIGHT: 154 LBS | HEIGHT: 65 IN | HEART RATE: 80 BPM | SYSTOLIC BLOOD PRESSURE: 128 MMHG | DIASTOLIC BLOOD PRESSURE: 83 MMHG

## 2023-11-30 DIAGNOSIS — E11.49 DM (DIABETES MELLITUS), TYPE 2 WITH NEUROLOGICAL COMPLICATIONS (HCC): Primary | ICD-10-CM

## 2023-11-30 DIAGNOSIS — B35.1 DERMATOPHYTOSIS OF NAIL: ICD-10-CM

## 2023-11-30 PROCEDURE — 11720 DEBRIDE NAIL 1-5: CPT | Performed by: PODIATRIST

## 2023-11-30 NOTE — PROGRESS NOTES
Foot Care Worksheet  PCP: Juan Aguayo MD  Last visit: 8/31/23    Nail description: Thick , Yellow , Crumbly , Marked limitation of ambulation     Pain resulting from thickened and dystrophy of nail plate No    Nails involved  Right   1  (T5-T9)  Left     5  (TA-T4)    Q7 1 Class A Finding - Non traumatic amputation of foot No    Q8 2 Class B Findings - Absent DP pulse No, Absent PT pulse No, Advanced tropic changes (3 required) Yes    Decrease hair growth Yes, Nail changes/thickening Yes, Pigmented changes/discoloration Yes, Skin texture (thin, shiny) No, Skin color (rubor/redness) No    Q9 1 Class B and 2 Class C Findings  Claudication No, Temperature change Yes, Paresthesia Yes, Burning No, Edema Yes    Subjective:  Martha Eubanks is a 78 y.o. female who presents to the office today for  DM foot care. .  Currently denies F/C/N/V. Allergies   Allergen Reactions    Latex Rash    Aleve [Naproxen]      Stomach problems    Amoxicillin      Other reaction(s): mucositis    Aspirin Other (See Comments)     Dizziness and tears her stomach up    Dye [Iodides]      IV DYE    Influenza Vaccines     Reglan [Metoclopramide] Other (See Comments)     Dystonic reaction, involuntary movements       Past Medical History:   Diagnosis Date    Allergic conjunctivitis     (stable)    Chronic sinusitis     Constipation     Dizzy spells     Dry eye syndrome     Edentulous     (does not wear dentures)    Essential hypertension 7/6/2016    Fracture of lateral malleolus     (avulsion fracture at the tip of the lateral malleolus - right ankle).     Hearing loss     Hypothyroidism     Interstitial cystitis     Irritable bowel syndrome     Keratitis     (secondary to dry eye syndrome)    Parotid gland enlargement     stasis, consider sjorgens    Pseudophakos     (bilateral)    Seasonal rhinitis     Stress incontinence     Type II or unspecified type diabetes mellitus without mention of complication, not stated as uncontrolled

## 2023-12-12 ENCOUNTER — HOSPITAL ENCOUNTER (OUTPATIENT)
Dept: OCCUPATIONAL THERAPY | Age: 78
Setting detail: THERAPIES SERIES
Discharge: HOME | End: 2023-12-12
Attending: ORTHOPAEDIC SURGERY

## 2023-12-14 ENCOUNTER — HOSPITAL ENCOUNTER (OUTPATIENT)
Dept: OCCUPATIONAL THERAPY | Age: 78
Setting detail: THERAPIES SERIES
Discharge: HOME | End: 2023-12-14
Attending: ORTHOPAEDIC SURGERY

## 2023-12-14 NOTE — PROGRESS NOTES
Outpatient Occupational Therapy    [x] [place]  Phone: [phone]  Fax: [phone]      [place]  Phone: [phone]  Fax: [phone]    Occupational Therapy  Cancellation/No-show Note  Patient Name:  Albert Dietz   :  1945   Date:  2023  Cancelled visits to date: 0  No-shows to date: 1         For today's appointment patient:  []    Cancelled  []    Rescheduled appointment  [x]    No-show     Reason given by patient:  []    Patient ill  []    Conflicting appointment  []    No transportation    []    Conflict with work  []    No reason given  []    Other:     Comments:      Electronically signed by:  Maria Ines Neil OT

## 2023-12-29 ENCOUNTER — OFFICE VISIT (OUTPATIENT)
Dept: FAMILY MEDICINE CLINIC | Age: 78
End: 2023-12-29
Payer: MEDICARE

## 2023-12-29 VITALS
DIASTOLIC BLOOD PRESSURE: 64 MMHG | HEIGHT: 65 IN | HEART RATE: 66 BPM | SYSTOLIC BLOOD PRESSURE: 116 MMHG | OXYGEN SATURATION: 98 % | BODY MASS INDEX: 25.46 KG/M2 | WEIGHT: 152.8 LBS

## 2023-12-29 DIAGNOSIS — L98.9 SKIN LESION OF RIGHT ARM: Primary | ICD-10-CM

## 2023-12-29 DIAGNOSIS — Z91.148 NON COMPLIANCE W MEDICATION REGIMEN: ICD-10-CM

## 2023-12-29 PROCEDURE — 3078F DIAST BP <80 MM HG: CPT | Performed by: FAMILY MEDICINE

## 2023-12-29 PROCEDURE — G8427 DOCREV CUR MEDS BY ELIG CLIN: HCPCS | Performed by: FAMILY MEDICINE

## 2023-12-29 PROCEDURE — 99213 OFFICE O/P EST LOW 20 MIN: CPT | Performed by: FAMILY MEDICINE

## 2023-12-29 PROCEDURE — 3074F SYST BP LT 130 MM HG: CPT | Performed by: FAMILY MEDICINE

## 2023-12-29 PROCEDURE — G8484 FLU IMMUNIZE NO ADMIN: HCPCS | Performed by: FAMILY MEDICINE

## 2023-12-29 PROCEDURE — 1090F PRES/ABSN URINE INCON ASSESS: CPT | Performed by: FAMILY MEDICINE

## 2023-12-29 PROCEDURE — 99214 OFFICE O/P EST MOD 30 MIN: CPT | Performed by: FAMILY MEDICINE

## 2023-12-29 PROCEDURE — 1036F TOBACCO NON-USER: CPT | Performed by: FAMILY MEDICINE

## 2023-12-29 PROCEDURE — G8417 CALC BMI ABV UP PARAM F/U: HCPCS | Performed by: FAMILY MEDICINE

## 2023-12-29 PROCEDURE — G8399 PT W/DXA RESULTS DOCUMENT: HCPCS | Performed by: FAMILY MEDICINE

## 2023-12-29 PROCEDURE — 1123F ACP DISCUSS/DSCN MKR DOCD: CPT | Performed by: FAMILY MEDICINE

## 2023-12-29 RX ORDER — LATANOPROST 0.005 %
1 DROPS OPHTHALMIC (EYE) NIGHTLY
COMMUNITY
Start: 2023-12-21

## 2024-01-22 RX ORDER — MONTELUKAST SODIUM 10 MG/1
TABLET ORAL
Qty: 90 TABLET | Refills: 1 | Status: SHIPPED | OUTPATIENT
Start: 2024-01-22

## 2024-01-22 NOTE — TELEPHONE ENCOUNTER
Funmilayo called requesting a refill of the below medication which has been pended for you:     Requested Prescriptions     Pending Prescriptions Disp Refills    montelukast (SINGULAIR) 10 MG tablet [Pharmacy Med Name: montelukast 10 mg tablet] 90 tablet 1     Sig: TAKE ONE TABLET BY MOUTH DAILY AT 5PM       Last Appointment Date: 12/29/2023  Next Appointment Date: 2/29/2024    Allergies   Allergen Reactions    Latex Rash    Aleve [Naproxen]      Stomach problems    Amoxicillin      Other reaction(s): mucositis    Aspirin Other (See Comments)     Dizziness and tears her stomach up    Dye [Iodides]      IV DYE    Influenza Vaccines     Reglan [Metoclopramide] Other (See Comments)     Dystonic reaction, involuntary movements

## 2024-02-22 RX ORDER — LEVOTHYROXINE SODIUM 112 UG/1
TABLET ORAL
Qty: 90 TABLET | Refills: 1 | Status: SHIPPED | OUTPATIENT
Start: 2024-02-22

## 2024-02-22 NOTE — TELEPHONE ENCOUNTER
Funmilayo called requesting a refill of the below medication which has been pended for you:     Requested Prescriptions     Pending Prescriptions Disp Refills    levothyroxine (SYNTHROID) 112 MCG tablet 90 tablet 1     Sig: TAKE ONE TABLET BY MOUTH DAILY AT 7AM       Last Appointment Date: 12/29/2023  Next Appointment Date: 2/29/2024    Allergies   Allergen Reactions    Latex Rash    Aleve [Naproxen]      Stomach problems    Amoxicillin      Other reaction(s): mucositis    Aspirin Other (See Comments)     Dizziness and tears her stomach up    Dye [Iodides]      IV DYE    Influenza Vaccines     Reglan [Metoclopramide] Other (See Comments)     Dystonic reaction, involuntary movements

## 2024-02-24 ENCOUNTER — OFFICE VISIT (OUTPATIENT)
Dept: PRIMARY CARE CLINIC | Age: 79
End: 2024-02-24
Payer: MEDICARE

## 2024-02-24 VITALS
SYSTOLIC BLOOD PRESSURE: 116 MMHG | HEART RATE: 93 BPM | TEMPERATURE: 98.3 F | DIASTOLIC BLOOD PRESSURE: 80 MMHG | WEIGHT: 154 LBS | OXYGEN SATURATION: 90 % | BODY MASS INDEX: 25.63 KG/M2

## 2024-02-24 DIAGNOSIS — H10.9 CONJUNCTIVITIS OF BOTH EYES, UNSPECIFIED CONJUNCTIVITIS TYPE: Primary | ICD-10-CM

## 2024-02-24 PROCEDURE — 3074F SYST BP LT 130 MM HG: CPT

## 2024-02-24 PROCEDURE — 1123F ACP DISCUSS/DSCN MKR DOCD: CPT

## 2024-02-24 PROCEDURE — 3079F DIAST BP 80-89 MM HG: CPT

## 2024-02-24 PROCEDURE — 99213 OFFICE O/P EST LOW 20 MIN: CPT

## 2024-02-24 RX ORDER — POLYMYXIN B SULFATE AND TRIMETHOPRIM 1; 10000 MG/ML; [USP'U]/ML
1 SOLUTION OPHTHALMIC EVERY 6 HOURS
Qty: 10 ML | Refills: 0 | Status: SHIPPED | OUTPATIENT
Start: 2024-02-24 | End: 2024-02-29

## 2024-02-24 ASSESSMENT — ENCOUNTER SYMPTOMS
ALLERGIC/IMMUNOLOGIC NEGATIVE: 1
EYE DISCHARGE: 1
EYE ITCHING: 1
EYE REDNESS: 1
EYE PAIN: 0
DOUBLE VISION: 0
RESPIRATORY NEGATIVE: 1
GASTROINTESTINAL NEGATIVE: 1
PHOTOPHOBIA: 1

## 2024-02-24 NOTE — PATIENT INSTRUCTIONS
Keep area clean and dry  May use cool/ warm compresses to both eyes as needed for drainage  Avoid rubbing eye and scratching eye  Use medication as prescribed  Practice good hand hygiene  If symptoms worsen vision changes, increased drainage follow up with PCP or return to walk in clinic  Patient verbalized understating and agrees with plan of care   
normal...

## 2024-02-24 NOTE — PROGRESS NOTES
[facility] [place]  [address]  [facility] [address]  Dept: [phone]  Dept Fax: [phone]    Funmilayo Cee  is a 78 y.o. female who presents today for her medical conditions/complaints as noted below.  Funmilayo Cee is c/o of   Chief Complaint   Patient presents with    Conjunctivitis     Bilate eyes mostly the left. Crusted shut in mornings painful itchy        HPI:     Conjunctivitis   The current episode started 5 to 7 days ago (sx began on Thursday). The onset was sudden. The problem occurs continuously. The problem has been gradually worsening. The problem is mild. Nothing relieves the symptoms. Nothing aggravates the symptoms. Associated symptoms include eye itching, photophobia, congestion, eye discharge and eye redness. Pertinent negatives include no fever, no decreased vision, no double vision, no ear discharge, no ear pain and no eye pain. The eye pain is mild. Both eyes are affected. The eye pain is not associated with movement. The eyelid exhibits redness.         Past Medical History:   Diagnosis Date    Allergic conjunctivitis     (stable)    Chronic sinusitis     Constipation     Dizzy spells     Dry eye syndrome     Edentulous     (does not wear dentures)    Essential hypertension 7/6/2016    Fracture of lateral malleolus     (avulsion fracture at the tip of the lateral malleolus - right ankle).    Hearing loss     Hypothyroidism     Interstitial cystitis     Irritable bowel syndrome     Keratitis     (secondary to dry eye syndrome)    Parotid gland enlargement     stasis, consider sjorgens    Pseudophakos     (bilateral)    Seasonal rhinitis     Stress incontinence     Type II or unspecified type diabetes mellitus without mention of complication, not stated as uncontrolled     Urethral stenosis     Xerostomia      Past Surgical History:   Procedure

## 2024-02-29 ENCOUNTER — HOSPITAL ENCOUNTER (OUTPATIENT)
Age: 79
Discharge: HOME | End: 2024-02-29
Payer: MEDICARE

## 2024-02-29 ENCOUNTER — OFFICE VISIT (OUTPATIENT)
Dept: FAMILY MEDICINE CLINIC | Age: 79
End: 2024-02-29
Payer: MEDICARE

## 2024-02-29 VITALS
HEART RATE: 60 BPM | SYSTOLIC BLOOD PRESSURE: 132 MMHG | OXYGEN SATURATION: 93 % | DIASTOLIC BLOOD PRESSURE: 64 MMHG | HEIGHT: 65 IN | BODY MASS INDEX: 25.56 KG/M2 | WEIGHT: 153.4 LBS

## 2024-02-29 DIAGNOSIS — I10 ESSENTIAL HYPERTENSION: Primary | ICD-10-CM

## 2024-02-29 DIAGNOSIS — E11.65 UNCONTROLLED TYPE 2 DIABETES MELLITUS WITH HYPERGLYCEMIA (HCC): ICD-10-CM

## 2024-02-29 DIAGNOSIS — K58.1 IRRITABLE BOWEL SYNDROME WITH CONSTIPATION: ICD-10-CM

## 2024-02-29 DIAGNOSIS — K11.7 XEROSTOMIA: ICD-10-CM

## 2024-02-29 DIAGNOSIS — K21.9 GASTROESOPHAGEAL REFLUX DISEASE, UNSPECIFIED WHETHER ESOPHAGITIS PRESENT: ICD-10-CM

## 2024-02-29 DIAGNOSIS — M15.9 GENERALIZED OSTEOARTHROSIS, INVOLVING MULTIPLE SITES: ICD-10-CM

## 2024-02-29 DIAGNOSIS — E78.00 HIGH CHOLESTEROL: ICD-10-CM

## 2024-02-29 DIAGNOSIS — G56.02 CARPAL TUNNEL SYNDROME OF LEFT WRIST: ICD-10-CM

## 2024-02-29 DIAGNOSIS — M54.41 CHRONIC BILATERAL LOW BACK PAIN WITH BILATERAL SCIATICA: ICD-10-CM

## 2024-02-29 DIAGNOSIS — E03.9 HYPOTHYROIDISM, UNSPECIFIED TYPE: ICD-10-CM

## 2024-02-29 DIAGNOSIS — I10 ESSENTIAL HYPERTENSION: ICD-10-CM

## 2024-02-29 DIAGNOSIS — E11.42 TYPE 2 DIABETES MELLITUS WITH DIABETIC POLYNEUROPATHY, WITHOUT LONG-TERM CURRENT USE OF INSULIN (HCC): ICD-10-CM

## 2024-02-29 DIAGNOSIS — M54.42 CHRONIC BILATERAL LOW BACK PAIN WITH BILATERAL SCIATICA: ICD-10-CM

## 2024-02-29 DIAGNOSIS — G89.29 CHRONIC BILATERAL LOW BACK PAIN WITH BILATERAL SCIATICA: ICD-10-CM

## 2024-02-29 DIAGNOSIS — J31.0 CHRONIC RHINITIS: ICD-10-CM

## 2024-02-29 DIAGNOSIS — K30 DELAYED GASTRIC EMPTYING: ICD-10-CM

## 2024-02-29 LAB
ALBUMIN SERPL-MCNC: 4.3 G/DL (ref 3.5–5.2)
ALBUMIN/GLOB SERPL: 1.3 {RATIO} (ref 1–2.5)
ALP SERPL-CCNC: 88 U/L (ref 35–104)
ALT SERPL-CCNC: 10 U/L (ref 5–33)
ANION GAP SERPL CALCULATED.3IONS-SCNC: 10 MMOL/L (ref 9–17)
AST SERPL-CCNC: 17 U/L
BASOPHILS # BLD: 0.05 K/UL (ref 0–0.2)
BASOPHILS NFR BLD: 1 % (ref 0–2)
BILIRUB SERPL-MCNC: 0.9 MG/DL (ref 0.3–1.2)
BUN SERPL-MCNC: 9 MG/DL (ref 8–23)
BUN/CREAT SERPL: 11 (ref 9–20)
CALCIUM SERPL-MCNC: 10.1 MG/DL (ref 8.6–10.4)
CHLORIDE SERPL-SCNC: 99 MMOL/L (ref 98–107)
CHOLEST SERPL-MCNC: 225 MG/DL
CHOLESTEROL/HDL RATIO: 3.5
CO2 SERPL-SCNC: 28 MMOL/L (ref 20–31)
CREAT SERPL-MCNC: 0.8 MG/DL (ref 0.5–0.9)
EOSINOPHIL # BLD: 0.16 K/UL (ref 0–0.44)
EOSINOPHILS RELATIVE PERCENT: 2 % (ref 1–4)
ERYTHROCYTE [DISTWIDTH] IN BLOOD BY AUTOMATED COUNT: 13.8 % (ref 11.8–14.4)
EST. AVERAGE GLUCOSE BLD GHB EST-MCNC: 160 MG/DL
GFR SERPL CREATININE-BSD FRML MDRD: >60 ML/MIN/1.73M2
GLUCOSE SERPL-MCNC: 130 MG/DL (ref 70–99)
HBA1C MFR BLD: 7.2 % (ref 4–6)
HCT VFR BLD AUTO: 42.6 % (ref 36.3–47.1)
HDLC SERPL-MCNC: 65 MG/DL
HGB BLD-MCNC: 13.5 G/DL (ref 11.9–15.1)
IMM GRANULOCYTES # BLD AUTO: 0.03 K/UL (ref 0–0.3)
IMM GRANULOCYTES NFR BLD: 0 %
LDLC SERPL CALC-MCNC: 120 MG/DL (ref 0–130)
LYMPHOCYTES NFR BLD: 2.85 K/UL (ref 1.1–3.7)
LYMPHOCYTES RELATIVE PERCENT: 41 % (ref 24–43)
MCH RBC QN AUTO: 28.6 PG (ref 25.2–33.5)
MCHC RBC AUTO-ENTMCNC: 31.7 G/DL (ref 25.2–33.5)
MCV RBC AUTO: 90.3 FL (ref 82.6–102.9)
MONOCYTES NFR BLD: 0.79 K/UL (ref 0.1–1.2)
MONOCYTES NFR BLD: 11 % (ref 3–12)
NEUTROPHILS NFR BLD: 45 % (ref 36–65)
NEUTS SEG NFR BLD: 3.07 K/UL (ref 1.5–8.1)
NRBC BLD-RTO: 0 PER 100 WBC
PLATELET # BLD AUTO: 332 K/UL (ref 138–453)
PMV BLD AUTO: 9.6 FL (ref 8.1–13.5)
POTASSIUM SERPL-SCNC: 4.1 MMOL/L (ref 3.7–5.3)
PROT SERPL-MCNC: 7.5 G/DL (ref 6.4–8.3)
RBC # BLD AUTO: 4.72 M/UL (ref 3.95–5.11)
SODIUM SERPL-SCNC: 137 MMOL/L (ref 135–144)
T4 FREE SERPL-MCNC: 1.9 NG/DL (ref 0.9–1.7)
TRIGL SERPL-MCNC: 200 MG/DL
TSH SERPL DL<=0.05 MIU/L-ACNC: 0.18 UIU/ML (ref 0.3–5)
WBC OTHER # BLD: 7 K/UL (ref 3.5–11.3)

## 2024-02-29 PROCEDURE — 99214 OFFICE O/P EST MOD 30 MIN: CPT | Performed by: FAMILY MEDICINE

## 2024-02-29 PROCEDURE — 84443 ASSAY THYROID STIM HORMONE: CPT

## 2024-02-29 PROCEDURE — 80053 COMPREHEN METABOLIC PANEL: CPT

## 2024-02-29 PROCEDURE — 1123F ACP DISCUSS/DSCN MKR DOCD: CPT | Performed by: FAMILY MEDICINE

## 2024-02-29 PROCEDURE — 83036 HEMOGLOBIN GLYCOSYLATED A1C: CPT

## 2024-02-29 PROCEDURE — 84439 ASSAY OF FREE THYROXINE: CPT

## 2024-02-29 PROCEDURE — 99213 OFFICE O/P EST LOW 20 MIN: CPT | Performed by: FAMILY MEDICINE

## 2024-02-29 PROCEDURE — 85025 COMPLETE CBC W/AUTO DIFF WBC: CPT

## 2024-02-29 PROCEDURE — 80061 LIPID PANEL: CPT

## 2024-02-29 PROCEDURE — 3075F SYST BP GE 130 - 139MM HG: CPT | Performed by: FAMILY MEDICINE

## 2024-02-29 PROCEDURE — 36415 COLL VENOUS BLD VENIPUNCTURE: CPT

## 2024-02-29 PROCEDURE — 3078F DIAST BP <80 MM HG: CPT | Performed by: FAMILY MEDICINE

## 2024-02-29 RX ORDER — SULFAMETHOXAZOLE AND TRIMETHOPRIM 800; 160 MG/1; MG/1
1 TABLET ORAL 2 TIMES DAILY
Qty: 20 TABLET | Refills: 0 | Status: SHIPPED | OUTPATIENT
Start: 2024-02-29 | End: 2024-03-10

## 2024-02-29 RX ORDER — NEOMYCIN SULFATE, POLYMYXIN B SULFATE AND DEXAMETHASONE 3.5; 10000; 1 MG/ML; [USP'U]/ML; MG/ML
2 SUSPENSION/ DROPS OPHTHALMIC 2 TIMES DAILY
Qty: 5 ML | Refills: 0 | Status: SHIPPED | OUTPATIENT
Start: 2024-02-29 | End: 2024-03-10

## 2024-02-29 SDOH — ECONOMIC STABILITY: FOOD INSECURITY: WITHIN THE PAST 12 MONTHS, THE FOOD YOU BOUGHT JUST DIDN'T LAST AND YOU DIDN'T HAVE MONEY TO GET MORE.: NEVER TRUE

## 2024-02-29 SDOH — ECONOMIC STABILITY: INCOME INSECURITY: HOW HARD IS IT FOR YOU TO PAY FOR THE VERY BASICS LIKE FOOD, HOUSING, MEDICAL CARE, AND HEATING?: NOT HARD AT ALL

## 2024-02-29 SDOH — ECONOMIC STABILITY: FOOD INSECURITY: WITHIN THE PAST 12 MONTHS, YOU WORRIED THAT YOUR FOOD WOULD RUN OUT BEFORE YOU GOT MONEY TO BUY MORE.: NEVER TRUE

## 2024-02-29 ASSESSMENT — ENCOUNTER SYMPTOMS
SINUS PRESSURE: 0
ALLERGIC/IMMUNOLOGIC NEGATIVE: 1
WHEEZING: 0
SHORTNESS OF BREATH: 0
EYES NEGATIVE: 1
SORE THROAT: 1
BACK PAIN: 0
TROUBLE SWALLOWING: 0
RHINORRHEA: 1
GASTROINTESTINAL NEGATIVE: 1
COUGH: 1

## 2024-02-29 ASSESSMENT — PATIENT HEALTH QUESTIONNAIRE - PHQ9
SUM OF ALL RESPONSES TO PHQ QUESTIONS 1-9: 0
SUM OF ALL RESPONSES TO PHQ QUESTIONS 1-9: 0
1. LITTLE INTEREST OR PLEASURE IN DOING THINGS: 0
SUM OF ALL RESPONSES TO PHQ QUESTIONS 1-9: 0
2. FEELING DOWN, DEPRESSED OR HOPELESS: 0
SUM OF ALL RESPONSES TO PHQ9 QUESTIONS 1 & 2: 0
SUM OF ALL RESPONSES TO PHQ QUESTIONS 1-9: 0

## 2024-02-29 NOTE — PROGRESS NOTES
term at present.  Declining to use at present.      Delayed gastric emptying.  Currently, some post prandial bloating and increased bowel sounds by report.  Not really painful.  No vomiting.  EGD 4/2/18: antral ulceration found.  Esophagus re-dilated at that time.   She has prn gastroenterology consult if symptoms worsen.  EGD 11/ 18/19.  Minimal gastritis without ulceration.  Single dilation with dilator.  Some evidence for thick secretions and laryngitis.  She continues on omeprazole.  No gastritis or gerd concerns at present.      Parotid enlargement/exrostomia; still with dry mouth concerns. Not using artificial saliva, she tried and did not like it. Nothing visible as far as enlargement.  She feels fullness at times.  Using hard candy (sugar free) to help with dryness.      Some chronic rhinitis and sinusitis issues ongoing.  She describes intermittent sneezing and congestions despite daily antihistamine and flonase use. Mild allergy symptoms at present.  Concerns for sjogrens in the past.   She has somewhat severe septal deviation and likely has a lot of mechanical obstruction, especially on the right.  Rec. ENT consult to consider mechanical fix to her nose issues.  Thought not to be bad enough to need repair by patient recall.  She desires a 2nd opinion.  Problems with chronic congestion getting worse over time.  Colds tend to cause complications with sinuses and prolonged symptoms.  Currently with a month of cold symptoms, productive cough and nasal congestion/sore throat remain.    Opting to use bactrim ds bid x 10 days against sinusitis concerns and productive cough/bronchitis concerns.       Ibs; some intermittent constipation.  Getting better per patient.  Stir lynch vegetables, watermelon helps she tends to increase water intake and eat foods that help.  Managing with diet at present.  We discussed current bowel regimen and made plans to add more if needed.  Using oat chewies by report which has helped 
4

## 2024-02-29 NOTE — PATIENT INSTRUCTIONS
Hospital Outpatient Visit on 02/29/2024   Component Date Value Ref Range Status    WBC 02/29/2024 7.0  3.5 - 11.3 k/uL Final    RBC 02/29/2024 4.72  3.95 - 5.11 m/uL Final    Hemoglobin 02/29/2024 13.5  11.9 - 15.1 g/dL Final    Hematocrit 02/29/2024 42.6  36.3 - 47.1 % Final    MCV 02/29/2024 90.3  82.6 - 102.9 fL Final    MCH 02/29/2024 28.6  25.2 - 33.5 pg Final    MCHC 02/29/2024 31.7  25.2 - 33.5 g/dL Final    RDW 02/29/2024 13.8  11.8 - 14.4 % Final    Platelets 02/29/2024 332  138 - 453 k/uL Final    MPV 02/29/2024 9.6  8.1 - 13.5 fL Final    NRBC Automated 02/29/2024 0.0  0.0 per 100 WBC Final    Neutrophils % 02/29/2024 45  36 - 65 % Final    Lymphocytes % 02/29/2024 41  24 - 43 % Final    Monocytes % 02/29/2024 11  3 - 12 % Final    Eosinophils % 02/29/2024 2  1 - 4 % Final    Basophils % 02/29/2024 1  0 - 2 % Final    Immature Granulocytes 02/29/2024 0  0 % Final    Neutrophils Absolute 02/29/2024 3.07  1.50 - 8.10 k/uL Final    Lymphocytes Absolute 02/29/2024 2.85  1.10 - 3.70 k/uL Final    Monocytes Absolute 02/29/2024 0.79  0.10 - 1.20 k/uL Final    Eosinophils Absolute 02/29/2024 0.16  0.00 - 0.44 k/uL Final    Basophils Absolute 02/29/2024 0.05  0.00 - 0.20 k/uL Final    Absolute Immature Granulocyte 02/29/2024 0.03  0.00 - 0.30 k/uL Final

## 2024-03-04 ENCOUNTER — TELEPHONE (OUTPATIENT)
Dept: FAMILY MEDICINE CLINIC | Age: 79
End: 2024-03-04

## 2024-03-04 ENCOUNTER — TELEMEDICINE (OUTPATIENT)
Dept: FAMILY MEDICINE CLINIC | Age: 79
End: 2024-03-04
Payer: MEDICARE

## 2024-03-04 DIAGNOSIS — Z00.00 MEDICARE ANNUAL WELLNESS VISIT, SUBSEQUENT: Primary | ICD-10-CM

## 2024-03-04 PROCEDURE — 1123F ACP DISCUSS/DSCN MKR DOCD: CPT | Performed by: FAMILY MEDICINE

## 2024-03-04 PROCEDURE — G0439 PPPS, SUBSEQ VISIT: HCPCS | Performed by: FAMILY MEDICINE

## 2024-03-04 ASSESSMENT — PATIENT HEALTH QUESTIONNAIRE - PHQ9
SUM OF ALL RESPONSES TO PHQ QUESTIONS 1-9: 1
SUM OF ALL RESPONSES TO PHQ9 QUESTIONS 1 & 2: 1
1. LITTLE INTEREST OR PLEASURE IN DOING THINGS: 1
SUM OF ALL RESPONSES TO PHQ QUESTIONS 1-9: 1
2. FEELING DOWN, DEPRESSED OR HOPELESS: 0
SUM OF ALL RESPONSES TO PHQ QUESTIONS 1-9: 1
SUM OF ALL RESPONSES TO PHQ QUESTIONS 1-9: 1

## 2024-03-04 NOTE — PROGRESS NOTES
2Medicare Annual Wellness Visit    Funmilayo Cee is here for Medicare AW    Assessment & Plan     Recommendations for Preventive Services Due: see orders and patient instructions/AVS.  Recommended screening schedule for the next 5-10 years is provided to the patient in written form: see Patient Instructions/AVS.     No follow-ups on file.     Subjective       Patient's complete Health Risk Assessment and screening values have been reviewed and are found in Flowsheets. The following problems were reviewed today and where indicated follow up appointments were made and/or referrals ordered.    Positive Risk Factor Screenings with Interventions:    Fall Risk:  Do you feel unsteady or are you worried about falling? : (!) yes (uses a walker and cane for balance)  2 or more falls in past year?: no  Fall with injury in past year?: no       Interventions:    Reviewed medications, home hazards, visual acuity, and co-morbidities that can increase risk for falls  See AVS for additional education material                  ADL's:   Patient reports needing help with:  Select all that apply: (!) Transportation, Housekeeping, Laundry, Shopping (has aide that comes in 4x a week to assist with these tasks)    Interventions:  See AVS for additional education material                  Objective      Patient-Reported Vitals  Patient-Reported Systolic (Top): 125 mmHg  Patient-Reported Diastolic (Bottom): 84 mmHg  BP Observations: No, remote/electronic monitoring device was not used or able to be verified  Patient-Reported Height: 5'5\"            Allergies   Allergen Reactions    Latex Rash    Aleve [Naproxen]      Stomach problems    Amoxicillin      Other reaction(s): mucositis    Aspirin Other (See Comments)     Dizziness and tears her stomach up    Dye [Iodides]      IV DYE    Influenza Vaccines     Reglan [Metoclopramide] Other (See Comments)     Dystonic reaction, involuntary movements     Prior to Visit Medications

## 2024-03-04 NOTE — PATIENT INSTRUCTIONS
co-insurance, and/or copay.    Some of these benefits include a comprehensive review of your medical history including lifestyle, illnesses that may run in your family, and various assessments and screenings as appropriate.    After reviewing your medical record and screening and assessments performed today your provider may have ordered immunizations, labs, imaging, and/or referrals for you.  A list of these orders (if applicable) as well as your Preventive Care list are included within your After Visit Summary for your review.    Other Preventive Recommendations:    A preventive eye exam performed by an eye specialist is recommended every 1-2 years to screen for glaucoma; cataracts, macular degeneration, and other eye disorders.  A preventive dental visit is recommended every 6 months.  Try to get at least 150 minutes of exercise per week or 10,000 steps per day on a pedometer .  Order or download the FREE \"Exercise & Physical Activity: Your Everyday Guide\" from [employer]. Call 1-188.421.5277 or search [employer] online.  You need 5084-7740 mg of calcium and 4647-3623 IU of vitamin D per day. It is possible to meet your calcium requirement with diet alone, but a vitamin D supplement is usually necessary to meet this goal.  When exposed to the sun, use a sunscreen that protects against both UVA and UVB radiation with an SPF of 30 or greater. Reapply every 2 to 3 hours or after sweating, drying off with a towel, or swimming.  Always wear a seat belt when traveling in a car. Always wear a helmet when riding a bicycle or motorcycle.

## 2024-03-20 ENCOUNTER — OFFICE VISIT (OUTPATIENT)
Dept: FAMILY MEDICINE CLINIC | Age: 79
End: 2024-03-20
Payer: MEDICARE

## 2024-03-20 VITALS
OXYGEN SATURATION: 96 % | HEART RATE: 74 BPM | BODY MASS INDEX: 26.53 KG/M2 | DIASTOLIC BLOOD PRESSURE: 82 MMHG | SYSTOLIC BLOOD PRESSURE: 118 MMHG | WEIGHT: 159.4 LBS

## 2024-03-20 DIAGNOSIS — K11.7 XEROSTOMIA: ICD-10-CM

## 2024-03-20 DIAGNOSIS — J31.0 CHRONIC RHINITIS: Primary | ICD-10-CM

## 2024-03-20 PROCEDURE — 3074F SYST BP LT 130 MM HG: CPT | Performed by: FAMILY MEDICINE

## 2024-03-20 PROCEDURE — 99213 OFFICE O/P EST LOW 20 MIN: CPT | Performed by: FAMILY MEDICINE

## 2024-03-20 PROCEDURE — 1123F ACP DISCUSS/DSCN MKR DOCD: CPT | Performed by: FAMILY MEDICINE

## 2024-03-20 PROCEDURE — 3079F DIAST BP 80-89 MM HG: CPT | Performed by: FAMILY MEDICINE

## 2024-03-20 PROCEDURE — 99212 OFFICE O/P EST SF 10 MIN: CPT | Performed by: FAMILY MEDICINE

## 2024-03-20 ASSESSMENT — ENCOUNTER SYMPTOMS
GASTROINTESTINAL NEGATIVE: 1
SORE THROAT: 0
SINUS PRESSURE: 0
WHEEZING: 0
SHORTNESS OF BREATH: 0
RHINORRHEA: 1
PHOTOPHOBIA: 1
ALLERGIC/IMMUNOLOGIC NEGATIVE: 1
BACK PAIN: 0
TROUBLE SWALLOWING: 0

## 2024-03-20 NOTE — PROGRESS NOTES
Subjective:      Patient ID: Funmilayo Cee is a 78 y.o. female.    HPI  Acute visit to discuss allergies and labs.  She has had allergie/rhinitis symptoms most of her life.  A month ago she had fairly severe flare, with some ongoing issues.  Using xalatan eye drops.  Eyes so dry they are blurry at times.  Has systane eyedrops for dryness, seems to help most of the time but she feels she needs to use them continuously at present.  Some rhinitis, nose symptoms , generally controlled with spray.  Remote allergy testing, she recalls wheat and corn allergies but can't remember the rest.     Also with concerns for Sjogrens syndrome in the past.  Thick salivary gland secretions and dry mouth concerns.      Past Medical History:   Diagnosis Date    Allergic conjunctivitis     (stable)    Chronic sinusitis     Constipation     Dizzy spells     Dry eye syndrome     Edentulous     (does not wear dentures)    Essential hypertension 7/6/2016    Fracture of lateral malleolus     (avulsion fracture at the tip of the lateral malleolus - right ankle).    Hearing loss     Hypothyroidism     Interstitial cystitis     Irritable bowel syndrome     Keratitis     (secondary to dry eye syndrome)    Parotid gland enlargement     stasis, consider sjorgens    Pseudophakos     (bilateral)    Seasonal rhinitis     Stress incontinence     Type II or unspecified type diabetes mellitus without mention of complication, not stated as uncontrolled     Urethral stenosis     Xerostomia      Past Surgical History:   Procedure Laterality Date    APPENDECTOMY  07/24/1986    CARPAL TUNNEL RELEASE Left 10/24/2023    Left Carpal Tunnel Release performed by Ambrose Porter MD at [facility] OR    CATARACT REMOVAL WITH IMPLANT Right 09/02/2003    (Dr. Camejo)    CATARACT REMOVAL WITH IMPLANT Left 08/12/2003    (Dr. Camejo)    CHOLECYSTECTOMY  07/24/1986    COLONOSCOPY  09/08/2014    normal    CYSTOURETHROSCOPY/URETHRAL DILATION  02/08/2012

## 2024-04-29 DIAGNOSIS — G62.9 NEUROPATHY: ICD-10-CM

## 2024-04-29 RX ORDER — GABAPENTIN 400 MG/1
CAPSULE ORAL
Qty: 90 CAPSULE | Refills: 11 | Status: CANCELLED | OUTPATIENT
Start: 2024-04-29 | End: 2024-05-29

## 2024-04-30 ENCOUNTER — OFFICE VISIT (OUTPATIENT)
Dept: PODIATRY | Age: 79
End: 2024-04-30
Payer: MEDICARE

## 2024-04-30 VITALS
HEIGHT: 65 IN | WEIGHT: 164 LBS | SYSTOLIC BLOOD PRESSURE: 122 MMHG | DIASTOLIC BLOOD PRESSURE: 80 MMHG | BODY MASS INDEX: 27.32 KG/M2 | HEART RATE: 62 BPM

## 2024-04-30 DIAGNOSIS — B35.1 DERMATOPHYTOSIS OF NAIL: ICD-10-CM

## 2024-04-30 DIAGNOSIS — E11.49 DM (DIABETES MELLITUS), TYPE 2 WITH NEUROLOGICAL COMPLICATIONS (HCC): Primary | ICD-10-CM

## 2024-04-30 PROCEDURE — 99999 PR OFFICE/OUTPT VISIT,PROCEDURE ONLY: CPT | Performed by: PODIATRIST

## 2024-04-30 PROCEDURE — 11720 DEBRIDE NAIL 1-5: CPT | Performed by: PODIATRIST

## 2024-04-30 NOTE — TELEPHONE ENCOUNTER
Pt stopped by window to discuss. She states she was taking Gabapentin for numbness in her feet, but she states this resolved last summer. She states the numbness has returned, and she had an old bottle at home, so she started taking 400mg TID. She was seen in March and states she was not having these issues at that time. Would you like pt to come in for appt, or would you like to fill?

## 2024-04-30 NOTE — PROGRESS NOTES
Foot Care Worksheet  PCP: Robbi Mora MD  Last visit: 3/20/24    Nail description: Thick , Yellow , Crumbly , Marked limitation of ambulation     Pain resulting from thickened and dystrophy of nail plate No    Nails involved  Right   1  (T5-T9)  Left     5  (TA-T4)    Q7 1 Class A Finding - Non traumatic amputation of foot No    Q8 2 Class B Findings - Absent DP pulse No, Absent PT pulse No, Advanced tropic changes (3 required) Yes    Decrease hair growth Yes, Nail changes/thickening Yes, Pigmented changes/discoloration Yes, Skin texture (thin, shiny) No, Skin color (rubor/redness) No    Q9 1 Class B and 2 Class C Findings  Claudication No, Temperature change Yes, Paresthesia Yes, Burning No, Edema Yes    Subjective:  Funmilayo Cee is a 78 y.o. female who presents to the office today for  DM foot care..  Currently denies F/C/N/V.     Allergies   Allergen Reactions    Latex Rash    Aleve [Naproxen]      Stomach problems    Amoxicillin      Other reaction(s): mucositis    Aspirin Other (See Comments)     Dizziness and tears her stomach up    Dye [Iodides]      IV DYE    Influenza Vaccines     Reglan [Metoclopramide] Other (See Comments)     Dystonic reaction, involuntary movements       Past Medical History:   Diagnosis Date    Allergic conjunctivitis     (stable)    Chronic sinusitis     Constipation     Dizzy spells     Dry eye syndrome     Edentulous     (does not wear dentures)    Essential hypertension 7/6/2016    Fracture of lateral malleolus     (avulsion fracture at the tip of the lateral malleolus - right ankle).    Hearing loss     Hypothyroidism     Interstitial cystitis     Irritable bowel syndrome     Keratitis     (secondary to dry eye syndrome)    Parotid gland enlargement     stasis, consider sjorgens    Pseudophakos     (bilateral)    Seasonal rhinitis     Stress incontinence     Type II or unspecified type diabetes mellitus without mention of complication, not stated as uncontrolled

## 2024-04-30 NOTE — TELEPHONE ENCOUNTER
Need to clarify compliance.  Not sure why she would increase dose , when not taking it recently?  Stopped due to lack of efficacy?  Clarify what she is currently taking , if any, and what she is trying to treat.

## 2024-05-06 RX ORDER — GABAPENTIN 400 MG/1
CAPSULE ORAL
Qty: 90 CAPSULE | Refills: 11 | Status: SHIPPED | OUTPATIENT
Start: 2024-05-06 | End: 2025-05-06

## 2024-05-06 NOTE — TELEPHONE ENCOUNTER
Refilling at prior dose schedule.  If she desires increase for more efficacy, can call after a week or so of restart to request dose increase if needed.

## 2024-05-13 NOTE — TELEPHONE ENCOUNTER
Pt aware. She states she has started taking this again, and she states it is working well at present. She will call with any changes.

## 2024-06-04 RX ORDER — LATANOPROST 50 UG/ML
1 SOLUTION/ DROPS OPHTHALMIC NIGHTLY
Qty: 1 EACH | Refills: 5 | Status: SHIPPED | OUTPATIENT
Start: 2024-06-04

## 2024-06-04 NOTE — TELEPHONE ENCOUNTER
Funmilayo called requesting a refill of the below medication which has been pended for you:     Pt states she is out and needs filled today.     Requested Prescriptions     Pending Prescriptions Disp Refills    latanoprost (XALATAN) 0.005 % ophthalmic solution 1 each 5     Sig: Place 1 drop into both eyes nightly       Last Appointment Date: 3/20/2024  Next Appointment Date: 8/29/2024    Allergies   Allergen Reactions    Latex Rash    Aleve [Naproxen]      Stomach problems    Amoxicillin      Other reaction(s): mucositis    Aspirin Other (See Comments)     Dizziness and tears her stomach up    Dye [Iodides]      IV DYE    Influenza Vaccines     Reglan [Metoclopramide] Other (See Comments)     Dystonic reaction, involuntary movements

## 2024-07-31 ENCOUNTER — TELEPHONE (OUTPATIENT)
Dept: FAMILY MEDICINE CLINIC | Age: 79
End: 2024-07-31

## 2024-07-31 NOTE — TELEPHONE ENCOUNTER
Patient calling to request a walker. She states she has been in communication with her . It was stated the  sent in a request.  She states she has been speaking to a Monique Ni and there was to be paperwork sent.  She does not have a number for her .  She is requesting a call back

## 2024-07-31 NOTE — TELEPHONE ENCOUNTER
Spoke with pt. Form was received and faxed, see scanned media. Will fax again today. Advised pt reach out to her  to ensure she received this. Pt states she does have her 's number, and she will call her.

## 2024-08-09 ENCOUNTER — TELEPHONE (OUTPATIENT)
Dept: FAMILY MEDICINE CLINIC | Age: 79
End: 2024-08-09

## 2024-08-09 DIAGNOSIS — G62.9 NEUROPATHY: ICD-10-CM

## 2024-08-09 RX ORDER — GABAPENTIN 400 MG/1
400 CAPSULE ORAL 3 TIMES DAILY
Qty: 90 CAPSULE | Refills: 0 | Status: SHIPPED | OUTPATIENT
Start: 2024-08-09 | End: 2024-09-08

## 2024-08-09 NOTE — TELEPHONE ENCOUNTER
Funmilayo called requesting a refill of the below medication which has been pended for you: patient needs new script since changing pharmacies from [facility] to [facility]    Requested Prescriptions     Pending Prescriptions Disp Refills    gabapentin (NEURONTIN) 400 MG capsule 90 capsule 0     Sig: Take 1 capsule by mouth 3 times daily for 30 days.       Last Appointment Date: 3/20/2024  Next Appointment Date: 8/29/2024    Allergies   Allergen Reactions    Latex Rash    Aleve [Naproxen]      Stomach problems    Amoxicillin      Other reaction(s): mucositis    Aspirin Other (See Comments)     Dizziness and tears her stomach up    Dye [Iodides]      IV DYE    Influenza Vaccines     Reglan [Metoclopramide] Other (See Comments)     Dystonic reaction, involuntary movements

## 2024-08-09 NOTE — TELEPHONE ENCOUNTER
Patient is calling regarding paperwork for \"passport\". She is needing signatures for this from Dr Mora. Paperwork was found from 7/24/24 for a walker from [employer] but patient states that this is not the paperwork she is talking about. Patient was made aware that Dr Mora has been out of office all week.    Patient is requesting a call back from GO nurse on Monday morning regarding this paperwork.

## 2024-08-09 NOTE — TELEPHONE ENCOUNTER
Medication refilled    Controlled Substance Monitoring:    Acute and Chronic Pain Monitoring:   RX Monitoring Periodic Controlled Substance Monitoring   8/9/2024   4:24 PM No signs of potential drug abuse or diversion identified.;Obtaining appropriate analgesic effect of treatment.

## 2024-08-09 NOTE — TELEPHONE ENCOUNTER
Patient is calling requesting her gabapentin. She confirmed with writer she does take this medication and she did have nine refills. Patient used [facility] before and now she uses [facility] in [place], but they wont fill it without a new script sent. Please advise.

## 2024-08-12 NOTE — TELEPHONE ENCOUNTER
LMTRC. We have received the form, and it is on Dr. Mora's desk to address. Will fax back once completed.

## 2024-08-29 ENCOUNTER — OFFICE VISIT (OUTPATIENT)
Dept: FAMILY MEDICINE CLINIC | Age: 79
End: 2024-08-29
Payer: MEDICARE

## 2024-08-29 ENCOUNTER — HOSPITAL ENCOUNTER (OUTPATIENT)
Age: 79
Discharge: HOME | End: 2024-08-29
Payer: MEDICARE

## 2024-08-29 VITALS
HEIGHT: 65 IN | WEIGHT: 170.2 LBS | BODY MASS INDEX: 28.36 KG/M2 | SYSTOLIC BLOOD PRESSURE: 132 MMHG | DIASTOLIC BLOOD PRESSURE: 74 MMHG | OXYGEN SATURATION: 99 % | HEART RATE: 63 BPM

## 2024-08-29 DIAGNOSIS — E11.42 TYPE 2 DIABETES MELLITUS WITH DIABETIC POLYNEUROPATHY, WITHOUT LONG-TERM CURRENT USE OF INSULIN (HCC): Primary | ICD-10-CM

## 2024-08-29 DIAGNOSIS — E78.00 HIGH CHOLESTEROL: ICD-10-CM

## 2024-08-29 DIAGNOSIS — K30 DELAYED GASTRIC EMPTYING: ICD-10-CM

## 2024-08-29 DIAGNOSIS — G56.02 CARPAL TUNNEL SYNDROME OF LEFT WRIST: ICD-10-CM

## 2024-08-29 DIAGNOSIS — J31.0 CHRONIC RHINITIS: ICD-10-CM

## 2024-08-29 DIAGNOSIS — M15.9 GENERALIZED OSTEOARTHROSIS, INVOLVING MULTIPLE SITES: ICD-10-CM

## 2024-08-29 DIAGNOSIS — E03.9 HYPOTHYROIDISM, UNSPECIFIED TYPE: ICD-10-CM

## 2024-08-29 DIAGNOSIS — K58.1 IRRITABLE BOWEL SYNDROME WITH CONSTIPATION: ICD-10-CM

## 2024-08-29 DIAGNOSIS — M54.41 CHRONIC BILATERAL LOW BACK PAIN WITH BILATERAL SCIATICA: ICD-10-CM

## 2024-08-29 DIAGNOSIS — K21.9 GASTROESOPHAGEAL REFLUX DISEASE, UNSPECIFIED WHETHER ESOPHAGITIS PRESENT: ICD-10-CM

## 2024-08-29 DIAGNOSIS — K11.7 XEROSTOMIA: ICD-10-CM

## 2024-08-29 DIAGNOSIS — I10 ESSENTIAL HYPERTENSION: ICD-10-CM

## 2024-08-29 DIAGNOSIS — G89.29 CHRONIC BILATERAL LOW BACK PAIN WITH BILATERAL SCIATICA: ICD-10-CM

## 2024-08-29 DIAGNOSIS — M54.42 CHRONIC BILATERAL LOW BACK PAIN WITH BILATERAL SCIATICA: ICD-10-CM

## 2024-08-29 DIAGNOSIS — E11.42 TYPE 2 DIABETES MELLITUS WITH DIABETIC POLYNEUROPATHY, WITHOUT LONG-TERM CURRENT USE OF INSULIN (HCC): ICD-10-CM

## 2024-08-29 LAB
ALBUMIN SERPL-MCNC: 4.5 G/DL (ref 3.5–5.2)
ALBUMIN/GLOB SERPL: 1.4 {RATIO} (ref 1–2.5)
ALP SERPL-CCNC: 83 U/L (ref 35–104)
ALT SERPL-CCNC: 26 U/L (ref 5–33)
ANION GAP SERPL CALCULATED.3IONS-SCNC: 11 MMOL/L (ref 9–17)
AST SERPL-CCNC: 27 U/L
BASOPHILS # BLD: 0.05 K/UL (ref 0–0.2)
BASOPHILS NFR BLD: 1 % (ref 0–2)
BILIRUB SERPL-MCNC: 0.4 MG/DL (ref 0.3–1.2)
BUN SERPL-MCNC: 12 MG/DL (ref 8–23)
BUN/CREAT SERPL: 12 (ref 9–20)
CALCIUM SERPL-MCNC: 10.2 MG/DL (ref 8.6–10.4)
CHLORIDE SERPL-SCNC: 99 MMOL/L (ref 98–107)
CHOLEST SERPL-MCNC: 266 MG/DL (ref 0–199)
CHOLESTEROL/HDL RATIO: 4
CO2 SERPL-SCNC: 27 MMOL/L (ref 20–31)
CREAT SERPL-MCNC: 1 MG/DL (ref 0.5–0.9)
EOSINOPHIL # BLD: 0.06 K/UL (ref 0–0.44)
EOSINOPHILS RELATIVE PERCENT: 1 % (ref 1–4)
ERYTHROCYTE [DISTWIDTH] IN BLOOD BY AUTOMATED COUNT: 14 % (ref 11.8–14.4)
EST. AVERAGE GLUCOSE BLD GHB EST-MCNC: 252 MG/DL
GFR, ESTIMATED: 57 ML/MIN/1.73M2
GLUCOSE SERPL-MCNC: 279 MG/DL (ref 70–99)
HBA1C MFR BLD: 10.4 % (ref 4–6)
HCT VFR BLD AUTO: 42.1 % (ref 36.3–47.1)
HDLC SERPL-MCNC: 72 MG/DL
HGB BLD-MCNC: 13.6 G/DL (ref 11.9–15.1)
IMM GRANULOCYTES # BLD AUTO: 0.03 K/UL (ref 0–0.3)
IMM GRANULOCYTES NFR BLD: 1 %
LDLC SERPL CALC-MCNC: 149 MG/DL (ref 0–100)
LYMPHOCYTES NFR BLD: 2.86 K/UL (ref 1.1–3.7)
LYMPHOCYTES RELATIVE PERCENT: 44 % (ref 24–43)
MCH RBC QN AUTO: 29.8 PG (ref 25.2–33.5)
MCHC RBC AUTO-ENTMCNC: 32.3 G/DL (ref 25.2–33.5)
MCV RBC AUTO: 92.1 FL (ref 82.6–102.9)
MONOCYTES NFR BLD: 0.72 K/UL (ref 0.1–1.2)
MONOCYTES NFR BLD: 11 % (ref 3–12)
NEUTROPHILS NFR BLD: 42 % (ref 36–65)
NEUTS SEG NFR BLD: 2.7 K/UL (ref 1.5–8.1)
NRBC BLD-RTO: 0 PER 100 WBC
PLATELET # BLD AUTO: 305 K/UL (ref 138–453)
PMV BLD AUTO: 10.5 FL (ref 8.1–13.5)
POTASSIUM SERPL-SCNC: 3.7 MMOL/L (ref 3.7–5.3)
PROT SERPL-MCNC: 7.7 G/DL (ref 6.4–8.3)
RBC # BLD AUTO: 4.57 M/UL (ref 3.95–5.11)
SODIUM SERPL-SCNC: 137 MMOL/L (ref 135–144)
T4 FREE SERPL-MCNC: 0.8 NG/DL (ref 0.92–1.68)
TRIGL SERPL-MCNC: 228 MG/DL
TSH SERPL DL<=0.05 MIU/L-ACNC: 3.16 UIU/ML (ref 0.3–5)
VLDLC SERPL CALC-MCNC: 46 MG/DL
WBC OTHER # BLD: 6.4 K/UL (ref 3.5–11.3)

## 2024-08-29 PROCEDURE — 80053 COMPREHEN METABOLIC PANEL: CPT

## 2024-08-29 PROCEDURE — 99213 OFFICE O/P EST LOW 20 MIN: CPT | Performed by: FAMILY MEDICINE

## 2024-08-29 PROCEDURE — 36415 COLL VENOUS BLD VENIPUNCTURE: CPT

## 2024-08-29 PROCEDURE — 85025 COMPLETE CBC W/AUTO DIFF WBC: CPT

## 2024-08-29 PROCEDURE — 1123F ACP DISCUSS/DSCN MKR DOCD: CPT | Performed by: FAMILY MEDICINE

## 2024-08-29 PROCEDURE — 99214 OFFICE O/P EST MOD 30 MIN: CPT | Performed by: FAMILY MEDICINE

## 2024-08-29 PROCEDURE — 3051F HG A1C>EQUAL 7.0%<8.0%: CPT | Performed by: FAMILY MEDICINE

## 2024-08-29 PROCEDURE — 84439 ASSAY OF FREE THYROXINE: CPT

## 2024-08-29 PROCEDURE — 84443 ASSAY THYROID STIM HORMONE: CPT

## 2024-08-29 PROCEDURE — 83036 HEMOGLOBIN GLYCOSYLATED A1C: CPT

## 2024-08-29 PROCEDURE — 3078F DIAST BP <80 MM HG: CPT | Performed by: FAMILY MEDICINE

## 2024-08-29 PROCEDURE — 80061 LIPID PANEL: CPT

## 2024-08-29 PROCEDURE — 3075F SYST BP GE 130 - 139MM HG: CPT | Performed by: FAMILY MEDICINE

## 2024-08-29 ASSESSMENT — ENCOUNTER SYMPTOMS
COUGH: 1
SHORTNESS OF BREATH: 0
SORE THROAT: 0
SINUS PRESSURE: 0
ALLERGIC/IMMUNOLOGIC NEGATIVE: 1
GASTROINTESTINAL NEGATIVE: 1
EYES NEGATIVE: 1
TROUBLE SWALLOWING: 0
RHINORRHEA: 1
BACK PAIN: 0
WHEEZING: 0

## 2024-08-29 NOTE — PROGRESS NOTES
with swallowing, and some episodes of choking recently.  She had prior dilation of esophagus in [place] 8/2016, and again 11/2016.  Repeat EGD 4/2/18 with antral gastric ulcer . Repeated dilation at that time.repeat dilation 11/19 through Dr. Joaquin in [place].    She continues on omeprazole 20mg/day.  No current symptoms of concern.        chronic back pain.   Neuropathy symptoms in feet by description, also more problematic acutely.   Stiff with poor rom.   Known moderate spinal canal stenosis at L3-4, L4-5 from 2016 MRI. Numbness in both feet endorsed at present.  neurontin use once a day to occasional twice a day when she has sharp pains.  Seems to be helping.  Back pain improved.  She felt well enough to leave her cane, but currently she is back to using the cane, more for balance and safety.      Hypothyroid: dosage increased to 100mcg/day in august 2021.  Still low at last check, compliance issues revealed.  Decided to cont. Current dosing with better compliance.  Awaiting updated labs.  Dose increased to 112mcg/day last check.    Hyperlipidemia:  Discussed rec. For treatment given diabetes diagnosis. Discussed side effects and precautions.  Updating labs.  She is deferring on statin start.        CTS: left wrist.  S/p ctr through ortho.  Improved.  Still drops things at times, but no issues in either hand at present.     Will review labs as available     Adverse event with flu vaccine, declines.  Rec. Tdap , covid, rsv vaccine.

## 2024-09-04 DIAGNOSIS — G62.9 NEUROPATHY: ICD-10-CM

## 2024-09-09 RX ORDER — GABAPENTIN 400 MG/1
400 CAPSULE ORAL 3 TIMES DAILY
Qty: 90 CAPSULE | Refills: 0 | Status: SHIPPED | OUTPATIENT
Start: 2024-09-09 | End: 2024-10-09

## 2024-09-17 ENCOUNTER — TELEPHONE (OUTPATIENT)
Dept: FAMILY MEDICINE CLINIC | Age: 79
End: 2024-09-17

## 2024-09-25 RX ORDER — DAPAGLIFLOZIN 5 MG/1
5 TABLET, FILM COATED ORAL EVERY MORNING
Qty: 90 TABLET | Refills: 3 | Status: SHIPPED | OUTPATIENT
Start: 2024-09-25

## 2024-10-18 NOTE — TELEPHONE ENCOUNTER
Copied from med refill encounter from 12/27/17. FYI     Last Appt:  9/14/2017  Next Appt:   1/3/2018    Pt calling stating she wants to switch her meals from [employer] to [employer], she needs [employer] to take her off her \"special low sodium diabetic diet\" so she can apply for the passport meals. States if you have any questions call Christopher Boone with [employer] at [phone] ext [phone]. Called patient to let her know that [facility] will discuss with her at her appt on 1/3/2018. Resident

## 2025-03-26 ENCOUNTER — TELEPHONE (OUTPATIENT)
Dept: FAMILY MEDICINE CLINIC | Age: 80
End: 2025-03-26

## 2025-07-15 ENCOUNTER — ANESTHESIA EVENT (OUTPATIENT)
Dept: ENDOSCOPY | Age: 80
End: 2025-07-15
Payer: MEDICARE

## 2025-07-15 ENCOUNTER — HOSPITAL ENCOUNTER (OUTPATIENT)
Age: 80
Setting detail: OUTPATIENT SURGERY
Discharge: HOME | End: 2025-07-15
Attending: INTERNAL MEDICINE | Admitting: INTERNAL MEDICINE
Payer: MEDICARE

## 2025-07-15 ENCOUNTER — HOSPITAL ENCOUNTER (INPATIENT)
Dept: ENDOSCOPY | Age: 80
Discharge: HOME | End: 2025-07-15
Attending: INTERNAL MEDICINE
Payer: MEDICARE

## 2025-07-15 ENCOUNTER — ANESTHESIA (OUTPATIENT)
Dept: ENDOSCOPY | Age: 80
End: 2025-07-15
Payer: MEDICARE

## 2025-07-15 VITALS
SYSTOLIC BLOOD PRESSURE: 135 MMHG | OXYGEN SATURATION: 93 % | HEART RATE: 69 BPM | DIASTOLIC BLOOD PRESSURE: 64 MMHG | WEIGHT: 169.2 LBS | TEMPERATURE: 97.4 F | HEIGHT: 65 IN | BODY MASS INDEX: 28.19 KG/M2 | RESPIRATION RATE: 18 BRPM

## 2025-07-15 PROCEDURE — C1726 CATH, BAL DIL, NON-VASCULAR: HCPCS | Performed by: INTERNAL MEDICINE

## 2025-07-15 PROCEDURE — 6360000002 HC RX W HCPCS: Performed by: NURSE ANESTHETIST, CERTIFIED REGISTERED

## 2025-07-15 PROCEDURE — 88342 IMHCHEM/IMCYTCHM 1ST ANTB: CPT

## 2025-07-15 PROCEDURE — 3609012400 HC EGD TRANSORAL BIOPSY SINGLE/MULTIPLE: Performed by: INTERNAL MEDICINE

## 2025-07-15 PROCEDURE — 3700000000 HC ANESTHESIA ATTENDED CARE: Performed by: INTERNAL MEDICINE

## 2025-07-15 PROCEDURE — 2580000003 HC RX 258: Performed by: INTERNAL MEDICINE

## 2025-07-15 PROCEDURE — 7100000011 HC PHASE II RECOVERY - ADDTL 15 MIN: Performed by: INTERNAL MEDICINE

## 2025-07-15 PROCEDURE — 3609017700 HC EGD DILATION GASTRIC/DUODENAL STRICTURE: Performed by: INTERNAL MEDICINE

## 2025-07-15 PROCEDURE — 88305 TISSUE EXAM BY PATHOLOGIST: CPT

## 2025-07-15 PROCEDURE — 3700000001 HC ADD 15 MINUTES (ANESTHESIA): Performed by: INTERNAL MEDICINE

## 2025-07-15 PROCEDURE — 7100000010 HC PHASE II RECOVERY - FIRST 15 MIN: Performed by: INTERNAL MEDICINE

## 2025-07-15 RX ORDER — M-VIT,TX,IRON,MINS/CALC/FOLIC 27MG-0.4MG
1 TABLET ORAL DAILY
COMMUNITY

## 2025-07-15 RX ORDER — SODIUM CHLORIDE 0.9 % (FLUSH) 0.9 %
5-40 SYRINGE (ML) INJECTION EVERY 12 HOURS SCHEDULED
Status: DISCONTINUED | OUTPATIENT
Start: 2025-07-15 | End: 2025-07-15 | Stop reason: HOSPADM

## 2025-07-15 RX ORDER — DULAGLUTIDE 0.75 MG/.5ML
INJECTION, SOLUTION SUBCUTANEOUS
COMMUNITY
Start: 2025-07-08

## 2025-07-15 RX ORDER — SODIUM CHLORIDE 9 MG/ML
25 INJECTION, SOLUTION INTRAVENOUS PRN
Status: DISCONTINUED | OUTPATIENT
Start: 2025-07-15 | End: 2025-07-15 | Stop reason: HOSPADM

## 2025-07-15 RX ORDER — VIT C/E/ZN/COPPR/LUTEIN/ZEAXAN 250MG-90MG
CAPSULE ORAL
COMMUNITY
Start: 2025-07-14

## 2025-07-15 RX ORDER — GABAPENTIN 600 MG/1
600 TABLET ORAL 3 TIMES DAILY
COMMUNITY

## 2025-07-15 RX ORDER — SODIUM CHLORIDE 9 MG/ML
INJECTION, SOLUTION INTRAVENOUS CONTINUOUS
Status: DISCONTINUED | OUTPATIENT
Start: 2025-07-15 | End: 2025-07-15 | Stop reason: HOSPADM

## 2025-07-15 RX ORDER — LORATADINE 10 MG/1
TABLET ORAL
COMMUNITY
Start: 2025-06-27

## 2025-07-15 RX ORDER — SODIUM CHLORIDE 0.9 % (FLUSH) 0.9 %
5-40 SYRINGE (ML) INJECTION PRN
Status: DISCONTINUED | OUTPATIENT
Start: 2025-07-15 | End: 2025-07-15 | Stop reason: HOSPADM

## 2025-07-15 RX ORDER — BENZONATATE 100 MG/1
CAPSULE ORAL
COMMUNITY
Start: 2025-06-17

## 2025-07-15 RX ORDER — LIDOCAINE HYDROCHLORIDE 20 MG/ML
INJECTION, SOLUTION EPIDURAL; INFILTRATION; INTRACAUDAL; PERINEURAL
Status: DISCONTINUED | OUTPATIENT
Start: 2025-07-15 | End: 2025-07-15 | Stop reason: SDUPTHER

## 2025-07-15 RX ADMIN — PROPOFOL 50 MG: 10 INJECTION, EMULSION INTRAVENOUS at 14:26

## 2025-07-15 RX ADMIN — SODIUM CHLORIDE: 0.9 INJECTION, SOLUTION INTRAVENOUS at 14:00

## 2025-07-15 RX ADMIN — PROPOFOL 50 MG: 10 INJECTION, EMULSION INTRAVENOUS at 14:25

## 2025-07-15 RX ADMIN — LIDOCAINE HYDROCHLORIDE 100 MG: 20 INJECTION, SOLUTION EPIDURAL; INFILTRATION; INTRACAUDAL; PERINEURAL at 14:25

## 2025-07-15 ASSESSMENT — PAIN - FUNCTIONAL ASSESSMENT
PAIN_FUNCTIONAL_ASSESSMENT: 0-10
PAIN_FUNCTIONAL_ASSESSMENT: NONE - DENIES PAIN
PAIN_FUNCTIONAL_ASSESSMENT: NONE - DENIES PAIN

## 2025-07-15 NOTE — ANESTHESIA PRE PROCEDURE
Department of Anesthesiology  Preprocedure Note       Name:  Funmilayo Cee   Age:  79 y.o.  :  1945                                          MRN:  [id number]         Date:  7/15/2025      Surgeon: Surgeon(s):  Patricio Curiel MD    Procedure: Procedure(s):  EGD W/ DILATATION    Medications prior to admission:   Prior to Admission medications    Medication Sig Start Date End Date Taking? Authorizing Provider   Omega-3 Fatty Acids (FT FISH OIL) 300 MG CAPS  25  Yes Violetta Woods MD   loratadine (CLARITIN) 10 MG tablet  25  Yes Violetta Woods MD   benzonatate (TESSALON) 100 MG capsule  25  Yes Violetta Woods MD   Multiple Vitamins-Minerals (THERAPEUTIC MULTIVITAMIN-MINERALS) tablet Take 1 tablet by mouth daily   Yes Violetta Woods MD   TRULICITY 0.75 MG/0.5ML SOAJ SC injection INJECT THE CONTENTS OF 1 PEN (0.5ML) INTO THE SKIN ONCE A WEEK. 25  Yes Violetta Woods MD   gabapentin (NEURONTIN) 600 MG tablet Take 1 tablet by mouth 3 times daily.   Yes Violetta Woods MD   latanoprost (XALATAN) 0.005 % ophthalmic solution Place 1 drop into both eyes nightly 24  Yes Robbi Mora MD   levothyroxine (SYNTHROID) 112 MCG tablet TAKE ONE TABLET BY MOUTH DAILY AT 7AM 24  Yes Robbi Mora MD   montelukast (SINGULAIR) 10 MG tablet TAKE ONE TABLET BY MOUTH DAILY AT 5PM 24  Yes Robbi Mora MD   XALATAN 0.005 % ophthalmic solution Apply 1 drop to eye nightly 23  Yes Violetta Woods MD   omeprazole (PRILOSEC) 20 MG delayed release capsule TAKE ONE CAPSULE BY MOUTH TWICE DAILY @ 9AM & 5PM 23  Yes Robbi Mora MD   dapagliflozin (FARXIGA) 5 MG tablet Take 1 tablet by mouth every morning  Patient not taking: Reported on 7/15/2025 9/25/24   Robbi Mora MD   gabapentin (NEURONTIN) 400 MG capsule Take 1 capsule by mouth 3 times daily for 30 days. 9/9/24 10/9/24  Robbi Mora MD   triamcinolone (KENALOG) 0.1 % ointment

## 2025-07-15 NOTE — PROGRESS NOTES
Scope #  .  EGD completed, tolerated well. biopsies taken, 1 jars labeled and sent to lab. Photos taken.   Dilation complete with 51 Fr american dilator. Guide wire tip intact upon removal.

## 2025-07-15 NOTE — PROCEDURES
[address] [address]                             PROCEDURE NOTE      PATIENT NAME: RANDI FOX           : 1945  MED REC NO: [id number]                       ROOM: [facility]  ACCOUNT NO: [id number]                       ADMIT DATE: 07/15/2025  PROVIDER: Patricio Curiel MD      DATE OF PROCEDURE:  07/15/2025    SURGEON:  Patricio Curiel MD    PROCEDURE:  Esophagogastroduodenoscopy plus dilation plus biopsy.    INDICATIONS FOR PROCEDURE:  The patient with history of intermittent dysphagia, GERD, dyspepsia.  See the recent office note for rest of clinical.  Swallowing is the biggest issue at the moment.  She has been dilated before.  See the preop note for rest of clinicals.    ASA CLASSIFICATION:  III.    MEDICATIONS:  Per Anesthesia.    BIOPSY:  Yes.    PHOTOGRAPHS:  Yes.    BLOOD LOSS:  Minimal.    DESCRIPTION OF PROCEDURE:  Informed consent was obtained after explaining risks and benefits of the procedure and conscious sedation.  Possible complications including bleeding, perforation, reaction to medicine, but not limiting to death, were discussed.    Afterwards, the GIF-180 gastroscope was advanced through oropharynx, esophagus, stomach into the duodenum.  Normal-looking duodenal bulb and postbulbar area.  See photograph.  The scope was withdrawn.  The patient having some gastritis, which was more prominent in the antrum.  See photograph.  Retroflexed exam showed gastritis extending in the body, fundus and cardia were relatively normal.  Scope was withdrawn.  Lower esophageal incompetence, presbyesophagus above that.  The patient having endoscopically no esophagitis.  In view of the patient's symptoms, scope was advanced back in the antrum.  Guidewire was left behind and 51 dilator was advanced without tear.  The patient does have dried up secretions in her throat and in the esophagus which are probably from the

## 2025-07-15 NOTE — PROGRESS NOTES
Pt in phase 2 of recovery, Denies pain,       Dr. Curiel in to speak with patient at bedside.     Discharge instructions given.

## 2025-07-15 NOTE — ANESTHESIA POSTPROCEDURE EVALUATION
Department of Anesthesiology  Postprocedure Note    Patient: Funmilayo Cee  MRN: [id number]  YOB: 1945  Date of evaluation: 7/15/2025    Procedure Summary       Date: 07/15/25 Room / Location: [place] [address] / [facility]    Anesthesia Start: 1421 Anesthesia Stop: 1436    Procedures:       EGD W/ DILATATION (Esophagus)      ESOPHAGOGASTRODUODENOSCOPY BIOPSY (Esophagus) Diagnosis:       Oropharyngeal dysphagia      (Oropharyngeal dysphagia [R13.12])    Surgeons: Patricio Curiel MD Responsible Provider: Jef Jensen DO    Anesthesia Type: MAC, TIVA ASA Status: 2            Anesthesia Type: No value filed.    Nader Phase I: Nader Score: 10    Nader Phase II:      Anesthesia Post Evaluation    Patient location during evaluation: bedside  Patient participation: complete - patient participated  Level of consciousness: sleepy but conscious  Airway patency: patent  Nausea & Vomiting: no nausea and no vomiting  Cardiovascular status: hemodynamically stable  Respiratory status: acceptable, spontaneous ventilation and room air  Hydration status: euvolemic  Pain management: adequate        No notable events documented.

## 2025-07-15 NOTE — PROGRESS NOTES
Funmilayo admitted to [facility] for egd with Dr. Curiel. Consent form signed and verified with pt. Allergies reviewed with pt. No family at bedside. Need to call [facility] when procedure is done.

## 2025-07-15 NOTE — POST SEDATION
[facility]  Sedation/Analgesia Post Sedation Record    Patient: Funmilayo Cee : 1945  Med Rec#: [id number] Acc#: [id number]   Procedure Performed By: Patricio Curiel MD  Primary Care Physician: Robbi Mora MD    POST-PROCEDURE    Physicians/Assistants: Patricio Curiel MD  Procedure Performed:    Sedation/Anesthesia:     Estimated Blood Loss:          ml  Specimens Removed:  []None [x]Other:      Disposition of Specimen:  [x]Pathology []Other      Complications:   [x]None Immediate []Other:     Post-procedure Diagnosis/Findings:             Recommendations:     stricture          Patricio Curiel MD, MD FACG  Electronically signed 7/15/2025 at 2:34 PM

## 2025-07-15 NOTE — H&P
[facility]  Sedation/Analgesia History & Physical    Patient: Funmilayo Cee : 1945  Med Rec#: [id number] Acc#: [id number]   Provider Performing Procedure: Patricio Curiel MD  Primary Care Physician: Robbi Mora MD    PRE-PROCEDURE   Full CODE [x]Yes  DNR-CCA/DNR-CC []Yes   Brief History/Pre-Procedure Diagnosis:dysphagia          MEDICAL HISTORY  []CAD/Valve  []Liver Disease  []Lung Disease []Diabetes  []Hypertension []Renal Disease  []Additional information:       has a past medical history of Allergic conjunctivitis, Chronic sinusitis, Constipation, Dizzy spells, Dry eye syndrome, Edentulous, Essential hypertension, Fracture of lateral malleolus, Hearing loss, Hypothyroidism, Interstitial cystitis, Irritable bowel syndrome, Keratitis, Parotid gland enlargement, Pseudophakos, Seasonal rhinitis, Stress incontinence, Type II or unspecified type diabetes mellitus without mention of complication, not stated as uncontrolled, Urethral stenosis, and Xerostomia.    SURGICAL HISTORY   has a past surgical history that includes Cataract removal with implant (Right, 2003); Cataract removal with implant (Left, 2003); Cholecystectomy (1986); Tubal ligation (1977); Cystourethroscopy/Urethral Dilation (2012); Appendectomy (1986); Colonoscopy (2014); Upper gastrointestinal endoscopy (2015); Upper gastrointestinal endoscopy (2018); Eye surgery (Bilateral, 2018); Upper gastrointestinal endoscopy (2021); Carpal tunnel release (Left, 10/24/2023); and Colonoscopy (2024).  Additional information:       ALLERGIES   Allergies as of 2025 - Fully Reviewed 2024   Allergen Reaction Noted    Latex Rash 2015    Aleve [naproxen]  2016    Amoxicillin  2019    Aspirin Other (See Comments) 2016    Dye [iodides]  2013    Influenza vaccines  2013    Reglan [metoclopramide] Other (See Comments)

## (undated) DEVICE — SCRUB DRY SURG EZ SCRUB BRUSH PREOPERATIVE GRN

## (undated) DEVICE — 9165 UNIVERSAL PATIENT PLATE: Brand: 3M™

## (undated) DEVICE — STERILE COTTON TIPPED APPLICATORS: Brand: PURITAN

## (undated) DEVICE — BANDAGE COMPR W4INXL5YD WHT BGE POLY COT M E WRP WV HK AND

## (undated) DEVICE — DILATOR ENDOSCP L180CM DIA6FR BLLN L8CM DIA54-60FR

## (undated) DEVICE — DRAPE,U/ SHT,SPLIT,PLAS,STERIL: Brand: MEDLINE

## (undated) DEVICE — SUTURE ETHLN SZ 3-0 L18IN NONABSORBABLE BLK FS-1 L24MM 3/8 663H

## (undated) DEVICE — MARKER W/PRE PRINTED CUSTOM LABEL

## (undated) DEVICE — PENCIL ES L3M BTTN SWCH HOLSTER W/ BLDE ELECTRD EDGE

## (undated) DEVICE — Device: Brand: MICROPULSE® P3 DEVICE BOX OF 6

## (undated) DEVICE — GLOVE ORANGE PI 8 1/2   MSG9085

## (undated) DEVICE — INTENDED FOR TISSUE SEPARATION, AND OTHER PROCEDURES THAT REQUIRE A SHARP SURGICAL BLADE TO PUNCTURE OR CUT.: Brand: BARD-PARKER ® CARBON RIB-BACK BLADES

## (undated) DEVICE — DRESSING,GAUZE,XEROFORM,CURAD,1"X8",ST: Brand: CURAD

## (undated) DEVICE — GOWN,AURORA,NON-REINFORCED,2XL: Brand: MEDLINE

## (undated) DEVICE — Z INACTIVE NO ACTIVE SUPPLIER APPLICATOR MEDICATED 26 CC TINT HI-LITE ORNG STRL CHLORAPREP

## (undated) DEVICE — PACK PROCEDURE SURG EXTREMITY MIN

## (undated) DEVICE — COVER LT HNDL BLU PLAS